# Patient Record
Sex: FEMALE | Race: WHITE | NOT HISPANIC OR LATINO
[De-identification: names, ages, dates, MRNs, and addresses within clinical notes are randomized per-mention and may not be internally consistent; named-entity substitution may affect disease eponyms.]

---

## 2017-03-06 ENCOUNTER — RECORD ABSTRACTING (OUTPATIENT)
Age: 82
End: 2017-03-06

## 2017-03-17 ENCOUNTER — RECORD ABSTRACTING (OUTPATIENT)
Age: 82
End: 2017-03-17

## 2017-03-17 DIAGNOSIS — K21.9 GASTRO-ESOPHAGEAL REFLUX DISEASE W/OUT ESOPHAGITIS: ICD-10-CM

## 2017-03-17 DIAGNOSIS — E55.9 VITAMIN D DEFICIENCY, UNSPECIFIED: ICD-10-CM

## 2017-03-17 DIAGNOSIS — Z87.891 PERSONAL HISTORY OF NICOTINE DEPENDENCE: ICD-10-CM

## 2017-03-17 DIAGNOSIS — Z91.19 PATIENT'S NONCOMPLIANCE WITH OTHER MEDICAL TREATMENT AND REGIMEN: ICD-10-CM

## 2017-03-17 DIAGNOSIS — Z80.1 FAMILY HISTORY OF MALIGNANT NEOPLASM OF TRACHEA, BRONCHUS AND LUNG: ICD-10-CM

## 2017-03-17 DIAGNOSIS — Z92.89 PERSONAL HISTORY OF OTHER MEDICAL TREATMENT: ICD-10-CM

## 2017-03-17 DIAGNOSIS — Z87.39 PERSONAL HISTORY OF OTHER DISEASES OF THE MUSCULOSKELETAL SYSTEM AND CONNECTIVE TISSUE: ICD-10-CM

## 2017-03-17 DIAGNOSIS — E03.9 HYPOTHYROIDISM, UNSPECIFIED: ICD-10-CM

## 2017-03-17 DIAGNOSIS — Z82.0 FAMILY HISTORY OF EPILEPSY AND OTHER DISEASES OF THE NERVOUS SYSTEM: ICD-10-CM

## 2017-03-20 ENCOUNTER — APPOINTMENT (OUTPATIENT)
Dept: INTERNAL MEDICINE | Facility: CLINIC | Age: 82
End: 2017-03-20

## 2017-03-20 VITALS
SYSTOLIC BLOOD PRESSURE: 130 MMHG | HEART RATE: 72 BPM | BODY MASS INDEX: 40.97 KG/M2 | OXYGEN SATURATION: 95 % | TEMPERATURE: 98.6 F | WEIGHT: 240 LBS | RESPIRATION RATE: 20 BRPM | DIASTOLIC BLOOD PRESSURE: 76 MMHG | HEIGHT: 64 IN

## 2017-03-20 DIAGNOSIS — Z00.00 ENCOUNTER FOR GENERAL ADULT MEDICAL EXAMINATION W/OUT ABNORMAL FINDINGS: ICD-10-CM

## 2017-03-27 PROBLEM — Z00.00 ENCOUNTER FOR PREVENTIVE HEALTH EXAMINATION: Status: ACTIVE | Noted: 2017-02-25

## 2017-07-17 ENCOUNTER — OTHER (OUTPATIENT)
Age: 82
End: 2017-07-17

## 2017-07-20 ENCOUNTER — OTHER (OUTPATIENT)
Age: 82
End: 2017-07-20

## 2017-07-20 ENCOUNTER — MEDICATION RENEWAL (OUTPATIENT)
Age: 82
End: 2017-07-20

## 2017-07-24 LAB
CHOLEST SERPL-MCNC: 254
GLUCOSE SERPL-MCNC: 115
HBA1C MFR BLD HPLC: 6
HDLC SERPL-MCNC: 47
LDLC SERPL CALC-MCNC: 165

## 2017-08-15 ENCOUNTER — MEDICATION RENEWAL (OUTPATIENT)
Age: 82
End: 2017-08-15

## 2017-08-15 RX ORDER — ATORVASTATIN CALCIUM 40 MG/1
40 TABLET, FILM COATED ORAL
Refills: 0 | Status: COMPLETED | COMMUNITY
End: 2017-08-15

## 2017-09-13 ENCOUNTER — APPOINTMENT (OUTPATIENT)
Dept: INTERNAL MEDICINE | Facility: CLINIC | Age: 82
End: 2017-09-13
Payer: MEDICARE

## 2017-09-13 VITALS
SYSTOLIC BLOOD PRESSURE: 120 MMHG | OXYGEN SATURATION: 95 % | WEIGHT: 248 LBS | RESPIRATION RATE: 20 BRPM | DIASTOLIC BLOOD PRESSURE: 82 MMHG | HEIGHT: 64 IN | HEART RATE: 78 BPM | TEMPERATURE: 97.5 F | BODY MASS INDEX: 42.34 KG/M2

## 2017-09-13 PROCEDURE — 99213 OFFICE O/P EST LOW 20 MIN: CPT

## 2017-09-13 RX ORDER — PREDNISONE 20 MG/1
20 TABLET ORAL
Qty: 10 | Refills: 0 | Status: DISCONTINUED | COMMUNITY
Start: 2017-08-15 | End: 2017-09-13

## 2017-09-22 ENCOUNTER — APPOINTMENT (OUTPATIENT)
Dept: INTERNAL MEDICINE | Facility: CLINIC | Age: 82
End: 2017-09-22

## 2017-10-07 ENCOUNTER — RECORD ABSTRACTING (OUTPATIENT)
Age: 82
End: 2017-10-07

## 2017-10-10 ENCOUNTER — APPOINTMENT (OUTPATIENT)
Dept: INTERNAL MEDICINE | Facility: CLINIC | Age: 82
End: 2017-10-10
Payer: MEDICARE

## 2017-10-10 VITALS
TEMPERATURE: 97.6 F | BODY MASS INDEX: 42 KG/M2 | SYSTOLIC BLOOD PRESSURE: 124 MMHG | WEIGHT: 245.99 LBS | OXYGEN SATURATION: 97 % | HEART RATE: 70 BPM | DIASTOLIC BLOOD PRESSURE: 80 MMHG | RESPIRATION RATE: 16 BRPM | HEIGHT: 64 IN

## 2017-10-10 DIAGNOSIS — N30.90 CYSTITIS, UNSPECIFIED W/OUT HEMATURIA: ICD-10-CM

## 2017-10-10 DIAGNOSIS — I65.23 OCCLUSION AND STENOSIS OF BILATERAL CAROTID ARTERIES: ICD-10-CM

## 2017-10-10 DIAGNOSIS — K76.0 FATTY (CHANGE OF) LIVER, NOT ELSEWHERE CLASSIFIED: ICD-10-CM

## 2017-10-10 DIAGNOSIS — E78.00 PURE HYPERCHOLESTEROLEMIA, UNSPECIFIED: ICD-10-CM

## 2017-10-10 DIAGNOSIS — R79.89 OTHER SPECIFIED ABNORMAL FINDINGS OF BLOOD CHEMISTRY: ICD-10-CM

## 2017-10-10 DIAGNOSIS — M10.9 GOUT, UNSPECIFIED: ICD-10-CM

## 2017-10-10 DIAGNOSIS — E53.8 DEFICIENCY OF OTHER SPECIFIED B GROUP VITAMINS: ICD-10-CM

## 2017-10-10 PROCEDURE — 99213 OFFICE O/P EST LOW 20 MIN: CPT | Mod: 25

## 2017-10-10 RX ORDER — PREDNISONE 20 MG/1
20 TABLET ORAL AS DIRECTED
Qty: 9 | Refills: 0 | Status: COMPLETED | COMMUNITY
Start: 2017-09-13 | End: 2017-10-10

## 2017-10-10 RX ORDER — OLMESARTAN MEDOXOMIL 20 MG/1
20 TABLET, FILM COATED ORAL DAILY
Refills: 0 | Status: COMPLETED | COMMUNITY
End: 2017-10-10

## 2017-10-10 RX ORDER — HYDROCHLOROTHIAZIDE 25 MG/1
25 TABLET ORAL
Refills: 0 | Status: COMPLETED | COMMUNITY
End: 2017-10-10

## 2017-10-10 RX ORDER — LEVOTHYROXINE SODIUM 75 UG/1
75 TABLET ORAL
Refills: 0 | Status: COMPLETED | COMMUNITY
End: 2017-10-10

## 2018-01-09 ENCOUNTER — RECORD ABSTRACTING (OUTPATIENT)
Age: 83
End: 2018-01-09

## 2018-01-09 RX ORDER — UBIDECARENONE/VIT E ACET 100MG-5
CAPSULE ORAL
Refills: 0 | Status: ACTIVE | COMMUNITY

## 2018-01-10 ENCOUNTER — OTHER (OUTPATIENT)
Age: 83
End: 2018-01-10

## 2018-01-10 ENCOUNTER — MED ADMIN CHARGE (OUTPATIENT)
Age: 83
End: 2018-01-10

## 2018-01-10 ENCOUNTER — APPOINTMENT (OUTPATIENT)
Dept: INTERNAL MEDICINE | Facility: CLINIC | Age: 83
End: 2018-01-10
Payer: MEDICARE

## 2018-01-10 VITALS
BODY MASS INDEX: 40.97 KG/M2 | DIASTOLIC BLOOD PRESSURE: 78 MMHG | OXYGEN SATURATION: 96 % | HEIGHT: 64 IN | HEART RATE: 70 BPM | TEMPERATURE: 97.6 F | WEIGHT: 240 LBS | SYSTOLIC BLOOD PRESSURE: 134 MMHG | RESPIRATION RATE: 20 BRPM

## 2018-01-10 DIAGNOSIS — Z23 ENCOUNTER FOR IMMUNIZATION: ICD-10-CM

## 2018-01-10 DIAGNOSIS — E66.01 MORBID (SEVERE) OBESITY DUE TO EXCESS CALORIES: ICD-10-CM

## 2018-01-10 DIAGNOSIS — E66.9 OBESITY, UNSPECIFIED: ICD-10-CM

## 2018-01-10 LAB
CHOLEST SERPL-MCNC: 187
HDLC SERPL-MCNC: 37.5
LDLC SERPL CALC-MCNC: 112.3

## 2018-01-10 PROCEDURE — 99213 OFFICE O/P EST LOW 20 MIN: CPT | Mod: 25

## 2018-01-10 PROCEDURE — 90662 IIV NO PRSV INCREASED AG IM: CPT

## 2018-01-10 PROCEDURE — G0008: CPT

## 2018-02-20 ENCOUNTER — MEDICATION RENEWAL (OUTPATIENT)
Age: 83
End: 2018-02-20

## 2018-04-04 ENCOUNTER — APPOINTMENT (OUTPATIENT)
Dept: OTOLARYNGOLOGY | Facility: CLINIC | Age: 83
End: 2018-04-04
Payer: MEDICARE

## 2018-04-04 VITALS
BODY MASS INDEX: 40.8 KG/M2 | HEIGHT: 64 IN | SYSTOLIC BLOOD PRESSURE: 178 MMHG | WEIGHT: 239 LBS | HEART RATE: 73 BPM | DIASTOLIC BLOOD PRESSURE: 93 MMHG

## 2018-04-04 DIAGNOSIS — H61.21 IMPACTED CERUMEN, RIGHT EAR: ICD-10-CM

## 2018-04-04 DIAGNOSIS — R42 DIZZINESS AND GIDDINESS: ICD-10-CM

## 2018-04-04 PROCEDURE — 92588 EVOKED AUDITORY TST COMPLETE: CPT

## 2018-04-04 PROCEDURE — 99203 OFFICE O/P NEW LOW 30 MIN: CPT

## 2018-04-04 PROCEDURE — 92557 COMPREHENSIVE HEARING TEST: CPT

## 2018-04-04 PROCEDURE — 92567 TYMPANOMETRY: CPT

## 2018-05-15 ENCOUNTER — NON-APPOINTMENT (OUTPATIENT)
Age: 83
End: 2018-05-15

## 2018-05-15 ENCOUNTER — APPOINTMENT (OUTPATIENT)
Dept: INTERNAL MEDICINE | Facility: CLINIC | Age: 83
End: 2018-05-15
Payer: MEDICARE

## 2018-05-15 VITALS
DIASTOLIC BLOOD PRESSURE: 74 MMHG | TEMPERATURE: 98.6 F | BODY MASS INDEX: 40.97 KG/M2 | HEIGHT: 64 IN | WEIGHT: 240 LBS | SYSTOLIC BLOOD PRESSURE: 132 MMHG | RESPIRATION RATE: 20 BRPM | HEART RATE: 88 BPM | OXYGEN SATURATION: 97 %

## 2018-05-15 PROCEDURE — 94010 BREATHING CAPACITY TEST: CPT

## 2018-05-15 PROCEDURE — 99214 OFFICE O/P EST MOD 30 MIN: CPT | Mod: 25

## 2018-05-23 ENCOUNTER — RX RENEWAL (OUTPATIENT)
Age: 83
End: 2018-05-23

## 2018-06-28 DIAGNOSIS — E55.9 VITAMIN D DEFICIENCY, UNSPECIFIED: ICD-10-CM

## 2018-07-10 ENCOUNTER — RX RENEWAL (OUTPATIENT)
Age: 83
End: 2018-07-10

## 2018-07-10 RX ORDER — LEVOTHYROXINE SODIUM 0.09 MG/1
88 TABLET ORAL DAILY
Qty: 90 | Refills: 3 | Status: ACTIVE | COMMUNITY
Start: 1900-01-01 | End: 1900-01-01

## 2018-08-16 ENCOUNTER — MEDICATION RENEWAL (OUTPATIENT)
Age: 83
End: 2018-08-16

## 2018-11-13 ENCOUNTER — APPOINTMENT (OUTPATIENT)
Dept: INTERNAL MEDICINE | Facility: CLINIC | Age: 83
End: 2018-11-13

## 2018-12-17 ENCOUNTER — APPOINTMENT (OUTPATIENT)
Dept: INTERNAL MEDICINE | Facility: CLINIC | Age: 83
End: 2018-12-17
Payer: MEDICARE

## 2018-12-17 ENCOUNTER — OTHER (OUTPATIENT)
Age: 83
End: 2018-12-17

## 2018-12-17 VITALS
DIASTOLIC BLOOD PRESSURE: 80 MMHG | OXYGEN SATURATION: 97 % | HEART RATE: 76 BPM | WEIGHT: 236 LBS | TEMPERATURE: 97.9 F | BODY MASS INDEX: 40.29 KG/M2 | RESPIRATION RATE: 16 BRPM | HEIGHT: 64 IN | SYSTOLIC BLOOD PRESSURE: 124 MMHG

## 2018-12-17 DIAGNOSIS — M54.41 LUMBAGO WITH SCIATICA, LEFT SIDE: ICD-10-CM

## 2018-12-17 DIAGNOSIS — M47.816 SPONDYLOSIS W/OUT MYELOPATHY OR RADICULOPATHY, LUMBAR REGION: ICD-10-CM

## 2018-12-17 DIAGNOSIS — M54.42 LUMBAGO WITH SCIATICA, LEFT SIDE: ICD-10-CM

## 2018-12-17 DIAGNOSIS — G89.29 LUMBAGO WITH SCIATICA, LEFT SIDE: ICD-10-CM

## 2018-12-17 DIAGNOSIS — R53.82 CHRONIC FATIGUE, UNSPECIFIED: ICD-10-CM

## 2018-12-17 PROCEDURE — G0008: CPT

## 2018-12-17 PROCEDURE — 99214 OFFICE O/P EST MOD 30 MIN: CPT | Mod: 25

## 2018-12-17 PROCEDURE — 90662 IIV NO PRSV INCREASED AG IM: CPT | Mod: GA

## 2018-12-17 RX ORDER — LOSARTAN POTASSIUM 50 MG/1
50 TABLET, FILM COATED ORAL
Refills: 0 | Status: DISCONTINUED | COMMUNITY
End: 2018-12-17

## 2018-12-17 NOTE — PHYSICAL EXAM
[No Acute Distress] : no acute distress [Well Nourished] : well nourished [Normal Oropharynx] : the oropharynx was normal [Supple] : supple [No Respiratory Distress] : no respiratory distress  [Clear to Auscultation] : lungs were clear to auscultation bilaterally [Normal Rate] : normal rate  [Regular Rhythm] : with a regular rhythm [Soft] : abdomen soft [Non Tender] : non-tender [Normal Bowel Sounds] : normal bowel sounds [Normal Supraclavicular Nodes] : no supraclavicular lymphadenopathy [Normal Posterior Cervical Nodes] : no posterior cervical lymphadenopathy [Normal Anterior Cervical Nodes] : no anterior cervical lymphadenopathy [No CVA Tenderness] : no CVA  tenderness [No Spinal Tenderness] : no spinal tenderness [Coordination Grossly Intact] : coordination grossly intact [No Focal Deficits] : no focal deficits [Normal Affect] : the affect was normal [Normal Insight/Judgement] : insight and judgment were intact [de-identified] : 1 + edema bilateral lower extremities. [de-identified] : Decreased strength and tone bilateral lower extremities.

## 2018-12-17 NOTE — PLAN
[FreeTextEntry1] : Will arrange comprehensive fasting blood work to be scheduled through [facility] for week of January 1, 2019.\par Continue current medications as prescribed.\par Advised follow up with Dr. Pack and Dr. Us for gait instability.\par She has a script through Dr. Us for further PT for gait, balance and strength to be restarted 2019.\par She will follow with Dr. Collins in Cardiology.\par 6 month primary care visit with Dr. Núñez in our office.\par Will speak with patient when lab results reviewed, she will call us sooner if questions or problems.

## 2018-12-17 NOTE — HISTORY OF PRESENT ILLNESS
[FreeTextEntry1] : patient presents for interval exam.  She is generally feeling well but still complains of gait imbalance and weakness in lower extremities.  Has had extensive work up with Neurologist and Physiologist.  She has had MRI of brain which was noncontributory and MRI spine and  EMG/nerve conduction studies.  Epidurals by Dr. Pack in past which has helped the pains from spinal disease.See previous notes for results.\par To date she is managing at home without falling, she uses a walker and cane.  \par Felt that PT was helpful, completed 1 month ago for strength and she has been doing some exercise at home.. Has PT script from Dr. Us for more sessions in 2019. [de-identified] : To Dr. Rodarte 5 months ago for stage 3 chronic renal disease.  Reports HCTZ and Losartan dosing decreased by 1/2.  Blood pressure has been stable.  Next appointment i n 2 months.

## 2019-01-08 ENCOUNTER — RX RENEWAL (OUTPATIENT)
Age: 84
End: 2019-01-08

## 2019-01-09 ENCOUNTER — RX RENEWAL (OUTPATIENT)
Age: 84
End: 2019-01-09

## 2019-01-09 RX ORDER — ATORVASTATIN CALCIUM 20 MG/1
20 TABLET, FILM COATED ORAL
Qty: 90 | Refills: 1 | Status: ACTIVE | COMMUNITY
Start: 2018-05-23 | End: 1900-01-01

## 2019-01-17 LAB — HBA1C MFR BLD HPLC: 6.2

## 2019-03-01 ENCOUNTER — APPOINTMENT (OUTPATIENT)
Dept: INTERNAL MEDICINE | Facility: CLINIC | Age: 84
End: 2019-03-01

## 2019-07-09 ENCOUNTER — RX RENEWAL (OUTPATIENT)
Age: 84
End: 2019-07-09

## 2019-07-10 ENCOUNTER — APPOINTMENT (OUTPATIENT)
Dept: INTERNAL MEDICINE | Facility: CLINIC | Age: 84
End: 2019-07-10

## 2021-01-18 ENCOUNTER — APPOINTMENT (OUTPATIENT)
Dept: OTOLARYNGOLOGY | Facility: CLINIC | Age: 86
End: 2021-01-18
Payer: MEDICARE

## 2021-01-18 VITALS
TEMPERATURE: 97.5 F | SYSTOLIC BLOOD PRESSURE: 134 MMHG | DIASTOLIC BLOOD PRESSURE: 67 MMHG | BODY MASS INDEX: 40.29 KG/M2 | HEART RATE: 72 BPM | WEIGHT: 236 LBS | HEIGHT: 64 IN

## 2021-01-18 DIAGNOSIS — H68.023 CHRONIC EUSTACHIAN SALPINGITIS, BILATERAL: ICD-10-CM

## 2021-01-18 PROCEDURE — 92557 COMPREHENSIVE HEARING TEST: CPT

## 2021-01-18 PROCEDURE — 99214 OFFICE O/P EST MOD 30 MIN: CPT

## 2021-01-18 PROCEDURE — 92567 TYMPANOMETRY: CPT

## 2021-01-18 NOTE — ASSESSMENT
[FreeTextEntry1] : cerumen cleared worse on rt\par audio  moderate to severe au lss poor disc rt\par rec hearing aid reeval\par tinnitus  rec masking

## 2021-01-18 NOTE — PHYSICAL EXAM
[Midline] : trachea located in midline position [Normal] : no rashes [de-identified] : cerumen impacted rt ear cleared

## 2021-04-27 ENCOUNTER — OUTPATIENT (OUTPATIENT)
Dept: OUTPATIENT SERVICES | Facility: HOSPITAL | Age: 86
LOS: 1 days | End: 2021-04-27
Payer: MEDICARE

## 2021-04-27 DIAGNOSIS — Z20.828 CONTACT WITH AND (SUSPECTED) EXPOSURE TO OTHER VIRAL COMMUNICABLE DISEASES: ICD-10-CM

## 2021-04-27 LAB — SARS-COV-2 RNA SPEC QL NAA+PROBE: SIGNIFICANT CHANGE UP

## 2021-04-27 PROCEDURE — U0003: CPT

## 2021-04-27 PROCEDURE — U0005: CPT

## 2021-04-29 ENCOUNTER — OUTPATIENT (OUTPATIENT)
Dept: OUTPATIENT SERVICES | Facility: HOSPITAL | Age: 86
LOS: 1 days | End: 2021-04-29
Payer: MEDICARE

## 2021-04-29 DIAGNOSIS — M54.16 RADICULOPATHY, LUMBAR REGION: ICD-10-CM

## 2021-04-29 PROCEDURE — 62323 NJX INTERLAMINAR LMBR/SAC: CPT

## 2021-07-29 ENCOUNTER — OUTPATIENT (OUTPATIENT)
Dept: OUTPATIENT SERVICES | Facility: HOSPITAL | Age: 86
LOS: 1 days | End: 2021-07-29
Payer: MEDICARE

## 2021-07-29 DIAGNOSIS — M54.16 RADICULOPATHY, LUMBAR REGION: ICD-10-CM

## 2021-07-29 PROCEDURE — 62323 NJX INTERLAMINAR LMBR/SAC: CPT

## 2022-03-15 ENCOUNTER — APPOINTMENT (OUTPATIENT)
Dept: PHARMACY | Facility: CLINIC | Age: 87
End: 2022-03-15

## 2022-03-30 ENCOUNTER — APPOINTMENT (OUTPATIENT)
Dept: PHARMACY | Facility: CLINIC | Age: 87
End: 2022-03-30
Payer: SELF-PAY

## 2022-03-30 ENCOUNTER — APPOINTMENT (OUTPATIENT)
Dept: OTOLARYNGOLOGY | Facility: CLINIC | Age: 87
End: 2022-03-30
Payer: MEDICARE

## 2022-03-30 PROCEDURE — 92567 TYMPANOMETRY: CPT

## 2022-03-30 PROCEDURE — 92557 COMPREHENSIVE HEARING TEST: CPT

## 2022-03-30 PROCEDURE — V5010 ASSESSMENT FOR HEARING AID: CPT | Mod: NC

## 2022-05-02 ENCOUNTER — APPOINTMENT (OUTPATIENT)
Dept: OTOLARYNGOLOGY | Facility: CLINIC | Age: 87
End: 2022-05-02

## 2022-05-02 ENCOUNTER — APPOINTMENT (OUTPATIENT)
Dept: PHARMACY | Facility: CLINIC | Age: 87
End: 2022-05-02

## 2022-06-06 ENCOUNTER — APPOINTMENT (OUTPATIENT)
Dept: PHARMACY | Facility: CLINIC | Age: 87
End: 2022-06-06

## 2022-06-06 ENCOUNTER — APPOINTMENT (OUTPATIENT)
Dept: OTOLARYNGOLOGY | Facility: CLINIC | Age: 87
End: 2022-06-06

## 2022-06-27 ENCOUNTER — APPOINTMENT (OUTPATIENT)
Dept: OTOLARYNGOLOGY | Facility: CLINIC | Age: 87
End: 2022-06-27

## 2022-06-30 RX ORDER — AZITHROMYCIN 500 MG/1
0 TABLET, FILM COATED ORAL
Qty: 0 | Refills: 0 | DISCHARGE
Start: 2022-06-30 | End: 2022-07-04

## 2022-07-02 ENCOUNTER — INPATIENT (INPATIENT)
Facility: HOSPITAL | Age: 87
LOS: 5 days | Discharge: NURSING, REHAB OR LONG-TERM CARE | DRG: 280 | End: 2022-07-08
Attending: HOSPITALIST | Admitting: FAMILY MEDICINE
Payer: MEDICARE

## 2022-07-02 VITALS
SYSTOLIC BLOOD PRESSURE: 107 MMHG | DIASTOLIC BLOOD PRESSURE: 63 MMHG | OXYGEN SATURATION: 96 % | RESPIRATION RATE: 20 BRPM | TEMPERATURE: 98 F | HEART RATE: 70 BPM | WEIGHT: 216.93 LBS

## 2022-07-02 DIAGNOSIS — I21.4 NON-ST ELEVATION (NSTEMI) MYOCARDIAL INFARCTION: ICD-10-CM

## 2022-07-02 LAB
ALBUMIN SERPL ELPH-MCNC: 2.9 G/DL — LOW (ref 3.3–5)
ALP SERPL-CCNC: 91 U/L — SIGNIFICANT CHANGE UP (ref 40–120)
ALT FLD-CCNC: 177 U/L — HIGH (ref 12–78)
ANION GAP SERPL CALC-SCNC: 9 MMOL/L — SIGNIFICANT CHANGE UP (ref 5–17)
ANION GAP SERPL CALC-SCNC: 9 MMOL/L — SIGNIFICANT CHANGE UP (ref 5–17)
APPEARANCE UR: ABNORMAL
APTT BLD: 20.7 SEC — LOW (ref 27.5–35.5)
AST SERPL-CCNC: 717 U/L — HIGH (ref 15–37)
BASOPHILS # BLD AUTO: 0.02 K/UL — SIGNIFICANT CHANGE UP (ref 0–0.2)
BASOPHILS NFR BLD AUTO: 0.1 % — SIGNIFICANT CHANGE UP (ref 0–2)
BILIRUB SERPL-MCNC: 0.8 MG/DL — SIGNIFICANT CHANGE UP (ref 0.2–1.2)
BILIRUB UR-MCNC: NEGATIVE — SIGNIFICANT CHANGE UP
BUN SERPL-MCNC: 69 MG/DL — HIGH (ref 7–23)
BUN SERPL-MCNC: 73 MG/DL — HIGH (ref 7–23)
CALCIUM SERPL-MCNC: 8.5 MG/DL — SIGNIFICANT CHANGE UP (ref 8.5–10.1)
CALCIUM SERPL-MCNC: 8.9 MG/DL — SIGNIFICANT CHANGE UP (ref 8.5–10.1)
CHLORIDE SERPL-SCNC: 105 MMOL/L — SIGNIFICANT CHANGE UP (ref 96–108)
CHLORIDE SERPL-SCNC: 108 MMOL/L — SIGNIFICANT CHANGE UP (ref 96–108)
CO2 SERPL-SCNC: 19 MMOL/L — LOW (ref 22–31)
CO2 SERPL-SCNC: 22 MMOL/L — SIGNIFICANT CHANGE UP (ref 22–31)
COLOR SPEC: YELLOW — SIGNIFICANT CHANGE UP
CREAT SERPL-MCNC: 4.09 MG/DL — HIGH (ref 0.5–1.3)
CREAT SERPL-MCNC: 4.21 MG/DL — HIGH (ref 0.5–1.3)
DIFF PNL FLD: ABNORMAL
EGFR: 10 ML/MIN/1.73M2 — LOW
EGFR: 10 ML/MIN/1.73M2 — LOW
EOSINOPHIL # BLD AUTO: 0.01 K/UL — SIGNIFICANT CHANGE UP (ref 0–0.5)
EOSINOPHIL NFR BLD AUTO: 0.1 % — SIGNIFICANT CHANGE UP (ref 0–6)
GLUCOSE SERPL-MCNC: 125 MG/DL — HIGH (ref 70–99)
GLUCOSE SERPL-MCNC: 126 MG/DL — HIGH (ref 70–99)
GLUCOSE UR QL: NEGATIVE — SIGNIFICANT CHANGE UP
HCT VFR BLD CALC: 34.2 % — LOW (ref 34.5–45)
HGB BLD-MCNC: 11.8 G/DL — SIGNIFICANT CHANGE UP (ref 11.5–15.5)
IMM GRANULOCYTES NFR BLD AUTO: 0.4 % — SIGNIFICANT CHANGE UP (ref 0–1.5)
INR BLD: 1.03 RATIO — SIGNIFICANT CHANGE UP (ref 0.88–1.16)
KETONES UR-MCNC: NEGATIVE — SIGNIFICANT CHANGE UP
LACTATE SERPL-SCNC: 1.7 MMOL/L — SIGNIFICANT CHANGE UP (ref 0.7–2)
LEUKOCYTE ESTERASE UR-ACNC: ABNORMAL
LYMPHOCYTES # BLD AUTO: 1.31 K/UL — SIGNIFICANT CHANGE UP (ref 1–3.3)
LYMPHOCYTES # BLD AUTO: 9.2 % — LOW (ref 13–44)
MAGNESIUM SERPL-MCNC: 2 MG/DL — SIGNIFICANT CHANGE UP (ref 1.6–2.6)
MCHC RBC-ENTMCNC: 30.9 PG — SIGNIFICANT CHANGE UP (ref 27–34)
MCHC RBC-ENTMCNC: 34.5 GM/DL — SIGNIFICANT CHANGE UP (ref 32–36)
MCV RBC AUTO: 89.5 FL — SIGNIFICANT CHANGE UP (ref 80–100)
MONOCYTES # BLD AUTO: 0.71 K/UL — SIGNIFICANT CHANGE UP (ref 0–0.9)
MONOCYTES NFR BLD AUTO: 5 % — SIGNIFICANT CHANGE UP (ref 2–14)
NEUTROPHILS # BLD AUTO: 12.07 K/UL — HIGH (ref 1.8–7.4)
NEUTROPHILS NFR BLD AUTO: 85.2 % — HIGH (ref 43–77)
NITRITE UR-MCNC: NEGATIVE — SIGNIFICANT CHANGE UP
PH UR: 5 — SIGNIFICANT CHANGE UP (ref 5–8)
PLATELET # BLD AUTO: 161 K/UL — SIGNIFICANT CHANGE UP (ref 150–400)
POTASSIUM SERPL-MCNC: 3.7 MMOL/L — SIGNIFICANT CHANGE UP (ref 3.5–5.3)
POTASSIUM SERPL-MCNC: 4.2 MMOL/L — SIGNIFICANT CHANGE UP (ref 3.5–5.3)
POTASSIUM SERPL-SCNC: 3.7 MMOL/L — SIGNIFICANT CHANGE UP (ref 3.5–5.3)
POTASSIUM SERPL-SCNC: 4.2 MMOL/L — SIGNIFICANT CHANGE UP (ref 3.5–5.3)
PROT SERPL-MCNC: 6.5 GM/DL — SIGNIFICANT CHANGE UP (ref 6–8.3)
PROT UR-MCNC: 30 MG/DL
PROTHROM AB SERPL-ACNC: 11.9 SEC — SIGNIFICANT CHANGE UP (ref 10.5–13.4)
RBC # BLD: 3.82 M/UL — SIGNIFICANT CHANGE UP (ref 3.8–5.2)
RBC # FLD: 13.4 % — SIGNIFICANT CHANGE UP (ref 10.3–14.5)
SODIUM SERPL-SCNC: 136 MMOL/L — SIGNIFICANT CHANGE UP (ref 135–145)
SODIUM SERPL-SCNC: 136 MMOL/L — SIGNIFICANT CHANGE UP (ref 135–145)
SP GR SPEC: 1.01 — SIGNIFICANT CHANGE UP (ref 1.01–1.02)
TROPONIN I, HIGH SENSITIVITY RESULT: 3957.44 NG/L — HIGH
UROBILINOGEN FLD QL: NEGATIVE — SIGNIFICANT CHANGE UP
WBC # BLD: 14.18 K/UL — HIGH (ref 3.8–10.5)
WBC # FLD AUTO: 14.18 K/UL — HIGH (ref 3.8–10.5)

## 2022-07-02 PROCEDURE — 83735 ASSAY OF MAGNESIUM: CPT

## 2022-07-02 PROCEDURE — 85045 AUTOMATED RETICULOCYTE COUNT: CPT

## 2022-07-02 PROCEDURE — 36415 COLL VENOUS BLD VENIPUNCTURE: CPT

## 2022-07-02 PROCEDURE — 97530 THERAPEUTIC ACTIVITIES: CPT | Mod: GP

## 2022-07-02 PROCEDURE — 93010 ELECTROCARDIOGRAM REPORT: CPT

## 2022-07-02 PROCEDURE — 83540 ASSAY OF IRON: CPT

## 2022-07-02 PROCEDURE — 80048 BASIC METABOLIC PNL TOTAL CA: CPT

## 2022-07-02 PROCEDURE — 86850 RBC ANTIBODY SCREEN: CPT

## 2022-07-02 PROCEDURE — 82570 ASSAY OF URINE CREATININE: CPT

## 2022-07-02 PROCEDURE — 76376 3D RENDER W/INTRP POSTPROCES: CPT

## 2022-07-02 PROCEDURE — 70450 CT HEAD/BRAIN W/O DYE: CPT | Mod: MA

## 2022-07-02 PROCEDURE — 83010 ASSAY OF HAPTOGLOBIN QUANT: CPT

## 2022-07-02 PROCEDURE — 85018 HEMOGLOBIN: CPT

## 2022-07-02 PROCEDURE — 86901 BLOOD TYPING SEROLOGIC RH(D): CPT

## 2022-07-02 PROCEDURE — 83550 IRON BINDING TEST: CPT

## 2022-07-02 PROCEDURE — 87086 URINE CULTURE/COLONY COUNT: CPT

## 2022-07-02 PROCEDURE — 72125 CT NECK SPINE W/O DYE: CPT | Mod: 26

## 2022-07-02 PROCEDURE — 84443 ASSAY THYROID STIM HORMONE: CPT

## 2022-07-02 PROCEDURE — 99291 CRITICAL CARE FIRST HOUR: CPT

## 2022-07-02 PROCEDURE — P9016: CPT

## 2022-07-02 PROCEDURE — 84300 ASSAY OF URINE SODIUM: CPT

## 2022-07-02 PROCEDURE — 93005 ELECTROCARDIOGRAM TRACING: CPT

## 2022-07-02 PROCEDURE — 72125 CT NECK SPINE W/O DYE: CPT | Mod: MA

## 2022-07-02 PROCEDURE — 85014 HEMATOCRIT: CPT

## 2022-07-02 PROCEDURE — 72192 CT PELVIS W/O DYE: CPT

## 2022-07-02 PROCEDURE — 74176 CT ABD & PELVIS W/O CONTRAST: CPT

## 2022-07-02 PROCEDURE — 86900 BLOOD TYPING SEROLOGIC ABO: CPT

## 2022-07-02 PROCEDURE — 84484 ASSAY OF TROPONIN QUANT: CPT

## 2022-07-02 PROCEDURE — 76770 US EXAM ABDO BACK WALL COMP: CPT

## 2022-07-02 PROCEDURE — 81001 URINALYSIS AUTO W/SCOPE: CPT

## 2022-07-02 PROCEDURE — 80076 HEPATIC FUNCTION PANEL: CPT

## 2022-07-02 PROCEDURE — 86920 COMPATIBILITY TEST SPIN: CPT

## 2022-07-02 PROCEDURE — 85027 COMPLETE CBC AUTOMATED: CPT

## 2022-07-02 PROCEDURE — 85730 THROMBOPLASTIN TIME PARTIAL: CPT

## 2022-07-02 PROCEDURE — 70450 CT HEAD/BRAIN W/O DYE: CPT | Mod: 26

## 2022-07-02 PROCEDURE — 93306 TTE W/DOPPLER COMPLETE: CPT

## 2022-07-02 PROCEDURE — U0003: CPT

## 2022-07-02 PROCEDURE — 73502 X-RAY EXAM HIP UNI 2-3 VIEWS: CPT | Mod: 26,LT

## 2022-07-02 PROCEDURE — 84156 ASSAY OF PROTEIN URINE: CPT

## 2022-07-02 PROCEDURE — 97116 GAIT TRAINING THERAPY: CPT | Mod: GP

## 2022-07-02 PROCEDURE — 99222 1ST HOSP IP/OBS MODERATE 55: CPT

## 2022-07-02 PROCEDURE — 73700 CT LOWER EXTREMITY W/O DYE: CPT | Mod: LT

## 2022-07-02 PROCEDURE — 82728 ASSAY OF FERRITIN: CPT

## 2022-07-02 PROCEDURE — 85610 PROTHROMBIN TIME: CPT

## 2022-07-02 PROCEDURE — 36430 TRANSFUSION BLD/BLD COMPNT: CPT

## 2022-07-02 PROCEDURE — 97163 PT EVAL HIGH COMPLEX 45 MIN: CPT | Mod: GP

## 2022-07-02 PROCEDURE — 71045 X-RAY EXAM CHEST 1 VIEW: CPT | Mod: 26

## 2022-07-02 PROCEDURE — 84100 ASSAY OF PHOSPHORUS: CPT

## 2022-07-02 PROCEDURE — 82607 VITAMIN B-12: CPT

## 2022-07-02 PROCEDURE — U0005: CPT

## 2022-07-02 PROCEDURE — 83615 LACTATE (LD) (LDH) ENZYME: CPT

## 2022-07-02 RX ORDER — ACETAMINOPHEN 500 MG
650 TABLET ORAL ONCE
Refills: 0 | Status: COMPLETED | OUTPATIENT
Start: 2022-07-02 | End: 2022-07-02

## 2022-07-02 RX ORDER — HEPARIN SODIUM 5000 [USP'U]/ML
INJECTION INTRAVENOUS; SUBCUTANEOUS
Qty: 25000 | Refills: 0 | Status: DISCONTINUED | OUTPATIENT
Start: 2022-07-02 | End: 2022-07-03

## 2022-07-02 RX ORDER — SODIUM CHLORIDE 9 MG/ML
1000 INJECTION INTRAMUSCULAR; INTRAVENOUS; SUBCUTANEOUS
Refills: 0 | Status: DISCONTINUED | OUTPATIENT
Start: 2022-07-02 | End: 2022-07-03

## 2022-07-02 RX ORDER — HEPARIN SODIUM 5000 [USP'U]/ML
5000 INJECTION INTRAVENOUS; SUBCUTANEOUS ONCE
Refills: 0 | Status: COMPLETED | OUTPATIENT
Start: 2022-07-02 | End: 2022-07-02

## 2022-07-02 RX ORDER — CEFTRIAXONE 500 MG/1
1000 INJECTION, POWDER, FOR SOLUTION INTRAMUSCULAR; INTRAVENOUS EVERY 24 HOURS
Refills: 0 | Status: DISCONTINUED | OUTPATIENT
Start: 2022-07-03 | End: 2022-07-05

## 2022-07-02 RX ORDER — CEFTRIAXONE 500 MG/1
1000 INJECTION, POWDER, FOR SOLUTION INTRAMUSCULAR; INTRAVENOUS ONCE
Refills: 0 | Status: COMPLETED | OUTPATIENT
Start: 2022-07-02 | End: 2022-07-02

## 2022-07-02 RX ORDER — SODIUM CHLORIDE 9 MG/ML
1000 INJECTION INTRAMUSCULAR; INTRAVENOUS; SUBCUTANEOUS ONCE
Refills: 0 | Status: COMPLETED | OUTPATIENT
Start: 2022-07-02 | End: 2022-07-02

## 2022-07-02 RX ORDER — LANOLIN ALCOHOL/MO/W.PET/CERES
3 CREAM (GRAM) TOPICAL AT BEDTIME
Refills: 0 | Status: DISCONTINUED | OUTPATIENT
Start: 2022-07-02 | End: 2022-07-08

## 2022-07-02 RX ORDER — ASPIRIN/CALCIUM CARB/MAGNESIUM 324 MG
324 TABLET ORAL ONCE
Refills: 0 | Status: COMPLETED | OUTPATIENT
Start: 2022-07-02 | End: 2022-07-02

## 2022-07-02 RX ORDER — CEFTRIAXONE 500 MG/1
1000 INJECTION, POWDER, FOR SOLUTION INTRAMUSCULAR; INTRAVENOUS EVERY 24 HOURS
Refills: 0 | Status: DISCONTINUED | OUTPATIENT
Start: 2022-07-02 | End: 2022-07-02

## 2022-07-02 RX ORDER — ATORVASTATIN CALCIUM 80 MG/1
20 TABLET, FILM COATED ORAL AT BEDTIME
Refills: 0 | Status: DISCONTINUED | OUTPATIENT
Start: 2022-07-02 | End: 2022-07-08

## 2022-07-02 RX ORDER — ACETAMINOPHEN 500 MG
650 TABLET ORAL EVERY 6 HOURS
Refills: 0 | Status: DISCONTINUED | OUTPATIENT
Start: 2022-07-02 | End: 2022-07-03

## 2022-07-02 RX ORDER — HEPARIN SODIUM 5000 [USP'U]/ML
6000 INJECTION INTRAVENOUS; SUBCUTANEOUS EVERY 6 HOURS
Refills: 0 | Status: DISCONTINUED | OUTPATIENT
Start: 2022-07-02 | End: 2022-07-05

## 2022-07-02 RX ORDER — LEVOTHYROXINE SODIUM 125 MCG
88 TABLET ORAL DAILY
Refills: 0 | Status: DISCONTINUED | OUTPATIENT
Start: 2022-07-02 | End: 2022-07-08

## 2022-07-02 RX ORDER — ONDANSETRON 8 MG/1
4 TABLET, FILM COATED ORAL EVERY 8 HOURS
Refills: 0 | Status: DISCONTINUED | OUTPATIENT
Start: 2022-07-02 | End: 2022-07-08

## 2022-07-02 RX ADMIN — HEPARIN SODIUM 5000 UNIT(S): 5000 INJECTION INTRAVENOUS; SUBCUTANEOUS at 16:38

## 2022-07-02 RX ADMIN — HEPARIN SODIUM 1000 UNIT(S)/HR: 5000 INJECTION INTRAVENOUS; SUBCUTANEOUS at 19:14

## 2022-07-02 RX ADMIN — Medication 650 MILLIGRAM(S): at 13:47

## 2022-07-02 RX ADMIN — CEFTRIAXONE 100 MILLIGRAM(S): 500 INJECTION, POWDER, FOR SOLUTION INTRAMUSCULAR; INTRAVENOUS at 13:42

## 2022-07-02 RX ADMIN — ATORVASTATIN CALCIUM 20 MILLIGRAM(S): 80 TABLET, FILM COATED ORAL at 21:22

## 2022-07-02 RX ADMIN — SODIUM CHLORIDE 1000 MILLILITER(S): 9 INJECTION INTRAMUSCULAR; INTRAVENOUS; SUBCUTANEOUS at 13:47

## 2022-07-02 RX ADMIN — Medication 324 MILLIGRAM(S): at 13:41

## 2022-07-02 RX ADMIN — CEFTRIAXONE 1000 MILLIGRAM(S): 500 INJECTION, POWDER, FOR SOLUTION INTRAMUSCULAR; INTRAVENOUS at 13:47

## 2022-07-02 RX ADMIN — HEPARIN SODIUM 1000 UNIT(S)/HR: 5000 INJECTION INTRAVENOUS; SUBCUTANEOUS at 16:38

## 2022-07-02 RX ADMIN — Medication 650 MILLIGRAM(S): at 13:41

## 2022-07-02 RX ADMIN — SODIUM CHLORIDE 500 MILLILITER(S): 9 INJECTION INTRAMUSCULAR; INTRAVENOUS; SUBCUTANEOUS at 22:28

## 2022-07-02 RX ADMIN — SODIUM CHLORIDE 2000 MILLILITER(S): 9 INJECTION INTRAMUSCULAR; INTRAVENOUS; SUBCUTANEOUS at 13:40

## 2022-07-02 NOTE — ED PROVIDER NOTE - CLINICAL SUMMARY MEDICAL DECISION MAKING FREE TEXT BOX
Pt's EKG is NST rate of 63 with T wave abnormalities, st segment flattening, labs remarkable for a trop of 3,000 Pt's EKG is NST rate of 63 with T wave abnormalities, st segment flattening, labs remarkable for a trop of over 3000, I d/w Dr. Nelson, no stemi called at this time, she denied any CP/SOB, ASA/heparin ordered, d/w medicine and will be admitted.

## 2022-07-02 NOTE — ED PROVIDER NOTE - OBJECTIVE STATEMENT
86 y/o female with a PMHx of HTN, HLD, Gout presents to the ED s/p fall getting into bed Wednesday night. Pt has severe pain in the L hip region, states pain has been getting worse. Pt denies head trauma, LOC. Daughter suspects pt is experiencing potential UTI, as she is becoming disoriented.

## 2022-07-02 NOTE — ED PROVIDER NOTE - CRITICAL CARE ATTENDING CONTRIBUTION TO CARE
Elements for critical care include direct patient care (not related to procedure), additional history taking, interpretation of diagnostic studies, documentation, consultation with other physicians, consult w/ pt's family directly relating to pts condition

## 2022-07-02 NOTE — H&P ADULT - NSHPPHYSICALEXAM_GEN_ALL_CORE
PHYSICAL EXAM:    General: NAD, Alert & Oriented X3.  HEENT: NC/AT, Normal Hearing  Eyes: EOMI, PERRLA, Conjunctiva and sclera clear  Neck: Soft and supple. No JVD  Respiratory: Clear breath sounds bilaterally.  No wheezing, rales or rhonchi  Cardiovascular: S1 and S2, regular rate and rhythm.  No murmurs, gallops or rubs  Gastrointestinal: Soft, non-tender, non-distended. No guarding, rebound tenderness, +BS  Genitourinary: Voiding freely. No palpable bladder  Extremities: +2 peripheral pulses bilaterally.  No clubbing, cyanosis, or edema.    Neurological:.  No focal deficits  Musculoskeletal: 5/5 strength bilateral upper and lower extremities  Skin: No rashes

## 2022-07-02 NOTE — ED ADULT TRIAGE NOTE - CHIEF COMPLAINT QUOTE
pt presents to ED from home, awake and alert. s/p mechanical fall getting out of bed on Wednesday. c/o L hip pain, lower back pain, difficulty ambulating. denies headstrike, anticoagulants. pt notes she is being treated currently for UTI with PO abx.

## 2022-07-02 NOTE — ED ADULT NURSE NOTE - NSIMPLEMENTINTERV_GEN_ALL_ED
Implemented All Fall Risk Interventions:  [place] to call system. Call bell, personal items and telephone within reach. Instruct patient to call for assistance. Room bathroom lighting operational. Non-slip footwear when patient is off stretcher. Physically safe environment: no spills, clutter or unnecessary equipment. Stretcher in lowest position, wheels locked, appropriate side rails in place. Provide visual cue, wrist band, yellow gown, etc. Monitor gait and stability. Monitor for mental status changes and reorient to person, place, and time. Review medications for side effects contributing to fall risk. Reinforce activity limits and safety measures with patient and family.

## 2022-07-02 NOTE — ED PROVIDER NOTE - PHYSICAL EXAMINATION
Vital signs as available reviewed.  General:  Comfortable, no acute distress.  Head:  Normocephalic, atraumatic.  Eyes:  Conjunctiva pink, no icterus.  Cardiovascular:  Regular rate, no obvious murmur.  Respiratory:  Clear to auscultation, good air entry bilaterally.  Abdomen:  Soft, non-tender.  Musculoskeletal:  No deformity or calf tenderness.  Neurologic: Alert and oriented, moving all extremities.  Skin:  Warm and dry. Constitutional: NAD AAOx3  Eyes: PERRL, EOMI  Head: Normocephalic atraumatic  Mouth: MMM  Cardiac: regular rate   Resp: Lungs CTAB  GI: Abd s/nt/nd  Neuro: CN2-12 intact  Extremities: Intact distal pulses b/l, no calf tenderness, + ttp of the left hip, normal ROM   Skin: No rashes

## 2022-07-02 NOTE — PATIENT PROFILE ADULT - TRANSPORTATION
BMI: BMI (kg/m2): 20.9 (04-16-22 @ 18:50)  HbA1c: A1C with Estimated Average Glucose Result: 5.3 % (09-22-21 @ 11:56)    Glucose:   BP: --  Lipid Panel: Date/Time: 09-23-21 @ 08:40  Cholesterol, Serum: 177  Direct LDL: --  HDL Cholesterol, Serum: 101  Total Cholesterol/HDL Ration Measurement: --  Triglycerides, Serum: 52   no

## 2022-07-02 NOTE — H&P ADULT - ASSESSMENT
metabolic encephalopathy secondary to uti, improving   mariza due to above, cr- 4.  unknown baseline  leukocytosis with left shift due to uti  nstemi, no cp or sob  2.1 cm left thyroid lobe nodule      -admit to tele   -cont rocephin   -cardio consult, dr. cooper  -trend troponin   -cont asa  -cont heparin gtt  -cont statin   -BP on lower end, may add metoprolol 12.5mg bid if increases (on Bystolic at home)  -hold home hctz, losartan, allopurinol due to mraiza  -nephro consult   -cont ivf   -repeat bmp in 2 hours    -insert external cath, strict i/o   -f/u am labs, ecg  -f/u thyroid nodule outpt     dvt prophyalxis   -heparin gtt     advance directives   -full code  metabolic encephalopathy secondary to uti, improving   mariza due to above, cr- 4.  h/o ckd stage III  leukocytosis with left shift due to uti  nstemi, no cp or sob  2.1 cm left thyroid lobe nodule      -admit to tele   -cont rocephin   -cardio consult, dr. cooper  -trend troponin   -cont asa  -cont heparin gtt  -cont statin   -BP on lower end, may add metoprolol 12.5mg bid if increases (on Bystolic at home)  -hold home hctz, losartan, allopurinol due to mariza  -nephro consult   -cont ivf   -repeat bmp in 2 hours    -insert external cath, strict i/o   -f/u am labs, ecg  -f/u thyroid nodule outpt     dvt prophyalxis   -heparin gtt     advance directives   -full code  metabolic encephalopathy secondary to uti, improving   mariza due to above, cr- 4.  h/o ckd stage III  leukocytosis with left shift due to uti  nstemi, no cp or sob  2.1 cm left thyroid lobe nodule      -admit to tele   -cont rocephin   -cardio consult, dr. cooper  -trend troponin   -ecg/echo in am   -cont asa  -cont heparin gtt  -cont statin   -BP on lower end, may add metoprolol 12.5mg bid if increases (on Bystolic at home)  -hold home hctz, losartan, allopurinol due to mariza  -nephro consult   -cont ivf   -repeat bmp in 2 hours    -insert external cath, strict i/o   -f/u am labs, ecg  -f/u thyroid nodule outpt     dvt prophyalxis   -heparin gtt     advance directives   -full code

## 2022-07-02 NOTE — ED PROVIDER NOTE - NS ED ROS FT
Constitutional: No fever.  Neurological: +disoriented  Eyes: No vision changes.   Ears, Nose, Mouth, Throat: No congestion.  Cardiovascular: No chest pain.  Respiratory: No difficulty breathing.  Gastrointestinal: No nausea or vomiting.  Genitourinary: No dysuria.  Musculoskeletal: +L hip pain.  Integumentary (skin and/or breast): No rash.

## 2022-07-02 NOTE — PATIENT PROFILE ADULT - FALL HARM RISK - HARM RISK INTERVENTIONS

## 2022-07-02 NOTE — H&P ADULT - HISTORY OF PRESENT ILLNESS
86 y/o female with a PMHx of HTN, HLD, Gout, hypothyroidism,  presents to the ED s/p fall 2 days ago.  as per daughter and patient, was getting out of bed and fell to the floor landing on left hip.   Pt denies head trauma, LOC.    episode was preceding buy ~1week of weakness, fatigue decreased po intake and disoriented.  patient lives alone, performs ADLs with some assistance.      in ed, ecg- s/p asa, heparin gtt. trop- >3000, cr-4.  cth-neg, left hip xray- no acute fx on wet read.   no cp.  c/o left hip and chronic back pain.  usually just takes tylenol at home  86 y/o female with a PMHx of CAD, HTN, HLD, Gout, hypothyroidism, ckd stage III, hypothyroidism presents to the ED s/p fall 2 days ago.  as per daughter and patient, was getting out of bed and fell to the floor landing on left hip.   Pt denies head trauma, LOC.    episode was preceding buy ~1week of weakness, fatigue decreased po intake and disoriented.  patient lives alone, performs ADLs with some assistance.      in ed, ecg- s/p asa, heparin gtt. trop- >3000, cr-4.  cth-neg, left hip xray- no acute fx on wet read.   no cp.  c/o left hip and chronic back pain.  usually just takes tylenol at home     pmh- as above   psh-denies   fmh- unable to recall   soc- non-smoker

## 2022-07-03 DIAGNOSIS — I10 ESSENTIAL (PRIMARY) HYPERTENSION: ICD-10-CM

## 2022-07-03 DIAGNOSIS — I25.10 ATHEROSCLEROTIC HEART DISEASE OF NATIVE CORONARY ARTERY WITHOUT ANGINA PECTORIS: ICD-10-CM

## 2022-07-03 DIAGNOSIS — I21.4 NON-ST ELEVATION (NSTEMI) MYOCARDIAL INFARCTION: ICD-10-CM

## 2022-07-03 DIAGNOSIS — N18.4 CHRONIC KIDNEY DISEASE, STAGE 4 (SEVERE): ICD-10-CM

## 2022-07-03 DIAGNOSIS — M25.552 PAIN IN LEFT HIP: ICD-10-CM

## 2022-07-03 LAB
ADD ON TEST-SPECIMEN IN LAB: SIGNIFICANT CHANGE UP
ANION GAP SERPL CALC-SCNC: 10 MMOL/L — SIGNIFICANT CHANGE UP (ref 5–17)
APPEARANCE UR: ABNORMAL
APTT BLD: 62.2 SEC — HIGH (ref 27.5–35.5)
APTT BLD: 76.7 SEC — HIGH (ref 27.5–35.5)
APTT BLD: 80.3 SEC — HIGH (ref 27.5–35.5)
BILIRUB UR-MCNC: NEGATIVE — SIGNIFICANT CHANGE UP
BUN SERPL-MCNC: 75 MG/DL — HIGH (ref 7–23)
CALCIUM SERPL-MCNC: 8.4 MG/DL — LOW (ref 8.5–10.1)
CHLORIDE SERPL-SCNC: 107 MMOL/L — SIGNIFICANT CHANGE UP (ref 96–108)
CO2 SERPL-SCNC: 19 MMOL/L — LOW (ref 22–31)
COLOR SPEC: YELLOW — SIGNIFICANT CHANGE UP
CREAT ?TM UR-MCNC: 160 MG/DL — SIGNIFICANT CHANGE UP
CREAT SERPL-MCNC: 4 MG/DL — HIGH (ref 0.5–1.3)
DIFF PNL FLD: ABNORMAL
EGFR: 10 ML/MIN/1.73M2 — LOW
GLUCOSE SERPL-MCNC: 97 MG/DL — SIGNIFICANT CHANGE UP (ref 70–99)
GLUCOSE UR QL: NEGATIVE — SIGNIFICANT CHANGE UP
HCT VFR BLD CALC: 32.8 % — LOW (ref 34.5–45)
HCT VFR BLD CALC: 34.9 % — SIGNIFICANT CHANGE UP (ref 34.5–45)
HGB BLD-MCNC: 11 G/DL — LOW (ref 11.5–15.5)
HGB BLD-MCNC: 11.1 G/DL — LOW (ref 11.5–15.5)
KETONES UR-MCNC: ABNORMAL
LEUKOCYTE ESTERASE UR-ACNC: ABNORMAL
MCHC RBC-ENTMCNC: 30.8 PG — SIGNIFICANT CHANGE UP (ref 27–34)
MCHC RBC-ENTMCNC: 30.9 PG — SIGNIFICANT CHANGE UP (ref 27–34)
MCHC RBC-ENTMCNC: 31.5 GM/DL — LOW (ref 32–36)
MCHC RBC-ENTMCNC: 33.8 GM/DL — SIGNIFICANT CHANGE UP (ref 32–36)
MCV RBC AUTO: 91.1 FL — SIGNIFICANT CHANGE UP (ref 80–100)
MCV RBC AUTO: 98 FL — SIGNIFICANT CHANGE UP (ref 80–100)
NITRITE UR-MCNC: POSITIVE
PH UR: 5 — SIGNIFICANT CHANGE UP (ref 5–8)
PLATELET # BLD AUTO: 147 K/UL — LOW (ref 150–400)
PLATELET # BLD AUTO: 155 K/UL — SIGNIFICANT CHANGE UP (ref 150–400)
POTASSIUM SERPL-MCNC: 4.6 MMOL/L — SIGNIFICANT CHANGE UP (ref 3.5–5.3)
POTASSIUM SERPL-SCNC: 4.6 MMOL/L — SIGNIFICANT CHANGE UP (ref 3.5–5.3)
PROT ?TM UR-MCNC: 101 MG/DL — HIGH (ref 0–12)
PROT UR-MCNC: SIGNIFICANT CHANGE UP MG/DL
PROT/CREAT UR-RTO: 0.6 RATIO — HIGH (ref 0–0.2)
RBC # BLD: 3.56 M/UL — LOW (ref 3.8–5.2)
RBC # BLD: 3.6 M/UL — LOW (ref 3.8–5.2)
RBC # FLD: 13.5 % — SIGNIFICANT CHANGE UP (ref 10.3–14.5)
RBC # FLD: 13.8 % — SIGNIFICANT CHANGE UP (ref 10.3–14.5)
SODIUM SERPL-SCNC: 136 MMOL/L — SIGNIFICANT CHANGE UP (ref 135–145)
SODIUM UR-SCNC: 51 MMOL/L — SIGNIFICANT CHANGE UP
SP GR SPEC: 1.01 — SIGNIFICANT CHANGE UP (ref 1.01–1.02)
UROBILINOGEN FLD QL: NEGATIVE — SIGNIFICANT CHANGE UP
WBC # BLD: 10.1 K/UL — SIGNIFICANT CHANGE UP (ref 3.8–10.5)
WBC # BLD: 10.9 K/UL — HIGH (ref 3.8–10.5)
WBC # FLD AUTO: 10.1 K/UL — SIGNIFICANT CHANGE UP (ref 3.8–10.5)
WBC # FLD AUTO: 10.9 K/UL — HIGH (ref 3.8–10.5)

## 2022-07-03 PROCEDURE — 99232 SBSQ HOSP IP/OBS MODERATE 35: CPT

## 2022-07-03 PROCEDURE — 72192 CT PELVIS W/O DYE: CPT | Mod: 26

## 2022-07-03 PROCEDURE — 93010 ELECTROCARDIOGRAM REPORT: CPT

## 2022-07-03 PROCEDURE — 76770 US EXAM ABDO BACK WALL COMP: CPT | Mod: 26

## 2022-07-03 PROCEDURE — 76376 3D RENDER W/INTRP POSTPROCES: CPT | Mod: 26

## 2022-07-03 RX ORDER — SODIUM CHLORIDE 9 MG/ML
1000 INJECTION INTRAMUSCULAR; INTRAVENOUS; SUBCUTANEOUS
Refills: 0 | Status: DISCONTINUED | OUTPATIENT
Start: 2022-07-03 | End: 2022-07-04

## 2022-07-03 RX ORDER — METOPROLOL TARTRATE 50 MG
25 TABLET ORAL DAILY
Refills: 0 | Status: DISCONTINUED | OUTPATIENT
Start: 2022-07-03 | End: 2022-07-05

## 2022-07-03 RX ORDER — ACETAMINOPHEN 500 MG
975 TABLET ORAL EVERY 8 HOURS
Refills: 0 | Status: DISCONTINUED | OUTPATIENT
Start: 2022-07-03 | End: 2022-07-08

## 2022-07-03 RX ORDER — HEPARIN SODIUM 5000 [USP'U]/ML
INJECTION INTRAVENOUS; SUBCUTANEOUS
Qty: 25000 | Refills: 0 | Status: DISCONTINUED | OUTPATIENT
Start: 2022-07-03 | End: 2022-07-05

## 2022-07-03 RX ADMIN — Medication 975 MILLIGRAM(S): at 22:10

## 2022-07-03 RX ADMIN — Medication 88 MICROGRAM(S): at 05:08

## 2022-07-03 RX ADMIN — Medication 975 MILLIGRAM(S): at 18:02

## 2022-07-03 RX ADMIN — HEPARIN SODIUM 800 UNIT(S)/HR: 5000 INJECTION INTRAVENOUS; SUBCUTANEOUS at 17:30

## 2022-07-03 RX ADMIN — HEPARIN SODIUM 800 UNIT(S)/HR: 5000 INJECTION INTRAVENOUS; SUBCUTANEOUS at 17:33

## 2022-07-03 RX ADMIN — Medication 25 MILLIGRAM(S): at 10:51

## 2022-07-03 RX ADMIN — Medication 975 MILLIGRAM(S): at 21:38

## 2022-07-03 RX ADMIN — HEPARIN SODIUM 1000 UNIT(S)/HR: 5000 INJECTION INTRAVENOUS; SUBCUTANEOUS at 07:12

## 2022-07-03 RX ADMIN — HEPARIN SODIUM 1000 UNIT(S)/HR: 5000 INJECTION INTRAVENOUS; SUBCUTANEOUS at 00:46

## 2022-07-03 RX ADMIN — Medication 975 MILLIGRAM(S): at 14:34

## 2022-07-03 RX ADMIN — HEPARIN SODIUM 800 UNIT(S)/HR: 5000 INJECTION INTRAVENOUS; SUBCUTANEOUS at 19:24

## 2022-07-03 RX ADMIN — HEPARIN SODIUM 900 UNIT(S)/HR: 5000 INJECTION INTRAVENOUS; SUBCUTANEOUS at 08:53

## 2022-07-03 RX ADMIN — ATORVASTATIN CALCIUM 20 MILLIGRAM(S): 80 TABLET, FILM COATED ORAL at 21:37

## 2022-07-03 RX ADMIN — CEFTRIAXONE 100 MILLIGRAM(S): 500 INJECTION, POWDER, FOR SOLUTION INTRAMUSCULAR; INTRAVENOUS at 13:28

## 2022-07-03 NOTE — PROGRESS NOTE ADULT - SUBJECTIVE AND OBJECTIVE BOX
Chief Complaint: Confusion, disorientation, decreased PO intake, fatigue    Interval History: Patient seen and examined. Patient states that she feels only minimally better since admission yesterday. Still with poor appetite, generalized weakness and malaise. Per daughter, patient is a bit less confused.     ROS: Multi system review is comprehensively negative x 10 systems except as above    Vitals:  T(F): 97.9 (03 Jul 2022 07:48), Max: 97.9 (03 Jul 2022 07:48)  HR: 70 (03 Jul 2022 07:48) (66 - 70)  BP: 132/59 (03 Jul 2022 07:48) (85/52 - 132/59)  RR: 19 (03 Jul 2022 07:48) (19 - 19)  SpO2: 98% (03 Jul 2022 07:48) (98% - 98%)    Exam:  Gen: Comfortable appearing  HEENT: NCAT PERRL EOMI MMM clear oropharynx  Neck: Supple, no JVD, no LAD  Chest: Normal resp effort at rest, lungs CTA B/L  CVS: s1 s2  normal, RRR  Abd: +BS, soft NT ND   Ext: No edema or calf tenderness  Skin: Warm, dry  Neuro: Awake and alert, follows commands, answers questions appropriately,   Mood: Calm, pleasant    Labs:               11.1   10.90 )-----------( 155               32.8       136  |  107  |  75  --------------------<  97  4.6   |  19  |  4.00    Ca 8.4  Mg 2.0    TPro  6.5  /  Alb  2.9  /  TBili  0.8  /  DBili  x   /  AST  717  /  ALT  177  /  AlkPhos  91      Troponin 3957 --> 2209   Lactate 1.7    UA 7/2: N-, mod LE, large bld, >50 WBC, 6-10 RBC, many bact    UA 7/3: N+, mod LE, large bld, 11-25 RBC 0-2 WBC (was > 50 on prior UA but patient has since received a dose of antibiotics), few bact    Micro:  Urine culture 7/3: In process (but collected after abx started)  COVID19 PCR 7/2: Negative    Imaging:  CT pelvis 7/3: No fractures are seen. Right iliopsoas muscle edema suggests strain.    US kidney bladder 7/3: The left kidney was not visualized. No right renal mass, hydronephrosis or calculi is visualized sonographically. The bladder is underdistended, limiting evaluation.    CT C spine 7/2: No acute cervical spine fracture or evidence of traumatic malalignment. 2.1 cm left thyroid lobe nodule.    CT head 7/2: No acute intracranial hemorrhage, mass effect, midline shift, extra-axial collection, hydrocephalus, or evidence of acute vascular territorial infarction. Mild patchy hypodensities within the periventricular and subcortical white matter, although nonspecific, likely reflect chronic microvascular disease. Cerebral volume loss   contributes to prominence of the ventricles and sulci. The visualized paranasal sinuses and mastoid air cells are clear. Intraorbital contents are unremarkable. No calvarial fracture.    Meds:  MEDICATIONS  (STANDING):  acetaminophen     Tablet .. 975 milliGRAM(s) Oral every 8 hours  atorvastatin 20 milliGRAM(s) Oral at bedtime  cefTRIAXone   IVPB 1000 milliGRAM(s) IV Intermittent every 24 hours  heparin  Infusion.  Unit(s)/Hr (10 mL/Hr) IV Continuous <Continuous>  levothyroxine 88 MICROGram(s) Oral daily  metoprolol succinate ER 25 milliGRAM(s) Oral daily  sodium chloride 0.9%. 1000 milliLiter(s) (75 mL/Hr) IV Continuous <Continuous>    MEDICATIONS  (PRN):  aluminum hydroxide/magnesium hydroxide/simethicone Suspension 30 milliLiter(s) Oral every 4 hours PRN Dyspepsia  heparin   Injectable 6000 Unit(s) IV Push every 6 hours PRN For aPTT less than 40  melatonin 3 milliGRAM(s) Oral at bedtime PRN Insomnia  ondansetron Injectable 4 milliGRAM(s) IV Push every 8 hours PRN Nausea and/or Vomiting

## 2022-07-03 NOTE — PROGRESS NOTE ADULT - ASSESSMENT
87 year old woman with HTN, HLD, CAD, CKD III (baseline Cr ~1.8), gout, and hypothyroidism, suffered a fall back on 6/30, landed on L hip, occurred while getting out of bed, has been generally weak, fatigued and with loss of appetite for several days prior to the fall and remains as such, per daughter patient also was having some disorientation and urinary frequency. In the ED patient was noted to have leukocytosis, RADHA with Cr 4.2, elevated AST/ALT, and markedly elevated troponin. EKG, rate WNL, sinus rhythm, L axis, lateral TWI. CT head and C spine negative for acute pathology. Patient started on empiric ceftriaxone for UTI, given IV fluids for RADHA and started on heparin drip for possible NSTEMI. Admitted to [facility].    Acute metabolic encephalopathy  Likely multi factorial due to UTI, acute renal injury, NSTEMI, see below.   - Continue to manage underlying issues  - Reorient frequently  - Control pain where applicable  - Maintain regular bowel movement     UTI  Patient with UA suggestive of infection, obained via straight cath. Started on empiric ceftriaxone.   - Continue ceftriaxone  - F/u in process Ucx    RADHA on CKD III  Cr markedly elevated. Could be pre-renal in setting of infection and low PO intake, superimposed progression to ATN. Nephrology consulted. UPCR 0.6. FENa 0.95%. Renal bladder US done. L kidney not visualized. R kidney with no hydronephrosis. No stones seen. Underdistended bladder.   - IV fluid hydration, serial Cr  - Treat UTI  - Bladder scan for PVR, may need Bose if she has significant retention    NSTEMI  Trop nearly 4000. While demand ischemia combined with reduced renal clearance is possible, must be treated as NSTEMI for now as such. Cardiology input appreciated.   - Awaiting TTE  - Continue heparin drip, statin, beta blocker    Hypothyroidism  - Check TFTs  - Continue levothyroxine   87 year old woman with HTN, HLD, CAD, CKD III (baseline Cr ~1.8), gout, and hypothyroidism, suffered a fall back on 6/30, landed on L hip, occurred while getting out of bed, has been generally weak, fatigued and with loss of appetite for several days prior to the fall and remains as such, per daughter patient also was having some disorientation and urinary frequency. In the ED patient was noted to have leukocytosis, RADHA with Cr 4.2, elevated AST/ALT, and markedly elevated troponin. EKG, rate WNL, sinus rhythm, L axis, lateral TWI. CT head and C spine negative for acute pathology. Patient started on empiric ceftriaxone for UTI, given IV fluids for RADHA and started on heparin drip for possible NSTEMI. Admitted to [facility].    Acute metabolic encephalopathy  Likely multi factorial due to UTI, acute renal injury, NSTEMI, see below.   - Continue to manage underlying issues  - Reorient frequently  - Control pain where applicable  - Maintain regular bowel movement     UTI  Patient with UA suggestive of infection, obained via straight cath. Started on empiric ceftriaxone.   - Continue ceftriaxone  - F/u in process Ucx    RADHA on CKD III  Cr markedly elevated. Could be pre-renal in setting of infection and low PO intake, superimposed progression to ATN. Nephrology consulted. UPCR 0.6. FENa 0.95%. Renal bladder US done. L kidney not visualized. R kidney with no hydronephrosis. No stones seen. Underdistended bladder.   - IV fluid hydration, serial Cr  - Treat UTI  - Bladder scan for PVR, may need Bose if she has significant retention    NSTEMI  Trop nearly 4000. While demand ischemia combined with reduced renal clearance is possible, must be treated as NSTEMI for now as such. Cardiology input appreciated.   - Awaiting TTE  - Continue heparin drip, statin, beta blocker    Hypothyroidism  - Check TFTs  - Continue levothyroxine    Physical deconditioning and debility  PT evaluation requested  - F/u PT assessment

## 2022-07-04 LAB
ANION GAP SERPL CALC-SCNC: 13 MMOL/L — SIGNIFICANT CHANGE UP (ref 5–17)
APTT BLD: 42 SEC — HIGH (ref 27.5–35.5)
APTT BLD: 71.9 SEC — HIGH (ref 27.5–35.5)
APTT BLD: 88 SEC — HIGH (ref 27.5–35.5)
BUN SERPL-MCNC: 81 MG/DL — HIGH (ref 7–23)
CALCIUM SERPL-MCNC: 7.9 MG/DL — LOW (ref 8.5–10.1)
CHLORIDE SERPL-SCNC: 109 MMOL/L — HIGH (ref 96–108)
CO2 SERPL-SCNC: 16 MMOL/L — LOW (ref 22–31)
CREAT SERPL-MCNC: 3.76 MG/DL — HIGH (ref 0.5–1.3)
CULTURE RESULTS: SIGNIFICANT CHANGE UP
EGFR: 11 ML/MIN/1.73M2 — LOW
GLUCOSE SERPL-MCNC: 94 MG/DL — SIGNIFICANT CHANGE UP (ref 70–99)
HCT VFR BLD CALC: 28 % — LOW (ref 34.5–45)
HGB BLD-MCNC: 9.4 G/DL — LOW (ref 11.5–15.5)
MCHC RBC-ENTMCNC: 31.2 PG — SIGNIFICANT CHANGE UP (ref 27–34)
MCHC RBC-ENTMCNC: 33.6 GM/DL — SIGNIFICANT CHANGE UP (ref 32–36)
MCV RBC AUTO: 93 FL — SIGNIFICANT CHANGE UP (ref 80–100)
PLATELET # BLD AUTO: 170 K/UL — SIGNIFICANT CHANGE UP (ref 150–400)
POTASSIUM SERPL-MCNC: 3.7 MMOL/L — SIGNIFICANT CHANGE UP (ref 3.5–5.3)
POTASSIUM SERPL-SCNC: 3.7 MMOL/L — SIGNIFICANT CHANGE UP (ref 3.5–5.3)
RBC # BLD: 3.01 M/UL — LOW (ref 3.8–5.2)
RBC # FLD: 13.8 % — SIGNIFICANT CHANGE UP (ref 10.3–14.5)
SODIUM SERPL-SCNC: 138 MMOL/L — SIGNIFICANT CHANGE UP (ref 135–145)
SPECIMEN SOURCE: SIGNIFICANT CHANGE UP
T4 FREE+ TSH PNL SERPL: 2.8 UU/ML — SIGNIFICANT CHANGE UP (ref 0.34–4.82)
WBC # BLD: 11.36 K/UL — HIGH (ref 3.8–10.5)
WBC # FLD AUTO: 11.36 K/UL — HIGH (ref 3.8–10.5)

## 2022-07-04 PROCEDURE — 99232 SBSQ HOSP IP/OBS MODERATE 35: CPT

## 2022-07-04 PROCEDURE — 93306 TTE W/DOPPLER COMPLETE: CPT | Mod: 26

## 2022-07-04 RX ORDER — SODIUM CHLORIDE 9 MG/ML
1000 INJECTION INTRAMUSCULAR; INTRAVENOUS; SUBCUTANEOUS
Refills: 0 | Status: DISCONTINUED | OUTPATIENT
Start: 2022-07-04 | End: 2022-07-05

## 2022-07-04 RX ADMIN — Medication 975 MILLIGRAM(S): at 14:31

## 2022-07-04 RX ADMIN — HEPARIN SODIUM 0 UNIT(S)/HR: 5000 INJECTION INTRAVENOUS; SUBCUTANEOUS at 18:39

## 2022-07-04 RX ADMIN — HEPARIN SODIUM 800 UNIT(S)/HR: 5000 INJECTION INTRAVENOUS; SUBCUTANEOUS at 01:58

## 2022-07-04 RX ADMIN — Medication 975 MILLIGRAM(S): at 21:51

## 2022-07-04 RX ADMIN — CEFTRIAXONE 100 MILLIGRAM(S): 500 INJECTION, POWDER, FOR SOLUTION INTRAMUSCULAR; INTRAVENOUS at 14:30

## 2022-07-04 RX ADMIN — Medication 88 MICROGRAM(S): at 05:19

## 2022-07-04 RX ADMIN — HEPARIN SODIUM 800 UNIT(S)/HR: 5000 INJECTION INTRAVENOUS; SUBCUTANEOUS at 07:44

## 2022-07-04 RX ADMIN — HEPARIN SODIUM 800 UNIT(S)/HR: 5000 INJECTION INTRAVENOUS; SUBCUTANEOUS at 19:15

## 2022-07-04 RX ADMIN — HEPARIN SODIUM 800 UNIT(S)/HR: 5000 INJECTION INTRAVENOUS; SUBCUTANEOUS at 19:17

## 2022-07-04 RX ADMIN — Medication 975 MILLIGRAM(S): at 05:19

## 2022-07-04 RX ADMIN — HEPARIN SODIUM 1000 UNIT(S)/HR: 5000 INJECTION INTRAVENOUS; SUBCUTANEOUS at 10:07

## 2022-07-04 RX ADMIN — ATORVASTATIN CALCIUM 20 MILLIGRAM(S): 80 TABLET, FILM COATED ORAL at 21:51

## 2022-07-04 NOTE — PROGRESS NOTE ADULT - SUBJECTIVE AND OBJECTIVE BOX
88 y/o female with a PMHx of CAD, HTN, HLD, Gout, hypothyroidism, ckd stage III Cr ~ known to me from office - last vist in Aug 2021 Cr 1.8,     presents to the ED s/p fall 2 days ago.  as per daughter and patient, was getting out of bed and fell to the floor landing on left hip.   Pt denies head trauma, LOC.    episode was preceding buy ~1week of weakness, fatigue decreased po intake and disoriented.  patient lives alone, performs ADLs with some assistance.  Per daughter pt feeling weak and some frequency   denies nsaid use    Today    feeling ok    no sob     straight cath x 2 overnight    300 ml this AM as well         PAST MEDICAL & SURGICAL HISTORY:    Home Medications:  allopurinol 100 mg oral tablet: 1 tab(s) orally once a day (2022 14:39)  atorvastatin 20 mg oral tablet: 1 tab(s) orally once a day (2022 14:39)  Azithromycin 5 Day Dose Pack 250 mg oral tablet: as directed  *** course not complete*** (2022 14:39)  hydroCHLOROthiazide 12.5 mg oral tablet: 1 tab(s) orally once a day (2022 14:39)  Imodium 2 mg oral capsule: 1 cap(s) orally every 4 hours, As Needed (2022 14:39)  levothyroxine 88 mcg (0.088 mg) oral tablet: 1 tab(s) orally once a day (2022 14:39)  losartan 25 mg oral tablet: 1 tab(s) orally once a day (2022 14:39)  Medrol Dosepak 4 mg oral tablet: as directed  ***course not complete*** (2022 14:39)  Nebivolol 20 mg oral tablet: 1 tab(s) orally once a day (2022 14:39)  Tylenol 500 mg oral tablet: 2 tab(s) orally 2 times a day, As Needed (2022 14:39)      MEDICATIONS  (STANDING):  acetaminophen     Tablet .. 975 milliGRAM(s) Oral every 8 hours  atorvastatin 20 milliGRAM(s) Oral at bedtime  cefTRIAXone   IVPB 1000 milliGRAM(s) IV Intermittent every 24 hours  heparin  Infusion.  Unit(s)/Hr (10 mL/Hr) IV Continuous <Continuous>  levothyroxine 88 MICROGram(s) Oral daily  metoprolol succinate ER 25 milliGRAM(s) Oral daily  sodium chloride 0.9%. 1000 milliLiter(s) (75 mL/Hr) IV Continuous <Continuous>        Allergies    penicillin (Unknown)    Intolerances        SOCIAL HISTORY:  Denies ETOh,Smoking,     FAMILY HISTORY:      REVIEW OF SYSTEMS:    CONSTITUTIONAL: No weakness, fevers or chills  EYES/ENT: No visual changes;  No vertigo or throat pain   NECK: No pain or stiffness  RESPIRATORY: No cough, wheezing, hemoptysis; No shortness of breath  CARDIOVASCULAR: No chest pain or palpitations  GASTROINTESTINAL: No abdominal or epigastric pain. No nausea, vomiting, or hematemesis; No diarrhea or constipation. No melena or hematochezia.  GENITOURINARY: No dysuria, frequency or hematuria  NEUROLOGICAL: No numbness or weakness  SKIN: No itching, burning, rashes, or lesions   All other review of systems is negative unless indicated above.    Vital Signs Last 24 Hrs  T(C): 36.2 (2022 07:33), Max: 36.7 (2022 19:40)  T(F): 97.2 (2022 07:33), Max: 98 (2022 19:40)  HR: 67 (2022 07:33) (67 - 71)  BP: 98/48 (2022 07:33) (98/48 - 105/47)  BP(mean): --  RR: 18 (2022 07:33) (18 - 18)  SpO2: 96% (2022 07:33) (96% - 97%)    I&O's Detail    2022 07:01  -  2022 07:00  --------------------------------------------------------  IN:  Total IN: 0 mL    OUT:    Intermittent Catheterization - Urethral (mL): 800 mL    Voided (mL): 50 mL  Total OUT: 850 mL    Total NET: -850 mL          PHYSICAL EXAM:    Constitutional: NAD, obese  HEENT: , EOMI,  MMM  Neck: No LAD, No JVD  Respiratory: CTAB  Cardiovascular: S1 and S2  Gastrointestinal: BS+, soft, NT/ND  Extremities: No peripheral edema  Neurological: A/O x 3, no focal deficits  : No Bose  Skin: No rashes  Access: Not applicable    LABS:                                   9.4    11.36 )-----------( 170      ( 2022 08:51 )             28.0                         11.1   10.90 )-----------( 155      ( 2022 06:49 )             32.8       138    |  109    |  81     ----------------------------<  94        2022 08:51  3.7     |  16     |  3.76     136    |  107    |  75     ----------------------------<  97        2022 06:49  4.6     |  19     |  4.00     136    |  108    |  73     ----------------------------<  125       2022 20:35  3.7     |  19     |  4.09     Ca    7.9        2022 08:51  Ca    8.4        2022 06:49      Mg     2.0       2022 12:11    TPro  6.5    /  Alb  2.9    /  TBili  0.8    /        2022 12:11  DBili  x      /  AST  717    /  ALT  177    /  AlkPhos  91               Urine Studies:  Urinalysis Basic - ( 2022 11:40 )    Color: Yellow / Appearance: Slightly Turbid / S.015 / pH: x  Gluc: x / Ketone: Trace  / Bili: Negative / Urobili: Negative   Blood: x / Protein: trace mg/dL / Nitrite: Positive   Leuk Esterase: Moderate / RBC: 11-25 /HPF / WBC 0-2   Sq Epi: x / Non Sq Epi: Occasional / Bacteria: Few      Sodium, Random Urine: 51 mmol/L ( @ 11:40)  Creatinine, Random Urine: 160 mg/dL ( @ 11:40)  Protein/Creatinine Ratio Calculation: 0.6 Ratio ( @ 11:40)        RADIOLOGY & ADDITIONAL STUDIES:    ACC: [id number] EXAM:  US KIDNEYS AND BLADDER                          PROCEDURE DATE:  2022          INTERPRETATION:  CLINICAL INFORMATION: Acute kidney injury.    COMPARISON: None available.    TECHNIQUE: Sonography of the kidneys and bladder.    FINDINGS:`  Right kidney: 9.8 cm. No renal mass, hydronephrosis or calculi.    Left kidney: Not visualized.    Urinary bladder: Underdistended limiting evaluation    IMPRESSION:  The left kidney was not visualized.  No right renal mass, hydronephrosis or calculi is visualized   sonographically.  The bladder is underdistended, limiting evaluation.

## 2022-07-04 NOTE — DIETITIAN INITIAL EVALUATION ADULT - OTHER INFO
86yo female with PMH significant for CAD, HTN, HLD, gout, hypothyroidism, CKD3 s/p fall 2 days PTA landing on Lt hip with preceding week of weakness, fatigue and decreased PO intake with disorientation.  admitted with acute metabolic encephalopathy 2/2 UTI, acute renal injury, NSTEMI.  full code.    *pt appears weak and frail.  NFPE significant for severe muscle/mod fat wasting.  no prior wt hx in EMR.  pt reports wt loss, however, unable to state prior wt.  current wt PTA was 217#.  unable to obtain bedscale wt as it was not working.  admit wt of 214.5#; wt loss of ~2.5# wt loss in ~1 wk (1.2%), clinically significant.  pt meets criteria for severe malnutrition.

## 2022-07-04 NOTE — PROGRESS NOTE ADULT - SUBJECTIVE AND OBJECTIVE BOX
CHIEF COMPLAINT:  Patient is a 87y old  Female who presents with a chief complaint of fall, weakness (2022 17:13)      HPI: 7/3/22:  86 y/o female, well known to our service and Dr Collins, with a PMHx of CAD, HTN, HLD, Gout, hypothyroidism, ckd stage III,  presents to the ED s/p fall 2 days ago.  As per daughter and patient, was getting out of bed and fell to the floor landing on left hip.   Pt denies head trauma, LOC.    Episode was preceding buy ~1week of weakness, fatigue decreased po intake and disoriented.  Patient lives alone, performs ADLs with some assistance.    In ed, ecg- s/p asa, heparin gtt. trop- >3000, cr-4.  cth-neg, left hip xray- no acute fx on wet read.   no cp.  c/o left hip and chronic back pain.  Usually just takes Tylenol at home.  Other than the pain in her left hip, she denies anginal chest pains or increased SOB.  With her CKD, likely demand ischemia with significantly reduce renal clearance of the Troponin which is coming down.  With her renal failure, invasive cardiac cath is very risky and will likely result in need for dialysis. Will get ECHO.    22:  Resting comfortably this AM without anginal chest pains or increased SOB.  Left hip pain better.  CT of Pelvis showed no left hip fx.  Echo pending.  Troponin levels coming down.  Renal function unchanged.  No cardiac at this time.        PMHx:  PAST MEDICAL & SURGICAL HISTORY:  CAD  HTN  Hyperlipidemia  Gout  Hypothyroidism  Chronic Kidney disease stage 4      SOCIAL Hx- non-smoker       FAMILY HISTORY:   FAMILY HISTORY:  non-contributory      ALLERGIES:  Allergies  penicillin (Unknown)      REVIEW OF SYSTEMS:  10 point ROS was obtained  Pertinent positives and negatives are as above  All other review of systems is negative unless indicated above      Vital Signs Last 24 Hrs  T(C): 36.7 (2022 19:40), Max: 36.7 (2022 19:40)  T(F): 98 (2022 19:40), Max: 98 (2022 19:40)  HR: 71 (2022 19:40) (70 - 71)  BP: 105/47 (2022 19:40) (105/47 - 132/59)  BP(mean): --  RR: 18 (2022 19:40) (18 - 19)  SpO2: 97% (2022 19:40) (97% - 98%)        PHYSICAL EXAM:   Constitutional: NAD, awake and alert, well-developed  HEENT: PERR, EOMI, Normal Hearing, MMM  Neck: Soft and supple, No LAD, No JVD  Respiratory: Breath sounds are clear bilaterally, No wheezing, rales or rhonchi  Cardiovascular: S1 and S2, regular rate and rhythm, soft DANI at LLSB and base as before, no gallops or rubs  Gastrointestinal: Bowel Sounds present, soft, nontender, nondistended, no guarding, no rebound  Extremities: No peripheral edema  Vascular: 2+ peripheral pulses  Neurological: A/O x 3, no focal deficits  Skin: No rashes      MEDICATIONS  (STANDING):  acetaminophen     Tablet .. 975 milliGRAM(s) Oral every 8 hours  atorvastatin 20 milliGRAM(s) Oral at bedtime  cefTRIAXone   IVPB 1000 milliGRAM(s) IV Intermittent every 24 hours  heparin  Infusion.  Unit(s)/Hr (10 mL/Hr) IV Continuous <Continuous>  levothyroxine 88 MICROGram(s) Oral daily  metoprolol succinate ER 25 milliGRAM(s) Oral daily  sodium chloride 0.9%. 1000 milliLiter(s) (75 mL/Hr) IV Continuous <Continuous>    MEDICATIONS  (PRN):  aluminum hydroxide/magnesium hydroxide/simethicone Suspension 30 milliLiter(s) Oral every 4 hours PRN Dyspepsia  heparin   Injectable 6000 Unit(s) IV Push every 6 hours PRN For aPTT less than 40  melatonin 3 milliGRAM(s) Oral at bedtime PRN Insomnia  ondansetron Injectable 4 milliGRAM(s) IV Push every 8 hours PRN Nausea and/or Vomiting      LABS: All Labs Reviewed:                        11.1   10.90 )-----------( 155      ( 2022 06:49 )             32.8     07-03    136  |  107  |  75<H>  ----------------------------<  97  4.6   |  19<L>  |  4.00<H>    Ca    8.4<L>      2022 06:49  Mg     2.0     07-02    TPro  6.5  /  Alb  2.9<L>  /  TBili  0.8  /  DBili  x   /  AST  717<H>  /  ALT  177<H>  /  AlkPhos  91  07-02    PT/INR - ( 2022 15:26 )   PT: 11.9 sec;   INR: 1.03 ratio       PTT - ( 2022 07:19 )  PTT:76.7 sec    Troponin I, High Sensitivity (22 @ 06:49): 2209.18  Troponin I, High Sensitivity (22 @ 12:11): 3957.44    BLOOD CULTURES:   LIPID PROFILE     RADIOLOGY:    CT of Pelvis: 7/3/22:  FINDINGS:  OSSEOUS STRUCTURES    Acute/Chronic Fractures:  None.  PELVIC JOINTS    Symphysis Pubis:  Mild arthrosis is noted.    Sacroiliac Joints:  Mild arthrosis is noted bilaterally.  RIGHT HIP JOINT    Avascular Necrosis: None.    Joint Space:  Maintained.    Effusion/Synovitis:  None.  LEFT HIP JOINT    Avascular Necrosis:  None.    Joint Space:  Maintained although tiny acetabular osteophytes are noted.    Effusion/Synovitis:  None.  VISUALIZED SPINE    Transitional lumbosacral level is noted with osseous bridging of the transitional disc. Moderate to severe lower lumbar facet arthrosis is present.  SOFT TISSUES  Neurovascular:  Moderate to severe atherosclerotic calcifications are present.  Pelvis/Abdomen:  Colonic diverticula are present without appreciated inflammatory change. The patient is status post hysterectomy.  Musculature:  There is mild edema of the right iliopsoas muscle. Mild generalized muscle atrophy is present. Moderate atrophy of the bilateral gluteus minimus muscles is noted.  Subcutaneous Tissues:  Unremarkable.  IMPRESSION:  1. No fractures are seen.  2. Right iliopsoas muscle edema suggests strain.    CT of C-Spine: 22:  FINDINGS:  No acute cervical spine fracture or evidence of traumatic malalignment.   Nonspecific straightening of the normal cervical lordosis. Craniocervical junction is unremarkable. No facet joint dislocation. No prevertebral soft tissue swelling. Mild anterolisthesis of C2 on C3, C3 on C4 and C4 on C5. There are multilevel degenerative changes of the spine characterized by degenerative disc disease as well as uncovertebral and facet joint arthrosis, contributing to mild to moderate stenosis at the C2-C3 level on the right. Limited evaluation of the spinal canal.  Visualized lung apices are clear. 2.1 cm hypodense left thyroid lobe nodule.  IMPRESSION:  No acute cervical spine fracture or evidence of traumatic malalignment.  2.1 cm left thyroid lobe nodule. Further characterization with nonemergent targeted sonogram may be obtained, if not previously characterized.    CT of Brain: 22:  FINDINGS:  There is no acute intracranial hemorrhage, mass effect, midline shift, extra-axial collection, hydrocephalus, or evidence of acute vascular territorial infarction. Mild patchy hypodensities within the periventricular and subcortical white matter, although nonspecific, likely reflect chronic microvascular disease. Cerebral volume loss contributes to prominence of the ventricles and sulci.  The visualized paranasal sinuses and mastoid air cells are clear.   Intraorbital contents are unremarkable. No calvarial fracture.  IMPRESSION:  No acute intracranial hemorrhage or calvarial fracture.      EK/3/22:  NSR, LAD/LAHB/IVCD, poor R-waves V1-6. No acute changes    TELEMETRY:  NSR    ECHO:  Pending

## 2022-07-04 NOTE — DIETITIAN NUTRITION RISK NOTIFICATION - ADDITIONAL COMMENTS/DIETITIAN RECOMMENDATIONS
1) add gelatein TID 2) consider checking vitamin D 25OH level and supplement prn 3) monitor BM: if > 3 days without BM, add bowel regimen 4) in v/o malnutrition, add 100mg thiamine 5) daily wt checks to track/trend changes 6) add MVI with minerals daily to ensure 100% RDI met

## 2022-07-04 NOTE — DIETITIAN INITIAL EVALUATION ADULT - PERTINENT MEDS FT
MEDICATIONS  (STANDING):  acetaminophen     Tablet .. 975 milliGRAM(s) Oral every 8 hours  atorvastatin 20 milliGRAM(s) Oral at bedtime  cefTRIAXone   IVPB 1000 milliGRAM(s) IV Intermittent every 24 hours  heparin  Infusion.  Unit(s)/Hr (10 mL/Hr) IV Continuous <Continuous>  levothyroxine 88 MICROGram(s) Oral daily  metoprolol succinate ER 25 milliGRAM(s) Oral daily  sodium chloride 0.9%. 1000 milliLiter(s) (75 mL/Hr) IV Continuous <Continuous>    MEDICATIONS  (PRN):  aluminum hydroxide/magnesium hydroxide/simethicone Suspension 30 milliLiter(s) Oral every 4 hours PRN Dyspepsia  heparin   Injectable 6000 Unit(s) IV Push every 6 hours PRN For aPTT less than 40  melatonin 3 milliGRAM(s) Oral at bedtime PRN Insomnia  ondansetron Injectable 4 milliGRAM(s) IV Push every 8 hours PRN Nausea and/or Vomiting

## 2022-07-04 NOTE — DIETITIAN INITIAL EVALUATION ADULT - ORAL INTAKE PTA/DIET HISTORY
3 small meals/day with decreased PO intake x 1 wk prior.  per pt, has had diarrhea with meals for a very long time.  was informed by MD to take imodium with meals.

## 2022-07-04 NOTE — PROGRESS NOTE ADULT - ASSESSMENT
7/3/22:  Pt with above history and slipped getting into her bed and lying on the floor for ? time.  Other than the pain in her left hip, she denies anginal chest pains or increased SOB.  With her CKD, likely demand ischemia with significantly reduce renal clearance of the Troponin which is coming down.  With her renal failure, invasive cardiac cath is very risky and will likely result in need for dialysis. Will get ECHO and avoid a cardiac cath if possible.  Otherwise a CT of her left hip may be needed to better evaluate possible hip fx or other source of her hip pain.  Otherwise continue as outlined by medicine etal.  Will follow.    7/4/22:  Resting comfortably this AM without anginal chest pains or increased SOB.  Left hip pain better.  CT of Pelvis showed no left hip fx.  Echo pending.  Troponin levels coming down.  Renal function unchanged.  No cardiac at this time.  Otherwise continue as outlined by medicine etal.  Will follow.

## 2022-07-04 NOTE — PROGRESS NOTE ADULT - ASSESSMENT
87 year-old woman with HTN, HLD, CAD, CKD III (baseline Cr ~1.8), gout and hypothyroidism, here for further evaluation of approximately 1 week of progressively worsening generalized weakness, malaise, decreased PO intake, suffered a fall on 6/30, landed on L hip, occurred while getting out of bed, and per daughter, patient has since developed increasing confusion as well. In the ED, patient was noted to have leukocytosis, RADHA with Cr 4.2, elevated AST/ALT, markedly elevated troponin. EKG w/ NSR, LAFB, septal infarct and T wave abnormality inferolaterally. Ua N-, mod LE, large bld, >50 WBC, 6-10 RBC, many bacteria. CT head and C spine negative for acute pathology. Patient started on empiric ceftriaxone for possible UTI, given IV fluids for RADHA, and started on heparin drip for possible NSTEMI. Admitted to [facility].    Acute metabolic encephalopathy  Likely multi factorial due to UTI, acute renal injury, NSTEMI, see below.   - Continue to manage underlying issues  - Reorient frequently  - Control pain where applicable  - Maintain regular bowel movement     UTI  Patient with UA suggestive of infection, obtained via straight cath. Started on empiric ceftriaxone. Urine culture in process but note that this was collected after patient had already received antibiotic.   - Continue ceftriaxone, day 2 today  - F/u in-process urine culture    RADHA on CKD III  Cr markedly elevated. Could be pre-renal in setting of infection and low PO intake, progression to ATN as she also had hypotension while on HCTZ and losartan, and possible urinary retention as well. Straight cath'd x 2 in the past 24 hrs. UPCR 0.6. FENa 0.95%. Renal bladder US done. L kidney not visualized. R kidney with no hydronephrosis. No stones seen. Appreciate input from Nephrology.    - Continue IV fluid hydration for now  - Trend Cr  - Treat UTI  - Bladder scan for PVR, may need Bose today if she has significant retention    Metabolic acidosis  In setting of RADHA but also may be partially due to saline infusion.   - Continue to trend labs, if bicarb <14 will switch over to bicarb drip    NSTEMI  Troponin 3957 on admission. EKG at that time NSR, L axis, LAFB, septal q, TWI inferolaterally. While demand ischemia combined with reduced renal clearance is possible, must be treated as NSTEMI for now as such. Cardiology input appreciated.   - Awaiting TTE results  - Continue heparin drip, statin, beta blocker    Hypothyroidism  TSH WNL  - Continue levothyroxine    Physical deconditioning and debility  PT evaluation requested  - F/u PT assessment    Severe protein-calorie malnutrition  Appreciate input from Nutrition  - Liberalized diet to regular but will need to take caution given RADHA. Ensure Enlive BID. Trend weight.    87 year-old woman with HTN, HLD, CAD, CKD III (baseline Cr ~1.8), gout and hypothyroidism, here for further evaluation of approximately 1 week of progressively worsening generalized weakness, malaise, decreased PO intake, suffered a fall on 6/30, landed on L hip, occurred while getting out of bed, and per daughter, patient has since developed increasing confusion as well. In the ED, patient was noted to have leukocytosis, RADHA with Cr 4.2, elevated AST/ALT, markedly elevated troponin. EKG w/ NSR, LAFB, septal infarct and T wave abnormality inferolaterally. Ua N-, mod LE, large bld, >50 WBC, 6-10 RBC, many bacteria. CT head and C spine negative for acute pathology. Patient started on empiric ceftriaxone for possible UTI, given IV fluids for RADHA, and started on heparin drip for possible NSTEMI. Admitted to [facility].    Acute metabolic encephalopathy  Likely multi factorial due to UTI, acute renal injury, NSTEMI, see below.   - Continue to manage underlying issues  - Reorient frequently  - Control pain where applicable  - Maintain regular bowel movement     UTI  Patient with UA suggestive of infection, obtained via straight cath. Started on empiric ceftriaxone. Urine culture in process but note that this was collected after patient had already received antibiotic.   - Continue ceftriaxone, day 2 today  - F/u in-process urine culture    RADHA on CKD III  Cr markedly elevated. Could be pre-renal in setting of infection and low PO intake, progression to ATN as she also had hypotension while on HCTZ and losartan, and possible urinary retention as well. Straight cath'd x 2 in the past 24 hrs. UPCR 0.6. FENa 0.95%. Renal bladder US done. L kidney not visualized. R kidney with no hydronephrosis. No stones seen. Appreciate input from Nephrology.    - Continue IV fluid hydration for now  - Trend Cr  - Treat UTI  - Bladder scan for PVR, may need Bose today if she has significant retention    Metabolic acidosis  In setting of RADHA but also may be partially due to saline infusion.   - Continue to trend labs, if bicarb <14 will switch over to bicarb drip    NSTEMI  Troponin 3957 on admission. EKG at that time NSR, L axis, LAFB, septal q, TWI inferolaterally. While demand ischemia combined with reduced renal clearance is possible, must be treated as NSTEMI for now as such. Cardiology input appreciated.   - Awaiting TTE results  - Continue heparin drip, statin, beta blocker    Anemia  Appears to be acute with Hgb today 9.4 from 11.8 on 7/2. MCV WNL. RDW WNL. No overt bleeding noted. Ordered for repeat CBC. Retic. LDH. Haptoglobin. Iron studies, B12.   - Trend Hgb  - F/u requested workup   - May be more liberal with transfusion requirements given her possible NSTEMI    Hypothyroidism  TSH WNL  - Continue levothyroxine    Physical deconditioning and debility  PT evaluation requested  - F/u PT assessment    Severe protein-calorie malnutrition  Appreciate input from Nutrition  - Liberalized diet to regular but will need to take caution given RADHA. Ensure Enlive BID. Trend weight.    87 year-old woman with HTN, HLD, CAD, CKD III (baseline Cr ~1.8), gout and hypothyroidism, here for further evaluation of approximately 1 week of progressively worsening generalized weakness, malaise, decreased PO intake, suffered a fall on 6/30, landed on L hip, occurred while getting out of bed, and per daughter, patient has since developed increasing confusion as well. In the ED, patient was noted to have leukocytosis, RADHA with Cr 4.2, elevated AST/ALT, markedly elevated troponin. EKG w/ NSR, LAFB, septal infarct and T wave abnormality inferolaterally. Ua N-, mod LE, large bld, >50 WBC, 6-10 RBC, many bacteria. CT head and C spine negative for acute pathology. Patient started on empiric ceftriaxone for possible UTI, given IV fluids for RADHA, and started on heparin drip for possible NSTEMI. Admitted to [facility].    Acute metabolic encephalopathy  Likely multi factorial due to UTI, acute renal injury, NSTEMI, see below.   - Continue to manage underlying issues  - Reorient frequently  - Control pain where applicable  - Maintain regular bowel movement     UTI  Patient with UA suggestive of infection, obtained via straight cath. Started on empiric ceftriaxone. Urine culture in process but note that this was collected after patient had already received antibiotic.   - Continue ceftriaxone, day 2 today  - F/u in-process urine culture    RADHA on CKD III  Cr markedly elevated. Could be pre-renal in setting of infection and low PO intake, progression to ATN as she also had hypotension while on HCTZ and losartan, and possible urinary retention as well. Straight cath'd x 2 in the past 24 hrs. UPCR 0.6. FENa 0.95%. Renal bladder US done. L kidney not visualized. R kidney with no hydronephrosis. No stones seen. Appreciate input from Nephrology.    - Continue IV fluid hydration for now  - Trend Cr  - Treat UTI  - Bladder scan for PVR, may need Bose today if she has significant retention    Metabolic acidosis  In setting of RADHA but also may be partially due to saline infusion.   - Continue to trend labs, if bicarb <14 will switch over to bicarb drip    NSTEMI  Troponin 3957 on admission. EKG at that time NSR, L axis, LAFB, septal q, TWI inferolaterally. While demand ischemia combined with reduced renal clearance is possible, must be treated as NSTEMI for now as such. Cardiology input appreciated.   - Awaiting TTE results  - Continue heparin drip, statin, beta blocker. Anticipate discontinuing heparin drip tomorrow and starting Aspirin/Plavix instead, pending TTE results and further Cardiology input    Anemia  Appears to be acute with Hgb today 9.4 from 11.8 on 7/2. MCV WNL. RDW WNL. No overt bleeding noted. Ordered for repeat CBC. Retic. LDH. Haptoglobin. Iron studies, B12.   - Trend Hgb  - F/u requested workup   - May be more liberal with transfusion requirements given her possible NSTEMI    Hypothyroidism  TSH WNL  - Continue levothyroxine    Physical deconditioning and debility  PT evaluation requested  - F/u PT assessment    Severe protein-calorie malnutrition  Appreciate input from Nutrition  - Liberalized diet to regular but will need to take caution given RADHA. Ensure Enlive BID. Trend weight.

## 2022-07-04 NOTE — DIETITIAN INITIAL EVALUATION ADULT - PERTINENT LABORATORY DATA
07-03    136  |  107  |  75<H>  ----------------------------<  97  4.6   |  19<L>  |  4.00<H>    Ca    8.4<L>      03 Jul 2022 06:49  Mg     2.0     07-02    TPro  6.5  /  Alb  2.9<L>  /  TBili  0.8  /  DBili  x   /  AST  717<H>  /  ALT  177<H>  /  AlkPhos  91  07-02

## 2022-07-04 NOTE — PROGRESS NOTE ADULT - SUBJECTIVE AND OBJECTIVE BOX
Chief Complaint: Confusion, disorientation, decreased PO intake, fatigue    Interval History: Patient seen and examined. Patient states that she better today. Bit less malaise and generalized weakness. Less confused. Still with poor appetite.   On ceftriaxone, day 2, for possible UTI. Urine culture in-process (note, collected after antibiotics started). On heparin infusion as per Cardiology for possible NSTEMI. TTE performed, report pending. On IV normal saline as per Nephrology for RADHA. Cr improved today, 3.74, compared to 4.21 on admission.     ROS: Multi system review is comprehensively negative x 10 systems except as above    Vitals:  T(F): 97.5 (04 Jul 2022 19:46), Max: 97.5 (04 Jul 2022 19:46)  HR: 74 (04 Jul 2022 19:46) (67 - 74)  BP: 95/42 (04 Jul 2022 19:46) (95/42 - 98/48)  RR: 18 (04 Jul 2022 19:46) (18 - 18)  SpO2: 100% (04 Jul 2022 19:46) (96% - 100%)    Exam:  Gen: Comfortable appearing  HEENT: NCAT PERRL EOMI MMM clear oropharynx  Neck: Supple, no JVD, no LAD  Chest: Normal resp effort at rest, lungs CTA B/L  CVS: s1 s2  normal, RRR  Abd: +BS, soft NT ND   Ext: No edema or calf tenderness  Skin: Warm, dry  Neuro: Awake and alert, follows commands, answers questions appropriately,   Mood: Calm, pleasant    Labs:                       9.4    11.36 )------( 170              28.0       138  |  109  |  81  ---------------------<  94  3.7   |   16   |  3.76    Ca 7.9    TPro  6.5  /  Alb  2.9  /  TBili  0.8  /  DBili  x   /  AST  717  /  ALT  177  /  AlkPhos  91      Troponin 3957 --> 2209   Lactate 1.7    UA 7/2: N-, mod LE, large bld, >50 WBC, 6-10 RBC, many bact    UA 7/3: N+, mod LE, large bld, 11-25 RBC 0-2 WBC (was > 50 on prior UA but patient has since received a dose of antibiotics), few bact    Micro:  Urine culture 7/3: In-process (but collected after abx started)  COVID19 PCR 7/2: Negative    Imaging:  CT pelvis 7/3: No fractures are seen. Right iliopsoas muscle edema suggests strain.    US kidney bladder 7/3: The left kidney was not visualized. No right renal mass, hydronephrosis or calculi is visualized sonographically. The bladder is underdistended, limiting evaluation.    CT C spine 7/2: No acute cervical spine fracture or evidence of traumatic malalignment. 2.1 cm left thyroid lobe nodule.    CT head 7/2: No acute intracranial hemorrhage, mass effect, midline shift, extra-axial collection, hydrocephalus, or evidence of acute vascular territorial infarction. Mild patchy hypodensities within the periventricular and subcortical white matter, although nonspecific, likely reflect chronic microvascular disease. Cerebral volume loss   contributes to prominence of the ventricles and sulci. The visualized paranasal sinuses and mastoid air cells are clear. Intraorbital contents are unremarkable. No calvarial fracture.    Cardiac Testing:  TTE 7/4: Performed, report pending    EKG 7/3: Normal sinus rhythm. Left axis deviation. Non-specific intra-ventricular conduction block. T wave abnormality, consider lateral ischemia. When compared with yesterday's EKG, criteria for septal infarct are no longer present. Nonspecific T wave abnormality has replaced inverted T waves in inferior leads.    EKG 7/2: Normal sinus rhythm. Left anterior fascicular block. Minimal voltage criteria for LVH, may be normal variant ( Ash product ). Septal infarct. T wave abnormality, inferolaterally.     Meds:  MEDICATIONS  (STANDING):  acetaminophen     Tablet .. 975 milliGRAM(s) Oral every 8 hours  atorvastatin 20 milliGRAM(s) Oral at bedtime  cefTRIAXone   IVPB 1000 milliGRAM(s) IV Intermittent every 24 hours  heparin  Infusion.  Unit(s)/Hr (10 mL/Hr) IV Continuous <Continuous>  levothyroxine 88 MICROGram(s) Oral daily  metoprolol succinate ER 25 milliGRAM(s) Oral daily  sodium chloride 0.9%. 1000 milliLiter(s) (75 mL/Hr) IV Continuous <Continuous>    MEDICATIONS  (PRN):  aluminum hydroxide/magnesium hydroxide/simethicone Suspension 30 milliLiter(s) Oral every 4 hours PRN Dyspepsia  heparin   Injectable 6000 Unit(s) IV Push every 6 hours PRN For aPTT less than 40  melatonin 3 milliGRAM(s) Oral at bedtime PRN Insomnia  ondansetron Injectable 4 milliGRAM(s) IV Push every 8 hours PRN Nausea and/or Vomiting   Chief Complaint: Confusion, disorientation, decreased PO intake, fatigue    Interval History: Patient seen and examined. Patient states that she better today. Bit less malaise and generalized weakness. Less confused. Still with poor appetite. On ceftriaxone, day 2, for possible UTI. Urine culture in-process (note, collected after antibiotics started). On heparin infusion as per Cardiology for possible NSTEMI. TTE performed, report pending. On IV normal saline as per Nephrology for RADHA. Cr improved today, 3.74, compared to 4.21 on admission.     ROS: Multi system review is comprehensively negative x 10 systems except as above    Vitals:  T(F): 97.5 (04 Jul 2022 19:46), Max: 97.5 (04 Jul 2022 19:46)  HR: 74 (04 Jul 2022 19:46) (67 - 74)  BP: 95/42 (04 Jul 2022 19:46) (95/42 - 98/48)  RR: 18 (04 Jul 2022 19:46) (18 - 18)  SpO2: 100% (04 Jul 2022 19:46) (96% - 100%)    Exam:  Gen: Comfortable appearing  HEENT: NCAT PERRL EOMI MMM clear oropharynx  Neck: Supple, no JVD, no LAD  Chest: Normal resp effort at rest, lungs CTA B/L  CVS: s1 s2  normal, RRR  Abd: +BS, soft NT ND   Ext: No edema or calf tenderness  Skin: Warm, dry  Neuro: Awake and alert, follows commands, answers questions appropriately,   Mood: Calm, pleasant    Labs:                       9.4    11.36 )------( 170              28.0       138  |  109  |  81  ---------------------<  94  3.7   |   16   |  3.76    Ca 7.9    TPro  6.5  /  Alb  2.9  /  TBili  0.8  /  DBili  x   /  AST  717  /  ALT  177  /  AlkPhos  91      Troponin 3957 --> 2209   Lactate 1.7    UA 7/2: N-, mod LE, large bld, >50 WBC, 6-10 RBC, many bact    UA 7/3: N+, mod LE, large bld, 11-25 RBC 0-2 WBC (was > 50 on prior UA but patient has since received a dose of antibiotics), few bact    Micro:  Urine culture 7/3: In-process (but collected after abx started)  COVID19 PCR 7/2: Negative    Imaging:  CT pelvis 7/3: No fractures are seen. Right iliopsoas muscle edema suggests strain.    US kidney bladder 7/3: The left kidney was not visualized. No right renal mass, hydronephrosis or calculi is visualized sonographically. The bladder is underdistended, limiting evaluation.    CT C spine 7/2: No acute cervical spine fracture or evidence of traumatic malalignment. 2.1 cm left thyroid lobe nodule.    CT head 7/2: No acute intracranial hemorrhage, mass effect, midline shift, extra-axial collection, hydrocephalus, or evidence of acute vascular territorial infarction. Mild patchy hypodensities within the periventricular and subcortical white matter, although nonspecific, likely reflect chronic microvascular disease. Cerebral volume loss   contributes to prominence of the ventricles and sulci. The visualized paranasal sinuses and mastoid air cells are clear. Intraorbital contents are unremarkable. No calvarial fracture.    Cardiac Testing:  TTE 7/4: Performed, report pending    EKG 7/3: Normal sinus rhythm. Left axis deviation. Non-specific intra-ventricular conduction block. T wave abnormality, consider lateral ischemia. When compared with yesterday's EKG, criteria for septal infarct are no longer present. Nonspecific T wave abnormality has replaced inverted T waves in inferior leads.    EKG 7/2: Normal sinus rhythm. Left anterior fascicular block. Minimal voltage criteria for LVH, may be normal variant ( Ash product ). Septal infarct. T wave abnormality, inferolaterally.     Meds:  MEDICATIONS  (STANDING):  acetaminophen     Tablet .. 975 milliGRAM(s) Oral every 8 hours  atorvastatin 20 milliGRAM(s) Oral at bedtime  cefTRIAXone   IVPB 1000 milliGRAM(s) IV Intermittent every 24 hours  heparin  Infusion.  Unit(s)/Hr (10 mL/Hr) IV Continuous <Continuous>  levothyroxine 88 MICROGram(s) Oral daily  metoprolol succinate ER 25 milliGRAM(s) Oral daily  sodium chloride 0.9%. 1000 milliLiter(s) (75 mL/Hr) IV Continuous <Continuous>    MEDICATIONS  (PRN):  aluminum hydroxide/magnesium hydroxide/simethicone Suspension 30 milliLiter(s) Oral every 4 hours PRN Dyspepsia  heparin   Injectable 6000 Unit(s) IV Push every 6 hours PRN For aPTT less than 40  melatonin 3 milliGRAM(s) Oral at bedtime PRN Insomnia  ondansetron Injectable 4 milliGRAM(s) IV Push every 8 hours PRN Nausea and/or Vomiting

## 2022-07-05 LAB
ADD ON TEST-SPECIMEN IN LAB: SIGNIFICANT CHANGE UP
ANION GAP SERPL CALC-SCNC: 9 MMOL/L — SIGNIFICANT CHANGE UP (ref 5–17)
APTT BLD: 61.8 SEC — HIGH (ref 27.5–35.5)
APTT BLD: 73.6 SEC — HIGH (ref 27.5–35.5)
BUN SERPL-MCNC: 85 MG/DL — HIGH (ref 7–23)
CALCIUM SERPL-MCNC: 7.5 MG/DL — LOW (ref 8.5–10.1)
CHLORIDE SERPL-SCNC: 109 MMOL/L — HIGH (ref 96–108)
CO2 SERPL-SCNC: 19 MMOL/L — LOW (ref 22–31)
CREAT SERPL-MCNC: 3.41 MG/DL — HIGH (ref 0.5–1.3)
EGFR: 13 ML/MIN/1.73M2 — LOW
FERRITIN SERPL-MCNC: 848 NG/ML — HIGH (ref 15–150)
GLUCOSE SERPL-MCNC: 111 MG/DL — HIGH (ref 70–99)
HAPTOGLOB SERPL-MCNC: 268 MG/DL — HIGH (ref 34–200)
HCT VFR BLD CALC: 20 % — CRITICAL LOW (ref 34.5–45)
HCT VFR BLD CALC: 20.4 % — CRITICAL LOW (ref 34.5–45)
HCT VFR BLD CALC: 28.8 % — LOW (ref 34.5–45)
HGB BLD-MCNC: 6.8 G/DL — CRITICAL LOW (ref 11.5–15.5)
HGB BLD-MCNC: 6.9 G/DL — CRITICAL LOW (ref 11.5–15.5)
HGB BLD-MCNC: 9.7 G/DL — LOW (ref 11.5–15.5)
IRON SATN MFR SERPL: 30 % — SIGNIFICANT CHANGE UP (ref 14–50)
IRON SATN MFR SERPL: 56 UG/DL — SIGNIFICANT CHANGE UP (ref 30–160)
LDH SERPL L TO P-CCNC: 536 U/L — HIGH (ref 84–241)
MAGNESIUM SERPL-MCNC: 1.9 MG/DL — SIGNIFICANT CHANGE UP (ref 1.6–2.6)
MCHC RBC-ENTMCNC: 30 PG — SIGNIFICANT CHANGE UP (ref 27–34)
MCHC RBC-ENTMCNC: 30.9 PG — SIGNIFICANT CHANGE UP (ref 27–34)
MCHC RBC-ENTMCNC: 33.7 GM/DL — SIGNIFICANT CHANGE UP (ref 32–36)
MCHC RBC-ENTMCNC: 33.8 GM/DL — SIGNIFICANT CHANGE UP (ref 32–36)
MCV RBC AUTO: 89.2 FL — SIGNIFICANT CHANGE UP (ref 80–100)
MCV RBC AUTO: 91.5 FL — SIGNIFICANT CHANGE UP (ref 80–100)
PHOSPHATE SERPL-MCNC: 5.2 MG/DL — HIGH (ref 2.5–4.5)
PLATELET # BLD AUTO: 150 K/UL — SIGNIFICANT CHANGE UP (ref 150–400)
PLATELET # BLD AUTO: 162 K/UL — SIGNIFICANT CHANGE UP (ref 150–400)
POTASSIUM SERPL-MCNC: 3.7 MMOL/L — SIGNIFICANT CHANGE UP (ref 3.5–5.3)
POTASSIUM SERPL-SCNC: 3.7 MMOL/L — SIGNIFICANT CHANGE UP (ref 3.5–5.3)
RBC # BLD: 2.23 M/UL — LOW (ref 3.8–5.2)
RBC # BLD: 2.23 M/UL — LOW (ref 3.8–5.2)
RBC # BLD: 3.23 M/UL — LOW (ref 3.8–5.2)
RBC # FLD: 14 % — SIGNIFICANT CHANGE UP (ref 10.3–14.5)
RBC # FLD: 14.6 % — HIGH (ref 10.3–14.5)
RETICS #: 39.8 K/UL — SIGNIFICANT CHANGE UP (ref 25–125)
RETICS/RBC NFR: 1.8 % — SIGNIFICANT CHANGE UP (ref 0.5–2.5)
SODIUM SERPL-SCNC: 137 MMOL/L — SIGNIFICANT CHANGE UP (ref 135–145)
TIBC SERPL-MCNC: 188 UG/DL — LOW (ref 220–430)
UIBC SERPL-MCNC: 132 UG/DL — SIGNIFICANT CHANGE UP (ref 110–370)
VIT B12 SERPL-MCNC: 359 PG/ML — SIGNIFICANT CHANGE UP (ref 232–1245)
WBC # BLD: 11.14 K/UL — HIGH (ref 3.8–10.5)
WBC # BLD: 9.54 K/UL — SIGNIFICANT CHANGE UP (ref 3.8–10.5)
WBC # FLD AUTO: 11.14 K/UL — HIGH (ref 3.8–10.5)
WBC # FLD AUTO: 9.54 K/UL — SIGNIFICANT CHANGE UP (ref 3.8–10.5)

## 2022-07-05 PROCEDURE — 73700 CT LOWER EXTREMITY W/O DYE: CPT | Mod: 26,LT

## 2022-07-05 PROCEDURE — 74176 CT ABD & PELVIS W/O CONTRAST: CPT | Mod: 26

## 2022-07-05 PROCEDURE — 99233 SBSQ HOSP IP/OBS HIGH 50: CPT

## 2022-07-05 PROCEDURE — 99221 1ST HOSP IP/OBS SF/LOW 40: CPT

## 2022-07-05 RX ORDER — PREGABALIN 225 MG/1
1000 CAPSULE ORAL DAILY
Refills: 0 | Status: DISCONTINUED | OUTPATIENT
Start: 2022-07-05 | End: 2022-07-08

## 2022-07-05 RX ORDER — CEFTRIAXONE 500 MG/1
INJECTION, POWDER, FOR SOLUTION INTRAMUSCULAR; INTRAVENOUS
Refills: 0 | Status: DISCONTINUED | OUTPATIENT
Start: 2022-07-05 | End: 2022-07-05

## 2022-07-05 RX ORDER — CEFTRIAXONE 500 MG/1
1000 INJECTION, POWDER, FOR SOLUTION INTRAMUSCULAR; INTRAVENOUS ONCE
Refills: 0 | Status: COMPLETED | OUTPATIENT
Start: 2022-07-05 | End: 2022-07-05

## 2022-07-05 RX ORDER — METOPROLOL TARTRATE 50 MG
25 TABLET ORAL DAILY
Refills: 0 | Status: DISCONTINUED | OUTPATIENT
Start: 2022-07-05 | End: 2022-07-08

## 2022-07-05 RX ORDER — CHLORHEXIDINE GLUCONATE 213 G/1000ML
1 SOLUTION TOPICAL
Refills: 0 | Status: DISCONTINUED | OUTPATIENT
Start: 2022-07-05 | End: 2022-07-08

## 2022-07-05 RX ORDER — SODIUM CHLORIDE 9 MG/ML
1000 INJECTION INTRAMUSCULAR; INTRAVENOUS; SUBCUTANEOUS
Refills: 0 | Status: DISCONTINUED | OUTPATIENT
Start: 2022-07-05 | End: 2022-07-06

## 2022-07-05 RX ORDER — CEFTRIAXONE 500 MG/1
INJECTION, POWDER, FOR SOLUTION INTRAMUSCULAR; INTRAVENOUS
Refills: 0 | Status: COMPLETED | OUTPATIENT
Start: 2022-07-05 | End: 2022-07-09

## 2022-07-05 RX ORDER — CEFTRIAXONE 500 MG/1
1000 INJECTION, POWDER, FOR SOLUTION INTRAMUSCULAR; INTRAVENOUS EVERY 24 HOURS
Refills: 0 | Status: COMPLETED | OUTPATIENT
Start: 2022-07-06 | End: 2022-07-08

## 2022-07-05 RX ORDER — CLOPIDOGREL BISULFATE 75 MG/1
75 TABLET, FILM COATED ORAL DAILY
Refills: 0 | Status: DISCONTINUED | OUTPATIENT
Start: 2022-07-05 | End: 2022-07-05

## 2022-07-05 RX ORDER — ALBUTEROL 90 UG/1
2 AEROSOL, METERED ORAL EVERY 6 HOURS
Refills: 0 | Status: DISCONTINUED | OUTPATIENT
Start: 2022-07-05 | End: 2022-07-08

## 2022-07-05 RX ADMIN — CLOPIDOGREL BISULFATE 75 MILLIGRAM(S): 75 TABLET, FILM COATED ORAL at 09:24

## 2022-07-05 RX ADMIN — Medication 88 MICROGRAM(S): at 06:06

## 2022-07-05 RX ADMIN — SODIUM CHLORIDE 60 MILLILITER(S): 9 INJECTION INTRAMUSCULAR; INTRAVENOUS; SUBCUTANEOUS at 14:23

## 2022-07-05 RX ADMIN — Medication 975 MILLIGRAM(S): at 21:43

## 2022-07-05 RX ADMIN — Medication 975 MILLIGRAM(S): at 06:06

## 2022-07-05 RX ADMIN — CHLORHEXIDINE GLUCONATE 1 APPLICATION(S): 213 SOLUTION TOPICAL at 09:26

## 2022-07-05 RX ADMIN — ATORVASTATIN CALCIUM 20 MILLIGRAM(S): 80 TABLET, FILM COATED ORAL at 21:43

## 2022-07-05 RX ADMIN — CEFTRIAXONE 1000 MILLIGRAM(S): 500 INJECTION, POWDER, FOR SOLUTION INTRAMUSCULAR; INTRAVENOUS at 14:23

## 2022-07-05 RX ADMIN — HEPARIN SODIUM 800 UNIT(S)/HR: 5000 INJECTION INTRAVENOUS; SUBCUTANEOUS at 02:50

## 2022-07-05 RX ADMIN — Medication 975 MILLIGRAM(S): at 14:24

## 2022-07-05 RX ADMIN — HEPARIN SODIUM 800 UNIT(S)/HR: 5000 INJECTION INTRAVENOUS; SUBCUTANEOUS at 01:49

## 2022-07-05 NOTE — PROGRESS NOTE ADULT - ASSESSMENT
7/3/22:  Pt with above history and slipped getting into her bed and lying on the floor for ? time.  Other than the pain in her left hip, she denies anginal chest pains or increased SOB.  With her CKD, likely demand ischemia with significantly reduce renal clearance of the Troponin which is coming down.  With her renal failure, invasive cardiac cath is very risky and will likely result in need for dialysis. Will get ECHO and avoid a cardiac cath if possible.  Otherwise a CT of her left hip may be needed to better evaluate possible hip fx or other source of her hip pain.  Otherwise continue as outlined by medicine etal.  Will follow.    7/4/22:  Resting comfortably this AM without anginal chest pains or increased SOB.  Left hip pain better.  CT of Pelvis showed no left hip fx.  Echo pending.  Troponin levels coming down.  Renal function unchanged.  No cardiac at this time.  Otherwise continue as outlined by medicine etal.  Will follow.    7/5/22:  Slowly improving without increased SOB or anginal chest pains.  Echo showing high normal LV systolic function with mild diastolic dysfunction, moderate LVH with mild outflow gradient, mild PI and TR and mild pulmonary HTN.  No wall motion abnormalities noted.  Will need PT for ambulation and left hip treatments.  Home or rehab when ok with medicine.  Would stop the Heparin and use antiplatelet therapy with Plavix for now.  No cardiac cath indicated at this time.  Otherwise continue as outlined by medicine etal.  Dr Collins will be back tomorrow.  Will follow as an outpt as needed.

## 2022-07-05 NOTE — PHYSICAL THERAPY INITIAL EVALUATION ADULT - LEVEL OF INDEPENDENCE: SUPINE/SIT, REHAB EVAL
held transfer / gait assessment due to low H&H: 6.8 / 20.0; blood transfusion pending; pt with Bilat thigh hematomas on CT; pt to be on bedrest @ this time as per YOAV Cordova (present)

## 2022-07-05 NOTE — PROGRESS NOTE ADULT - SUBJECTIVE AND OBJECTIVE BOX
Chief Complaint: Confusion, disorientation, decreased PO intake, fatigue    Interval History: Patient seen and examined. Patient states that she has generalized weakness.   On ceftriaxone, day 3, for possible UTI. Urine culture in-process (note, collected after antibiotics started). On heparin infusion as per Cardiology for possible NSTEMI. TTE performed, report pending. On IV normal saline as per Nephrology for RADHA. Cr improved today, 3.74, compared to 4.21 on admission.     ROS: Multi system review is comprehensively negative x 10 systems except as above    Vitals:  T(F): 97.5 (04 Jul 2022 19:46), Max: 97.5 (04 Jul 2022 19:46)  HR: 74 (04 Jul 2022 19:46) (67 - 74)  BP: 95/42 (04 Jul 2022 19:46) (95/42 - 98/48)  RR: 18 (04 Jul 2022 19:46) (18 - 18)  SpO2: 100% (04 Jul 2022 19:46) (96% - 100%)    Exam:  Gen: Comfortable appearing  HEENT: NCAT PERRL EOMI MMM clear oropharynx  Neck: Supple, no JVD, no LAD  Chest: Normal resp effort at rest, lungs CTA B/L  CVS: s1 s2  normal, RRR  Abd: +BS, soft NT ND   : Bose  Ext: No edema or calf tenderness  Skin: Warm, dry  Neuro: Awake and alert, follows commands, answers questions appropriately,   Mood: Calm, pleasant    Labs:               6.8    x     )-----------( x        ( 05 Jul 2022 09:28 )             20.0     07-05    137  |  109<H>  |  85<H>  ----------------------------<  111<H>  3.7   |  19<L>  |  3.41<H>    Ca    7.5<L>      05 Jul 2022 07:24  Phos  5.2     07-05  Mg     1.9     07-05    PTT - ( 05 Jul 2022 07:24 )  PTT:73.6 sec    Troponin 3957 --> 2209   Lactate 1.7    UA 7/2: N-, mod LE, large bld, >50 WBC, 6-10 RBC, many bact    UA 7/3: N+, mod LE, large bld, 11-25 RBC 0-2 WBC (was > 50 on prior UA but patient has since received a dose of antibiotics), few bact    Micro:  Urine culture 7/3: (-0 (but collected after abx started)    COVID19 PCR 7/2: Negative    Imaging:  CT pelvis 7/3: No fractures are seen. Right iliopsoas muscle edema suggests strain.    US kidney bladder 7/3: The left kidney was not visualized. No right renal mass, hydronephrosis or calculi is visualized sonographically. The bladder is underdistended, limiting evaluation.    CT C spine 7/2: No acute cervical spine fracture or evidence of traumatic malalignment. 2.1 cm left thyroid lobe nodule.    CT head 7/2: No acute intracranial hemorrhage, mass effect, midline shift, extra-axial collection, hydrocephalus, or evidence of acute vascular territorial infarction. Mild patchy hypodensities within the periventricular and subcortical white matter, although nonspecific, likely reflect chronic microvascular disease. Cerebral volume loss   contributes to prominence of the ventricles and sulci. The visualized paranasal sinuses and mastoid air cells are clear. Intraorbital contents are unremarkable. No calvarial fracture.    Cardiac Testing:  TTE 7/4: The left ventricle is normal in size, wall motion and contractility as seen in limited views. Estimated left ventricular ejection fraction is >70 %. Moderate concentric left ventricular hypertrophy is present. The aortic valve is well visualized, appears mildly calcified. Valve opening seems to be normal. Normal appearing mitral valve structure and function. EA reversal of the mitral inflow consistent with reduced compliance of the left ventricle. The tricuspid valve leaflets are thin and pliable; valve motion is normal. Mild (1+) tricuspid valve regurgitation is present. Mild pulmonary hypertension. Normal appearing pulmonic valve structure. Mild pulmonic valvular regurgitation (1+) is present. There is a hypoechoic space anterior to the right ventricular free wall. Cannot rule out loculated pericardial effusion Vs. pericardial fat pad. The IVC is dilated.    EKG 7/3: Normal sinus rhythm. Left axis deviation. Non-specific intra-ventricular conduction block. T wave abnormality, consider lateral ischemia. When compared with yesterday's EKG, criteria for septal infarct are no longer present. Nonspecific T wave abnormality has replaced inverted T waves in inferior leads.    EKG 7/2: Normal sinus rhythm. Left anterior fascicular block. Minimal voltage criteria for LVH, may be normal variant ( Murphy product ). Septal infarct. T wave abnormality, inferolaterally.     Meds:  MEDICATIONS  (STANDING):  acetaminophen     Tablet .. 975 milliGRAM(s) Oral every 8 hours  atorvastatin 20 milliGRAM(s) Oral at bedtime  cefTRIAXone Injectable.      chlorhexidine 4% Liquid 1 Application(s) Topical <User Schedule>  clopidogrel Tablet 75 milliGRAM(s) Oral daily  levothyroxine 88 MICROGram(s) Oral daily  metoprolol succinate ER 25 milliGRAM(s) Oral daily  sodium chloride 0.9%. 1000 milliLiter(s) (75 mL/Hr) IV Continuous <Continuous>    MEDICATIONS  (PRN):  ALBUTerol    90 MICROgram(s) HFA Inhaler 2 Puff(s) Inhalation every 6 hours PRN Bronchospasm  aluminum hydroxide/magnesium hydroxide/simethicone Suspension 30 milliLiter(s) Oral every 4 hours PRN Dyspepsia  melatonin 3 milliGRAM(s) Oral at bedtime PRN Insomnia  ondansetron Injectable 4 milliGRAM(s) IV Push every 8 hours PRN Nausea and/or Vomiting     Chief Complaint: Confusion, disorientation, decreased PO intake, fatigue    Interval History: Patient seen and examined. Patient states that she has generalized weakness. Acute anemia with Hgb to 6/8 today. follow up CT scans. IU PRBCs today. + Left hip pain. No visible ecchymosis. No blood in stool or urine per patient. On ceftriaxone, day 3, for probable UTI.     ROS: Multi system review is comprehensively negative x 10 systems except as above    Vitals:  T(C): 36.3 (05 Jul 2022 07:30), Max: 36.4 (04 Jul 2022 19:46)  T(F): 97.3 (05 Jul 2022 07:30), Max: 97.5 (04 Jul 2022 19:46)  HR: 61 (05 Jul 2022 07:30) (61 - 74)  BP: 102/42 (05 Jul 2022 07:30) (95/42 - 102/42)  RR: 18 (05 Jul 2022 07:30) (18 - 18)  SpO2: 96% (05 Jul 2022 07:30) (96% - 100%)    Exam:  Gen: Comfortable appearing  HEENT: NCAT PERRL EOMI MMM clear oropharynx  Neck: Supple, no JVD, no LAD  Chest: Normal resp effort at rest, lungs CTA B/L  CVS: s1 s2  normal, RRR  Abd: +BS, soft NT ND   : Bose  Ext: No edema or calf tenderness  Skin: Warm, dry  Neuro: Awake and alert, follows commands, answers questions appropriately,   Mood: Calm, pleasant    Labs:               6.8    x     )-----------( x        ( 05 Jul 2022 09:28 )             20.0     07-05    137  |  109<H>  |  85<H>  ----------------------------<  111<H>  3.7   |  19<L>  |  3.41<H>    Ca    7.5<L>      05 Jul 2022 07:24  Phos  5.2     07-05  Mg     1.9     07-05    PTT - ( 05 Jul 2022 07:24 )  PTT:73.6 sec    Troponin 3957 --> 2209   Lactate 1.7    UA 7/2: N-, mod LE, large bld, >50 WBC, 6-10 RBC, many bact    UA 7/3: N+, mod LE, large bld, 11-25 RBC 0-2 WBC (was > 50 on prior UA but patient has since received a dose of antibiotics), few bact    Micro:  Urine culture 7/3: (-0 (but collected after abx started)    COVID19 PCR 7/2: Negative    Imaging:  CT pelvis 7/3: No fractures are seen. Right iliopsoas muscle edema suggests strain.    US kidney bladder 7/3: The left kidney was not visualized. No right renal mass, hydronephrosis or calculi is visualized sonographically. The bladder is underdistended, limiting evaluation.    CT C spine 7/2: No acute cervical spine fracture or evidence of traumatic malalignment. 2.1 cm left thyroid lobe nodule.    CT head 7/2: No acute intracranial hemorrhage, mass effect, midline shift, extra-axial collection, hydrocephalus, or evidence of acute vascular territorial infarction. Mild patchy hypodensities within the periventricular and subcortical white matter, although nonspecific, likely reflect chronic microvascular disease. Cerebral volume loss   contributes to prominence of the ventricles and sulci. The visualized paranasal sinuses and mastoid air cells are clear. Intraorbital contents are unremarkable. No calvarial fracture.    Cardiac Testing:  TTE 7/4: The left ventricle is normal in size, wall motion and contractility as seen in limited views. Estimated left ventricular ejection fraction is >70 %. Moderate concentric left ventricular hypertrophy is present. The aortic valve is well visualized, appears mildly calcified. Valve opening seems to be normal. Normal appearing mitral valve structure and function. EA reversal of the mitral inflow consistent with reduced compliance of the left ventricle. The tricuspid valve leaflets are thin and pliable; valve motion is normal. Mild (1+) tricuspid valve regurgitation is present. Mild pulmonary hypertension. Normal appearing pulmonic valve structure. Mild pulmonic valvular regurgitation (1+) is present. There is a hypoechoic space anterior to the right ventricular free wall. Cannot rule out loculated pericardial effusion Vs. pericardial fat pad. The IVC is dilated.    EKG 7/3: Normal sinus rhythm. Left axis deviation. Non-specific intra-ventricular conduction block. T wave abnormality, consider lateral ischemia. When compared with yesterday's EKG, criteria for septal infarct are no longer present. Nonspecific T wave abnormality has replaced inverted T waves in inferior leads.    EKG 7/2: Normal sinus rhythm. Left anterior fascicular block. Minimal voltage criteria for LVH, may be normal variant ( Ash product ). Septal infarct. T wave abnormality, inferolaterally.     Meds:  MEDICATIONS  (STANDING):  acetaminophen     Tablet .. 975 milliGRAM(s) Oral every 8 hours  atorvastatin 20 milliGRAM(s) Oral at bedtime  cefTRIAXone Injectable.      chlorhexidine 4% Liquid 1 Application(s) Topical <User Schedule>  levothyroxine 88 MICROGram(s) Oral daily  metoprolol succinate ER 25 milliGRAM(s) Oral daily  sodium chloride 0.9%. 1000 milliLiter(s) (75 mL/Hr) IV Continuous <Continuous>    MEDICATIONS  (PRN):  ALBUTerol    90 MICROgram(s) HFA Inhaler 2 Puff(s) Inhalation every 6 hours PRN Bronchospasm  aluminum hydroxide/magnesium hydroxide/simethicone Suspension 30 milliLiter(s) Oral every 4 hours PRN Dyspepsia  melatonin 3 milliGRAM(s) Oral at bedtime PRN Insomnia  ondansetron Injectable 4 milliGRAM(s) IV Push every 8 hours PRN Nausea and/or Vomiting

## 2022-07-05 NOTE — PROGRESS NOTE ADULT - SUBJECTIVE AND OBJECTIVE BOX
CHIEF COMPLAINT:  Patient is a 87y old  Female who presents with a chief complaint of fall, weakness (2022 17:13)      HPI: 7/3/22:  86 y/o female, well known to our service and Dr Collins, with a PMHx of CAD, HTN, HLD, Gout, hypothyroidism, ckd stage III,  presents to the ED s/p fall 2 days ago.  As per daughter and patient, was getting out of bed and fell to the floor landing on left hip.   Pt denies head trauma, LOC.    Episode was preceding buy ~1week of weakness, fatigue decreased po intake and disoriented.  Patient lives alone, performs ADLs with some assistance.    In ed, ecg- s/p asa, heparin gtt. trop- >3000, cr-4.  cth-neg, left hip xray- no acute fx on wet read.   no cp.  c/o left hip and chronic back pain.  Usually just takes Tylenol at home.  Other than the pain in her left hip, she denies anginal chest pains or increased SOB.  With her CKD, likely demand ischemia with significantly reduce renal clearance of the Troponin which is coming down.  With her renal failure, invasive cardiac cath is very risky and will likely result in need for dialysis. Will get ECHO.    22:  Resting comfortably this AM without anginal chest pains or increased SOB.  Left hip pain better.  CT of Pelvis showed no left hip fx.  Echo pending.  Troponin levels coming down.  Renal function unchanged.  No cardiac at this time.    22:  Slowly improving without increased SOB or anginal chest pains.  Echo showing high normal LV systolic function with mild diastolic dysfunction, moderate LVH with mild outflow gradient, mild PI and TR and mild pulmonary HTN.  No wall motion abnormalities noted.  Will need PT for ambulation and left hip treatments.  Home or rehab when ok with medicine.  Would stop the Heparin and use antiplatelet therapy with Plavix for now.  No cardiac cath indicated at this time.        PMHx:  PAST MEDICAL & SURGICAL HISTORY:  CAD  HTN  Hyperlipidemia  Gout  Hypothyroidism  Chronic Kidney disease stage 4      SOCIAL Hx- non-smoker       FAMILY HISTORY:   FAMILY HISTORY:  non-contributory      ALLERGIES:  Allergies  penicillin (Unknown)      REVIEW OF SYSTEMS:  10 point ROS was obtained  Pertinent positives and negatives are as above  All other review of systems is negative unless indicated above      Vital Signs Last 24 Hrs  T(C): 36.4 (2022 19:46), Max: 36.4 (2022 19:46)  T(F): 97.5 (2022 19:46), Max: 97.5 (2022 19:46)  HR: 74 (2022 19:46) (67 - 74)  BP: 95/42 (2022 19:46) (95/42 - 98/48)  RR: 18 (2022 19:46) (18 - 18)  SpO2: 100% (2022 19:46) (96% - 100%)      PHYSICAL EXAM:   Constitutional: NAD, awake and alert, well-developed  HEENT: PERR, EOMI, Normal Hearing, MMM  Neck: Soft and supple, No LAD, No JVD  Respiratory: Breath sounds are clear bilaterally, No wheezing, rales or rhonchi  Cardiovascular: S1 and S2, regular rate and rhythm, soft DANI at LLSB and base as before, no gallops or rubs  Gastrointestinal: Bowel Sounds present, soft, nontender, nondistended, no guarding, no rebound  Extremities: No peripheral edema  Vascular: 2+ peripheral pulses  Neurological: A/O x 3, no focal deficits  Skin: No rashes      MEDICATIONS  (STANDING):  acetaminophen     Tablet .. 975 milliGRAM(s) Oral every 8 hours  atorvastatin 20 milliGRAM(s) Oral at bedtime  cefTRIAXone   IVPB 1000 milliGRAM(s) IV Intermittent every 24 hours  heparin  Infusion.  Unit(s)/Hr (10 mL/Hr) IV Continuous <Continuous>  levothyroxine 88 MICROGram(s) Oral daily  metoprolol succinate ER 25 milliGRAM(s) Oral daily  sodium chloride 0.9%. 1000 milliLiter(s) (75 mL/Hr) IV Continuous <Continuous>    MEDICATIONS  (PRN):  ALBUTerol    90 MICROgram(s) HFA Inhaler 2 Puff(s) Inhalation every 6 hours PRN Bronchospasm  aluminum hydroxide/magnesium hydroxide/simethicone Suspension 30 milliLiter(s) Oral every 4 hours PRN Dyspepsia  heparin   Injectable 6000 Unit(s) IV Push every 6 hours PRN For aPTT less than 40  melatonin 3 milliGRAM(s) Oral at bedtime PRN Insomnia  ondansetron Injectable 4 milliGRAM(s) IV Push every 8 hours PRN Nausea and/or Vomiting      LABS: All Labs Reviewed:                        9.4    11.36 )-----------( 170      ( 2022 08:51 )             28.0                           11.1   10.90 )-----------( 155      ( 2022 06:49 )             32.8       07-04    138  |  109<H>  |  81<H>  ----------------------------<  94  3.7   |  16<L>  |  3.76<H>    Ca    7.9<L>      2022 08:51      07-03    136  |  107  |  75<H>  ----------------------------<  97  4.6   |  19<L>  |  4.00<H>    Ca    8.4<L>      2022 06:49  Mg     2.0     07-02    TPro  6.5  /  Alb  2.9<L>  /  TBili  0.8  /  DBili  x   /  AST  717<H>  /  ALT  177<H>  /  AlkPhos  91  07-02    PT/INR - ( 2022 15:26 )   PT: 11.9 sec;   INR: 1.03 ratio       PTT - ( 2022 07:19 )  PTT:76.7 sec    Troponin I, High Sensitivity (22 @ 06:49): 2209.18  Troponin I, High Sensitivity (22 @ 12:11): 3957.44    BLOOD CULTURES:   LIPID PROFILE     RADIOLOGY:    CT of Pelvis: 7/3/22:  FINDINGS:  OSSEOUS STRUCTURES    Acute/Chronic Fractures:  None.  PELVIC JOINTS    Symphysis Pubis:  Mild arthrosis is noted.    Sacroiliac Joints:  Mild arthrosis is noted bilaterally.  RIGHT HIP JOINT    Avascular Necrosis: None.    Joint Space:  Maintained.    Effusion/Synovitis:  None.  LEFT HIP JOINT    Avascular Necrosis:  None.    Joint Space:  Maintained although tiny acetabular osteophytes are noted.    Effusion/Synovitis:  None.  VISUALIZED SPINE    Transitional lumbosacral level is noted with osseous bridging of the transitional disc. Moderate to severe lower lumbar facet arthrosis is present.  SOFT TISSUES  Neurovascular:  Moderate to severe atherosclerotic calcifications are present.  Pelvis/Abdomen:  Colonic diverticula are present without appreciated inflammatory change. The patient is status post hysterectomy.  Musculature:  There is mild edema of the right iliopsoas muscle. Mild generalized muscle atrophy is present. Moderate atrophy of the bilateral gluteus minimus muscles is noted.  Subcutaneous Tissues:  Unremarkable.  IMPRESSION:  1. No fractures are seen.  2. Right iliopsoas muscle edema suggests strain.    CT of C-Spine: 22:  FINDINGS:  No acute cervical spine fracture or evidence of traumatic malalignment.   Nonspecific straightening of the normal cervical lordosis. Craniocervical junction is unremarkable. No facet joint dislocation. No prevertebral soft tissue swelling. Mild anterolisthesis of C2 on C3, C3 on C4 and C4 on C5. There are multilevel degenerative changes of the spine characterized by degenerative disc disease as well as uncovertebral and facet joint arthrosis, contributing to mild to moderate stenosis at the C2-C3 level on the right. Limited evaluation of the spinal canal.  Visualized lung apices are clear. 2.1 cm hypodense left thyroid lobe nodule.  IMPRESSION:  No acute cervical spine fracture or evidence of traumatic malalignment.  2.1 cm left thyroid lobe nodule. Further characterization with nonemergent targeted sonogram may be obtained, if not previously characterized.    CT of Brain: 22:  FINDINGS:  There is no acute intracranial hemorrhage, mass effect, midline shift, extra-axial collection, hydrocephalus, or evidence of acute vascular territorial infarction. Mild patchy hypodensities within the periventricular and subcortical white matter, although nonspecific, likely reflect chronic microvascular disease. Cerebral volume loss contributes to prominence of the ventricles and sulci.  The visualized paranasal sinuses and mastoid air cells are clear.   Intraorbital contents are unremarkable. No calvarial fracture.  IMPRESSION:  No acute intracranial hemorrhage or calvarial fracture.      EK/3/22:  NSR, LAD/LAHB/IVCD, poor R-waves V1-6. No acute changes    TELEMETRY:  NSR    ECHO:  22:  M-Mode Measurements (cm)   LVEDd: 3.48 cm            LVESd: 2.3 cm   IVSEd: 1.45 cm   LVPWd: 1.4 cm             AO Root Dimension: 2.8 cm                    LA: 3 cm                             LVOT: 2 cm  Doppler Measurements:   AV Velocity:153 cm/s                 MV Peak E-Wave: 45.9 cm/s   AV Peak Gradient: 9.36 mmHg          MV Peak A-Wave: 75.5 cm/s   AV Mean Gradient: 5 mmHg           MV E/A Ratio: 0.61 %   AV Area (Continuity):2.35 cm^2       MV Peak Gradient: 0.84 mmHg   TR Velocity:276 cm/s   TR Gradient:30.4704 mmHg   Estimated RAP:10 mmHg   RVSP:45 mmHg    Findings  Mitral Valve   Normal appearing mitral valve structure and function.   EA reversal of the mitral inflow consistent with reduced compliance of the left ventricle.    Aortic Valve   The aortic valve is well visualized, appears mildly calcified. Valve opening seems to be normal.    Tricuspid Valve   The tricuspid valve leaflets are thin and pliable; valve motion is normal.   Mild (1+) tricuspid valve regurgitation is present.   Mild pulmonary hypertension.    Pulmonic Valve   Normal appearing pulmonic valve structure.   Mild pulmonic valvular regurgitation (1+) is present.    Left Atrium   Normal appearing left atrium.    Left Ventricle   The left ventricle is normal in size, wall motion and contractility as seen in limited views.   Estimated left ventricular ejection fraction is >70 %.   Moderate concentric left ventricular hypertrophy is present.    Right Atrium   Normal appearing right atrium.    Right Ventricle   Normal appearing right ventricle structure and function.    Pericardial Effusion   There is a hypoechoic space anterior to the right ventricular free wall.   Cannot rule out loculated pericardial effusion Vs. pericardial fat pad.    Pleural Effusion   No evidence of pleural effusion.    Miscellaneous   The IVC is dilated.    Summary   The left ventricle is normal in size, wall motion and contractility as seen in limited views.   Estimated left ventricular ejection fraction is >70 %.   Moderate concentric left ventricular hypertrophy is present.   The aortic valve is well visualized, appears mildly calcified. Valve   opening seems to be normal.   Normal appearing mitral valve structure and function.   EA reversal of the mitral inflow consistent with reduced compliance of the left ventricle.   The tricuspid valve leaflets are thin and pliable; valve motion is normal.   Mild (1+) tricuspid valve regurgitation is present.   Mild pulmonary hypertension.   Normal appearing pulmonic valve structure.   Mild pulmonic valvular regurgitation (1+) is present.   There is a hypoechoic space anterior to the right ventricular free wall.   Cannot rule out loculated pericardial effusion Vs. pericardial fat pad.   The IVC is dilated.    Signature   ---------------------------------------------------------------   Electronically signed by [facility]   physician) on 2022 06:53 PM   ----------------------------------------------------------------

## 2022-07-05 NOTE — CHART NOTE - NSCHARTNOTEFT_GEN_A_CORE
CT Abdomen and Pelvis No Cont: 7/5/22: Large bilateral proximal thigh hematomas with hematocrit levels indicating acute bleed. The left hematoma is larger. Right iliacus hematoma as well. Left lateral abdominal wall ecchymosis. Small bilateral pleural effusions.    CT Femur No Cont, Left: 7/5/22: 1.  Evolving moderate left adductor muscle hematoma 2.  No fracture. 3.  Degenerative changes of the hip and knee. 4.  Mild soft tissue swelling.    Case reported by radiologist.   Contacted Surgery Resident who advised contacting IR. Case d/w IR PA.   Continue with medical management. 2U PRBCs, IVF. Close monitoring of hemodynamics.  If patient deteriorates, obtain CTA A/P and re-call IR department.  CBCs q6  Keep NPO  Bedrest    Updated family at bedside. CT Abdomen and Pelvis No Cont: 7/5/22: Large bilateral proximal thigh hematomas with hematocrit levels indicating acute bleed. The left hematoma is larger. Right iliacus hematoma as well. Left lateral abdominal wall ecchymosis. Small bilateral pleural effusions.    CT Femur No Cont, Left: 7/5/22: 1.  Evolving moderate left adductor muscle hematoma 2.  No fracture. 3.  Degenerative changes of the hip and knee. 4.  Mild soft tissue swelling.    Case reported by radiologist.   Contacted Surgery Resident who advised contacting IR. Case d/w IR PA.   Continue with medical management. 2U PRBCs, IVF. Close monitoring of hemodynamics.  If patient deteriorates, obtain CTA A/P and re-call IR department.  CBCs q6  Keep NPO  Bedrest  Vitals q4    Updated family at bedside. CT Abdomen and Pelvis No Cont: 7/5/22: Large bilateral proximal thigh hematomas with hematocrit levels indicating acute bleed. The left hematoma is larger. Right iliacus hematoma as well. Left lateral abdominal wall ecchymosis. Small bilateral pleural effusions.    CT Femur No Cont, Left: 7/5/22: 1.  Evolving moderate left adductor muscle hematoma 2.  No fracture. 3.  Degenerative changes of the hip and knee. 4.  Mild soft tissue swelling.    Case reported by radiologist.   Contacted Surgery Resident who advised contacting IR. Case d/w IR PA.   Continue with medical management. 2U PRBCs, IVF. Close monitoring of hemodynamics.  Heparin drip held around 7AM, no indication at this time for reversal agent d/w pharmacy  If patient deteriorates, obtain CTA A/P and re-call IR department.  CBCs q6  Keep NPO  Bedrest  Vitals q4    Updated family at bedside.

## 2022-07-05 NOTE — PHYSICAL THERAPY INITIAL EVALUATION ADULT - CRITERIA FOR SKILLED THERAPEUTIC INTERVENTIONS
will hold further PT intervention @ this time; CT + for Bilat thigh hematomas; pt with low H&H: 6.8 / 20.0; blood transfusion pending; will await future PT orders when appropriate further course of PT intervention TBD

## 2022-07-05 NOTE — PROGRESS NOTE ADULT - ASSESSMENT
86 yo female with hx of HTN, CAD, CKD 3b w left atrophic kidney, baseline Cr ~ 1.8 in 2021 presenting with fall, weakness and UTI w decreased po intake and RADHA     RADHA on CKD Cr 1.8 - 2 w left atrophic kidney hx     sec to ATN in setting of hypotension w soft BP while on HCTZ and losartan + Urine retention     Cr improving and likely anemia related   Urology eval    continue to hold further ARB and diuretics   maintain SBP > 110     Met acidosis -  sec to saline + RADHA    trend and change to Bicarb gtt if  HC < 14    check labs in AM     Anemia  -Workup per medicine  -Consideration of imaging for blood loss    d/c with YOAV Tolbert and Dr Rodarte  Seen earlier, note now    Thank you for the courtesy of this consult. We will follow this patient with you.   Management is subject to change if new information becomes available or patient condition changes.

## 2022-07-05 NOTE — PROVIDER CONTACT NOTE (CRITICAL VALUE NOTIFICATION) - NS PROVIDER READ BACK TO LAB
Prescription approved per [facility], [facility] or [facility] refill protocol.  Arline Farias RN - Triage  [facility]       yes

## 2022-07-05 NOTE — PHYSICAL THERAPY INITIAL EVALUATION ADULT - GENERAL OBSERVATIONS, REHAB EVAL
kevin; HM; pt rec'd in bed supine; HR 71; pt denied pain; bilat knee abrasions noted;  son / NP RAINE Cordova present

## 2022-07-05 NOTE — PHYSICAL THERAPY INITIAL EVALUATION ADULT - MODALITIES TREATMENT COMMENTS
pt left in bed supine post Eval; bed alarm on; cassie  in place; son present; callbell in reach; madhu well; denied pain @ rest

## 2022-07-05 NOTE — PROGRESS NOTE ADULT - ASSESSMENT
87 year-old woman with HTN, HLD, CAD, CKD III (baseline Cr ~1.8), gout and hypothyroidism, here for further evaluation of approximately 1 week of progressively worsening generalized weakness, malaise, decreased PO intake, suffered a fall on 6/30, landed on L hip, occurred while getting out of bed, and per daughter, patient has since developed increasing confusion as well. In the ED, patient was noted to have leukocytosis, RADHA with Cr 4.2, elevated AST/ALT, markedly elevated troponin. EKG w/ NSR, LAFB, septal infarct and T wave abnormality inferolaterally. Ua N-, mod LE, large bld, >50 WBC, 6-10 RBC, many bacteria. CT head and C spine negative for acute pathology. Patient started on empiric ceftriaxone for possible UTI, given IV fluids for RADHA, and started on heparin drip for possible NSTEMI. Admitted to [facility].    Acute metabolic encephalopathy  Likely multi factorial due to UTI, acute renal injury, NSTEMI, see below.   - Continue to manage underlying issues  - Reorient frequently  - Control pain where applicable  - Maintain regular bowel movement     UTI  Patient with UA suggestive of infection, obtained via straight cath. Started on empiric ceftriaxone. Urine culture (-), but note that this was collected after patient had already received antibiotic. Given presence of Bose will given 5 day course.  - Continue ceftriaxone, day 3/5 today    Acute Anemia  Appears to be acute with Hgb today of 6.8. Possible dilutional component and with acute renal failure contributing to anemia. No overt bleeding noted. CT abdomen/Pelvis and CT LT femur to r/o bleeding. Patient with fall prior to hospital arrival. 1U PRBCs.  - Trend Hgb q6 hours. Follow up iron studies  - Antiplatelet agents held    RADHA on CKD III  Cr markedly elevated. Could be pre-renal in setting of infection and low PO intake, progression to ATN as she also had hypotension while on HCTZ and losartan, and possible urinary retention as well. Straight cath'd x 2 in the past 24 hrs. UPCR 0.6. FENa 0.95%. Renal bladder US done. L kidney not visualized. R kidney with no hydronephrosis. No stones seen. Appreciate input from Nephrology.    - Continue IV fluid hydration for now  - Trend Cr  - Treat UTI  - Bladder scan for PVR, may need Bose today if she has significant retention    Metabolic acidosis  In setting of RADHA but also may be partially due to saline infusion.   - Continue to trend labs, if bicarb <14 will switch over to bicarb drip    NSTEMI  Troponin 3957 on admission. EKG at that time NSR, L axis, LAFB, septal q, TWI inferolaterally. While demand ischemia combined with reduced renal clearance is possible, must be treated as NSTEMI for now as such. Cardiology input appreciated.   - TTE w/ EF >70 %, mild TR, mild pulmHTN  - S/p heparin drip. Discontinued today.  - Continue statin, beta blocker  - Antiplatelet agents held in lieu of anemia, r/o bleed d/w cardiology.    Hypothyroidism  TSH WNL  - Continue levothyroxine    Physical deconditioning and debility  PT evaluation requested  - F/u PT assessment    Severe protein-calorie malnutrition  Appreciate input from Nutrition  - Liberalized diet to regular but will need to take caution given RADHA. Ensure Enlive BID. Trend weight.     DVT ppx  - SCDs, hold chemical DVT ppx in lieu of anemia. 87 year-old woman with HTN, HLD, CAD, CKD III (baseline Cr ~1.8), gout and hypothyroidism, here for further evaluation of approximately 1 week of progressively worsening generalized weakness, malaise, decreased PO intake, suffered a fall on 6/30, landed on L hip, occurred while getting out of bed, and per daughter, patient has since developed increasing confusion as well. In the ED, patient was noted to have leukocytosis, RADHA with Cr 4.2, elevated AST/ALT, markedly elevated troponin. EKG w/ NSR, LAFB, septal infarct and T wave abnormality inferolaterally. Ua N-, mod LE, large bld, >50 WBC, 6-10 RBC, many bacteria. CT head and C spine negative for acute pathology. Patient started on empiric ceftriaxone for possible UTI, given IV fluids for RADHA, and started on heparin drip for possible NSTEMI. Admitted to [facility].    Acute metabolic encephalopathy  Likely multi factorial due to UTI, acute renal injury, NSTEMI, see below.   - Continue to manage underlying issues  - Reorient frequently  - Control pain where applicable  - Maintain regular bowel movement     UTI  Patient with UA suggestive of infection, obtained via straight cath. Started on empiric ceftriaxone. Urine culture (-), but note that this was collected after patient had already received antibiotic. Given presence of Bose will given 5 day course.  - Continue ceftriaxone, day 3/5 today    Acute Anemia  Appears to be acute with Hgb today of 6.8. Possible dilutional component and with acute renal failure contributing to anemia. No overt bleeding noted. CT abdomen/Pelvis and CT LT femur to r/o bleeding. Patient with fall prior to hospital arrival. 1U PRBCs.  - Trend Hgb q6 hours. Follow up iron studies  - Antiplatelet agents held    RADHA on CKD III  Cr markedly elevated. Could be pre-renal in setting of infection and low PO intake, progression to ATN as she also had hypotension while on HCTZ and losartan, and possible urinary retention as well. UPCR 0.6. FENa 0.95%. Renal bladder US done. L kidney not visualized. R kidney with no hydronephrosis. No stones seen. Appreciate input from Nephrology.    - Continue IV fluid hydration for now  - Trend Cr  - Treat UTI  - Bose in place for retention. Urology evaluation.    Metabolic acidosis  In setting of RADHA but also may be partially due to saline infusion.   - Continue to trend labs, if bicarb <14 will switch over to bicarb drip    NSTEMI  Troponin 3957 on admission. EKG at that time NSR, L axis, LAFB, septal q, TWI inferolaterally. While demand ischemia combined with reduced renal clearance is possible, must be treated as NSTEMI for now as such. Cardiology input appreciated.   - TTE w/ EF >70 %, mild TR, mild pulmHTN  - S/p heparin drip. Discontinued today.  - Continue statin, beta blocker  - Antiplatelet agents held in lieu of anemia, r/o bleed d/w cardiology.    Hypothyroidism  TSH WNL  - Continue levothyroxine    Physical deconditioning and debility  PT evaluation requested  - F/u PT assessment    Severe protein-calorie malnutrition  Appreciate input from Nutrition  - Liberalized diet to regular but will need to take caution given RADHA. Ensure Enlive BID. Trend weight.     DVT ppx  - SCDs, hold chemical DVT ppx in lieu of anemia.

## 2022-07-05 NOTE — PROGRESS NOTE ADULT - SUBJECTIVE AND OBJECTIVE BOX
Patient is a 87y Female who reports no complaints overnight.    REVIEW OF SYSTEMS:    CONSTITUTIONAL: No weakness, fevers or chills  RESPIRATORY: No cough, wheezing, hemoptysis; No shortness of breath  CARDIOVASCULAR: No chest pain or palpitations  GENITOURINARY: No dysuria, frequency or hematuria  All other review of systems is negative unless indicated above.    MEDICATIONS  (STANDING):  acetaminophen     Tablet .. 975 milliGRAM(s) Oral every 8 hours  atorvastatin 20 milliGRAM(s) Oral at bedtime  cefTRIAXone   IVPB      chlorhexidine 4% Liquid 1 Application(s) Topical <User Schedule>  levothyroxine 88 MICROGram(s) Oral daily  metoprolol succinate ER 25 milliGRAM(s) Oral daily  sodium chloride 0.9%. 1000 milliLiter(s) (75 mL/Hr) IV Continuous <Continuous>  sodium chloride 0.9%. 1000 milliLiter(s) (60 mL/Hr) IV Continuous <Continuous>    MEDICATIONS  (PRN):  ALBUTerol    90 MICROgram(s) HFA Inhaler 2 Puff(s) Inhalation every 6 hours PRN Bronchospasm  aluminum hydroxide/magnesium hydroxide/simethicone Suspension 30 milliLiter(s) Oral every 4 hours PRN Dyspepsia  melatonin 3 milliGRAM(s) Oral at bedtime PRN Insomnia  ondansetron Injectable 4 milliGRAM(s) IV Push every 8 hours PRN Nausea and/or Vomiting        T(C): , Max: 36.4 (07-04-22 @ 19:46)  T(F): , Max: 97.5 (07-04-22 @ 19:46)  HR: 65 (07-05-22 @ 13:00)  BP: 90/51 (07-05-22 @ 13:00)  BP(mean): --  RR: 16 (07-05-22 @ 13:00)  SpO2: 100% (07-05-22 @ 13:00)  Wt(kg): --    07-04 @ 07:01  -  07-05 @ 07:00  --------------------------------------------------------  IN: 0 mL / OUT: 450 mL / NET: -450 mL          PHYSICAL EXAM:    Constitutional: NAD, well-groomed, well-developed  HEENT: PERRLA, EOMI,  MMM  Neck: No LAD, No JVD  Respiratory: CTAB  Cardiovascular: S1 and S2, RRR  Gastrointestinal: BS+, soft, NT/ND  Extremities: No peripheral edema  Neurological: A/O x 3, no focal deficits  Psychiatric: Normal mood, normal affect  : No Bose  Skin: No rashes  Access: Not applicable        LABS:                        6.8    x     )-----------( x        ( 05 Jul 2022 09:28 )             20.0     05 Jul 2022 07:24    137    |  109    |  85     ----------------------------<  111    3.7     |  19     |  3.41   04 Jul 2022 08:51    138    |  109    |  81     ----------------------------<  94     3.7     |  16     |  3.76   03 Jul 2022 06:49    136    |  107    |  75     ----------------------------<  97     4.6     |  19     |  4.00   02 Jul 2022 20:35    136    |  108    |  73     ----------------------------<  125    3.7     |  19     |  4.09   02 Jul 2022 12:11    136    |  105    |  69     ----------------------------<  126    4.2     |  22     |  4.21     Ca    7.5        05 Jul 2022 07:24  Ca    7.9        04 Jul 2022 08:51  Ca    8.4        03 Jul 2022 06:49  Ca    8.5        02 Jul 2022 20:35  Ca    8.9        02 Jul 2022 12:11  Phos  5.2       05 Jul 2022 07:24  Mg     1.9       05 Jul 2022 07:24  Mg     2.0       02 Jul 2022 12:11    TPro  6.5    /  Alb  2.9    /  TBili  0.8    /  DBili  x      /  AST  717    /  ALT  177    /  AlkPhos  91     02 Jul 2022 12:11          Urine Studies:          RADIOLOGY & ADDITIONAL STUDIES:

## 2022-07-06 LAB
ALBUMIN SERPL ELPH-MCNC: 2.2 G/DL — LOW (ref 3.3–5)
ALP SERPL-CCNC: 64 U/L — SIGNIFICANT CHANGE UP (ref 40–120)
ALT FLD-CCNC: 81 U/L — HIGH (ref 12–78)
ANION GAP SERPL CALC-SCNC: 10 MMOL/L — SIGNIFICANT CHANGE UP (ref 5–17)
AST SERPL-CCNC: 164 U/L — HIGH (ref 15–37)
BILIRUB DIRECT SERPL-MCNC: 0.1 MG/DL — SIGNIFICANT CHANGE UP (ref 0–0.3)
BILIRUB INDIRECT FLD-MCNC: 0.8 MG/DL — SIGNIFICANT CHANGE UP (ref 0.2–1)
BILIRUB SERPL-MCNC: 0.9 MG/DL — SIGNIFICANT CHANGE UP (ref 0.2–1.2)
BUN SERPL-MCNC: 84 MG/DL — HIGH (ref 7–23)
CALCIUM SERPL-MCNC: 7.9 MG/DL — LOW (ref 8.5–10.1)
CHLORIDE SERPL-SCNC: 113 MMOL/L — HIGH (ref 96–108)
CO2 SERPL-SCNC: 20 MMOL/L — LOW (ref 22–31)
CREAT SERPL-MCNC: 2.97 MG/DL — HIGH (ref 0.5–1.3)
EGFR: 15 ML/MIN/1.73M2 — LOW
GLUCOSE SERPL-MCNC: 95 MG/DL — SIGNIFICANT CHANGE UP (ref 70–99)
HCT VFR BLD CALC: 29.3 % — LOW (ref 34.5–45)
HCT VFR BLD CALC: 30.8 % — LOW (ref 34.5–45)
HGB BLD-MCNC: 9.9 G/DL — LOW (ref 11.5–15.5)
HGB BLD-MCNC: 9.9 G/DL — LOW (ref 11.5–15.5)
MAGNESIUM SERPL-MCNC: 2.1 MG/DL — SIGNIFICANT CHANGE UP (ref 1.6–2.6)
MCHC RBC-ENTMCNC: 29.6 PG — SIGNIFICANT CHANGE UP (ref 27–34)
MCHC RBC-ENTMCNC: 30.3 PG — SIGNIFICANT CHANGE UP (ref 27–34)
MCHC RBC-ENTMCNC: 32.1 GM/DL — SIGNIFICANT CHANGE UP (ref 32–36)
MCHC RBC-ENTMCNC: 33.8 GM/DL — SIGNIFICANT CHANGE UP (ref 32–36)
MCV RBC AUTO: 89.6 FL — SIGNIFICANT CHANGE UP (ref 80–100)
MCV RBC AUTO: 92.2 FL — SIGNIFICANT CHANGE UP (ref 80–100)
PLATELET # BLD AUTO: 155 K/UL — SIGNIFICANT CHANGE UP (ref 150–400)
PLATELET # BLD AUTO: 165 K/UL — SIGNIFICANT CHANGE UP (ref 150–400)
POTASSIUM SERPL-MCNC: 4 MMOL/L — SIGNIFICANT CHANGE UP (ref 3.5–5.3)
POTASSIUM SERPL-SCNC: 4 MMOL/L — SIGNIFICANT CHANGE UP (ref 3.5–5.3)
PROT SERPL-MCNC: 5 GM/DL — LOW (ref 6–8.3)
RBC # BLD: 3.27 M/UL — LOW (ref 3.8–5.2)
RBC # BLD: 3.34 M/UL — LOW (ref 3.8–5.2)
RBC # FLD: 15 % — HIGH (ref 10.3–14.5)
RBC # FLD: 15 % — HIGH (ref 10.3–14.5)
SODIUM SERPL-SCNC: 143 MMOL/L — SIGNIFICANT CHANGE UP (ref 135–145)
WBC # BLD: 10.76 K/UL — HIGH (ref 3.8–10.5)
WBC # BLD: 11.77 K/UL — HIGH (ref 3.8–10.5)
WBC # FLD AUTO: 10.76 K/UL — HIGH (ref 3.8–10.5)
WBC # FLD AUTO: 11.77 K/UL — HIGH (ref 3.8–10.5)

## 2022-07-06 PROCEDURE — 99232 SBSQ HOSP IP/OBS MODERATE 35: CPT

## 2022-07-06 RX ORDER — TAMSULOSIN HYDROCHLORIDE 0.4 MG/1
0.4 CAPSULE ORAL AT BEDTIME
Refills: 0 | Status: DISCONTINUED | OUTPATIENT
Start: 2022-07-06 | End: 2022-07-08

## 2022-07-06 RX ADMIN — Medication 975 MILLIGRAM(S): at 14:45

## 2022-07-06 RX ADMIN — PREGABALIN 1000 MICROGRAM(S): 225 CAPSULE ORAL at 12:42

## 2022-07-06 RX ADMIN — CEFTRIAXONE 100 MILLIGRAM(S): 500 INJECTION, POWDER, FOR SOLUTION INTRAMUSCULAR; INTRAVENOUS at 12:42

## 2022-07-06 RX ADMIN — CHLORHEXIDINE GLUCONATE 1 APPLICATION(S): 213 SOLUTION TOPICAL at 12:41

## 2022-07-06 RX ADMIN — ATORVASTATIN CALCIUM 20 MILLIGRAM(S): 80 TABLET, FILM COATED ORAL at 21:22

## 2022-07-06 RX ADMIN — Medication 975 MILLIGRAM(S): at 21:23

## 2022-07-06 RX ADMIN — Medication 88 MICROGRAM(S): at 04:54

## 2022-07-06 RX ADMIN — TAMSULOSIN HYDROCHLORIDE 0.4 MILLIGRAM(S): 0.4 CAPSULE ORAL at 21:23

## 2022-07-06 NOTE — PROGRESS NOTE ADULT - NUTRITIONAL ASSESSMENT
This patient has been assessed with a concern for Malnutrition and has been determined to have a diagnosis/diagnoses of Severe protein-calorie malnutrition.    This patient is being managed with:   Diet Regular-  Supplement Feeding Modality:  Oral  Ensure Enlive Cans or Servings Per Day:  1       Frequency:  Two Times a day  Entered: Jul 6 2022  8:45AM    
This patient has been assessed with a concern for Malnutrition and has been determined to have a diagnosis/diagnoses of Severe protein-calorie malnutrition.    This patient is being managed with:   Diet Regular-  Supplement Feeding Modality:  Oral  Ensure Enlive Cans or Servings Per Day:  1       Frequency:  Two Times a day  Entered: Jul 4 2022 12:52PM

## 2022-07-06 NOTE — CONSULT NOTE ADULT - ASSESSMENT
86 yo female with PMH as above, admitted for metabolic encephalopathy. Urology consulted for pt with urinary retention with PVRs of 415 mL, 55 mL then 315 mL on bladder scan, straight catheterized initially and now with indwelling brown in place. Pt with concern for UTI initially started on ABX, urine culture neg but was collected after antibiotics were started. Pt is on ABX empirically.  Recommend  - Start Flomax   - Trial of void in 1 week inpatient vs. outpatient   - Pt can follow up with Urologists in [place] (Dr. Sampson/Denise/Geoff) or Follow up with Dr. Etienne Mckeon (located in [facility] [phone]) for further management    Case discussed with Dr. Mckeon
Trauma surgery consulted for drop in H/H, on Hep drip for NSTEMI since this am, (currently held), repeat CT     trend H/H Q6 hr  transfuse as needed  monitor LE compartments, soft  conservative management for hematomas  hold any AC at this time  appreciate further recs for NSTEMI by cardiology team  rest of care per primary team  thank you for the consult    Plan discussed with trauma attending, Dr Glez  
86yo F with L adductor muscle hematoma/bleed referred to IR for evaluation  - Pt was on heparin drip for elevated troponins, now stopped  - H/H morning readings stable at two hour interval  - As per NP, non-con CT obtained due to impaired renal function  - VSS  - Pt had breakfast  - Case reviewed with Dr. Davila. Recommend holding anticoagulation and performing serial H/H for now as pt is currently stable. Proceed with transfusion of 2U PRBC as ordered, assess H/H for appropriate response. If pt becomes unstable, order CTA to assess for location of bleed. Keep pt NPO in the interim. NP Art little
88 yo female with hx of HTN, CAD, CKD 3b w left atrophic kidney, baseline Cr ~ 1.8 in 2021    not seen since then  now presenting with fall, weakness and UTI w decreased po intake and RADHA     RADHA on CKD Cr 1.8 - 2 w left atrophic kidney hx    sec to ATN in setting of hypotension w soft BP while on HCTZ and losartan + Urine retention    hold further ARB and diuretics   maintain SBP > 110    bladder scan for retention and SC - may need Bose if persists    trend labs    abx asper Medicine    gentle IVF    check labs in AM     ** pt seen earlier today  d/w Dr Ayers     Thank you for the courtesy of this consult. We will follow this patient with you.   Management is subject to change if new information becomes available or patient condition changes.     
7/3/22:  Pt with above history and slipped getting into her bed and lying on the floor for ? time.  Other than the pain in her left hip, she denies anginal chest pains or increased SOB.  With her CKD, likely demand ischemia with significantly reduce renal clearance of the Troponin which is coming down.  With her renal failure, invasive cardiac cath is very risky and will likely result in need for dialysis. Will get ECHO and avoid a cardiac cath if possible.  Otherwise a CT of her left hip may be needed to better evaluate possible hip fx or other source of her hip pain.  Otherwise continue as outlined by medicine etal.  Will follow.

## 2022-07-06 NOTE — PROGRESS NOTE ADULT - ASSESSMENT
87 year-old woman with HTN, HLD, CAD, CKD III (baseline Cr ~1.8), gout and hypothyroidism, here for further evaluation of approximately 1 week of progressively worsening generalized weakness, malaise, decreased PO intake, suffered a fall on 6/30, landed on L hip, occurred while getting out of bed, and per daughter, patient has since developed increasing confusion as well. In the ED, patient was noted to have leukocytosis, RADHA with Cr 4.2, elevated AST/ALT, markedly elevated troponin. EKG w/ NSR, LAFB, septal infarct and T wave abnormality inferolaterally. Ua N-, mod LE, large bld, >50 WBC, 6-10 RBC, many bacteria. CT head and C spine negative for acute pathology. Patient started on empiric ceftriaxone for possible UTI, given IV fluids for RADHA, and started on heparin drip for possible NSTEMI. Admitted to [facility].    Acute metabolic encephalopathy  Likely multi factorial due to UTI, acute renal injury, NSTEMI, see below.   - Continue to manage underlying issues  - Reorient frequently  - Control pain where applicable  - Maintain regular bowel movement     UTI  Patient with UA suggestive of infection, obtained via straight cath. Started on empiric ceftriaxone. Urine culture (-), but note that this was collected after patient had already received antibiotic. Given presence of Bose will given 5 day course.  - Continue ceftriaxone, day 3/5 today    Acute Anemia  Appears to be acute with Hgb today of 6.8. Possible dilutional component and with acute renal failure contributing to anemia. No overt bleeding noted. CT abdomen/Pelvis and CT LT femur to r/o bleeding. Patient with fall prior to hospital arrival. 1U PRBCs.  - Trend Hgb q6 hours. Follow up iron studies  - Antiplatelet agents held    RADHA on CKD III  Cr markedly elevated. Could be pre-renal in setting of infection and low PO intake, progression to ATN as she also had hypotension while on HCTZ and losartan, and possible urinary retention as well. UPCR 0.6. FENa 0.95%. Renal bladder US done. L kidney not visualized. R kidney with no hydronephrosis. No stones seen. Appreciate input from Nephrology.    - Continue IV fluid hydration for now  - Trend Cr  - Treat UTI  - Bose in place for retention. Urology evaluation.    Metabolic acidosis  In setting of RADHA but also may be partially due to saline infusion.   - Continue to trend labs, if bicarb <14 will switch over to bicarb drip    NSTEMI  Troponin 3957 on admission. EKG at that time NSR, L axis, LAFB, septal q, TWI inferolaterally. While demand ischemia combined with reduced renal clearance is possible, must be treated as NSTEMI for now as such. Cardiology input appreciated.   - TTE w/ EF >70 %, mild TR, mild pulmHTN  - S/p heparin drip. Discontinued today.  - Continue statin, beta blocker  - Antiplatelet agents held in lieu of anemia, r/o bleed d/w cardiology.    Hypothyroidism  TSH WNL  - Continue levothyroxine    Physical deconditioning and debility  PT evaluation requested  - F/u PT assessment    Severe protein-calorie malnutrition  Appreciate input from Nutrition  - Liberalized diet to regular but will need to take caution given RADHA. Ensure Enlive BID. Trend weight.     DVT ppx  - SCDs, hold chemical DVT ppx in lieu of anemia. 87 year-old woman with HTN, HLD, CAD, CKD III (baseline Cr ~1.8), gout and hypothyroidism, here for further evaluation of approximately 1 week of progressively worsening generalized weakness, malaise, decreased PO intake, suffered a fall on 6/30, landed on L hip, occurred while getting out of bed, and per daughter, patient has since developed increasing confusion as well. In the ED, patient was noted to have leukocytosis, RADHA with Cr 4.2, elevated AST/ALT, markedly elevated troponin. EKG w/ NSR, LAFB, septal infarct and T wave abnormality inferolaterally. Ua N-, mod LE, large bld, >50 WBC, 6-10 RBC, many bacteria. CT head and C spine negative for acute pathology. Patient started on empiric ceftriaxone for possible UTI, given IV fluids for RADHA, and started on heparin drip for possible NSTEMI. Admitted to [facility].    Acute metabolic encephalopathy  Mentation improved. Likely multi factorial due to UTI, acute renal injury, NSTEMI, see below.   - Continue to manage underlying issues  - Reorient frequently  - Control pain where applicable  - Maintain regular bowel movement     UTI  Patient with UA suggestive of infection, obtained via straight cath. Started on empiric ceftriaxone. Urine culture (-), but note that this was collected after patient had already received antibiotic. Given presence of Bose will given 5 day course.  - Continue ceftriaxone, day 4/5 today    Acute Anemia  Due to large bilateral proximal thigh hematomas. The left hematoma is larger. Right iliacus hematoma as well. Left lateral abdominal wall ecchymosis and moderate left adductor muscle hematoma. s/p 2U of PRBCs, hemoglobin response appropriate and VSS. Patient with fall prior to hospital arrival.   - Continue to monitor CBC daily, continue to hold antiplatelet agents   - Surgery and IR team f/u appreciated      RADHA on CKD III  Cr markedly elevated. Could be pre-renal in setting of infection and low PO intake, progression to ATN as she also had hypotension while on HCTZ and losartan, and possible urinary retention as well. UPCR 0.6. FENa 0.95%. Renal bladder US done. L kidney not visualized. R kidney with no hydronephrosis. No stones seen. Appreciate input from Nephrology.    - s/p IV fluid hydration   - Bose in place for retention. Urology evaluation pending  - ABX for UTI, trend sCr    Elevated AST/ALT  Likely related to dehydration  - s/p IVF. Improving, repeat in AM.    Metabolic acidosis  In setting of RADHA but also may be partially due to saline infusion.   - Improving.     NSTEMI  Troponin 3957 on admission. EKG at that time NSR, L axis, LAFB, septal q, TWI inferolaterally. While demand ischemia combined with reduced renal clearance is possible,  treated as NSTEMI on arrival. Cardiology input appreciated. TTE w/ EF >70 %, mild TR, mild pulmHTN.   - S/p heparin drip. Discontinued in lieu of hematomas  - Continue statin, beta blocker  - Antiplatelet agents held, ok to hold d/w cardiology.    Hypothyroidism  TSH WNL  - Continue levothyroxine    Physical deconditioning and debility  PT evaluation requested  -  PT assessment pending from today, likely CE placement     Severe protein-calorie malnutrition  Appreciate input from Nutrition  - Liberalized diet to regular but will need to take caution given RADHA. Ensure Enlive BID. Trend weight.     DVT ppx  - SCDs, hold chemical DVT ppx in lieu of anemia/hematomas.     Updated family at bedside.

## 2022-07-06 NOTE — PROGRESS NOTE ADULT - SUBJECTIVE AND OBJECTIVE BOX
Patient is a 87y Female who reports no complaints overnight.  no pains  in good spirits     REVIEW OF SYSTEMS:    CONSTITUTIONAL: No weakness, fevers or chills  RESPIRATORY: No cough, wheezing, hemoptysis; No shortness of breath  CARDIOVASCULAR: No chest pain or palpitations  GENITOURINARY: No dysuria, frequency or hematuria  All other review of systems is negative unless indicated above.    MEDICATIONS  (STANDING):  acetaminophen     Tablet .. 975 milliGRAM(s) Oral every 8 hours  atorvastatin 20 milliGRAM(s) Oral at bedtime  cefTRIAXone   IVPB      cefTRIAXone   IVPB 1000 milliGRAM(s) IV Intermittent every 24 hours  chlorhexidine 4% Liquid 1 Application(s) Topical <User Schedule>  cyanocobalamin 1000 MICROGram(s) Oral daily  levothyroxine 88 MICROGram(s) Oral daily  metoprolol succinate ER 25 milliGRAM(s) Oral daily  tamsulosin 0.4 milliGRAM(s) Oral at bedtime        Vital Signs Last 24 Hrs  T(C): 36.8 (06 Jul 2022 19:22), Max: 36.8 (06 Jul 2022 19:22)  T(F): 98.2 (06 Jul 2022 19:22), Max: 98.2 (06 Jul 2022 19:22)  HR: 70 (06 Jul 2022 19:22) (69 - 70)  BP: 121/48 (06 Jul 2022 19:22) (104/50 - 121/48)  BP(mean): --  RR: 16 (06 Jul 2022 19:22) (16 - 18)  SpO2: 96% (06 Jul 2022 19:22) (96% - 99%)      I&O's Detail    05 Jul 2022 07:01  -  06 Jul 2022 07:00  --------------------------------------------------------  IN:    PRBCs (Packed Red Blood Cells): 678 mL  Total IN: 678 mL    OUT:    Indwelling Catheter - Urethral (mL): 800 mL  Total OUT: 800 mL    Total NET: -122 mL        PHYSICAL EXAM:    Constitutional: NAD, well-groomed, well-developed  HEENT: EOMI,  MMM  Neck: No LAD, No JVD  Respiratory: CTAB  Cardiovascular: S1 and S2, RRR  Gastrointestinal: BS+, soft, NT/ND  Extremities: No peripheral edema  Neurological: A/O x 3, no focal deficits  : No Bose  Skin: No rashes  Access: Not applicable        LABS:                                   9.9    10.76 )-----------( 165      ( 06 Jul 2022 07:27 )             29.3                         9.9    11.77 )-----------( 155      ( 06 Jul 2022 03:06 )             30.8         143    |  113    |  84     ----------------------------<  95        06 Jul 2022 07:27  4.0     |  20     |  2.97     137    |  109    |  85     ----------------------------<  111       05 Jul 2022 07:24  3.7     |  19     |  3.41     138    |  109    |  81     ----------------------------<  94        04 Jul 2022 08:51  3.7     |  16     |  3.76     Ca    7.9        06 Jul 2022 07:27  Ca    7.5        05 Jul 2022 07:24    Phos  5.2       05 Jul 2022 07:24    Mg     2.1       06 Jul 2022 07:27  Mg     1.9       05 Jul 2022 07:24    TPro  5.0    /  Alb  2.2    /  TBili  0.9    /        06 Jul 2022 07:27  DBili  0.1    /  AST  164    /  ALT  81     /  AlkPhos  64             Urine Studies:          RADIOLOGY & ADDITIONAL STUDIES:

## 2022-07-06 NOTE — CONSULT NOTE ADULT - CONSULT REASON
RADHA on CKD
B/l Thigh Hematomas
urinary retention
88yo F with L adductor muscle hematoma/bleed referred to IR for evaluation
FALL, NSTEMI, renal failure, left hip pain

## 2022-07-06 NOTE — PROGRESS NOTE ADULT - ASSESSMENT
86 yo female with hx of HTN, CAD, CKD 3b w left atrophic kidney, baseline Cr ~ 1.8 in 2021 presenting with fall, weakness and UTI w decreased po intake and RADHA     RADHA on CKD Cr 1.8 - 2 w left atrophic kidney hx     sec to ATN in setting of hypotension w soft BP while on HCTZ and losartan + Urine retention     Cr improving - trend labs    Urology eval    continue to hold further ARB and diuretics   maintain SBP > 110     Met acidosis -  sec to saline + RADHA    trend and change to Bicarb gtt if  HC < 14    check labs in AM     Anemia - blood loss sec to RP hematoma while on A/c    s/p PRBC - Hb improved     * pt seen earlier

## 2022-07-06 NOTE — PROGRESS NOTE ADULT - SUBJECTIVE AND OBJECTIVE BOX
Chief Complaint: Confusion, disorientation, decreased PO intake, fatigue    Interval History:   Patient seen and examined. Patient states that she has generalized weakness. Acute anemia with Hgb to 6/8 today. follow up CT scans. IU PRBCs today. + Left hip pain. No visible ecchymosis. No blood in stool or urine per patient. On ceftriaxone, day 3, for probable UTI.     ROS: Multi system review is comprehensively negative x 10 systems except as above    Vitals:  T(C): 36.3 (05 Jul 2022 07:30), Max: 36.4 (04 Jul 2022 19:46)  T(F): 97.3 (05 Jul 2022 07:30), Max: 97.5 (04 Jul 2022 19:46)  HR: 61 (05 Jul 2022 07:30) (61 - 74)  BP: 102/42 (05 Jul 2022 07:30) (95/42 - 102/42)  RR: 18 (05 Jul 2022 07:30) (18 - 18)  SpO2: 96% (05 Jul 2022 07:30) (96% - 100%)    Exam:  Gen: Comfortable appearing  HEENT: NCAT PERRL EOMI MMM clear oropharynx  Neck: Supple, no JVD, no LAD  Chest: Normal resp effort at rest, lungs CTA B/L  CVS: s1 s2  normal, RRR  Abd: +BS, soft NT ND   : Bose  Ext: No edema or calf tenderness  Skin: Warm, dry  Neuro: Awake and alert, follows commands, answers questions appropriately,   Mood: Calm, pleasant    Labs:               6.8    x     )-----------( x        ( 05 Jul 2022 09:28 )             20.0     07-05    137  |  109<H>  |  85<H>  ----------------------------<  111<H>  3.7   |  19<L>  |  3.41<H>    Ca    7.5<L>      05 Jul 2022 07:24  Phos  5.2     07-05  Mg     1.9     07-05    PTT - ( 05 Jul 2022 07:24 )  PTT:73.6 sec    Troponin 3957 --> 2209   Lactate 1.7    UA 7/2: N-, mod LE, large bld, >50 WBC, 6-10 RBC, many bact    UA 7/3: N+, mod LE, large bld, 11-25 RBC 0-2 WBC (was > 50 on prior UA but patient has since received a dose of antibiotics), few bact    Micro:  Urine culture 7/3: (-0 (but collected after abx started)    COVID19 PCR 7/2: Negative    Imaging:  CT pelvis 7/3: No fractures are seen. Right iliopsoas muscle edema suggests strain.    US kidney bladder 7/3: The left kidney was not visualized. No right renal mass, hydronephrosis or calculi is visualized sonographically. The bladder is underdistended, limiting evaluation.    CT C spine 7/2: No acute cervical spine fracture or evidence of traumatic malalignment. 2.1 cm left thyroid lobe nodule.    CT head 7/2: No acute intracranial hemorrhage, mass effect, midline shift, extra-axial collection, hydrocephalus, or evidence of acute vascular territorial infarction. Mild patchy hypodensities within the periventricular and subcortical white matter, although nonspecific, likely reflect chronic microvascular disease. Cerebral volume loss   contributes to prominence of the ventricles and sulci. The visualized paranasal sinuses and mastoid air cells are clear. Intraorbital contents are unremarkable. No calvarial fracture.    Cardiac Testing:  TTE 7/4: The left ventricle is normal in size, wall motion and contractility as seen in limited views. Estimated left ventricular ejection fraction is >70 %. Moderate concentric left ventricular hypertrophy is present. The aortic valve is well visualized, appears mildly calcified. Valve opening seems to be normal. Normal appearing mitral valve structure and function. EA reversal of the mitral inflow consistent with reduced compliance of the left ventricle. The tricuspid valve leaflets are thin and pliable; valve motion is normal. Mild (1+) tricuspid valve regurgitation is present. Mild pulmonary hypertension. Normal appearing pulmonic valve structure. Mild pulmonic valvular regurgitation (1+) is present. There is a hypoechoic space anterior to the right ventricular free wall. Cannot rule out loculated pericardial effusion Vs. pericardial fat pad. The IVC is dilated.    EKG 7/3: Normal sinus rhythm. Left axis deviation. Non-specific intra-ventricular conduction block. T wave abnormality, consider lateral ischemia. When compared with yesterday's EKG, criteria for septal infarct are no longer present. Nonspecific T wave abnormality has replaced inverted T waves in inferior leads.    EKG 7/2: Normal sinus rhythm. Left anterior fascicular block. Minimal voltage criteria for LVH, may be normal variant ( [place] product ). Septal infarct. T wave abnormality, inferolaterally.     Meds:  MEDICATIONS  (STANDING):  acetaminophen     Tablet .. 975 milliGRAM(s) Oral every 8 hours  atorvastatin 20 milliGRAM(s) Oral at bedtime  cefTRIAXone Injectable.      chlorhexidine 4% Liquid 1 Application(s) Topical <User Schedule>  levothyroxine 88 MICROGram(s) Oral daily  metoprolol succinate ER 25 milliGRAM(s) Oral daily  sodium chloride 0.9%. 1000 milliLiter(s) (75 mL/Hr) IV Continuous <Continuous>    MEDICATIONS  (PRN):  ALBUTerol    90 MICROgram(s) HFA Inhaler 2 Puff(s) Inhalation every 6 hours PRN Bronchospasm  aluminum hydroxide/magnesium hydroxide/simethicone Suspension 30 milliLiter(s) Oral every 4 hours PRN Dyspepsia  melatonin 3 milliGRAM(s) Oral at bedtime PRN Insomnia  ondansetron Injectable 4 milliGRAM(s) IV Push every 8 hours PRN Nausea and/or Vomiting       Chief Complaint: Confusion, disorientation, decreased PO intake, fatigue    Interval History:   7/5/22: Patient seen and examined. Patient states that she has generalized weakness. Acute anemia with Hgb to 6/8 today. follow up CT scans. IU PRBCs today. + Left hip pain. No visible ecchymosis. No blood in stool or urine per patient. On ceftriaxone, day 3, for probable UTI.     7/6/22:    ROS: Multi system review is comprehensively negative x 10 systems except as above    Vitals:  T(C): 36.5 (06 Jul 2022 07:55), Max: 36.7 (05 Jul 2022 18:50)  T(F): 97.7 (06 Jul 2022 07:55), Max: 98 (05 Jul 2022 18:50)  HR: 69 (06 Jul 2022 07:55) (65 - 72)  BP: 104/50 (06 Jul 2022 07:55) (90/51 - 117/62)  RR: 18 (06 Jul 2022 07:55) (16 - 18)  SpO2: 99% (06 Jul 2022 07:55) (95% - 100%)    Exam:  Gen: Comfortable appearing  HEENT: NCAT PERRL EOMI MMM clear oropharynx  Neck: Supple, no JVD, no LAD  Chest: Normal resp effort at rest, lungs CTA B/L  CVS: s1 s2  normal, RRR  Abd: +BS, soft NT ND   : Bose  Ext: No edema or calf tenderness  Skin: Warm, dry  Neuro: Awake and alert, follows commands, answers questions appropriately,   Mood: Calm, pleasant    Labs:             9.9    10.76 )-----------( 165      ( 06 Jul 2022 07:27 )             29.3     07-06    143  |  113<H>  |  84<H>  ----------------------------<  95  4.0   |  20<L>  |  2.97<H>    Ca    7.9<L>      06 Jul 2022 07:27  Phos  5.2     07-05  Mg     2.1     07-06    TPro  5.0<L>  /  Alb  2.2<L>  /  TBili  0.9  /  DBili  0.1  /  AST  164<H>  /  ALT  81<H>  /  AlkPhos  64  07-06    PTT - ( 05 Jul 2022 07:24 )  PTT:73.6 sec    Troponin 3957 --> 2209   Lactate 1.7    UA 7/2: N-, mod LE, large bld, >50 WBC, 6-10 RBC, many bact    UA 7/3: N+, mod LE, large bld, 11-25 RBC 0-2 WBC (was > 50 on prior UA but patient has since received a dose of antibiotics), few bact    Micro:  Urine culture 7/3: Negative (but collected after abx started)    COVID19 PCR 7/2: Negative    Imaging:  CT pelvis 7/3: No fractures are seen. Right iliopsoas muscle edema suggests strain.    US kidney bladder 7/3: The left kidney was not visualized. No right renal mass, hydronephrosis or calculi is visualized sonographically. The bladder is underdistended, limiting evaluation.    CT C spine 7/2: No acute cervical spine fracture or evidence of traumatic malalignment. 2.1 cm left thyroid lobe nodule.    CT head 7/2: No acute intracranial hemorrhage, mass effect, midline shift, extra-axial collection, hydrocephalus, or evidence of acute vascular territorial infarction. Mild patchy hypodensities within the periventricular and subcortical white matter, although nonspecific, likely reflect chronic microvascular disease. Cerebral volume loss   contributes to prominence of the ventricles and sulci. The visualized paranasal sinuses and mastoid air cells are clear. Intraorbital contents are unremarkable. No calvarial fracture.    Cardiac Testing:  TTE 7/4: The left ventricle is normal in size, wall motion and contractility as seen in limited views. Estimated left ventricular ejection fraction is >70 %. Moderate concentric left ventricular hypertrophy is present. The aortic valve is well visualized, appears mildly calcified. Valve opening seems to be normal. Normal appearing mitral valve structure and function. EA reversal of the mitral inflow consistent with reduced compliance of the left ventricle. The tricuspid valve leaflets are thin and pliable; valve motion is normal. Mild (1+) tricuspid valve regurgitation is present. Mild pulmonary hypertension. Normal appearing pulmonic valve structure. Mild pulmonic valvular regurgitation (1+) is present. There is a hypoechoic space anterior to the right ventricular free wall. Cannot rule out loculated pericardial effusion Vs. pericardial fat pad. The IVC is dilated.    EKG 7/3: Normal sinus rhythm. Left axis deviation. Non-specific intra-ventricular conduction block. T wave abnormality, consider lateral ischemia. When compared with yesterday's EKG, criteria for septal infarct are no longer present. Nonspecific T wave abnormality has replaced inverted T waves in inferior leads.    EKG 7/2: Normal sinus rhythm. Left anterior fascicular block. Minimal voltage criteria for LVH, may be normal variant ( [place] product ). Septal infarct. T wave abnormality, inferolaterally.     Meds:  MEDICATIONS  (STANDING):  acetaminophen     Tablet .. 975 milliGRAM(s) Oral every 8 hours  atorvastatin 20 milliGRAM(s) Oral at bedtime  cefTRIAXone   IVPB      cefTRIAXone   IVPB 1000 milliGRAM(s) IV Intermittent every 24 hours  chlorhexidine 4% Liquid 1 Application(s) Topical <User Schedule>  cyanocobalamin 1000 MICROGram(s) Oral daily  levothyroxine 88 MICROGram(s) Oral daily  metoprolol succinate ER 25 milliGRAM(s) Oral daily    MEDICATIONS  (PRN):  ALBUTerol    90 MICROgram(s) HFA Inhaler 2 Puff(s) Inhalation every 6 hours PRN Bronchospasm  aluminum hydroxide/magnesium hydroxide/simethicone Suspension 30 milliLiter(s) Oral every 4 hours PRN Dyspepsia  melatonin 3 milliGRAM(s) Oral at bedtime PRN Insomnia  ondansetron Injectable 4 milliGRAM(s) IV Push every 8 hours PRN Nausea and/or Vomiting         Chief Complaint: Confusion, disorientation, decreased PO intake, fatigue    Interval History:   7/5/22: Patient seen and examined. Patient states that she has generalized weakness. Acute anemia with Hgb to 6/8 today. follow up CT scans. IU PRBCs today. + Left hip pain. No visible ecchymosis. No blood in stool or urine per patient. On ceftriaxone, day 3, for probable UTI.     7/6/22: Patient seen and examined. Some left hip tenderness, persistent, not worse since yesterday. C/w IV Abx. Hemoglobin stable after PRBC transfusions. (+) hematomas of repeat imaging yesterday. Urology eval pending. off anticoagulation. DC NPO and bedrest. PT evaluation.    ROS: Multi system review is comprehensively negative x 10 systems except as above    Vitals:  T(C): 36.5 (06 Jul 2022 07:55), Max: 36.7 (05 Jul 2022 18:50)  T(F): 97.7 (06 Jul 2022 07:55), Max: 98 (05 Jul 2022 18:50)  HR: 69 (06 Jul 2022 07:55) (65 - 72)  BP: 104/50 (06 Jul 2022 07:55) (90/51 - 117/62)  RR: 18 (06 Jul 2022 07:55) (16 - 18)  SpO2: 99% (06 Jul 2022 07:55) (95% - 100%) room air    Exam:  Gen: Comfortable appearing  HEENT: NCAT PERRL EOMI MMM clear oropharynx  Neck: Supple, no JVD, no LAD  Chest: Normal resp effort at rest, lungs CTA B/L  CVS: s1 s2  normal, RRR  Abd: +BS, soft NT ND   : Bose  Ext: No edema or calf tenderness  Skin: Warm, dry  Neuro: Awake and alert, follows commands, answers questions appropriately,   Mood: Calm, pleasant    Labs:             9.9    10.76 )-----------( 165      ( 06 Jul 2022 07:27 )             29.3     07-06    143  |  113<H>  |  84<H>  ----------------------------<  95  4.0   |  20<L>  |  2.97<H>    Ca    7.9<L>      06 Jul 2022 07:27  Phos  5.2     07-05  Mg     2.1     07-06    TPro  5.0<L>  /  Alb  2.2<L>  /  TBili  0.9  /  DBili  0.1  /  AST  164<H>  /  ALT  81<H>  /  AlkPhos  64  07-06    PTT - ( 05 Jul 2022 07:24 )  PTT:73.6 sec    Troponin 3957 --> 2209   Lactate 1.7    UA 7/2: N-, mod LE, large bld, >50 WBC, 6-10 RBC, many bact    UA 7/3: N+, mod LE, large bld, 11-25 RBC 0-2 WBC (was > 50 on prior UA but patient has since received a dose of antibiotics), few bact    Micro:  Urine culture 7/3: Negative (but collected after abx started)    COVID19 PCR 7/2: Negative    Imaging:    CT Femur No Cont, Left: 7/5/22: 1.  Evolving moderate left adductor muscle hematoma 2.  No fracture. 3.  Degenerative changes of the hip and knee. 4.  Mild soft tissue swelling.     CT Abdomen and Pelvis No Cont: 7/5/22: Large bilateral proximal thigh hematomas with hematocrit levels indicating acute bleed. The left hematoma is larger. Right iliacus hematoma as well. Left lateral abdominal wall ecchymosis. Small bilateral pleural effusions.    CT pelvis 7/3: No fractures are seen. Right iliopsoas muscle edema suggests strain.    US kidney bladder 7/3: The left kidney was not visualized. No right renal mass, hydronephrosis or calculi is visualized sonographically. The bladder is underdistended, limiting evaluation.    CT C spine 7/2: No acute cervical spine fracture or evidence of traumatic malalignment. 2.1 cm left thyroid lobe nodule.    CT head 7/2: No acute intracranial hemorrhage, mass effect, midline shift, extra-axial collection, hydrocephalus, or evidence of acute vascular territorial infarction. Mild patchy hypodensities within the periventricular and subcortical white matter, although nonspecific, likely reflect chronic microvascular disease. Cerebral volume loss   contributes to prominence of the ventricles and sulci. The visualized paranasal sinuses and mastoid air cells are clear. Intraorbital contents are unremarkable. No calvarial fracture.    Cardiac Testing:  TTE 7/4: The left ventricle is normal in size, wall motion and contractility as seen in limited views. Estimated left ventricular ejection fraction is >70 %. Moderate concentric left ventricular hypertrophy is present. The aortic valve is well visualized, appears mildly calcified. Valve opening seems to be normal. Normal appearing mitral valve structure and function. EA reversal of the mitral inflow consistent with reduced compliance of the left ventricle. The tricuspid valve leaflets are thin and pliable; valve motion is normal. Mild (1+) tricuspid valve regurgitation is present. Mild pulmonary hypertension. Normal appearing pulmonic valve structure. Mild pulmonic valvular regurgitation (1+) is present. There is a hypoechoic space anterior to the right ventricular free wall. Cannot rule out loculated pericardial effusion Vs. pericardial fat pad. The IVC is dilated.    EKG 7/3: Normal sinus rhythm. Left axis deviation. Non-specific intra-ventricular conduction block. T wave abnormality, consider lateral ischemia. When compared with yesterday's EKG, criteria for septal infarct are no longer present. Nonspecific T wave abnormality has replaced inverted T waves in inferior leads.    EKG 7/2: Normal sinus rhythm. Left anterior fascicular block. Minimal voltage criteria for LVH, may be normal variant ( [place] product ). Septal infarct. T wave abnormality, inferolaterally.     Meds:  MEDICATIONS  (STANDING):  acetaminophen     Tablet .. 975 milliGRAM(s) Oral every 8 hours  atorvastatin 20 milliGRAM(s) Oral at bedtime  cefTRIAXone   IVPB      cefTRIAXone   IVPB 1000 milliGRAM(s) IV Intermittent every 24 hours  chlorhexidine 4% Liquid 1 Application(s) Topical <User Schedule>  cyanocobalamin 1000 MICROGram(s) Oral daily  levothyroxine 88 MICROGram(s) Oral daily  metoprolol succinate ER 25 milliGRAM(s) Oral daily    MEDICATIONS  (PRN):  ALBUTerol    90 MICROgram(s) HFA Inhaler 2 Puff(s) Inhalation every 6 hours PRN Bronchospasm  aluminum hydroxide/magnesium hydroxide/simethicone Suspension 30 milliLiter(s) Oral every 4 hours PRN Dyspepsia  melatonin 3 milliGRAM(s) Oral at bedtime PRN Insomnia  ondansetron Injectable 4 milliGRAM(s) IV Push every 8 hours PRN Nausea and/or Vomiting

## 2022-07-06 NOTE — CONSULT NOTE ADULT - SUBJECTIVE AND OBJECTIVE BOX
CHIEF COMPLAINT:  Patient is a 87y old  Female who presents with a chief complaint of fall, weakness (02 Jul 2022 17:13)      HPI: 7/3/22:  88 y/o female, well known to our service and Dr Collins, with a PMHx of CAD, HTN, HLD, Gout, hypothyroidism, ckd stage III,  presents to the ED s/p fall 2 days ago.  As per daughter and patient, was getting out of bed and fell to the floor landing on left hip.   Pt denies head trauma, LOC.    Episode was preceding buy ~1week of weakness, fatigue decreased po intake and disoriented.  Patient lives alone, performs ADLs with some assistance.    In ed, ecg- s/p asa, heparin gtt. trop- >3000, cr-4.  cth-neg, left hip xray- no acute fx on wet read.   no cp.  c/o left hip and chronic back pain.  Usually just takes Tylenol at home.  Other than the pain in her left hip, she denies anginal chest pains or increased SOB.  With her CKD, likely demand ischemia with significantly reduce renal clearance of the Troponin which is coming down.  With her renal failure, invasive cardiac cath is very risky and will likely result in need for dialysis. Will get ECHO.        PMHx:  PAST MEDICAL & SURGICAL HISTORY:  CAD  HTN  Hyperlipidemia  Gout  Hypothyroidism  Chronic Kidney disease stage 4      SOCIAL Hx- non-smoker       FAMILY HISTORY:   FAMILY HISTORY:  non-contributory      ALLERGIES:  Allergies  penicillin (Unknown)      REVIEW OF SYSTEMS:  10 point ROS was obtained  Pertinent positives and negatives are as above  All other review of systems is negative unless indicated above      Vital Signs Last 24 Hrs  T(C): 36.6 (03 Jul 2022 07:48), Max: 36.6 (02 Jul 2022 17:27)  T(F): 97.9 (03 Jul 2022 07:48), Max: 97.9 (02 Jul 2022 17:27)  HR: 70 (03 Jul 2022 07:48) (62 - 80)  BP: 132/59 (03 Jul 2022 07:48) (85/52 - 132/59)  RR: 19 (03 Jul 2022 07:48) (17 - 20)  SpO2: 98% (03 Jul 2022 07:48) (96% - 100%)        PHYSICAL EXAM:   Constitutional: NAD, awake and alert, well-developed  HEENT: PERR, EOMI, Normal Hearing, MMM  Neck: Soft and supple, No LAD, No JVD  Respiratory: Breath sounds are clear bilaterally, No wheezing, rales or rhonchi  Cardiovascular: S1 and S2, regular rate and rhythm, soft DANI at LLSB and base as before, no gallops or rubs  Gastrointestinal: Bowel Sounds present, soft, nontender, nondistended, no guarding, no rebound  Extremities: No peripheral edema  Vascular: 2+ peripheral pulses  Neurological: A/O x 3, no focal deficits  Skin: No rashes      MEDICATIONS  (STANDING):  atorvastatin 20 milliGRAM(s) Oral at bedtime  cefTRIAXone   IVPB 1000 milliGRAM(s) IV Intermittent every 24 hours  heparin  Infusion.  Unit(s)/Hr (10 mL/Hr) IV Continuous <Continuous>  levothyroxine 88 MICROGram(s) Oral daily  metoprolol succinate ER 25 milliGRAM(s) Oral daily  sodium chloride 0.9%. 1000 milliLiter(s) (500 mL/Hr) IV Continuous <Continuous>  sodium chloride 0.9%. 1000 milliLiter(s) (75 mL/Hr) IV Continuous <Continuous>    MEDICATIONS  (PRN):  acetaminophen     Tablet .. 650 milliGRAM(s) Oral every 6 hours PRN Temp greater or equal to 38C (100.4F), Mild Pain (1 - 3)  aluminum hydroxide/magnesium hydroxide/simethicone Suspension 30 milliLiter(s) Oral every 4 hours PRN Dyspepsia  heparin   Injectable 6000 Unit(s) IV Push every 6 hours PRN For aPTT less than 40  melatonin 3 milliGRAM(s) Oral at bedtime PRN Insomnia  ondansetron Injectable 4 milliGRAM(s) IV Push every 8 hours PRN Nausea and/or Vomiting      LABS: All Labs Reviewed:                        11.1   10.90 )-----------( 155      ( 03 Jul 2022 06:49 )             32.8     07-03    136  |  107  |  75<H>  ----------------------------<  97  4.6   |  19<L>  |  4.00<H>    Ca    8.4<L>      03 Jul 2022 06:49  Mg     2.0     07-02    TPro  6.5  /  Alb  2.9<L>  /  TBili  0.8  /  DBili  x   /  AST  717<H>  /  ALT  177<H>  /  AlkPhos  91  07-02    PT/INR - ( 02 Jul 2022 15:26 )   PT: 11.9 sec;   INR: 1.03 ratio       PTT - ( 03 Jul 2022 07:19 )  PTT:76.7 sec    Troponin I, High Sensitivity (07.03.22 @ 06:49): 2209.18  Troponin I, High Sensitivity (07.02.22 @ 12:11): 3957.44    BLOOD CULTURES:   LIPID PROFILE     RADIOLOGY:    CT of C-Spine: 7/2/22:  FINDINGS:  No acute cervical spine fracture or evidence of traumatic malalignment.   Nonspecific straightening of the normal cervical lordosis. Craniocervical junction is unremarkable. No facet joint dislocation. No prevertebral soft tissue swelling. Mild anterolisthesis of C2 on C3, C3 on C4 and C4 on C5. There are multilevel degenerative changes of the spine characterized by degenerative disc disease as well as uncovertebral and facet joint arthrosis, contributing to mild to moderate stenosis at the C2-C3 level on the right. Limited evaluation of the spinal canal.  Visualized lung apices are clear. 2.1 cm hypodense left thyroid lobe nodule.  IMPRESSION:  No acute cervical spine fracture or evidence of traumatic malalignment.  2.1 cm left thyroid lobe nodule. Further characterization with nonemergent targeted sonogram may be obtained, if not previously characterized.    CT of Brain: 7/2/22:  FINDINGS:  There is no acute intracranial hemorrhage, mass effect, midline shift, extra-axial collection, hydrocephalus, or evidence of acute vascular territorial infarction. Mild patchy hypodensities within the periventricular and subcortical white matter, although nonspecific, likely reflect chronic microvascular disease. Cerebral volume loss contributes to prominence of the ventricles and sulci.  The visualized paranasal sinuses and mastoid air cells are clear.   Intraorbital contents are unremarkable. No calvarial fracture.  IMPRESSION:  No acute intracranial hemorrhage or calvarial fracture.      EKG:  No acute changes    TELEMETRY:  NSR    ECHO:  Pending    
CHIEF COMPLAINT:  Patient is a 87y old  Female who presents with a chief complaint of fall, weakness (02 Jul 2022 17:13)    HPI: 7/3/22:  86 y/o female, with a PMHx of CAD, HTN, HLD, Gout, hypothyroidism, ckd stage III,  presents to the ED s/p fall 3 days ago.  As per daughter and patient, was getting out of bed and fell to the floor landing on left hip.   Pt denies head trauma, LOC.    Episode was preceding buy ~1week of weakness, fatigue decreased po intake and disoriented.  Patient lives alone, performs ADLs with some assistance.    In ed, ecg- s/p asa, heparin gtt. trop- >3000, cr-4.  cth-neg, left hip xray- no acute fx on wet read.   no cp.  c/o left hip and chronic back pain.  Usually just takes Tylenol at home.  Other than the pain in her left hip, she denies anginal chest pains or increased SOB.  patient lives alone, performs ADLs with some assistance.      in ed, ecg- s/p asa, heparin gtt. trop- >3000, cr-4.  cth-neg, left hip xray- no acute fx on wet read.   no cp.  c/o left hip and chronic back pain.  usually just takes tylenol at home     pmh- as above   psh-denies   fmh- unable to recall   soc- non-smoker     PAST MEDICAL & SURGICAL HISTORY:    as above    Allergies    penicillin (Unknown)    Intolerances    Vital Signs Last 24 Hrs  T(C): 36.3 (05 Jul 2022 12:37), Max: 36.4 (04 Jul 2022 19:46)  T(F): 97.4 (05 Jul 2022 12:37), Max: 97.5 (04 Jul 2022 19:46)  HR: 72 (05 Jul 2022 12:37) (61 - 74)  BP: 103/53 (05 Jul 2022 12:37) (95/42 - 103/53)  RR: 16 (05 Jul 2022 12:37) (16 - 18)  SpO2: 99% (05 Jul 2022 12:37) (96% - 100%)    PHYSICAL EXAM:  GENERAL: NAD, lying in bed comfortably  HEAD:  Atraumatic, Normocephalic  EYES: EOMI, PERRLA, conjunctiva and sclera clear  ENT: Moist mucous membranes  NECK: Supple, No JVD  CHEST/LUNG: Unlabored respirations  HEART: Regular rate and rhythm  ABDOMEN: Soft, Nontender, Nondistended. No hepatomegaly. Moderate size soft, left flank hematoma.   EXTREMITIES:  2+ Peripheral Pulses, brisk capillary refill. No clubbing, cyanosis, or edema or hematoma visible or palpable. LE compartments soft.   NERVOUS SYSTEM:  Alert & Oriented X3, speech clear. No deficits   MSK: FROM all 4 extremities, full and equal strength  SKIN: No rashes or lesions    LABS:                        6.8    x     )-----------( x        ( 05 Jul 2022 09:28 )             20.0     05 Jul 2022 07:24    137    |  109    |  85     ----------------------------<  111    3.7     |  19     |  3.41     Ca    7.5        05 Jul 2022 07:24  Phos  5.2       05 Jul 2022 07:24  Mg     1.9       05 Jul 2022 07:24      PTT - ( 05 Jul 2022 07:24 )  PTT:73.6 sec    ct< from: CT Femur No Cont, Left (07.05.22 @ 11:31) >    IMPRESSION:    1.  Evolving moderate left adductor muscle hematoma  2.  No fracture.  3.  Degenerative changes of the hip and knee.  4.  Mild soft tissue swelling.    --- End of Report ---            MIRNA ELAM MD; Attending Radiologist  This document has been electronically signed. Jul 5 2022 12:08PM    < end of copied text >  < from: CT Abdomen and Pelvis No Cont (07.05.22 @ 11:31) >  IMPRESSION:    Large bilateral proximal thigh hematomas with hematocrit levels   indicating acute bleed. The left hematoma is larger. Right iliacus   hematoma as well. Left lateral abdominal wall ecchymosis.    Small bilateral pleural effusions.    Findings were discussed with Dr. Aracelis Birch 7/5/2022 11:58 AM by Dr. Kinjal Martinez with read back confirmation.    --- End of Report ---            KINJAL MARTINEZ MD; Attending Radiologist  This document has been electronically signed. Jul 5 2022 12:04PM    < end of copied text >  < from: CT Pelvis Bony Only No Cont (07.03.22 @ 09:59) >    IMPRESSION:  1. No fractures are seen.  2. Right iliopsoas muscle edema suggests strain.    --- End of Report ---            VICTOR HUGO ATKINS MD; Attending Radiologist  This document has been electronically signed. Jul  3 2022 10:56AM    < end of copied text >  
Chief complaint:  Patient is a 87y old  Female who presents with a chief complaint of left adductor muscle hematoma      HPI:  88 y/o female with a PMHx of CAD, HTN, HLD, Gout, hypothyroidism, ckd stage III, hypothyroidism presents to the ED s/p fall 2 days ago.  as per daughter and patient, was getting out of bed and fell to the floor landing on left hip. Pt admitted, found to have high troponins and heparin drip was started. Today pt was found to have hemoglobin 6.8, down from 9.4. Non contrast CT showed left adductor muscle hematoma. IR was consulted for evaluation.    Allergies  penicillin (Unknown)      MEDICATIONS  (STANDING):  acetaminophen     Tablet .. 975 milliGRAM(s) Oral every 8 hours  atorvastatin 20 milliGRAM(s) Oral at bedtime  cefTRIAXone   IVPB      cefTRIAXone   IVPB 1000 milliGRAM(s) IV Intermittent once  chlorhexidine 4% Liquid 1 Application(s) Topical <User Schedule>  levothyroxine 88 MICROGram(s) Oral daily  metoprolol succinate ER 25 milliGRAM(s) Oral daily  sodium chloride 0.9%. 1000 milliLiter(s) (75 mL/Hr) IV Continuous <Continuous>  sodium chloride 0.9%. 1000 milliLiter(s) (60 mL/Hr) IV Continuous <Continuous>    MEDICATIONS  (PRN):  ALBUTerol    90 MICROgram(s) HFA Inhaler 2 Puff(s) Inhalation every 6 hours PRN Bronchospasm  aluminum hydroxide/magnesium hydroxide/simethicone Suspension 30 milliLiter(s) Oral every 4 hours PRN Dyspepsia  melatonin 3 milliGRAM(s) Oral at bedtime PRN Insomnia  ondansetron Injectable 4 milliGRAM(s) IV Push every 8 hours PRN Nausea and/or Vomiting      Vital Signs Last 24 Hrs  T(C): 36.2 (05 Jul 2022 13:00), Max: 36.4 (04 Jul 2022 19:46)  T(F): 97.2 (05 Jul 2022 13:00), Max: 97.5 (04 Jul 2022 19:46)  HR: 65 (05 Jul 2022 13:00) (61 - 74)  BP: 90/51 (05 Jul 2022 13:00) (90/51 - 103/53)  BP(mean): --  RR: 16 (05 Jul 2022 13:00) (16 - 18)  SpO2: 100% (05 Jul 2022 13:00) (96% - 100%)    CBC                        6.8    x     )-----------( x        ( 05 Jul 2022 09:28 )             20.0       Chemistry  07-05    137  |  109<H>  |  85<H>  ----------------------------<  111<H>  3.7   |  19<L>  |  3.41<H>    Ca    7.5<L>      05 Jul 2022 07:24  Phos  5.2     07-05  Mg     1.9     07-05        PTT - ( 05 Jul 2022 07:24 )  PTT:73.6 sec    < from: CT Femur No Cont, Left (07.05.22 @ 11:31) >  IMPRESSION:    1.  Evolving moderate left adductor muscle hematoma  2.  No fracture.  3.  Degenerative changes of the hip and knee.  4.  Mild soft tissue swelling.    < end of copied text >  
HPI:  86 y/o female with a PMHx of CAD, HTN, HLD, Gout, hypothyroidism, ckd stage III, hypothyroidism presents to the ED s/p fall 2 days ago.  as per daughter and patient, was getting out of bed and fell to the floor landing on left hip.   Pt denies head trauma, LOC.    episode was preceding buy ~1week of weakness, fatigue decreased po intake and disoriented.  patient lives alone, performs ADLs with some assistance.      in ed, ecg- s/p asa, heparin gtt. trop- >3000, cr-4.  cth-neg, left hip xray- no acute fx on wet read.   no cp.  c/o left hip and chronic back pain.  usually just takes tylenol at home     pmh- as above   psh-denies   fmh- unable to recall   soc- non-smoker  (02 Jul 2022 17:13)    88 yo female with PMH as above, admitted for metabolic encephalopathy. Urology consulted for pt with urinary retention with PVRs of 415 mL, 55 mL then 315 mL on bladder scan, straight catheterized initially and now with indwelling brown in place. Patient seen at bedside reports she had some urinary frequency and urgency at home. She denies any abd/flank pain, dysuria, hematuria, retention, fevers or chills. Pt denies following up with a urologist for this.      PAST MEDICAL & SURGICAL HISTORY:      REVIEW OF SYSTEMS  All other review of systems neg, except as noted in HPI    MEDICATIONS  (STANDING):  acetaminophen     Tablet .. 975 milliGRAM(s) Oral every 8 hours  atorvastatin 20 milliGRAM(s) Oral at bedtime  cefTRIAXone   IVPB      cefTRIAXone   IVPB 1000 milliGRAM(s) IV Intermittent every 24 hours  chlorhexidine 4% Liquid 1 Application(s) Topical <User Schedule>  cyanocobalamin 1000 MICROGram(s) Oral daily  levothyroxine 88 MICROGram(s) Oral daily  metoprolol succinate ER 25 milliGRAM(s) Oral daily  tamsulosin 0.4 milliGRAM(s) Oral at bedtime    MEDICATIONS  (PRN):  ALBUTerol    90 MICROgram(s) HFA Inhaler 2 Puff(s) Inhalation every 6 hours PRN Bronchospasm  aluminum hydroxide/magnesium hydroxide/simethicone Suspension 30 milliLiter(s) Oral every 4 hours PRN Dyspepsia  melatonin 3 milliGRAM(s) Oral at bedtime PRN Insomnia  ondansetron Injectable 4 milliGRAM(s) IV Push every 8 hours PRN Nausea and/or Vomiting      Allergies    penicillin (Unknown)    Intolerances        SOCIAL HISTORY:    FAMILY HISTORY:      Vital Signs Last 24 Hrs  T(C): 36.5 (06 Jul 2022 07:55), Max: 36.7 (05 Jul 2022 18:50)  T(F): 97.7 (06 Jul 2022 07:55), Max: 98 (05 Jul 2022 18:50)  HR: 69 (06 Jul 2022 07:55) (67 - 72)  BP: 104/50 (06 Jul 2022 07:55) (100/44 - 117/62)  BP(mean): --  RR: 18 (06 Jul 2022 07:55) (16 - 18)  SpO2: 99% (06 Jul 2022 07:55) (95% - 99%)    PHYSICAL EXAM:    General: No distress, No anxiety  VITALS  T(C): 36.5 (07-06-22 @ 07:55), Max: 36.7 (07-05-22 @ 18:50)  HR: 69 (07-06-22 @ 07:55) (67 - 72)  BP: 104/50 (07-06-22 @ 07:55) (100/44 - 117/62)  RR: 18 (07-06-22 @ 07:55) (16 - 18)  SpO2: 99% (07-06-22 @ 07:55) (95% - 99%)            Skin     : No jaundice   HEENT: Normocephalic, no icterus , EOM full , No epistaxis  Lung    : No resp distress  Abdo:   : Soft, Non tender, No guarding, No distension   Back    : No CVAT b/l  Genitalia Female: brown with yellow urine  Neuro   : A&Ox3      LABS:                        9.9    10.76 )-----------( 165      ( 06 Jul 2022 07:27 )             29.3     07-06    143  |  113<H>  |  84<H>  ----------------------------<  95  4.0   |  20<L>  |  2.97<H>    Ca    7.9<L>      06 Jul 2022 07:27  Phos  5.2     07-05  Mg     2.1     07-06    TPro  5.0<L>  /  Alb  2.2<L>  /  TBili  0.9  /  DBili  0.1  /  AST  164<H>  /  ALT  81<H>  /  AlkPhos  64  07-06    PTT - ( 05 Jul 2022 07:24 )  PTT:73.6 sec      RADIOLOGY & ADDITIONAL STUDIES:  < from: CT Abdomen and Pelvis No Cont (07.05.22 @ 11:31) >  ACC: [id number] EXAM:  CT ABDOMEN AND PELVIS                          PROCEDURE DATE:  07/05/2022          INTERPRETATION:  CLINICAL INFORMATION: 87 years  Female with fall.   anemia. r/o rp bleed..    COMPARISON: CT pelvis 7/3/2022    CONTRAST/COMPLICATIONS:  IV Contrast: NONE  Oral Contrast: NONE  Complications: None reported at time of study completion    PROCEDURE:  CT of the Abdomen and Pelvis was performed.  Sagittal and coronal reformats were performed.    FINDINGS:  LOWER CHEST: Small bilateral pleural effusions. Coronary atherosclerosis.    LIVER: Within normal limits.  BILE DUCTS: Normal caliber.  GALLBLADDER: Within normal limits.  SPLEEN: Within normal limits.  PANCREAS: Within normal limits.  ADRENALS: Within normal limits.  KIDNEYS/URETERS: Completely atretic left kidney. No right   hydroureteronephrosis. 2.3 cm right upper pole cyst.    BLADDER: Collapsed around Brown catheter.  REPRODUCTIVE ORGANS: Hysterectomy.    BOWEL: No bowel obstruction. Appendix  . Extensive sigmoiddiverticulosis   without diverticulitis.  PERITONEUM: No ascites.  VESSELS: Atherosclerotic changes.  RETROPERITONEUM/LYMPH NODES: Thickening of the right iliac is muscle   extending into the right groin hematoma.  ABDOMINAL WALL: There is a large hematoma in the left proximal thigh   partially imaged measuring 7.9 x 7.4 cm in axial dimension with a   hematocrit level. There is also a right thigh hematoma measuring 5.9 x   5.9 cm in axial dimensions also with a hematocrit level. Infiltration of   the left lateral abdominal wall fat likely related to ecchymosis.  BONES: Moderate degenerative changes.    IMPRESSION:    Large bilateral proximal thigh hematomas with hematocrit levels   indicating acute bleed. The left hematoma is larger. Right iliacus   hematoma as well. Left lateral abdominal wall ecchymosis.    Small bilateral pleural effusions.    Findings were discussed with Dr. Aracelis Birch 7/5/2022 11:58 AM by Dr. Luisa Martinez with read back confirmation.    --- End of Report ---            LUISA MARTINEZ MD; Attending Radiologist  This document has been electronically signed. Jul 5 2022 12:04PM    < end of copied text >  < from: US Kidney and Bladder (07.03.22 @ 15:17) >  ACC: [id number] EXAM:  US KIDNEYS AND BLADDER                          PROCEDURE DATE:  07/03/2022          INTERPRETATION:  CLINICAL INFORMATION: Acute kidney injury.    COMPARISON: None available.    TECHNIQUE: Sonography of the kidneys and bladder.    FINDINGS:  Right kidney: 9.8 cm. No renal mass, hydronephrosis or calculi.    Left kidney: Not visualized.    Urinary bladder: Underdistended limiting evaluation    IMPRESSION:  The left kidney was not visualized.  No right renal mass, hydronephrosis or calculi is visualized   sonographically.  The bladder is underdistended, limiting evaluation.        --- End of Report ---            LEDA THEODORE MD; Attending Radiologist  This document has been electronically signed. Jul  3 2022  6:23PM    < end of copied text >  
88 y/o female with a PMHx of CAD, HTN, HLD, Gout, hypothyroidism, ckd stage III Cr ~ known to me from office - last vist in Aug 2021 Cr 1.8,     presents to the ED s/p fall 2 days ago.  as per daughter and patient, was getting out of bed and fell to the floor landing on left hip.   Pt denies head trauma, LOC.    episode was preceding buy ~1week of weakness, fatigue decreased po intake and disoriented.  patient lives alone, performs ADLs with some assistance.  Per daughter pt feeling weak and some frequency   denies nsaid use    PAST MEDICAL & SURGICAL HISTORY:    Home Medications:  allopurinol 100 mg oral tablet: 1 tab(s) orally once a day (2022 14:39)  atorvastatin 20 mg oral tablet: 1 tab(s) orally once a day (2022 14:39)  Azithromycin 5 Day Dose Pack 250 mg oral tablet: as directed  *** course not complete*** (2022 14:39)  hydroCHLOROthiazide 12.5 mg oral tablet: 1 tab(s) orally once a day (2022 14:39)  Imodium 2 mg oral capsule: 1 cap(s) orally every 4 hours, As Needed (2022 14:39)  levothyroxine 88 mcg (0.088 mg) oral tablet: 1 tab(s) orally once a day (2022 14:39)  losartan 25 mg oral tablet: 1 tab(s) orally once a day (2022 14:39)  Medrol Dosepak 4 mg oral tablet: as directed  ***course not complete*** (2022 14:39)  Nebivolol 20 mg oral tablet: 1 tab(s) orally once a day (2022 14:39)  Tylenol 500 mg oral tablet: 2 tab(s) orally 2 times a day, As Needed (2022 14:39)      MEDICATIONS  (STANDING):  acetaminophen     Tablet .. 975 milliGRAM(s) Oral every 8 hours  atorvastatin 20 milliGRAM(s) Oral at bedtime  cefTRIAXone   IVPB 1000 milliGRAM(s) IV Intermittent every 24 hours  heparin  Infusion.  Unit(s)/Hr (10 mL/Hr) IV Continuous <Continuous>  levothyroxine 88 MICROGram(s) Oral daily  metoprolol succinate ER 25 milliGRAM(s) Oral daily  sodium chloride 0.9%. 1000 milliLiter(s) (75 mL/Hr) IV Continuous <Continuous>      Allergies    penicillin (Unknown)    Intolerances        SOCIAL HISTORY:  Denies ETOh,Smoking,     FAMILY HISTORY:      REVIEW OF SYSTEMS:    CONSTITUTIONAL: No weakness, fevers or chills  EYES/ENT: No visual changes;  No vertigo or throat pain   NECK: No pain or stiffness  RESPIRATORY: No cough, wheezing, hemoptysis; No shortness of breath  CARDIOVASCULAR: No chest pain or palpitations  GASTROINTESTINAL: No abdominal or epigastric pain. No nausea, vomiting, or hematemesis; No diarrhea or constipation. No melena or hematochezia.  GENITOURINARY: No dysuria, frequency or hematuria  NEUROLOGICAL: No numbness or weakness  SKIN: No itching, burning, rashes, or lesions   All other review of systems is negative unless indicated above.    VITAL:  T(C): , Max: 36.7 (22 @ 19:40)  T(F): , Max: 98 (22 @ 19:40)  HR: 67 (22 @ 07:33)  BP: 98/48 (22 @ 07:33)  BP(mean): --  RR: 18 (22 @ 07:33)  SpO2: 96% (22 @ 07:33)  Wt(kg): --    I and O's:     @ 07:01  -   @ 07:00  --------------------------------------------------------  IN: 0 mL / OUT: 850 mL / NET: -850 mL          PHYSICAL EXAM:    Constitutional: NAD  HEENT: PERRLA, EOMI,  MMM  Neck: No LAD, No JVD  Respiratory: CTAB  Cardiovascular: S1 and S2  Gastrointestinal: BS+, soft, NT/ND  Extremities: No peripheral edema  Neurological: A/O x 3, no focal deficits  : No Bose  Skin: No rashes  Access: Not applicable    LABS:               136    |  107    |  75     ----------------------------<  97        2022 06:49  4.6     |  19     |  4.00     136    |  108    |  73     ----------------------------<  125       2022 20:35  3.7     |  19     |  4.09     Ca    7.9        2022 08:51  Ca    8.4        2022 06:49      Mg     2.0       2022 12:11    TPro  6.5    /  Alb  2.9    /  TBili  0.8    /        2022 12:11  DBili  x      /  AST  717    /  ALT  177    /  AlkPhos  91             Urine Studies:  Urinalysis Basic - ( 2022 11:40 )    Color: Yellow / Appearance: Slightly Turbid / S.015 / pH: x  Gluc: x / Ketone: Trace  / Bili: Negative / Urobili: Negative   Blood: x / Protein: trace mg/dL / Nitrite: Positive   Leuk Esterase: Moderate / RBC: 11-25 /HPF / WBC 0-2   Sq Epi: x / Non Sq Epi: Occasional / Bacteria: Few      Sodium, Random Urine: 51 mmol/L ( @ 11:40)  Creatinine, Random Urine: 160 mg/dL ( @ 11:40)  Protein/Creatinine Ratio Calculation: 0.6 Ratio ( @ 11:40)        RADIOLOGY & ADDITIONAL STUDIES:    ACC: [id number] EXAM:  US KIDNEYS AND BLADDER                          PROCEDURE DATE:  2022          INTERPRETATION:  CLINICAL INFORMATION: Acute kidney injury.    COMPARISON: None available.    TECHNIQUE: Sonography of the kidneys and bladder.    FINDINGS:`  Right kidney: 9.8 cm. No renal mass, hydronephrosis or calculi.    Left kidney: Not visualized.    Urinary bladder: Underdistended limiting evaluation    IMPRESSION:  The left kidney was not visualized.  No right renal mass, hydronephrosis or calculi is visualized   sonographically.  The bladder is underdistended, limiting evaluation.

## 2022-07-07 LAB
ADD ON TEST-SPECIMEN IN LAB: SIGNIFICANT CHANGE UP
ANION GAP SERPL CALC-SCNC: 10 MMOL/L — SIGNIFICANT CHANGE UP (ref 5–17)
BUN SERPL-MCNC: 75 MG/DL — HIGH (ref 7–23)
CALCIUM SERPL-MCNC: 8.4 MG/DL — LOW (ref 8.5–10.1)
CHLORIDE SERPL-SCNC: 111 MMOL/L — HIGH (ref 96–108)
CO2 SERPL-SCNC: 17 MMOL/L — LOW (ref 22–31)
CREAT SERPL-MCNC: 2.48 MG/DL — HIGH (ref 0.5–1.3)
EGFR: 19 ML/MIN/1.73M2 — LOW
GLUCOSE SERPL-MCNC: 99 MG/DL — SIGNIFICANT CHANGE UP (ref 70–99)
HCT VFR BLD CALC: 26.9 % — LOW (ref 34.5–45)
HGB BLD-MCNC: 9.1 G/DL — LOW (ref 11.5–15.5)
MAGNESIUM SERPL-MCNC: 2.1 MG/DL — SIGNIFICANT CHANGE UP (ref 1.6–2.6)
MCHC RBC-ENTMCNC: 30.7 PG — SIGNIFICANT CHANGE UP (ref 27–34)
MCHC RBC-ENTMCNC: 33.8 GM/DL — SIGNIFICANT CHANGE UP (ref 32–36)
MCV RBC AUTO: 90.9 FL — SIGNIFICANT CHANGE UP (ref 80–100)
PLATELET # BLD AUTO: 164 K/UL — SIGNIFICANT CHANGE UP (ref 150–400)
POTASSIUM SERPL-MCNC: 4.2 MMOL/L — SIGNIFICANT CHANGE UP (ref 3.5–5.3)
POTASSIUM SERPL-SCNC: 4.2 MMOL/L — SIGNIFICANT CHANGE UP (ref 3.5–5.3)
RBC # BLD: 2.96 M/UL — LOW (ref 3.8–5.2)
RBC # FLD: 15.1 % — HIGH (ref 10.3–14.5)
SODIUM SERPL-SCNC: 138 MMOL/L — SIGNIFICANT CHANGE UP (ref 135–145)
WBC # BLD: 10.23 K/UL — SIGNIFICANT CHANGE UP (ref 3.8–10.5)
WBC # FLD AUTO: 10.23 K/UL — SIGNIFICANT CHANGE UP (ref 3.8–10.5)

## 2022-07-07 PROCEDURE — 99232 SBSQ HOSP IP/OBS MODERATE 35: CPT

## 2022-07-07 RX ORDER — SODIUM BICARBONATE 1 MEQ/ML
325 SYRINGE (ML) INTRAVENOUS
Refills: 0 | Status: DISCONTINUED | OUTPATIENT
Start: 2022-07-07 | End: 2022-07-08

## 2022-07-07 RX ADMIN — Medication 975 MILLIGRAM(S): at 05:17

## 2022-07-07 RX ADMIN — TAMSULOSIN HYDROCHLORIDE 0.4 MILLIGRAM(S): 0.4 CAPSULE ORAL at 21:35

## 2022-07-07 RX ADMIN — Medication 88 MICROGRAM(S): at 05:17

## 2022-07-07 RX ADMIN — Medication 975 MILLIGRAM(S): at 14:07

## 2022-07-07 RX ADMIN — CEFTRIAXONE 100 MILLIGRAM(S): 500 INJECTION, POWDER, FOR SOLUTION INTRAMUSCULAR; INTRAVENOUS at 12:44

## 2022-07-07 RX ADMIN — Medication 325 MILLIGRAM(S): at 21:35

## 2022-07-07 RX ADMIN — Medication 975 MILLIGRAM(S): at 21:35

## 2022-07-07 RX ADMIN — ATORVASTATIN CALCIUM 20 MILLIGRAM(S): 80 TABLET, FILM COATED ORAL at 21:35

## 2022-07-07 RX ADMIN — CHLORHEXIDINE GLUCONATE 1 APPLICATION(S): 213 SOLUTION TOPICAL at 05:17

## 2022-07-07 RX ADMIN — PREGABALIN 1000 MICROGRAM(S): 225 CAPSULE ORAL at 11:28

## 2022-07-07 RX ADMIN — Medication 25 MILLIGRAM(S): at 11:27

## 2022-07-07 NOTE — PROGRESS NOTE ADULT - SUBJECTIVE AND OBJECTIVE BOX
Patient is a 87y Female who reports no complaints overnight.  no pains  in good spirits     REVIEW OF SYSTEMS:    CONSTITUTIONAL: No weakness, fevers or chills  RESPIRATORY: No cough, wheezing, hemoptysis; No shortness of breath  CARDIOVASCULAR: No chest pain or palpitations  GENITOURINARY: No dysuria, frequency or hematuria  All other review of systems is negative unless indicated above.    MEDICATIONS  (STANDING):  acetaminophen     Tablet .. 975 milliGRAM(s) Oral every 8 hours  atorvastatin 20 milliGRAM(s) Oral at bedtime  cefTRIAXone   IVPB      cefTRIAXone   IVPB 1000 milliGRAM(s) IV Intermittent every 24 hours  chlorhexidine 4% Liquid 1 Application(s) Topical <User Schedule>  cyanocobalamin 1000 MICROGram(s) Oral daily  levothyroxine 88 MICROGram(s) Oral daily  metoprolol succinate ER 25 milliGRAM(s) Oral daily  sodium bicarbonate 325 milliGRAM(s) Oral two times a day  tamsulosin 0.4 milliGRAM(s) Oral at bedtime      Vital Signs Last 24 Hrs  T(C): 36.3 (07 Jul 2022 08:03), Max: 36.8 (06 Jul 2022 19:22)  T(F): 97.4 (07 Jul 2022 08:03), Max: 98.2 (06 Jul 2022 19:22)  HR: 74 (07 Jul 2022 08:03) (70 - 74)  BP: 109/48 (07 Jul 2022 08:03) (109/48 - 121/48)  BP(mean): --  RR: 18 (07 Jul 2022 08:03) (16 - 18)  SpO2: 94% (07 Jul 2022 08:03) (94% - 96%)    Parameters below as of 07 Jul 2022 08:03  Patient On (Oxygen Delivery Method): room air      I&O's Detail    06 Jul 2022 07:01  -  07 Jul 2022 07:00  --------------------------------------------------------  IN:  Total IN: 0 mL    OUT:    Indwelling Catheter - Urethral (mL): 1000 mL  Total OUT: 1000 mL    Total NET: -1000 mL      PHYSICAL EXAM:    Constitutional: NAD, well-groomed, well-developed  HEENT: EOMI,  MMM  Neck: No LAD, No JVD  Respiratory: CTAB  Cardiovascular: S1 and S2, RRR  Gastrointestinal: BS+, soft, NT/ND  Extremities: No peripheral edema  Neurological: A/O x 3, no focal deficits  :  Bose +  Skin: No rashes  Access: Not applicable        LABS:               138    |  111    |  75     ----------------------------<  99        07 Jul 2022 06:47  4.2     |  17     |  2.48     143    |  113    |  84     ----------------------------<  95        06 Jul 2022 07:27  4.0     |  20     |  2.97     137    |  109    |  85     ----------------------------<  111       05 Jul 2022 07:24  3.7     |  19     |  3.41     Ca    8.4        07 Jul 2022 06:47  Ca    7.9        06 Jul 2022 07:27    Phos  5.2       05 Jul 2022 07:24    Mg     2.1       07 Jul 2022 06:47  Mg     2.1       06 Jul 2022 07:27    TPro  4.9    /  Alb  2.2    /  TBili  0.9    /        07 Jul 2022 06:47  DBili  <0.1   /  AST  137    /  ALT  71     /  AlkPhos  63       TPro  5.0    /  Alb  2.2    /  TBili  0.9    /        06 Jul 2022 07:27  DBili  0.1    /  AST  164    /  ALT  81     /  AlkPhos  64                                         9.1    10.23 )-----------( 164      ( 07 Jul 2022 06:47 )             26.9                         9.9    10.76 )-----------( 165      ( 06 Jul 2022 07:27 )             29.3           Urine Studies:          RADIOLOGY & ADDITIONAL STUDIES:

## 2022-07-07 NOTE — PROGRESS NOTE ADULT - SUBJECTIVE AND OBJECTIVE BOX
Chief Complaint: Confusion, disorientation, decreased PO intake, fatigue    Interval History:   7/5/22: Patient seen and examined. Patient states that she has generalized weakness. Acute anemia with Hgb to 6/8 today. follow up CT scans. IU PRBCs today. + Left hip pain. No visible ecchymosis. No blood in stool or urine per patient. On ceftriaxone, day 3, for probable UTI.     7/6/22: Patient seen and examined. Some left hip tenderness, persistent, not worse since yesterday. C/w IV Abx. Hemoglobin stable after PRBC transfusions. (+) hematomas of repeat imaging yesterday. Urology eval pending. off anticoagulation. DC NPO and bedrest. PT evaluation.    7/7/22: Patient seen and examined. no new complaints. generalized weakness and left hip pain persists since fall.     ROS: Multi system review is comprehensively negative x 10 systems except as above    Vitals:  T(C): 36.3 (07 Jul 2022 08:03), Max: 36.8 (06 Jul 2022 19:22)  T(F): 97.4 (07 Jul 2022 08:03), Max: 98.2 (06 Jul 2022 19:22)  HR: 74 (07 Jul 2022 08:03) (70 - 74)  BP: 109/48 (07 Jul 2022 08:03) (109/48 - 121/48)  RR: 18 (07 Jul 2022 08:03) (16 - 18)  SpO2: 94% (07 Jul 2022 08:03) (94% - 96%)room air    Exam:  Gen: Comfortable appearing  HEENT: NCAT PERRL EOMI MMM clear oropharynx  Neck: Supple, no JVD, no LAD  Chest: Normal resp effort at rest, lungs CTA B/L  CVS: s1 s2  normal, RRR  Abd: +BS, soft NT ND   : Bose  Ext: No edema or calf tenderness  Skin: Warm, dry  Neuro: Awake and alert, follows commands, answers questions appropriately,   Mood: Calm, pleasant    Labs:             9.1    10.23 )-----------( 164      ( 07 Jul 2022 06:47 )             26.9     07-07    138  |  111<H>  |  75<H>  ----------------------------<  99  4.2   |  17<L>  |  2.48<H>    Ca    8.4<L>      07 Jul 2022 06:47  Mg     2.1     07-07    TPro  4.9<L>  /  Alb  2.2<L>  /  TBili  0.9  /  DBili  <0.1  /  AST  137<H>  /  ALT  71  /  AlkPhos  63  07-07    Troponin 3957 --> 2209   Lactate 1.7    UA 7/2: N-, mod LE, large bld, >50 WBC, 6-10 RBC, many bact    UA 7/3: N+, mod LE, large bld, 11-25 RBC 0-2 WBC (was > 50 on prior UA but patient has since received a dose of antibiotics), few bact    Micro:  Urine culture 7/3: Negative (but collected after abx started)    COVID19 PCR 7/2: Negative    Imaging:    CT Femur No Cont, Left: 7/5/22: 1.  Evolving moderate left adductor muscle hematoma 2.  No fracture. 3.  Degenerative changes of the hip and knee. 4.  Mild soft tissue swelling.     CT Abdomen and Pelvis No Cont: 7/5/22: Large bilateral proximal thigh hematomas with hematocrit levels indicating acute bleed. The left hematoma is larger. Right iliacus hematoma as well. Left lateral abdominal wall ecchymosis. Small bilateral pleural effusions.    CT pelvis 7/3: No fractures are seen. Right iliopsoas muscle edema suggests strain.    US kidney bladder 7/3: The left kidney was not visualized. No right renal mass, hydronephrosis or calculi is visualized sonographically. The bladder is underdistended, limiting evaluation.    CT C spine 7/2: No acute cervical spine fracture or evidence of traumatic malalignment. 2.1 cm left thyroid lobe nodule.    CT head 7/2: No acute intracranial hemorrhage, mass effect, midline shift, extra-axial collection, hydrocephalus, or evidence of acute vascular territorial infarction. Mild patchy hypodensities within the periventricular and subcortical white matter, although nonspecific, likely reflect chronic microvascular disease. Cerebral volume loss   contributes to prominence of the ventricles and sulci. The visualized paranasal sinuses and mastoid air cells are clear. Intraorbital contents are unremarkable. No calvarial fracture.    Cardiac Testing:  TTE 7/4: The left ventricle is normal in size, wall motion and contractility as seen in limited views. Estimated left ventricular ejection fraction is >70 %. Moderate concentric left ventricular hypertrophy is present. The aortic valve is well visualized, appears mildly calcified. Valve opening seems to be normal. Normal appearing mitral valve structure and function. EA reversal of the mitral inflow consistent with reduced compliance of the left ventricle. The tricuspid valve leaflets are thin and pliable; valve motion is normal. Mild (1+) tricuspid valve regurgitation is present. Mild pulmonary hypertension. Normal appearing pulmonic valve structure. Mild pulmonic valvular regurgitation (1+) is present. There is a hypoechoic space anterior to the right ventricular free wall. Cannot rule out loculated pericardial effusion Vs. pericardial fat pad. The IVC is dilated.    EKG 7/3: Normal sinus rhythm. Left axis deviation. Non-specific intra-ventricular conduction block. T wave abnormality, consider lateral ischemia. When compared with yesterday's EKG, criteria for septal infarct are no longer present. Nonspecific T wave abnormality has replaced inverted T waves in inferior leads.    EKG 7/2: Normal sinus rhythm. Left anterior fascicular block. Minimal voltage criteria for LVH, may be normal variant ( [place] product ). Septal infarct. T wave abnormality, inferolaterally.     Meds:  MEDICATIONS  (STANDING):  acetaminophen     Tablet .. 975 milliGRAM(s) Oral every 8 hours  atorvastatin 20 milliGRAM(s) Oral at bedtime  cefTRIAXone   IVPB      cefTRIAXone   IVPB 1000 milliGRAM(s) IV Intermittent every 24 hours  chlorhexidine 4% Liquid 1 Application(s) Topical <User Schedule>  cyanocobalamin 1000 MICROGram(s) Oral daily  levothyroxine 88 MICROGram(s) Oral daily  metoprolol succinate ER 25 milliGRAM(s) Oral daily  tamsulosin 0.4 milliGRAM(s) Oral at bedtime    MEDICATIONS  (PRN):  ALBUTerol    90 MICROgram(s) HFA Inhaler 2 Puff(s) Inhalation every 6 hours PRN Bronchospasm  aluminum hydroxide/magnesium hydroxide/simethicone Suspension 30 milliLiter(s) Oral every 4 hours PRN Dyspepsia  melatonin 3 milliGRAM(s) Oral at bedtime PRN Insomnia  ondansetron Injectable 4 milliGRAM(s) IV Push every 8 hours PRN Nausea and/or Vomiting

## 2022-07-07 NOTE — PROGRESS NOTE ADULT - ASSESSMENT
86 yo female with hx of HTN, CAD, CKD 3b w left atrophic kidney, baseline Cr ~ 1.8 in 2021 presenting with fall, weakness and UTI w decreased po intake and RADHA     RADHA on CKD Cr 1.8 - 2 w left atrophic kidney hx     sec to ATN in setting of hypotension w soft BP while on HCTZ and losartan + Urine retention     Cr improving - trend labs    Urology eval    continue to hold further ARB and diuretics   maintain SBP > 110    encourage po     Met acidosis -  sec to saline + RADHA    oral bicarb tabs     check labs in AM     Anemia - blood loss sec to RP hematoma while on A/c    s/p PRBC - Hb improved   trend Hb      * pt seen earlier     d/w Np cassy

## 2022-07-07 NOTE — PROGRESS NOTE ADULT - ASSESSMENT
87 year-old woman with HTN, HLD, CAD, CKD III (baseline Cr ~1.8), gout and hypothyroidism, here for further evaluation of approximately 1 week of progressively worsening generalized weakness, malaise, decreased PO intake, suffered a fall on 6/30, landed on L hip, occurred while getting out of bed, and per daughter, patient has since developed increasing confusion as well. In the ED, patient was noted to have leukocytosis, RADHA with Cr 4.2, elevated AST/ALT, markedly elevated troponin. EKG w/ NSR, LAFB, septal infarct and T wave abnormality inferolaterally. Ua N-, mod LE, large bld, >50 WBC, 6-10 RBC, many bacteria. CT head and C spine negative for acute pathology. Patient started on empiric ceftriaxone for possible UTI, given IV fluids for RADHA, and started on heparin drip for possible NSTEMI. Admitted to [facility].    Acute metabolic encephalopathy  Mentation improved. Likely multi factorial due to UTI, acute renal injury, NSTEMI, see below.   - Continue to manage underlying issues  - Reorient frequently  - Control pain where applicable  - Maintain regular bowel movement     UTI  Patient with UA suggestive of infection, obtained via straight cath. Started on empiric ceftriaxone. Urine culture (-), but note that this was collected after patient had already received antibiotic. Given presence of Bose will continue IV Ceftriaxone.   - Continue with IV ceftriaxone     Acute Anemia  Due to large bilateral proximal thigh hematomas. The left hematoma is larger. Right iliacus hematoma as well. Left lateral abdominal wall ecchymosis and moderate left adductor muscle hematoma. s/p 2U of PRBCs, hemoglobin response appropriate and VSS. Patient with fall prior to hospital arrival.   - Continue to monitor CBC daily, continue to hold antiplatelet agents   - Surgery and IR team f/u appreciated      RADHA on CKD III  Cr markedly elevated. Could be pre-renal in setting of infection and low PO intake, progression to ATN as she also had hypotension while on HCTZ and losartan, and possible urinary retention as well. UPCR 0.6. FENa 0.95%. Renal bladder US done. L kidney not visualized. R kidney with no hydronephrosis. No stones seen. Appreciate input from Nephrology.    - s/p IV fluid hydration   - Bose in place for retention. Urology evaluation pending  - ABX for UTI, trend sCr    Elevated AST/ALT  Likely related to dehydration  - s/p IVF. Improving, monitor    Metabolic acidosis  In setting of RADHA but also may be partially due to saline infusion.   - Improving. Defer to nephrology in starting bicarb tabs if necessary.    NSTEMI  Troponin 3957 on admission. EKG at that time NSR, L axis, LAFB, septal q, TWI inferolaterally. While demand ischemia combined with reduced renal clearance is possible,  treated as NSTEMI on arrival. Cardiology input appreciated. TTE w/ EF >70 %, mild TR, mild pulmHTN.   - S/p heparin drip. Discontinued in lieu of hematomas  - Continue statin, beta blocker  - Antiplatelet agents held, ok to hold d/w cardiology.    Hypothyroidism  TSH WNL  - Continue levothyroxine    Physical deconditioning and debility  PT evaluation requested  -  PT assessment pending from today, CE placement     Severe protein-calorie malnutrition  Appreciate input from Nutrition  - Liberalized diet to regular but will need to take caution given RADHA. Ensure Enlive BID. Trend weight.     DVT ppx  - SCDs, hold chemical DVT ppx in lieu of anemia/hematomas.     Updated family at bedside daily.  Eventual CE. DC planning in 24 to 48 hours.

## 2022-07-08 ENCOUNTER — TRANSCRIPTION ENCOUNTER (OUTPATIENT)
Age: 87
End: 2022-07-08

## 2022-07-08 VITALS
HEART RATE: 78 BPM | OXYGEN SATURATION: 98 % | DIASTOLIC BLOOD PRESSURE: 80 MMHG | SYSTOLIC BLOOD PRESSURE: 112 MMHG | RESPIRATION RATE: 16 BRPM | TEMPERATURE: 98 F

## 2022-07-08 LAB
ANION GAP SERPL CALC-SCNC: 8 MMOL/L — SIGNIFICANT CHANGE UP (ref 5–17)
BUN SERPL-MCNC: 78 MG/DL — HIGH (ref 7–23)
CALCIUM SERPL-MCNC: 8 MG/DL — LOW (ref 8.5–10.1)
CHLORIDE SERPL-SCNC: 114 MMOL/L — HIGH (ref 96–108)
CO2 SERPL-SCNC: 20 MMOL/L — LOW (ref 22–31)
CREAT SERPL-MCNC: 2.41 MG/DL — HIGH (ref 0.5–1.3)
EGFR: 19 ML/MIN/1.73M2 — LOW
GLUCOSE SERPL-MCNC: 120 MG/DL — HIGH (ref 70–99)
HCT VFR BLD CALC: 27 % — LOW (ref 34.5–45)
HCT VFR BLD CALC: 29.7 % — LOW (ref 34.5–45)
HGB BLD-MCNC: 8.8 G/DL — LOW (ref 11.5–15.5)
HGB BLD-MCNC: 8.9 G/DL — LOW (ref 11.5–15.5)
MCHC RBC-ENTMCNC: 29.6 GM/DL — LOW (ref 32–36)
MCHC RBC-ENTMCNC: 30 PG — SIGNIFICANT CHANGE UP (ref 27–34)
MCHC RBC-ENTMCNC: 30.1 PG — SIGNIFICANT CHANGE UP (ref 27–34)
MCHC RBC-ENTMCNC: 33 GM/DL — SIGNIFICANT CHANGE UP (ref 32–36)
MCV RBC AUTO: 101.4 FL — HIGH (ref 80–100)
MCV RBC AUTO: 91.2 FL — SIGNIFICANT CHANGE UP (ref 80–100)
PLATELET # BLD AUTO: 162 K/UL — SIGNIFICANT CHANGE UP (ref 150–400)
PLATELET # BLD AUTO: 164 K/UL — SIGNIFICANT CHANGE UP (ref 150–400)
POTASSIUM SERPL-MCNC: 4.2 MMOL/L — SIGNIFICANT CHANGE UP (ref 3.5–5.3)
POTASSIUM SERPL-SCNC: 4.2 MMOL/L — SIGNIFICANT CHANGE UP (ref 3.5–5.3)
RBC # BLD: 2.93 M/UL — LOW (ref 3.8–5.2)
RBC # BLD: 2.96 M/UL — LOW (ref 3.8–5.2)
RBC # FLD: 15 % — HIGH (ref 10.3–14.5)
RBC # FLD: 15.6 % — HIGH (ref 10.3–14.5)
SARS-COV-2 RNA SPEC QL NAA+PROBE: SIGNIFICANT CHANGE UP
SODIUM SERPL-SCNC: 142 MMOL/L — SIGNIFICANT CHANGE UP (ref 135–145)
WBC # BLD: 9.19 K/UL — SIGNIFICANT CHANGE UP (ref 3.8–10.5)
WBC # BLD: 9.43 K/UL — SIGNIFICANT CHANGE UP (ref 3.8–10.5)
WBC # FLD AUTO: 9.19 K/UL — SIGNIFICANT CHANGE UP (ref 3.8–10.5)
WBC # FLD AUTO: 9.43 K/UL — SIGNIFICANT CHANGE UP (ref 3.8–10.5)

## 2022-07-08 PROCEDURE — 99239 HOSP IP/OBS DSCHRG MGMT >30: CPT

## 2022-07-08 RX ORDER — LOPERAMIDE HCL 2 MG
1 TABLET ORAL
Qty: 0 | Refills: 0 | DISCHARGE

## 2022-07-08 RX ORDER — PREGABALIN 225 MG/1
1 CAPSULE ORAL
Qty: 0 | Refills: 0 | DISCHARGE
Start: 2022-07-08

## 2022-07-08 RX ORDER — PREGABALIN 225 MG/1
2 CAPSULE ORAL
Qty: 0 | Refills: 0 | DISCHARGE
Start: 2022-07-08

## 2022-07-08 RX ORDER — LOSARTAN POTASSIUM 100 MG/1
1 TABLET, FILM COATED ORAL
Qty: 0 | Refills: 0 | DISCHARGE

## 2022-07-08 RX ORDER — NEBIVOLOL HYDROCHLORIDE 5 MG/1
1 TABLET ORAL
Qty: 0 | Refills: 0 | DISCHARGE

## 2022-07-08 RX ORDER — TAMSULOSIN HYDROCHLORIDE 0.4 MG/1
1 CAPSULE ORAL
Qty: 0 | Refills: 0 | DISCHARGE
Start: 2022-07-08

## 2022-07-08 RX ORDER — SODIUM BICARBONATE 1 MEQ/ML
1 SYRINGE (ML) INTRAVENOUS
Qty: 0 | Refills: 0 | DISCHARGE
Start: 2022-07-08

## 2022-07-08 RX ORDER — METOPROLOL TARTRATE 50 MG
1 TABLET ORAL
Qty: 0 | Refills: 0 | DISCHARGE
Start: 2022-07-08

## 2022-07-08 RX ORDER — ALLOPURINOL 300 MG
1 TABLET ORAL
Qty: 0 | Refills: 0 | DISCHARGE

## 2022-07-08 RX ADMIN — CEFTRIAXONE 100 MILLIGRAM(S): 500 INJECTION, POWDER, FOR SOLUTION INTRAMUSCULAR; INTRAVENOUS at 10:55

## 2022-07-08 RX ADMIN — Medication 88 MICROGRAM(S): at 05:51

## 2022-07-08 RX ADMIN — Medication 975 MILLIGRAM(S): at 05:50

## 2022-07-08 RX ADMIN — Medication 975 MILLIGRAM(S): at 08:03

## 2022-07-08 RX ADMIN — Medication 325 MILLIGRAM(S): at 10:51

## 2022-07-08 RX ADMIN — Medication 25 MILLIGRAM(S): at 10:51

## 2022-07-08 RX ADMIN — PREGABALIN 1000 MICROGRAM(S): 225 CAPSULE ORAL at 10:51

## 2022-07-08 RX ADMIN — CHLORHEXIDINE GLUCONATE 1 APPLICATION(S): 213 SOLUTION TOPICAL at 10:57

## 2022-07-08 NOTE — DISCHARGE NOTE PROVIDER - NSDCCPCAREPLAN_GEN_ALL_CORE_FT
PRINCIPAL DISCHARGE DIAGNOSIS  Diagnosis: NSTEMI (non-ST elevation myocardial infarction)  Assessment and Plan of Treatment: WHAT IS A HEART ATTACK (NSTEMI)? A heart attack, also known as a myocardial infarction (MI) is a condition that occurs when your heart does not get enough oxygen causing damage to your heart muscle. This can cause permanent damage if not treated right away and lead to heart failure.  THINGS TO DO: (1) Eat heart healthy foods like fruits, vegetables, and whole grains (2) Limit dietary sodium to less than 2,300mg per day (3) Avoid nicotine products – smoking can cause further damage to your heart (4) Stay active with exercise (5) Maintain a healthy weight (6) Manage stress – stress can increase your risk of another heart attack (7) Monitor your blood pressure – high blood pressure can increase your risk for another heart attack  MONITOR THESE SIGNS AND SYMPTOMS: (1) Chest pain or pressure (2) Shortness of breath (3) Nausea or Vomiting (4) Lightheadedness. If you experience any of these, DO alert your primary care provider, or return to the Emergency Department if you feel very sick.      SECONDARY DISCHARGE DIAGNOSES  Diagnosis: Stage 4 chronic kidney disease  Assessment and Plan of Treatment: During hospitalization had KAREN on CKD  Baseline sCr 1.8 - 2 with left atrophic kidney hx   Karen was from ATN in setting of hypotension in combination with HCTZ and Losartan, and urinary retention   Continue to hold Losartan and diuretics  Bose in place for urinary retention    Urology eval    continue to hold further ARB and diuretics   maintain SBP > 110    encourage po        PRINCIPAL DISCHARGE DIAGNOSIS  Diagnosis: NSTEMI (non-ST elevation myocardial infarction)  Assessment and Plan of Treatment: WHAT IS A HEART ATTACK (NSTEMI)? A heart attack, also known as a myocardial infarction (MI) is a condition that occurs when your heart does not get enough oxygen causing damage to your heart muscle. This can cause permanent damage if not treated right away and lead to heart failure.  THINGS TO DO: (1) Eat heart healthy foods like fruits, vegetables, and whole grains (2) Limit dietary sodium to less than 2,300mg per day (3) Avoid nicotine products – smoking can cause further damage to your heart (4) Stay active with exercise (5) Maintain a healthy weight (6) Manage stress – stress can increase your risk of another heart attack (7) Monitor your blood pressure – high blood pressure can increase your risk for another heart attack  MONITOR THESE SIGNS AND SYMPTOMS: (1) Chest pain or pressure (2) Shortness of breath (3) Nausea or Vomiting (4) Lightheadedness. If you experience any of these, DO alert your primary care provider, or return to the Emergency Department if you feel very sick.      SECONDARY DISCHARGE DIAGNOSES  Diagnosis: Stage 4 chronic kidney disease  Assessment and Plan of Treatment: During hospitalization had RADHA on CKD with metabolic acidosis  Baseline sCr 1.8 - 2 with left atrophic kidney hx   Radha was from ATN in setting of hypotension in combination with HCTZ and Losartan, and urinary retention   Continue to hold Losartan and diuretics  Continue sodium bicarb tablets  Bose in place for urinary retention   Maintain SBP > 110    Diagnosis: Anemia  Assessment and Plan of Treatment: Acute Anemia due to large bilateral proximal thigh hematomas. The left hematoma is larger. Right iliacus hematoma as well. Left lateral abdominal wall ecchymosis and moderate left adductor muscle hematoma.   You recieved 2 units of blood  Continue with close monitoring of CBC after discharge in 3 days.   Continue to hold antiplatelet agents (Plavix and Aspirin), okay to continue to hold as per cardiology team in setting of possible NSTEMI.    Diagnosis: UTI (urinary tract infection)  Assessment and Plan of Treatment: UTI, you completed your course of IV antibiotics.   Continue Bose for urinary retention and eventual voiding trial.    Diagnosis: Acute urinary retention  Assessment and Plan of Treatment: Patient was started on Flomax at bedtime. Attempt Trial of void in 1 week (7/13/22). Follow up with your urologist, you can see Urologists in [place] (Dr. Sampson/Denise/Geoff) or Follow up with Dr. Etienne Mckeon (located in [facility] [phone]) for further management.     PRINCIPAL DISCHARGE DIAGNOSIS  Diagnosis: NSTEMI (non-ST elevation myocardial infarction)  Assessment and Plan of Treatment: WHAT IS A HEART ATTACK (NSTEMI)? A heart attack, also known as a myocardial infarction (MI) is a condition that occurs when your heart does not get enough oxygen causing damage to your heart muscle. This can cause permanent damage if not treated right away and lead to heart failure.  THINGS TO DO: (1) Eat heart healthy foods like fruits, vegetables, and whole grains (2) Limit dietary sodium to less than 2,300mg per day (3) Avoid nicotine products – smoking can cause further damage to your heart (4) Stay active with exercise (5) Maintain a healthy weight (6) Manage stress – stress can increase your risk of another heart attack (7) Monitor your blood pressure – high blood pressure can increase your risk for another heart attack  MONITOR THESE SIGNS AND SYMPTOMS: (1) Chest pain or pressure (2) Shortness of breath (3) Nausea or Vomiting (4) Lightheadedness. If you experience any of these, DO alert your primary care provider, or return to the Emergency Department if you feel very sick.  Antiplatelet agents held due to anemia/bleeding and hemtomas as described below.      SECONDARY DISCHARGE DIAGNOSES  Diagnosis: Stage 4 chronic kidney disease  Assessment and Plan of Treatment: During hospitalization had RADHA on CKD with metabolic acidosis  Baseline sCr 1.8 - 2 with left atrophic kidney hx   Radha was from ATN in setting of hypotension in combination with HCTZ and Losartan, and urinary retention   Continue to hold Losartan and diuretics  Continue sodium bicarb tablets  Bose in place for urinary retention   Maintain SBP > 110  sCr 2.4 on discharge    Diagnosis: Anemia  Assessment and Plan of Treatment: Acute Anemia due to large bilateral proximal thigh hematomas. The left hematoma is larger. Right iliacus hematoma as well. Left lateral abdominal wall ecchymosis and moderate left adductor muscle hematoma.   You recieved 2 units of blood  Continue with close monitoring of CBC after discharge in 3 days.   Continue to hold antiplatelet agents (Plavix and Aspirin), okay to continue to hold as per cardiology team in setting of possible NSTEMI.  Hbg 8.9 on discharge.    Diagnosis: UTI (urinary tract infection)  Assessment and Plan of Treatment: UTI, you completed your course of IV antibiotics.   Continue Bose for urinary retention and eventual voiding trial.    Diagnosis: Acute urinary retention  Assessment and Plan of Treatment: Patient was started on Flomax at bedtime. Attempt Trial of void in 1 week (7/13/22). Follow up with your urologist, you can see Urologists in [place] (Dr. Sampson/Denise/Geoff) or Follow up with Dr. Etienne Mckeon (located in [facility] [phone]) for further management.    Diagnosis: Severe protein-calorie malnutrition  Assessment and Plan of Treatment: Protein supplemental drinks

## 2022-07-08 NOTE — PROGRESS NOTE ADULT - SUBJECTIVE AND OBJECTIVE BOX
Patient is a 87y Female who reports no complaints overnight.  no pains  in good spirits     REVIEW OF SYSTEMS:    CONSTITUTIONAL: No weakness, fevers or chills  RESPIRATORY: No cough, wheezing, hemoptysis; No shortness of breath  CARDIOVASCULAR: No chest pain or palpitations  GENITOURINARY: No dysuria, frequency or hematuria  All other review of systems is negative unless indicated above.    MEDICATIONS  (STANDING):  acetaminophen     Tablet .. 975 milliGRAM(s) Oral every 8 hours  atorvastatin 20 milliGRAM(s) Oral at bedtime  chlorhexidine 4% Liquid 1 Application(s) Topical <User Schedule>  cyanocobalamin 1000 MICROGram(s) Oral daily  levothyroxine 88 MICROGram(s) Oral daily  metoprolol succinate ER 25 milliGRAM(s) Oral daily  sodium bicarbonate 325 milliGRAM(s) Oral two times a day  tamsulosin 0.4 milliGRAM(s) Oral at bedtime        Vital Signs Last 24 Hrs  T(C): 36.4 (08 Jul 2022 13:56), Max: 36.4 (08 Jul 2022 08:02)  T(F): 97.5 (08 Jul 2022 13:56), Max: 97.6 (08 Jul 2022 08:02)  HR: 78 (08 Jul 2022 13:56) (74 - 78)  BP: 112/80 (08 Jul 2022 13:56) (106/48 - 112/80)  BP(mean): --  RR: 16 (08 Jul 2022 13:56) (16 - 18)  SpO2: 98% (08 Jul 2022 13:56) (93% - 98%)    Parameters below as of 08 Jul 2022 13:56  Patient On (Oxygen Delivery Method): room air      I&O's Detail    07 Jul 2022 07:01  -  08 Jul 2022 07:00  --------------------------------------------------------  IN:  Total IN: 0 mL    OUT:    Indwelling Catheter - Urethral (mL): 1000 mL  Total OUT: 1000 mL    Total NET: -1000 mL      08 Jul 2022 07:01  -  08 Jul 2022 19:21  --------------------------------------------------------  IN:  Total IN: 0 mL    OUT:    Indwelling Catheter - Urethral (mL): 250 mL  Total OUT: 250 mL    Total NET: -250 mL            PHYSICAL EXAM:    Constitutional: NAD, well-groomed, well-developed  HEENT: EOMI,  MMM  Neck: No LAD, No JVD  Respiratory: CTAB  Cardiovascular: S1 and S2, RRR  Gastrointestinal: BS+, soft, NT/ND  Extremities: No peripheral edema  Neurological: A/O x 3, no focal deficits  :  Bose +  Skin: No rashes  Access: Not applicable        LABS:                                   8.9    9.43  )-----------( 162      ( 08 Jul 2022 11:43 )             27.0                         8.8    9.19  )-----------( 164      ( 08 Jul 2022 06:47 )             29.7         142    |  114    |  78     ----------------------------<  120       08 Jul 2022 06:47  4.2     |  20     |  2.41     138    |  111    |  75     ----------------------------<  99        07 Jul 2022 06:47  4.2     |  17     |  2.48     143    |  113    |  84     ----------------------------<  95        06 Jul 2022 07:27  4.0     |  20     |  2.97     Ca    8.0        08 Jul 2022 06:47  Ca    8.4        07 Jul 2022 06:47    Phos  5.2       05 Jul 2022 07:24    Mg     2.1       07 Jul 2022 06:47  Mg     2.1       06 Jul 2022 07:27    TPro  4.9    /  Alb  2.2    /  TBili  0.9 / 07 Jul 2022 06:47  DBili  <0.1   /  AST  137    /  ALT  71     /  AlkPhos  63       TPro  5.0    /  Alb  2.2    /  TBili  0.9    /        06 Jul 2022 07:27  DBili  0.1    /  AST  164    /  ALT  81     /  AlkPhos  64               Urine Studies:          RADIOLOGY & ADDITIONAL STUDIES:

## 2022-07-08 NOTE — PROGRESS NOTE ADULT - ASSESSMENT
87 year-old woman with HTN, HLD, CAD, CKD III (baseline Cr ~1.8), gout and hypothyroidism, here for further evaluation of approximately 1 week of progressively worsening generalized weakness, malaise, decreased PO intake, suffered a fall on 6/30, landed on L hip, occurred while getting out of bed, and per daughter, patient has since developed increasing confusion as well. In the ED, patient was noted to have leukocytosis, RADHA with Cr 4.2, elevated AST/ALT, markedly elevated troponin. EKG w/ NSR, LAFB, septal infarct and T wave abnormality inferolaterally. Ua N-, mod LE, large bld, >50 WBC, 6-10 RBC, many bacteria. CT head and C spine negative for acute pathology. Patient started on empiric ceftriaxone for possible UTI, given IV fluids for RADHA, and started on heparin drip for possible NSTEMI. Admitted to [facility].    Acute metabolic encephalopathy  Mentation improved. Likely multi factorial due to UTI, acute renal injury, NSTEMI, see below.   - Continue to manage underlying issues  - Reorient frequently  - Control pain where applicable  - Maintain regular bowel movement     UTI  Patient with UA suggestive of infection, obtained via straight cath. Started on empiric ceftriaxone. Urine culture (-), but note that this was collected after patient had already received antibiotic. Given presence of Bose will continue IV Ceftriaxone.   - Continue with IV ceftriaxone     Acute Anemia  Due to large bilateral proximal thigh hematomas. The left hematoma is larger. Right iliacus hematoma as well. Left lateral abdominal wall ecchymosis and moderate left adductor muscle hematoma. s/p 2U of PRBCs, hemoglobin response appropriate and VSS. Patient with fall prior to hospital arrival.   - Continue to monitor CBC daily, continue to hold antiplatelet agents   - Surgery and IR team f/u appreciated      RADHA on CKD III  Cr markedly elevated. Could be pre-renal in setting of infection and low PO intake, progression to ATN as she also had hypotension while on HCTZ and losartan, and possible urinary retention as well. UPCR 0.6. FENa 0.95%. Renal bladder US done. L kidney not visualized. R kidney with no hydronephrosis. No stones seen. Appreciate input from Nephrology.    - s/p IV fluid hydration   - Bose in place for retention. Urology evaluation pending  - ABX for UTI, trend sCr    Elevated AST/ALT  Likely related to dehydration  - s/p IVF. Improving, monitor    Metabolic acidosis  In setting of RADHA but also may be partially due to saline infusion.   - Improving. Defer to nephrology in starting bicarb tabs if necessary.    NSTEMI  Troponin 3957 on admission. EKG at that time NSR, L axis, LAFB, septal q, TWI inferolaterally. While demand ischemia combined with reduced renal clearance is possible,  treated as NSTEMI on arrival. Cardiology input appreciated. TTE w/ EF >70 %, mild TR, mild pulmHTN.   - S/p heparin drip. Discontinued in lieu of hematomas  - Continue statin, beta blocker  - Antiplatelet agents held, ok to hold d/w cardiology.    Hypothyroidism  TSH WNL  - Continue levothyroxine    Physical deconditioning and debility  PT evaluation requested  -  PT assessment pending from today, [facility] placement     Severe protein-calorie malnutrition  Appreciate input from Nutrition  - Liberalized diet to regular but will need to take caution given RADHA. Ensure Enlive BID. Trend weight.     DVT ppx  - SCDs, hold chemical DVT ppx in lieu of anemia/hematomas.     Updated family at bedside daily.  Eventual CE. DC planning in 24 to 48 hours. 87 year-old woman with HTN, HLD, CAD, CKD III (baseline Cr ~1.8), gout and hypothyroidism, here for further evaluation of approximately 1 week of progressively worsening generalized weakness, malaise, decreased PO intake, suffered a fall on 6/30, landed on L hip, occurred while getting out of bed, and per daughter, patient has since developed increasing confusion as well. In the ED, patient was noted to have leukocytosis, RADHA with Cr 4.2, elevated AST/ALT, markedly elevated troponin. EKG w/ NSR, LAFB, septal infarct and T wave abnormality inferolaterally. Ua N-, mod LE, large bld, >50 WBC, 6-10 RBC, many bacteria. CT head and C spine negative for acute pathology. Patient started on empiric ceftriaxone for possible UTI, given IV fluids for RADHA, and started on heparin drip for possible NSTEMI. Admitted to [facility].    Acute metabolic encephalopathy  Mentation improved. Likely multi factorial due to UTI, acute renal injury, NSTEMI, see below.   - Continue to manage underlying issues  - Reorient frequently  - Control pain where applicable  - Maintain regular bowel movement     UTI  Patient with UA suggestive of infection, obtained via straight cath. Started on empiric ceftriaxone. Urine culture (-), but note that this was collected after patient had already received antibiotic.   - s/p Course of IV Ceftriaxone.     Acute Anemia  Due to large bilateral proximal thigh hematomas. The left hematoma is larger. Right iliacus hematoma as well. Left lateral abdominal wall ecchymosis and moderate left adductor muscle hematoma. s/p 2U of PRBCs, hemoglobin response appropriate and VSS. Patient with fall prior to hospital arrival.   - Continue to monitor CBC daily, continue to hold antiplatelet agents   - Surgery and IR team f/u appreciated    - Repeat CBC in 3 days after discharge for close monitoring.      RADHA on CKD III  Cr markedly elevated. Could be pre-renal in setting of infection and low PO intake, progression to ATN as she also had hypotension while on HCTZ and losartan, and urinary retention as well. UPCR 0.6. FENa 0.95%. Renal bladder US done. L kidney not visualized. R kidney with no hydronephrosis. No stones seen. Appreciate input from Nephrology.    - s/p IV fluid hydration   - Obse in place for retention. Urology evaluation pending  - ABX for UTI, trend sCr    Acute Urinary retention  Bose in place. Seen by Urology.  - Started on Flomax   - Trial of void in 1 week at facility  - Follow up with Urologists in [place] (Dr. Sampson/Denise/Geoff) or Follow up with Dr. Etienne Mckeon (located in [facility] [phone]) for further management    Elevated AST/ALT  Likely related to dehydration  - s/p IVF. Improving.    Metabolic acidosis  In setting of RADHA but also may be partially due to saline infusion.   - Improving. Started on Na bicarb tabs per nephrology.    NSTEMI  Troponin 3957 on admission. EKG at that time NSR, L axis, LAFB, septal q, TWI inferolaterally. While demand ischemia combined with reduced renal clearance is possible,  treated as NSTEMI on arrival. Cardiology input appreciated. TTE w/ EF >70 %, mild TR, mild pulmHTN.   - S/p heparin drip. Discontinued in lieu of hematomas  - Continue statin, beta blocker  - Antiplatelet agents held, ok to hold d/w cardiology.    Hypothyroidism  TSH WNL  - Continue levothyroxine    Physical deconditioning and debility  PT evaluation requested  -  PT assessment pending from today, [facility] placement     Severe protein-calorie malnutrition  Appreciate input from Nutrition  - Liberalized diet to regular but will need to take caution given RADHA. Ensure Enlive BID. Trend weight.     DVT ppx  - SCDs, hold chemical DVT ppx in lieu of anemia/hematomas.     Updated family at bedside daily.  [facility]. [place].

## 2022-07-08 NOTE — DISCHARGE NOTE NURSING/CASE MANAGEMENT/SOCIAL WORK - NSDCPEFALRISK_GEN_ALL_CORE
For information on Fall & Injury Prevention, visit: [email or web address] OR  [email or web address] OR  https://www.cdc.gov/steadi/patient.html

## 2022-07-08 NOTE — PROGRESS NOTE ADULT - ASSESSMENT
88 yo female with hx of HTN, CAD, CKD 3b w left atrophic kidney, baseline Cr ~ 1.8 in 2021 presenting with fall, weakness and UTI w decreased po intake and RADHA     RADHA on CKD baseline  Cr 1.8 - 2 w left atrophic kidney hx     sec to ATN in setting of hypotension w soft BP while on HCTZ and losartan + Urine retention     Cr improving - trend labs    if DC needs labs drawn at rehab center   Urology eval for voiding trial in rehab    continue to hold further ARB and diuretics at this time    maintain SBP > 110    encourage po     Met acidosis -  sec to saline + RADHA    oral bicarb tabs -  improving   follow labs     Anemia - blood loss sec to RP hematoma while on A/c    s/p PRBC - Hb improved -  monitor Hb in rehab       * pt seen earlier     d/w Np cassy

## 2022-07-08 NOTE — PROGRESS NOTE ADULT - REASON FOR ADMISSION
Acute encephalopathy, RADHA, UTI, possible NSTEMI
Acute encephalopathy, RADHA, UTI, possible NSTEMI
fall, weakness

## 2022-07-08 NOTE — DISCHARGE NOTE PROVIDER - CARE PROVIDERS DIRECT ADDRESSES
[email or web address]

## 2022-07-08 NOTE — DISCHARGE NOTE PROVIDER - HOSPITAL COURSE
See Progress Note From 7/8/22:         See Progress Note From 7/8/22:    87 year-old woman with HTN, HLD, CAD, CKD III (baseline Cr ~1.8), gout and hypothyroidism, here for further evaluation of approximately 1 week of progressively worsening generalized weakness, malaise, decreased PO intake, suffered a fall on 6/30, landed on L hip, occurred while getting out of bed, and per daughter, patient has since developed increasing confusion as well. In the ED, patient was noted to have leukocytosis, RADHA with Cr 4.2, elevated AST/ALT, markedly elevated troponin. EKG w/ NSR, LAFB, septal infarct and T wave abnormality inferolaterally. Ua N-, mod LE, large bld, >50 WBC, 6-10 RBC, many bacteria. CT head and C spine negative for acute pathology. Patient started on empiric ceftriaxone for possible UTI, given IV fluids for RADHA, and started on heparin drip for possible NSTEMI. Admitted to [facility].    Acute metabolic encephalopathy  Mentation improved. Likely multi factorial due to UTI, acute renal injury, NSTEMI, see below.   - Continue to manage underlying issues  - Reorient frequently  - Control pain where applicable  - Maintain regular bowel movement     UTI  Patient with UA suggestive of infection, obtained via straight cath. Started on empiric ceftriaxone. Urine culture (-), but note that this was collected after patient had already received antibiotic.   - s/p Course of IV Ceftriaxone.     Acute Anemia due to bleeding/hematomas.  Due to large bilateral proximal thigh hematomas. The left hematoma is larger. Right iliacus hematoma as well. Left lateral abdominal wall ecchymosis and moderate left adductor muscle hematoma. s/p 2U of PRBCs, hemoglobin response appropriate and VSS. Patient with fall prior to hospital arrival.   - Continue to monitor CBC daily, continue to hold antiplatelet agents   - Surgery and IR team f/u appreciated    - Repeat CBC in 3 days after discharge for close monitoring.      RADHA on CKD III  Cr markedly elevated. Could be pre-renal in setting of infection and low PO intake, progression to ATN as she also had hypotension while on HCTZ and losartan, and urinary retention as well. UPCR 0.6. FENa 0.95%. Renal bladder US done. L kidney not visualized. R kidney with no hydronephrosis. No stones seen. Appreciate input from Nephrology.    - s/p IV fluid hydration   - Bose in place for retention. Urology evaluation pending  - ABX for UTI, trend sCr    Acute Urinary retention  Bose in place. Seen by Urology.  - Started on Flomax   - Trial of void in 1 week at facility  - Follow up with Urologists in [place] (Dr. Sampson/Denise/Geoff) or Follow up with Dr. Etienne Mckeon (located in [place] and [place] [phone]) for further management    Elevated AST/ALT  Likely related to dehydration  - s/p IVF. Improving.    Metabolic acidosis  In setting of RADHA but also may be partially due to saline infusion.   - Improving. Started on Na bicarb tabs per nephrology.    NSTEMI  Troponin 3957 on admission. EKG at that time NSR, L axis, LAFB, septal q, TWI inferolaterally. While demand ischemia combined with reduced renal clearance is possible,  treated as NSTEMI on arrival. Cardiology input appreciated. TTE w/ EF >70 %, mild TR, mild pulmHTN.   - S/p heparin drip. Discontinued in lieu of hematomas  - Continue statin, beta blocker  - Antiplatelet agents held, ok to hold d/w cardiology.    Hypothyroidism  TSH WNL  - Continue levothyroxine    Physical deconditioning and debility  PT evaluation requested  -  PT assessment pending from today, [facility] placement     Severe protein-calorie malnutrition  Appreciate input from Nutrition  - Liberalized diet to regular but will need to take caution given RADHA. Ensure Enlive BID. Trend weight.     DVT ppx  - SCDs, hold chemical DVT ppx in lieu of anemia/hematomas.     Updated family at bedside daily.  [facility]. [place].    Dispo: discharge to *[facility] in stable condition    Final diagnosis, treatment plan, and follow-up recommendations were discussed and explained to the patient. The patient was given an opportunity to ask questions concerning the diagnosis and treatment plan. The patient acknowledged understanding of the diagnosis, treatment, and follow-up recommendations. The patient was advised to seek urgent care upon discharge if worsening symptoms develop prior to scheduled follow-up. Time spent on discharge included time with the patient, and also coordinating discharge care as outlined below.    Total time spent: 50 min See Progress Note From 7/8/22:    87 year-old woman with HTN, HLD, CAD, CKD III (baseline Cr ~1.8), gout and hypothyroidism, here for further evaluation of approximately 1 week of progressively worsening generalized weakness, malaise, decreased PO intake, suffered a fall on 6/30, landed on L hip, occurred while getting out of bed, and per daughter, patient has since developed increasing confusion as well. In the ED, patient was noted to have leukocytosis, RADHA with Cr 4.2, elevated AST/ALT, markedly elevated troponin. EKG w/ NSR, LAFB, septal infarct and T wave abnormality inferolaterally. Ua N-, mod LE, large bld, >50 WBC, 6-10 RBC, many bacteria. CT head and C spine negative for acute pathology. Patient started on empiric ceftriaxone for possible UTI, given IV fluids for RADHA, and started on heparin drip for possible NSTEMI. Admitted to [facility].    Acute metabolic encephalopathy  Mentation improved. Likely multi factorial due to UTI, acute renal injury, NSTEMI, see below.   - Continue to manage underlying issues  - Reorient frequently  - Control pain where applicable  - Maintain regular bowel movement     UTI  Patient with UA suggestive of infection, obtained via straight cath. Started on empiric ceftriaxone. Urine culture (-), but note that this was collected after patient had already received antibiotic.   - s/p Course of IV Ceftriaxone.     Acute Anemia due to bleeding/hematomas.  Due to large bilateral proximal thigh hematomas. The left hematoma is larger. Right iliacus hematoma as well. Left lateral abdominal wall ecchymosis and moderate left adductor muscle hematoma. s/p 2U of PRBCs, hemoglobin response appropriate and VSS. Patient with fall prior to hospital arrival.   - Continue to monitor CBC daily, continue to hold antiplatelet agents   - Surgery and IR team f/u appreciated    - Repeat CBC in 3 days after discharge for close monitoring.      RADHA on CKD III  Cr markedly elevated. Could be pre-renal in setting of infection and low PO intake, progression to ATN as she also had hypotension while on HCTZ and losartan, and urinary retention as well. UPCR 0.6. FENa 0.95%. Renal bladder US done. L kidney not visualized. R kidney with no hydronephrosis. No stones seen. Appreciate input from Nephrology.    - s/p IV fluid hydration   - Bose in place for retention. Urology evaluation pending  - ABX for UTI, trend sCr    Acute Urinary retention  Bose in place. Seen by Urology.  - Started on Flomax   - Trial of void in 1 week at facility  - Follow up with Urologists in [place] (Dr. Sampson/Denise/Geoff) or Follow up with Dr. Etienne Mckeon (located in [place] and [place] [phone]) for further management    Elevated AST/ALT  Likely related to dehydration  - s/p IVF. Improving.    Metabolic acidosis  In setting of RADHA but also may be partially due to saline infusion.   - Improving. Started on Na bicarb tabs per nephrology.    NSTEMI  Troponin 3957 on admission. EKG at that time NSR, L axis, LAFB, septal q, TWI inferolaterally. While demand ischemia combined with reduced renal clearance is possible,  treated as NSTEMI on arrival. Cardiology input appreciated. TTE w/ EF >70 %, mild TR, mild pulmHTN.   - S/p heparin drip. Discontinued in lieu of hematomas  - Continue statin, beta blocker  - Antiplatelet agents held, ok to hold d/w cardiology.    Hypothyroidism  TSH WNL  - Continue levothyroxine    Physical deconditioning and debility  PT evaluation requested  -  PT assessment pending from today, [facility] placement     Severe protein-calorie malnutrition  Appreciate input from Nutrition  - Liberalized diet to regular but will need to take caution given RADHA. Ensure Enlive BID. Trend weight.     DVT ppx  - SCDs, hold chemical DVT ppx in lieu of anemia/hematomas.     Updated family at bedside daily.  [facility]. [place].    Dispo: discharge to *[facility]* in stable condition    Final diagnosis, treatment plan, and follow-up recommendations were discussed and explained to the patient. The patient was given an opportunity to ask questions concerning the diagnosis and treatment plan. The patient acknowledged understanding of the diagnosis, treatment, and follow-up recommendations. The patient was advised to seek urgent care upon discharge if worsening symptoms develop prior to scheduled follow-up. Time spent on discharge included time with the patient, and also coordinating discharge care as outlined below.    Total time spent: 50 min    I have seen and examined pt on day of d/c and agree with A&P as above.  -Aracelis Birch MD

## 2022-07-08 NOTE — DISCHARGE NOTE PROVIDER - NSDCFUSCHEDAPPT_GEN_ALL_CORE_FT
[facility] Physician [facility]  DISPENSARY [address]  Scheduled Appointment: 07/21/2022

## 2022-07-08 NOTE — DISCHARGE NOTE PROVIDER - DETAILS OF MALNUTRITION DIAGNOSIS/DIAGNOSES
This patient has been assessed with a concern for Malnutrition and was treated during this hospitalization for the following Nutrition diagnosis/diagnoses:     -  07/04/2022: Severe protein-calorie malnutrition

## 2022-07-08 NOTE — PROGRESS NOTE ADULT - SUBJECTIVE AND OBJECTIVE BOX
Chief Complaint: Confusion, disorientation, decreased PO intake, fatigue    Interval History:   7/5/22: Patient seen and examined. Patient states that she has generalized weakness. Acute anemia with Hgb to 6/8 today. follow up CT scans. IU PRBCs today. + Left hip pain. No visible ecchymosis. No blood in stool or urine per patient. On ceftriaxone, day 3, for probable UTI.     7/6/22: Patient seen and examined. Some left hip tenderness, persistent, not worse since yesterday. C/w IV Abx. Hemoglobin stable after PRBC transfusions. (+) hematomas of repeat imaging yesterday. Urology eval pending. off anticoagulation. DC NPO and bedrest. PT evaluation.    7/7/22: Patient seen and examined. no new complaints. generalized weakness and left hip pain persists since fall.     7/8/22:       ROS: Multi system review is comprehensively negative x 10 systems except as above    Vitals:  T(C): 36.4 (08 Jul 2022 08:02), Max: 36.4 (08 Jul 2022 08:02)  T(F): 97.6 (08 Jul 2022 08:02), Max: 97.6 (08 Jul 2022 08:02)  HR: 74 (08 Jul 2022 08:02) (74 - 74)  BP: 111/49 (08 Jul 2022 08:02) (106/48 - 111/49)  RR: 18 (08 Jul 2022 08:02) (18 - 18)  SpO2: 96% (08 Jul 2022 08:02) (93% - 96%)    Exam:  Gen: Comfortable appearing  HEENT: NCAT PERRL EOMI MMM clear oropharynx  Neck: Supple, no JVD, no LAD  Chest: Normal resp effort at rest, lungs CTA B/L  CVS: s1 s2  normal, RRR  Abd: +BS, soft NT ND   : Bose  Ext: No edema or calf tenderness  Skin: Warm, dry  Neuro: Awake and alert, follows commands, answers questions appropriately,   Mood: Calm, pleasant    Labs:             8.8    9.19  )-----------( 164      ( 08 Jul 2022 06:47 )             29.7     07-08    142  |  114<H>  |  78<H>  ----------------------------<  120<H>  4.2   |  20<L>  |  2.41<H>    Ca    8.0<L>      08 Jul 2022 06:47  Mg     2.1     07-07    TPro  4.9<L>  /  Alb  2.2<L>  /  TBili  0.9  /  DBili  <0.1  /  AST  137<H>  /  ALT  71  /  AlkPhos  63  07-07    Troponin 3957 --> 2209   Lactate 1.7    UA 7/2: N-, mod LE, large bld, >50 WBC, 6-10 RBC, many bact    UA 7/3: N+, mod LE, large bld, 11-25 RBC 0-2 WBC (was > 50 on prior UA but patient has since received a dose of antibiotics), few bact    Micro:  Urine culture 7/3: Negative (but collected after abx started)    COVID19 PCR 7/2: Negative    Imaging:    CT Femur No Cont, Left: 7/5/22: 1.  Evolving moderate left adductor muscle hematoma 2.  No fracture. 3.  Degenerative changes of the hip and knee. 4.  Mild soft tissue swelling.     CT Abdomen and Pelvis No Cont: 7/5/22: Large bilateral proximal thigh hematomas with hematocrit levels indicating acute bleed. The left hematoma is larger. Right iliacus hematoma as well. Left lateral abdominal wall ecchymosis. Small bilateral pleural effusions.    CT pelvis 7/3: No fractures are seen. Right iliopsoas muscle edema suggests strain.    US kidney bladder 7/3: The left kidney was not visualized. No right renal mass, hydronephrosis or calculi is visualized sonographically. The bladder is underdistended, limiting evaluation.    CT C spine 7/2: No acute cervical spine fracture or evidence of traumatic malalignment. 2.1 cm left thyroid lobe nodule.    CT head 7/2: No acute intracranial hemorrhage, mass effect, midline shift, extra-axial collection, hydrocephalus, or evidence of acute vascular territorial infarction. Mild patchy hypodensities within the periventricular and subcortical white matter, although nonspecific, likely reflect chronic microvascular disease. Cerebral volume loss   contributes to prominence of the ventricles and sulci. The visualized paranasal sinuses and mastoid air cells are clear. Intraorbital contents are unremarkable. No calvarial fracture.    Cardiac Testing:  TTE 7/4: The left ventricle is normal in size, wall motion and contractility as seen in limited views. Estimated left ventricular ejection fraction is >70 %. Moderate concentric left ventricular hypertrophy is present. The aortic valve is well visualized, appears mildly calcified. Valve opening seems to be normal. Normal appearing mitral valve structure and function. EA reversal of the mitral inflow consistent with reduced compliance of the left ventricle. The tricuspid valve leaflets are thin and pliable; valve motion is normal. Mild (1+) tricuspid valve regurgitation is present. Mild pulmonary hypertension. Normal appearing pulmonic valve structure. Mild pulmonic valvular regurgitation (1+) is present. There is a hypoechoic space anterior to the right ventricular free wall. Cannot rule out loculated pericardial effusion Vs. pericardial fat pad. The IVC is dilated.    EKG 7/3: Normal sinus rhythm. Left axis deviation. Non-specific intra-ventricular conduction block. T wave abnormality, consider lateral ischemia. When compared with yesterday's EKG, criteria for septal infarct are no longer present. Nonspecific T wave abnormality has replaced inverted T waves in inferior leads.    EKG 7/2: Normal sinus rhythm. Left anterior fascicular block. Minimal voltage criteria for LVH, may be normal variant ( [place] product ). Septal infarct. T wave abnormality, inferolaterally.     Meds:  MEDICATIONS  (STANDING):  acetaminophen     Tablet .. 975 milliGRAM(s) Oral every 8 hours  atorvastatin 20 milliGRAM(s) Oral at bedtime  cefTRIAXone   IVPB      cefTRIAXone   IVPB 1000 milliGRAM(s) IV Intermittent every 24 hours  chlorhexidine 4% Liquid 1 Application(s) Topical <User Schedule>  cyanocobalamin 1000 MICROGram(s) Oral daily  levothyroxine 88 MICROGram(s) Oral daily  metoprolol succinate ER 25 milliGRAM(s) Oral daily  sodium bicarbonate 325 milliGRAM(s) Oral two times a day  tamsulosin 0.4 milliGRAM(s) Oral at bedtime    MEDICATIONS  (PRN):  ALBUTerol    90 MICROgram(s) HFA Inhaler 2 Puff(s) Inhalation every 6 hours PRN Bronchospasm  aluminum hydroxide/magnesium hydroxide/simethicone Suspension 30 milliLiter(s) Oral every 4 hours PRN Dyspepsia  melatonin 3 milliGRAM(s) Oral at bedtime PRN Insomnia  ondansetron Injectable 4 milliGRAM(s) IV Push every 8 hours PRN Nausea and/or Vomiting     Chief Complaint: Confusion, disorientation, decreased PO intake, fatigue    Interval History:   7/8/22: Patient seen and examined. no new complaints. generalized weakness and left hip pain persists since fall (not worsening, stable). H&H stable on repeat labs. DC planning to [facility].    ROS: Multi system review is comprehensively negative x 10 systems except as above    Vitals:  T(C): 36.4 (08 Jul 2022 08:02), Max: 36.4 (08 Jul 2022 08:02)  T(F): 97.6 (08 Jul 2022 08:02), Max: 97.6 (08 Jul 2022 08:02)  HR: 74 (08 Jul 2022 08:02) (74 - 74)  BP: 111/49 (08 Jul 2022 08:02) (106/48 - 111/49)  RR: 18 (08 Jul 2022 08:02) (18 - 18)  SpO2: 96% (08 Jul 2022 08:02) (93% - 96%)    Exam:  Gen: Comfortable appearing  HEENT: NCAT PERRL EOMI MMM clear oropharynx  Neck: Supple, no JVD, no LAD  Chest: Normal resp effort at rest, lungs CTA B/L  CVS: s1 s2  normal, RRR  Abd: +BS, soft NT ND   : Bose  Ext: No edema or calf tenderness  Skin: Warm, dry  Neuro: Awake and alert, follows commands, answers questions appropriately,   Mood: Calm, pleasant    Labs:             8.8    9.19  )-----------( 164      ( 08 Jul 2022 06:47 )             29.7     07-08    142  |  114<H>  |  78<H>  ----------------------------<  120<H>  4.2   |  20<L>  |  2.41<H>    Ca    8.0<L>      08 Jul 2022 06:47  Mg     2.1     07-07    TPro  4.9<L>  /  Alb  2.2<L>  /  TBili  0.9  /  DBili  <0.1  /  AST  137<H>  /  ALT  71  /  AlkPhos  63  07-07    Troponin 3957 --> 2209   Lactate 1.7    UA 7/2: N-, mod LE, large bld, >50 WBC, 6-10 RBC, many bact    UA 7/3: N+, mod LE, large bld, 11-25 RBC 0-2 WBC (was > 50 on prior UA but patient has since received a dose of antibiotics), few bact    Micro:  Urine culture 7/3: Negative (but collected after abx started)    COVID19 PCR 7/2: Negative    Imaging:    CT Femur No Cont, Left: 7/5/22: 1.  Evolving moderate left adductor muscle hematoma 2.  No fracture. 3.  Degenerative changes of the hip and knee. 4.  Mild soft tissue swelling.     CT Abdomen and Pelvis No Cont: 7/5/22: Large bilateral proximal thigh hematomas with hematocrit levels indicating acute bleed. The left hematoma is larger. Right iliacus hematoma as well. Left lateral abdominal wall ecchymosis. Small bilateral pleural effusions.    CT pelvis 7/3: No fractures are seen. Right iliopsoas muscle edema suggests strain.    US kidney bladder 7/3: The left kidney was not visualized. No right renal mass, hydronephrosis or calculi is visualized sonographically. The bladder is underdistended, limiting evaluation.    CT C spine 7/2: No acute cervical spine fracture or evidence of traumatic malalignment. 2.1 cm left thyroid lobe nodule.    CT head 7/2: No acute intracranial hemorrhage, mass effect, midline shift, extra-axial collection, hydrocephalus, or evidence of acute vascular territorial infarction. Mild patchy hypodensities within the periventricular and subcortical white matter, although nonspecific, likely reflect chronic microvascular disease. Cerebral volume loss   contributes to prominence of the ventricles and sulci. The visualized paranasal sinuses and mastoid air cells are clear. Intraorbital contents are unremarkable. No calvarial fracture.    Cardiac Testing:  TTE 7/4: The left ventricle is normal in size, wall motion and contractility as seen in limited views. Estimated left ventricular ejection fraction is >70 %. Moderate concentric left ventricular hypertrophy is present. The aortic valve is well visualized, appears mildly calcified. Valve opening seems to be normal. Normal appearing mitral valve structure and function. EA reversal of the mitral inflow consistent with reduced compliance of the left ventricle. The tricuspid valve leaflets are thin and pliable; valve motion is normal. Mild (1+) tricuspid valve regurgitation is present. Mild pulmonary hypertension. Normal appearing pulmonic valve structure. Mild pulmonic valvular regurgitation (1+) is present. There is a hypoechoic space anterior to the right ventricular free wall. Cannot rule out loculated pericardial effusion Vs. pericardial fat pad. The IVC is dilated.    EKG 7/3: Normal sinus rhythm. Left axis deviation. Non-specific intra-ventricular conduction block. T wave abnormality, consider lateral ischemia. When compared with yesterday's EKG, criteria for septal infarct are no longer present. Nonspecific T wave abnormality has replaced inverted T waves in inferior leads.    EKG 7/2: Normal sinus rhythm. Left anterior fascicular block. Minimal voltage criteria for LVH, may be normal variant ( [place] product ). Septal infarct. T wave abnormality, inferolaterally.     Meds:  MEDICATIONS  (STANDING):  acetaminophen     Tablet .. 975 milliGRAM(s) Oral every 8 hours  atorvastatin 20 milliGRAM(s) Oral at bedtime  chlorhexidine 4% Liquid 1 Application(s) Topical <User Schedule>  cyanocobalamin 1000 MICROGram(s) Oral daily  levothyroxine 88 MICROGram(s) Oral daily  metoprolol succinate ER 25 milliGRAM(s) Oral daily  sodium bicarbonate 325 milliGRAM(s) Oral two times a day  tamsulosin 0.4 milliGRAM(s) Oral at bedtime    MEDICATIONS  (PRN):  ALBUTerol    90 MICROgram(s) HFA Inhaler 2 Puff(s) Inhalation every 6 hours PRN Bronchospasm  aluminum hydroxide/magnesium hydroxide/simethicone Suspension 30 milliLiter(s) Oral every 4 hours PRN Dyspepsia  melatonin 3 milliGRAM(s) Oral at bedtime PRN Insomnia  ondansetron Injectable 4 milliGRAM(s) IV Push every 8 hours PRN Nausea and/or Vomiting

## 2022-07-08 NOTE — DISCHARGE NOTE PROVIDER - NSDCACTIVITY_GEN_ALL_CORE
From: Keith Lara  To: Shanon Watson MD  Sent: 3/13/2022 4:02 PM CDT  Subject: Refill Of Insulin    Dr Georgia Watson   Urgent! Please refill my Insulin as I will run out Sunday evening the 13th March and need it by the 14th.     Thank you      Triston Cabrales
See refill request 3/14/22. Rx was sent. MyChart sent.
No restrictions

## 2022-07-08 NOTE — DISCHARGE NOTE PROVIDER - NSDCMRMEDTOKEN_GEN_ALL_CORE_FT
allopurinol 100 mg oral tablet: 1 tab(s) orally once a day  atorvastatin 20 mg oral tablet: 1 tab(s) orally once a day  Azithromycin 5 Day Dose Pack 250 mg oral tablet: as directed  *** course not complete***  hydroCHLOROthiazide 12.5 mg oral tablet: 1 tab(s) orally once a day  Imodium 2 mg oral capsule: 1 cap(s) orally every 4 hours, As Needed  levothyroxine 88 mcg (0.088 mg) oral tablet: 1 tab(s) orally once a day  losartan 25 mg oral tablet: 1 tab(s) orally once a day  Medrol Dosepak 4 mg oral tablet: as directed  ***course not complete***  Nebivolol 20 mg oral tablet: 1 tab(s) orally once a day  Tylenol 500 mg oral tablet: 2 tab(s) orally 2 times a day, As Needed   atorvastatin 20 mg oral tablet: 1 tab(s) orally once a day  cyanocobalamin 1000 mcg oral tablet: 1 tab(s) orally once a day  levothyroxine 88 mcg (0.088 mg) oral tablet: 1 tab(s) orally once a day  metoprolol succinate 25 mg oral tablet, extended release: 1 tab(s) orally once a day  sodium bicarbonate 325 mg oral tablet: 1 tab(s) orally 2 times a day  tamsulosin 0.4 mg oral capsule: 1 cap(s) orally once a day (at bedtime)  Tylenol 500 mg oral tablet: 2 tab(s) orally 2 times a day, As Needed

## 2022-07-08 NOTE — DISCHARGE NOTE PROVIDER - NSDCCAREPROVSEEN_GEN_ALL_CORE_FT
Paris Cordova ([facility])  Aracelis Birch ([facility])  Ashish Moe (Nephrology)  Theo Norwood (Cardiology)   Rasheed Oro (Nephrology)  Jorge Coyne (Urology)  Ander Ayers ([facility])  Briana Collins (Cardiology)   Manas Rodarte (Nephrology)

## 2022-07-08 NOTE — PROGRESS NOTE ADULT - PROVIDER SPECIALTY LIST ADULT
Nephrology
Hospitalist
Nephrology
Cardiology
Hospitalist
Nephrology
Hospitalist
Cardiology

## 2022-07-08 NOTE — DISCHARGE NOTE NURSING/CASE MANAGEMENT/SOCIAL WORK - PATIENT PORTAL LINK FT
You can access the FollowMyHealth Patient Portal offered by [facility] by registering at the following website: [email or web address]. By joining [facility]’s FollowMyHealth portal, you will also be able to view your health information using other applications (apps) compatible with our system.

## 2022-07-08 NOTE — DISCHARGE NOTE PROVIDER - NSDCFUADDINST_GEN_ALL_CORE_FT
Ensure supplemental drinks with meals. Ensure supplemental drinks with meals.  Renal Diet restrictions. Protein Supplemental drinks with meals.  Renal Diet restrictions.

## 2022-07-08 NOTE — DISCHARGE NOTE PROVIDER - CARE PROVIDER_API CALL
Manas Rodarte)  Internal Medicine; Nephrology  [address] [place] [address]  Phone: [phone]  Fax: [phone]  Follow Up Time: Routine    Judah Collins)  Cardiovascular Disease; Internal Medicine  [address], [address]  [place] [address]  Phone: [phone]  Fax: [phone]  Follow Up Time: Routine   Manas Rodarte)  Internal Medicine; Nephrology  [address] [place] [address]  Phone: [phone]  Fax: [phone]  Follow Up Time: Routine    Judah Collins)  Cardiovascular Disease; Internal Medicine  [address] [facility] [address], [address]  [place] [address]  Phone: [phone]  Fax: [phone]  Follow Up Time: Routine    López Walker)  Urology  [facility]  [address], [address] [place] [address]  Phone: [phone]  Fax: [phone]  Follow Up Time: Routine

## 2022-07-08 NOTE — DISCHARGE NOTE NURSING/CASE MANAGEMENT/SOCIAL WORK - NSTRANSFERBELONGINGSDISPO_GEN_A_NUR
If you are a smoker, it is important for your health to stop smoking. Please be aware that second hand smoke is also harmful. with patient

## 2022-07-14 DIAGNOSIS — I25.10 ATHEROSCLEROTIC HEART DISEASE OF NATIVE CORONARY ARTERY WITHOUT ANGINA PECTORIS: ICD-10-CM
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
## 2022-07-14 DIAGNOSIS — I27.20 PULMONARY HYPERTENSION, UNSPECIFIED: ICD-10-CM

## 2022-07-14 DIAGNOSIS — E43 UNSPECIFIED SEVERE PROTEIN-CALORIE MALNUTRITION: ICD-10-CM
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
## 2022-07-14 DIAGNOSIS — N17.0 ACUTE KIDNEY FAILURE WITH TUBULAR NECROSIS: ICD-10-CM

## 2022-07-14 DIAGNOSIS — M54.9 DORSALGIA, UNSPECIFIED: ICD-10-CM

## 2022-07-14 DIAGNOSIS — M10.9 GOUT, UNSPECIFIED: ICD-10-CM

## 2022-07-14 DIAGNOSIS — I10 ESSENTIAL (PRIMARY) HYPERTENSION: ICD-10-CM

## 2022-07-14 DIAGNOSIS — G89.29 OTHER CHRONIC PAIN: ICD-10-CM

## 2022-07-14 DIAGNOSIS — M25.552 PAIN IN LEFT HIP: ICD-10-CM

## 2022-07-14 DIAGNOSIS — M79.81 NONTRAUMATIC HEMATOMA OF SOFT TISSUE: ICD-10-CM

## 2022-07-14 DIAGNOSIS — I36.1 NONRHEUMATIC TRICUSPID (VALVE) INSUFFICIENCY: ICD-10-CM

## 2022-07-14 DIAGNOSIS — I21.4 NON-ST ELEVATION (NSTEMI) MYOCARDIAL INFARCTION: ICD-10-CM

## 2022-07-14 DIAGNOSIS — E03.9 HYPOTHYROIDISM, UNSPECIFIED: ICD-10-CM

## 2022-07-14 DIAGNOSIS — G93.41 METABOLIC ENCEPHALOPATHY: ICD-10-CM

## 2022-07-14 DIAGNOSIS — D63.8 ANEMIA IN OTHER CHRONIC DISEASES CLASSIFIED ELSEWHERE: ICD-10-CM

## 2022-07-14 DIAGNOSIS — N18.4 CHRONIC KIDNEY DISEASE, STAGE 4 (SEVERE): ICD-10-CM

## 2022-07-14 DIAGNOSIS — E86.0 DEHYDRATION: ICD-10-CM

## 2022-07-14 DIAGNOSIS — D62 ACUTE POSTHEMORRHAGIC ANEMIA: ICD-10-CM

## 2022-07-14 DIAGNOSIS — E04.1 NONTOXIC SINGLE THYROID NODULE: ICD-10-CM

## 2022-07-14 DIAGNOSIS — R53.81 OTHER MALAISE: ICD-10-CM

## 2022-07-14 DIAGNOSIS — R33.9 RETENTION OF URINE, UNSPECIFIED: ICD-10-CM

## 2022-07-14 DIAGNOSIS — J90 PLEURAL EFFUSION, NOT ELSEWHERE CLASSIFIED: ICD-10-CM

## 2022-07-14 DIAGNOSIS — E87.2 ACIDOSIS: ICD-10-CM

## 2022-07-14 DIAGNOSIS — N39.0 URINARY TRACT INFECTION, SITE NOT SPECIFIED: ICD-10-CM
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
## 2022-07-14 DIAGNOSIS — E78.5 HYPERLIPIDEMIA, UNSPECIFIED: ICD-10-CM

## 2022-07-18 ENCOUNTER — APPOINTMENT (OUTPATIENT)
Dept: PHARMACY | Facility: CLINIC | Age: 87
End: 2022-07-18

## 2022-07-21 ENCOUNTER — APPOINTMENT (OUTPATIENT)
Dept: PHARMACY | Facility: CLINIC | Age: 87
End: 2022-07-21

## 2022-09-30 NOTE — H&P ADULT - NSICDXPASTMEDICALHX_GEN_ALL_CORE_FT
PAST MEDICAL HISTORY:  CAD (coronary artery disease)     History of valvular heart disease     HLD (hyperlipidemia)     HTN (hypertension)      PAST MEDICAL HISTORY:  CAD (coronary artery disease)     History of valvular heart disease     HLD (hyperlipidemia)     HTN (hypertension)     Stage 3 chronic kidney disease

## 2022-09-30 NOTE — H&P ADULT - HISTORY OF PRESENT ILLNESS
87 y.o female with PMHx of HTN, HLD, valvular disease, CAD presented to cardiology office with c/o PHELAN. Pt reports having dyspnea while walking on ground level, as well as walking up and hills. Pt underwent PET stress, revealed LCx/ RCA ischemia. Pt referred to cardiac cath for further ischemic evaluation.   COVID-19 PCR (

## 2022-09-30 NOTE — H&P ADULT - ASSESSMENT
87 y.o female with PMHx of HTN, HLD, valvular disease, CAD presented to cardiology office with c/o PHELAN. Pt reports having dyspnea while walking on ground level, as well as walking up and hills. Pt underwent PET stress, revealed LCx/ RCA ischemia. Pt referred to cardiac cath for further ischemic evaluation.     ASA class:  Cr:  GFR;  Bleeding risk:  Marco score:  87 y.o female with PMHx of HTN, HLD, valvular disease, CAD presented to cardiology office with c/o PHELAN. Pt reports having dyspnea while walking on ground level, as well as walking up and hills. Pt underwent PET stress, revealed LCx/ RCA ischemia. Pt referred to cardiac cath for further ischemic evaluation.     ASA class: II  Cr: 1.88  GFR; 26  Bleeding risk: 5.6%  Marco score:  12 points

## 2022-09-30 NOTE — H&P ADULT - NSHPSOCIALHISTORY_GEN_ALL_CORE
Pt was recently discharged from subacute rehab s/p fall, at that time she moved in with her daughter Zaida Sandhu [phone]  Pt is a    denies tobacco, alcohol and illicit drug use.

## 2022-09-30 NOTE — H&P ADULT - PROBLEM SELECTOR PLAN 1
- EARL protocol pre hydration: NS 250cc IV bolus x1   - Procedure, its risks, alternatives, benefits and potential complications were discussed in detail. Risks include but not limited to bleeding, infection, allergy, renal failure requiring dialysis, stroke, vascular injury, pericardial tamponade, arrhythmias, MI and even death. Pt is agreeable and has consented for the procedure. [facility] with possible PCI  - EARL protocol pre hydration: NS 250cc x 1  complications were discussed in detail. Risks include but not limited to bleeding, infection, allergy, renal failure requiring dialysis, stroke, vascular injury, pericardial tamponade, arrhythmias, MI and even death. Pt is agreeable and has consented for the procedure.

## 2022-09-30 NOTE — H&P ADULT - NSHPPHYSICALEXAM_GEN_ALL_CORE
PHYSICAL EXAM  GENERAL: NAD, AAOx3, obese   HEAD:  Atraumatic, Normocephalic  EYES: EOMI, PERRLA, conjunctiva and sclera clear  NECK: Supple, No JVD, No LAD  CHEST/LUNG: Clear to auscultation bilaterally; No wheeze  HEART: s1 s2 Regular rate and rhythm; No murmurs, rubs, or gallops  ABDOMEN: Soft, Nontender, Nondistended; Bowel sounds present X 4 quadrants   EXTREMITIES:  2+ Peripheral Pulses, No clubbing, cyanosis, or edema  SKIN: No rashes or lesions,  b/l LE not red, cool to touch,  no open skin no drainage  NEURO: nonfocal CN/motor/sensory/reflexes  Psych: normal affect and behavior, calm and cooperative

## 2022-09-30 NOTE — H&P ADULT - NSHPLABSRESULTS_GEN_ALL_CORE
EKG (9/26/22): NSR at 84 bpm    PET stress test (9/26/22): EF 53% at rest, 64% at stress, large defect of moderate to severe intensity, fixed perfusion abnormality in basal antreolateral segment, partially reversible pefsuion defect in mid inferolateral, mid inferior and apex, reversible defect in basal inferolateral and apical lateral segments, reversible defect in apical inferior segment. These findings are c/w LCx/ RCA ischemia.

## 2022-10-03 ENCOUNTER — OUTPATIENT (OUTPATIENT)
Dept: OUTPATIENT SERVICES | Facility: HOSPITAL | Age: 87
LOS: 1 days | Discharge: HOME | End: 2022-10-03
Payer: MEDICARE

## 2022-10-03 ENCOUNTER — TRANSCRIPTION ENCOUNTER (OUTPATIENT)
Age: 87
End: 2022-10-03

## 2022-10-03 VITALS
RESPIRATION RATE: 16 BRPM | HEIGHT: 64 IN | TEMPERATURE: 97 F | HEART RATE: 88 BPM | OXYGEN SATURATION: 96 % | SYSTOLIC BLOOD PRESSURE: 163 MMHG | DIASTOLIC BLOOD PRESSURE: 70 MMHG | WEIGHT: 214.07 LBS

## 2022-10-03 DIAGNOSIS — R94.39 ABNORMAL RESULT OF OTHER CARDIOVASCULAR FUNCTION STUDY: ICD-10-CM

## 2022-10-03 PROCEDURE — C1760: CPT

## 2022-10-03 PROCEDURE — 93454 CORONARY ARTERY ANGIO S&I: CPT

## 2022-10-03 PROCEDURE — C1894: CPT

## 2022-10-03 PROCEDURE — C1769: CPT

## 2022-10-03 PROCEDURE — C1887: CPT

## 2022-10-03 RX ORDER — LEVOTHYROXINE SODIUM 125 MCG
88 TABLET ORAL DAILY
Refills: 0 | Status: DISCONTINUED | OUTPATIENT
Start: 2022-10-03 | End: 2022-10-04

## 2022-10-03 RX ORDER — SODIUM CHLORIDE 9 MG/ML
1000 INJECTION INTRAMUSCULAR; INTRAVENOUS; SUBCUTANEOUS
Refills: 0 | Status: DISCONTINUED | OUTPATIENT
Start: 2022-10-03 | End: 2022-10-04

## 2022-10-03 RX ORDER — FUROSEMIDE 40 MG
40 TABLET ORAL DAILY
Refills: 0 | Status: DISCONTINUED | OUTPATIENT
Start: 2022-10-03 | End: 2022-10-04

## 2022-10-03 RX ORDER — METOPROLOL TARTRATE 50 MG
25 TABLET ORAL DAILY
Refills: 0 | Status: DISCONTINUED | OUTPATIENT
Start: 2022-10-03 | End: 2022-10-04

## 2022-10-03 RX ORDER — SODIUM BICARBONATE 1 MEQ/ML
325 SYRINGE (ML) INTRAVENOUS DAILY
Refills: 0 | Status: DISCONTINUED | OUTPATIENT
Start: 2022-10-03 | End: 2022-10-04

## 2022-10-03 RX ORDER — TAMSULOSIN HYDROCHLORIDE 0.4 MG/1
0.4 CAPSULE ORAL AT BEDTIME
Refills: 0 | Status: DISCONTINUED | OUTPATIENT
Start: 2022-10-03 | End: 2022-10-04

## 2022-10-03 RX ORDER — SODIUM CHLORIDE 9 MG/ML
250 INJECTION INTRAMUSCULAR; INTRAVENOUS; SUBCUTANEOUS
Refills: 0 | Status: DISCONTINUED | OUTPATIENT
Start: 2022-10-03 | End: 2022-10-03

## 2022-10-03 RX ORDER — ACETAMINOPHEN 500 MG
1000 TABLET ORAL EVERY 8 HOURS
Refills: 0 | Status: DISCONTINUED | OUTPATIENT
Start: 2022-10-03 | End: 2022-10-04

## 2022-10-03 RX ORDER — ATORVASTATIN CALCIUM 80 MG/1
20 TABLET, FILM COATED ORAL AT BEDTIME
Refills: 0 | Status: DISCONTINUED | OUTPATIENT
Start: 2022-10-03 | End: 2022-10-04

## 2022-10-03 RX ADMIN — TAMSULOSIN HYDROCHLORIDE 0.4 MILLIGRAM(S): 0.4 CAPSULE ORAL at 21:32

## 2022-10-03 RX ADMIN — SODIUM CHLORIDE 50 MILLILITER(S): 9 INJECTION INTRAMUSCULAR; INTRAVENOUS; SUBCUTANEOUS at 11:30

## 2022-10-03 RX ADMIN — Medication 1000 MILLIGRAM(S): at 21:38

## 2022-10-03 RX ADMIN — ATORVASTATIN CALCIUM 20 MILLIGRAM(S): 80 TABLET, FILM COATED ORAL at 21:32

## 2022-10-03 NOTE — PROGRESS NOTE ADULT - SUBJECTIVE AND OBJECTIVE BOX
Nurse Practitioner Progress note:     HPI:  87 y.o female with PMHx of HTN, HLD, valvular disease, CAD presented to cardiology office with c/o PHELAN. Pt reports having dyspnea while walking on ground level, as well as walking up and hills. Pt underwent PET stress, revealed LCx/ RCA ischemia. Pt referred to cardiac cath for further ischemic evaluation.   COVID-19 PCR ( (30 Sep 2022 13:05)      S/P LHC: Triple vessel disease, pt needs evaluation for CABG. Possible tx to [place]       T(C): 36.2 (10-03-22 @ 09:20), Max: 36.2 (10-03-22 @ 09:20)  HR: 79 (10-03-22 @ 12:30) (79 - 90)  BP: 132/67 (10-03-22 @ 12:30) (118/80 - 163/70)  RR: 16 (10-03-22 @ 12:30) (16 - 16)  SpO2: 98% (10-03-22 @ 12:30) (96% - 100%)  Wt(kg): --        PHYSICAL EXAM:  NEURO: Non-focal, AxOx3.  No neuro deficits NECK: Supple, No JVD, No LAD  CHEST/LUNG: Clear to auscultation bilaterally; No wheeze  HEART: s1 s2 Regular rate and rhythm; No murmurs, rubs, or gallops  ABDOMEN: Soft, Nontender, Nondistended; Bowel sounds present X 4 quadrants   EXTREMITIES:  2+ Peripheral Pulses, No clubbing, cyanosis, or edema   VASCULAR: Peripheral pulses palpable 2+ bilaterally  PROCEDURE SITE: RFA with mynx  ,Site is without hematoma or bleeding. Sensation and ANKIT intact. Distal pulses palpable 2+, capillary refill < 2 seconds. Patient denies pain, numbness, tingling, CP or SOB. Clean dry dressing applied     PROCEDURE RESULTS: Full report to follow     ASSESSMENT: HPI:  87 y.o female with PMHx of HTN, HLD, valvular disease, CAD presented to cardiology office with c/o PHELAN. Pt reports having dyspnea while walking on ground level, as well as walking up and hills. Pt underwent PET stress, revealed LCx/ RCA ischemia. Pt referred to cardiac cath for further ischemic evaluation.   COVID-19 PCR ( (30 Sep 2022 13:05)    S/P LHC: Triple vessel disease, pt needs evaluation for CABG. Possible tx to [place], awaiting to confirm with Dr. Olson     PLAN:  -VS, diet, activity as per post cath orders  -Encourage PO fluids  -Continue current medications  Pt would prefer transfer to [place] for CABG evaluation.   -Plan of care discussed with patient and Dr Dunn  -Post cath instructions reviewed, patient verbalizes and understands instructions

## 2022-10-03 NOTE — DISCHARGE NOTE PROVIDER - CARE PROVIDER_API CALL
Judah Collins)  Cardiovascular Disease; Internal Medicine  [address] [facility], [address]  [place] [address]  Phone: [phone]  Fax: [phone]  Follow Up Time:

## 2022-10-03 NOTE — DISCHARGE NOTE PROVIDER - HOSPITAL COURSE
S/P LHC: Triple vessel disease, pt needs evaluation for CABG. Possible tx to [place], awaiting to confirm with Dr. Olson     PLAN:  -VS, diet, activity as per post cath orders  -Encourage PO fluids  -Continue current medications  -Post hydration NS @50cc x 4hrs   Pt would prefer transfer to [place] for CABG evaluation.   -Pt accepted by Dr. Olson @ [place]   -Plan of care discussed with patient and Dr Dunn

## 2022-10-03 NOTE — DISCHARGE NOTE PROVIDER - NSDCMRMEDTOKEN_GEN_ALL_CORE_FT
Renal Dosing/Monitoring  Medication: Famotidine   Current regimen:  20 mg PO every 12 hr  Recent Labs      01/03/18   1702  01/02/18   0952   CREA  1.80*  1.77*   BUN  32*  25     Estimated CrCl:  34.9 ml/min  Plan: Change to 20 mg PO Q 24 hours  per [facility] P&T Committee Protocol with respect to renal function. Pharmacy will continue to monitor patient daily and will make dosage adjustments based upon changing renal function.     Herrera Araiza, ShaD, BCPS atorvastatin 20 mg oral tablet: 1 tab(s) orally once a day  cyanocobalamin 1000 mcg oral tablet: 1 tab(s) orally once a day  Lasix 40 mg oral tablet: 1 tab(s) orally once a day  levothyroxine 88 mcg (0.088 mg) oral tablet: 1 tab(s) orally once a day  metoprolol succinate 25 mg oral tablet, extended release: 1 tab(s) orally once a day  sodium bicarbonate 325 mg oral tablet: 1 tab(s) orally 2 times a day  tamsulosin 0.4 mg oral capsule: 1 cap(s) orally once a day (at bedtime)  Tylenol 500 mg oral tablet: 2 tab(s) orally 2 times a day, As Needed   allopurinol 100 mg oral tablet: 1 tab(s) orally once a day (in the evening)  atorvastatin 20 mg oral tablet: 1 tab(s) orally once a day (in the evening)  cyanocobalamin 1000 mcg oral tablet: 2 tab(s) orally once a day  Lasix 40 mg oral tablet: 1 tab(s) orally once a day  levothyroxine 88 mcg (0.088 mg) oral tablet: 1 tab(s) orally once a day  metoprolol succinate 25 mg oral tablet, extended release: 1 tab(s) orally once a day  Nephro-Zoë oral tablet: 1 tab(s) orally once a day  sodium bicarbonate 325 mg oral tablet: 1 tab(s) orally once a day  tamsulosin 0.4 mg oral capsule: 1 cap(s) orally once a day (at bedtime)

## 2022-10-03 NOTE — ASU PATIENT PROFILE, ADULT - FALL HARM RISK - HARM RISK INTERVENTIONS

## 2022-10-03 NOTE — DISCHARGE NOTE PROVIDER - NSDCCPCAREPLAN_GEN_ALL_CORE_FT
PRINCIPAL DISCHARGE DIAGNOSIS  Diagnosis: 3-vessel CAD  Assessment and Plan of Treatment: critical lesions in LAD/RCA transfer for CABG evaluation

## 2022-10-03 NOTE — PACU DISCHARGE NOTE - COMMENTS
Patient s/p LHC via RFA. Dressing intact with no s/s of bleeding or hematoma. RLE warm and mobile. VS Stable. Patient denies pain. Report given to IDA Durham on 3E. Patient placed on Zoll monitor at this time and pending transport to 3E at this time

## 2022-10-03 NOTE — DISCHARGE NOTE PROVIDER - NSDCCPTREATMENT_GEN_ALL_CORE_FT
PRINCIPAL PROCEDURE  Procedure: Left heart catheterization  Findings and Treatment: no intervention , triple vessel disease, transfer for CABG evaluation       Area M Indication Text: Tumors in this location are included in Area M (cheek, forehead, scalp, neck, jawline and pretibial skin).  Mohs surgery is indicated for tumors in these anatomic locations.

## 2022-10-04 ENCOUNTER — TRANSCRIPTION ENCOUNTER (OUTPATIENT)
Age: 87
End: 2022-10-04

## 2022-10-04 VITALS
RESPIRATION RATE: 14 BRPM | OXYGEN SATURATION: 97 % | TEMPERATURE: 98 F | HEART RATE: 77 BPM | SYSTOLIC BLOOD PRESSURE: 138 MMHG | DIASTOLIC BLOOD PRESSURE: 61 MMHG

## 2022-10-04 RX ORDER — ALLOPURINOL 300 MG
1 TABLET ORAL
Qty: 0 | Refills: 0 | DISCHARGE

## 2022-10-04 RX ORDER — ACETAMINOPHEN 500 MG
2 TABLET ORAL
Qty: 0 | Refills: 0 | DISCHARGE

## 2022-10-04 RX ORDER — ATORVASTATIN CALCIUM 80 MG/1
1 TABLET, FILM COATED ORAL
Qty: 0 | Refills: 0 | DISCHARGE

## 2022-10-04 RX ADMIN — Medication 88 MICROGRAM(S): at 06:28

## 2022-10-04 RX ADMIN — Medication 325 MILLIGRAM(S): at 11:57

## 2022-10-04 RX ADMIN — Medication 25 MILLIGRAM(S): at 10:29

## 2022-10-04 RX ADMIN — Medication 40 MILLIGRAM(S): at 11:56

## 2022-10-04 NOTE — PHARMACOTHERAPY INTERVENTION NOTE - COMMENTS
Medication history complete, reviewed medication with patient provided list and confirmed with DrFirst.

## 2022-10-04 NOTE — DISCHARGE NOTE NURSING/CASE MANAGEMENT/SOCIAL WORK - PATIENT PORTAL LINK FT
You can access the FollowMyHealth Patient Portal offered by [facility] by registering at the following website: [email or web address]. By joining [facility]’s FollowMyHealth portal, you will also be able to view your health information using other applications (apps) compatible with our system.

## 2022-10-04 NOTE — DISCHARGE NOTE NURSING/CASE MANAGEMENT/SOCIAL WORK - NSDCFUADDAPPT_GEN_ALL_CORE_FT
Pt being transferred to [facility] accepted by Dr Olson     Pt being transferred to [facility] accepted by Dr Olson    appt made with Dr. Judah Collins  October 13, 2022 at 1:30 p.m.

## 2022-10-05 DIAGNOSIS — I10 ESSENTIAL (PRIMARY) HYPERTENSION: ICD-10-CM

## 2022-10-05 DIAGNOSIS — I25.110 ATHEROSCLEROTIC HEART DISEASE OF NATIVE CORONARY ARTERY WITH UNSTABLE ANGINA PECTORIS: ICD-10-CM

## 2022-10-18 NOTE — ED ADULT TRIAGE NOTE - CCCP TRG CHIEF CMPLNT
V2.0  [facility] Hospitalist Progress Note      Name:  Floridalma Jose /Age/Sex: 1964  (66 y.o. female)   MRN & CSN:  [id number] & [id number] Encounter Date/Time: 10/18/N2 NURSE MANRIQUE AT 0755 BY WESTON SAAVEDRA. 

## 2022-10-20 NOTE — ED PROVIDER NOTE - NS_ATTENDINGSCRIBE_ED_ALL_ED
[FreeTextEntry1] : YEYO ADAMS is a 41 year M who presents today as a new patient evaluation for vasectomy evaluation.\par \par He has 4 children: 15 and 10-year-old stepchildren, and 7 and 3-year-old biological children.  He is .  He does not desire to have any additional children.  He has never had surgery, or abnormal bleeding.  No history of scrotal surgery.\par \par Anticoagulation: None\par All: None\par Social: .  Half pack smoker, x4 to 5 years.\par PMHx: None\par PSHx: None\par FHx:  no  malignancy\par Labs: None\par \par Denies gross hematuria, flank pain, fevers, chills, nausea, vomiting. 
I personally performed the service described in the documentation recorded by the scribe in my presence, and it accurately and completely records my words and actions.

## 2022-11-14 PROBLEM — E78.5 HYPERLIPIDEMIA, UNSPECIFIED: Chronic | Status: ACTIVE | Noted: 2022-09-30

## 2022-11-14 PROBLEM — I25.10 ATHEROSCLEROTIC HEART DISEASE OF NATIVE CORONARY ARTERY WITHOUT ANGINA PECTORIS: Chronic | Status: ACTIVE | Noted: 2022-09-30

## 2022-11-14 PROBLEM — I10 ESSENTIAL (PRIMARY) HYPERTENSION: Chronic | Status: ACTIVE | Noted: 2022-09-30

## 2022-11-14 PROBLEM — N18.30 CHRONIC KIDNEY DISEASE, STAGE 3 UNSPECIFIED: Chronic | Status: ACTIVE | Noted: 2022-10-03

## 2022-11-14 PROBLEM — Z86.79 PERSONAL HISTORY OF OTHER DISEASES OF THE CIRCULATORY SYSTEM: Chronic | Status: ACTIVE | Noted: 2022-09-30

## 2022-11-30 ENCOUNTER — APPOINTMENT (OUTPATIENT)
Dept: OTOLARYNGOLOGY | Facility: CLINIC | Age: 87
End: 2022-11-30

## 2022-11-30 VITALS — HEIGHT: 64 IN | WEIGHT: 204 LBS | BODY MASS INDEX: 34.83 KG/M2 | TEMPERATURE: 95.9 F

## 2022-11-30 DIAGNOSIS — H61.23 IMPACTED CERUMEN, BILATERAL: ICD-10-CM

## 2022-11-30 DIAGNOSIS — H90.3 SENSORINEURAL HEARING LOSS, BILATERAL: ICD-10-CM

## 2022-11-30 DIAGNOSIS — H93.13 TINNITUS, BILATERAL: ICD-10-CM

## 2022-11-30 PROCEDURE — 69210 REMOVE IMPACTED EAR WAX UNI: CPT

## 2022-11-30 PROCEDURE — 99213 OFFICE O/P EST LOW 20 MIN: CPT | Mod: 25

## 2022-11-30 NOTE — ASSESSMENT
[FreeTextEntry1] : cerumen cleared au\par sn loss\par to have hearing aid reeval\par  20 minutes spent with patient with exam and counseling excluding time for any procedure.\par

## 2022-12-28 ENCOUNTER — APPOINTMENT (OUTPATIENT)
Dept: PHARMACY | Facility: CLINIC | Age: 87
End: 2022-12-28

## 2023-02-06 ENCOUNTER — INPATIENT (INPATIENT)
Facility: HOSPITAL | Age: 88
LOS: 14 days | Discharge: HOME | DRG: 674 | End: 2023-02-21
Attending: HOSPITALIST | Admitting: HOSPITALIST
Payer: MEDICARE

## 2023-02-06 VITALS
DIASTOLIC BLOOD PRESSURE: 56 MMHG | TEMPERATURE: 98 F | HEART RATE: 59 BPM | SYSTOLIC BLOOD PRESSURE: 118 MMHG | RESPIRATION RATE: 17 BRPM | OXYGEN SATURATION: 100 %

## 2023-02-06 DIAGNOSIS — N17.9 ACUTE KIDNEY FAILURE, UNSPECIFIED: ICD-10-CM

## 2023-02-06 DIAGNOSIS — Z90.710 ACQUIRED ABSENCE OF BOTH CERVIX AND UTERUS: Chronic | ICD-10-CM

## 2023-02-06 DIAGNOSIS — Z95.1 PRESENCE OF AORTOCORONARY BYPASS GRAFT: Chronic | ICD-10-CM

## 2023-02-06 DIAGNOSIS — Z90.49 ACQUIRED ABSENCE OF OTHER SPECIFIED PARTS OF DIGESTIVE TRACT: Chronic | ICD-10-CM

## 2023-02-06 LAB
ADD ON TEST-SPECIMEN IN LAB: SIGNIFICANT CHANGE UP
ALBUMIN SERPL ELPH-MCNC: 3.8 G/DL — SIGNIFICANT CHANGE UP (ref 3.3–5)
ALP SERPL-CCNC: 83 U/L — SIGNIFICANT CHANGE UP (ref 40–120)
ALT FLD-CCNC: 18 U/L — SIGNIFICANT CHANGE UP (ref 12–78)
ANION GAP SERPL CALC-SCNC: 9 MMOL/L — SIGNIFICANT CHANGE UP (ref 5–17)
APTT BLD: 21.7 SEC — LOW (ref 27.5–35.5)
AST SERPL-CCNC: 17 U/L — SIGNIFICANT CHANGE UP (ref 15–37)
BASOPHILS # BLD AUTO: 0.05 K/UL — SIGNIFICANT CHANGE UP (ref 0–0.2)
BASOPHILS NFR BLD AUTO: 0.6 % — SIGNIFICANT CHANGE UP (ref 0–2)
BILIRUB SERPL-MCNC: 0.7 MG/DL — SIGNIFICANT CHANGE UP (ref 0.2–1.2)
BUN SERPL-MCNC: 87 MG/DL — HIGH (ref 7–23)
C3 SERPL-MCNC: 95 MG/DL — SIGNIFICANT CHANGE UP (ref 81–157)
C4 SERPL-MCNC: 26 MG/DL — SIGNIFICANT CHANGE UP (ref 13–39)
CALCIUM SERPL-MCNC: 9.4 MG/DL — SIGNIFICANT CHANGE UP (ref 8.5–10.1)
CHLORIDE SERPL-SCNC: 107 MMOL/L — SIGNIFICANT CHANGE UP (ref 96–108)
CO2 SERPL-SCNC: 22 MMOL/L — SIGNIFICANT CHANGE UP (ref 22–31)
CREAT SERPL-MCNC: 6.44 MG/DL — HIGH (ref 0.5–1.3)
EGFR: 6 ML/MIN/1.73M2 — LOW
EOSINOPHIL # BLD AUTO: 0.8 K/UL — HIGH (ref 0–0.5)
EOSINOPHIL NFR BLD AUTO: 9.9 % — HIGH (ref 0–6)
FLUAV AG NPH QL: SIGNIFICANT CHANGE UP
FLUBV AG NPH QL: SIGNIFICANT CHANGE UP
GLUCOSE SERPL-MCNC: 124 MG/DL — HIGH (ref 70–99)
HAV IGM SER-ACNC: SIGNIFICANT CHANGE UP
HBV CORE IGM SER-ACNC: SIGNIFICANT CHANGE UP
HBV SURFACE AG SER-ACNC: SIGNIFICANT CHANGE UP
HCT VFR BLD CALC: 27.5 % — LOW (ref 34.5–45)
HCV AB S/CO SERPL IA: 0.04 S/CO — SIGNIFICANT CHANGE UP (ref 0–0.99)
HCV AB SERPL-IMP: SIGNIFICANT CHANGE UP
HGB BLD-MCNC: 9.3 G/DL — LOW (ref 11.5–15.5)
IMM GRANULOCYTES NFR BLD AUTO: 0.4 % — SIGNIFICANT CHANGE UP (ref 0–0.9)
INR BLD: 1.03 RATIO — SIGNIFICANT CHANGE UP (ref 0.88–1.16)
LYMPHOCYTES # BLD AUTO: 1.21 K/UL — SIGNIFICANT CHANGE UP (ref 1–3.3)
LYMPHOCYTES # BLD AUTO: 15 % — SIGNIFICANT CHANGE UP (ref 13–44)
MCHC RBC-ENTMCNC: 32.2 PG — SIGNIFICANT CHANGE UP (ref 27–34)
MCHC RBC-ENTMCNC: 33.8 GM/DL — SIGNIFICANT CHANGE UP (ref 32–36)
MCV RBC AUTO: 95.2 FL — SIGNIFICANT CHANGE UP (ref 80–100)
MONOCYTES # BLD AUTO: 0.57 K/UL — SIGNIFICANT CHANGE UP (ref 0–0.9)
MONOCYTES NFR BLD AUTO: 7.1 % — SIGNIFICANT CHANGE UP (ref 2–14)
NEUTROPHILS # BLD AUTO: 5.4 K/UL — SIGNIFICANT CHANGE UP (ref 1.8–7.4)
NEUTROPHILS NFR BLD AUTO: 67 % — SIGNIFICANT CHANGE UP (ref 43–77)
PLATELET # BLD AUTO: 153 K/UL — SIGNIFICANT CHANGE UP (ref 150–400)
POTASSIUM SERPL-MCNC: 4.1 MMOL/L — SIGNIFICANT CHANGE UP (ref 3.5–5.3)
POTASSIUM SERPL-SCNC: 4.1 MMOL/L — SIGNIFICANT CHANGE UP (ref 3.5–5.3)
PROT SERPL-MCNC: 6.8 GM/DL — SIGNIFICANT CHANGE UP (ref 6–8.3)
PROTHROM AB SERPL-ACNC: 11.9 SEC — SIGNIFICANT CHANGE UP (ref 10.5–13.4)
RBC # BLD: 2.89 M/UL — LOW (ref 3.8–5.2)
RBC # FLD: 12.8 % — SIGNIFICANT CHANGE UP (ref 10.3–14.5)
RSV RNA NPH QL NAA+NON-PROBE: SIGNIFICANT CHANGE UP
SARS-COV-2 RNA SPEC QL NAA+PROBE: SIGNIFICANT CHANGE UP
SODIUM SERPL-SCNC: 138 MMOL/L — SIGNIFICANT CHANGE UP (ref 135–145)
WBC # BLD: 8.06 K/UL — SIGNIFICANT CHANGE UP (ref 3.8–10.5)
WBC # FLD AUTO: 8.06 K/UL — SIGNIFICANT CHANGE UP (ref 3.8–10.5)

## 2023-02-06 PROCEDURE — 82668 ASSAY OF ERYTHROPOIETIN: CPT

## 2023-02-06 PROCEDURE — 71045 X-RAY EXAM CHEST 1 VIEW: CPT | Mod: 26

## 2023-02-06 PROCEDURE — 85027 COMPLETE CBC AUTOMATED: CPT

## 2023-02-06 PROCEDURE — 83970 ASSAY OF PARATHORMONE: CPT

## 2023-02-06 PROCEDURE — 93306 TTE W/DOPPLER COMPLETE: CPT

## 2023-02-06 PROCEDURE — 93975 VASCULAR STUDY: CPT

## 2023-02-06 PROCEDURE — 86900 BLOOD TYPING SEROLOGIC ABO: CPT

## 2023-02-06 PROCEDURE — 80048 BASIC METABOLIC PNL TOTAL CA: CPT

## 2023-02-06 PROCEDURE — 86334 IMMUNOFIX E-PHORESIS SERUM: CPT

## 2023-02-06 PROCEDURE — 83516 IMMUNOASSAY NONANTIBODY: CPT

## 2023-02-06 PROCEDURE — 76770 US EXAM ABDO BACK WALL COMP: CPT | Mod: 26,59

## 2023-02-06 PROCEDURE — 97163 PT EVAL HIGH COMPLEX 45 MIN: CPT | Mod: GP

## 2023-02-06 PROCEDURE — 85014 HEMATOCRIT: CPT

## 2023-02-06 PROCEDURE — 87635 SARS-COV-2 COVID-19 AMP PRB: CPT

## 2023-02-06 PROCEDURE — 82607 VITAMIN B-12: CPT

## 2023-02-06 PROCEDURE — 81001 URINALYSIS AUTO W/SCOPE: CPT

## 2023-02-06 PROCEDURE — 77001 FLUOROGUIDE FOR VEIN DEVICE: CPT

## 2023-02-06 PROCEDURE — C1894: CPT

## 2023-02-06 PROCEDURE — 97116 GAIT TRAINING THERAPY: CPT | Mod: GP

## 2023-02-06 PROCEDURE — 86850 RBC ANTIBODY SCREEN: CPT

## 2023-02-06 PROCEDURE — 99285 EMERGENCY DEPT VISIT HI MDM: CPT

## 2023-02-06 PROCEDURE — 93976 VASCULAR STUDY: CPT | Mod: 26

## 2023-02-06 PROCEDURE — 83540 ASSAY OF IRON: CPT

## 2023-02-06 PROCEDURE — C1750: CPT

## 2023-02-06 PROCEDURE — 86255 FLUORESCENT ANTIBODY SCREEN: CPT

## 2023-02-06 PROCEDURE — 85610 PROTHROMBIN TIME: CPT

## 2023-02-06 PROCEDURE — C1887: CPT

## 2023-02-06 PROCEDURE — C1769: CPT

## 2023-02-06 PROCEDURE — 83521 IG LIGHT CHAINS FREE EACH: CPT

## 2023-02-06 PROCEDURE — 99497 ADVNCD CARE PLAN 30 MIN: CPT | Mod: 25

## 2023-02-06 PROCEDURE — 93010 ELECTROCARDIOGRAM REPORT: CPT

## 2023-02-06 PROCEDURE — 84100 ASSAY OF PHOSPHORUS: CPT

## 2023-02-06 PROCEDURE — 84165 PROTEIN E-PHORESIS SERUM: CPT

## 2023-02-06 PROCEDURE — 80069 RENAL FUNCTION PANEL: CPT

## 2023-02-06 PROCEDURE — 84300 ASSAY OF URINE SODIUM: CPT

## 2023-02-06 PROCEDURE — 82570 ASSAY OF URINE CREATININE: CPT

## 2023-02-06 PROCEDURE — 99223 1ST HOSP IP/OBS HIGH 75: CPT

## 2023-02-06 PROCEDURE — 86901 BLOOD TYPING SEROLOGIC RH(D): CPT

## 2023-02-06 PROCEDURE — 76770 US EXAM ABDO BACK WALL COMP: CPT

## 2023-02-06 PROCEDURE — 84550 ASSAY OF BLOOD/URIC ACID: CPT

## 2023-02-06 PROCEDURE — 36558 INSERT TUNNELED CV CATH: CPT

## 2023-02-06 PROCEDURE — 82310 ASSAY OF CALCIUM: CPT

## 2023-02-06 PROCEDURE — 85730 THROMBOPLASTIN TIME PARTIAL: CPT

## 2023-02-06 PROCEDURE — 85045 AUTOMATED RETICULOCYTE COUNT: CPT

## 2023-02-06 PROCEDURE — 82728 ASSAY OF FERRITIN: CPT

## 2023-02-06 PROCEDURE — 97530 THERAPEUTIC ACTIVITIES: CPT | Mod: GP

## 2023-02-06 PROCEDURE — 86160 COMPLEMENT ANTIGEN: CPT

## 2023-02-06 PROCEDURE — 86706 HEP B SURFACE ANTIBODY: CPT

## 2023-02-06 PROCEDURE — 80053 COMPREHEN METABOLIC PANEL: CPT

## 2023-02-06 PROCEDURE — 84156 ASSAY OF PROTEIN URINE: CPT

## 2023-02-06 PROCEDURE — 90935 HEMODIALYSIS ONE EVALUATION: CPT

## 2023-02-06 PROCEDURE — 82746 ASSAY OF FOLIC ACID SERUM: CPT

## 2023-02-06 PROCEDURE — 84155 ASSAY OF PROTEIN SERUM: CPT

## 2023-02-06 PROCEDURE — 83735 ASSAY OF MAGNESIUM: CPT

## 2023-02-06 PROCEDURE — 85018 HEMOGLOBIN: CPT

## 2023-02-06 PROCEDURE — 86022 PLATELET ANTIBODIES: CPT

## 2023-02-06 PROCEDURE — 93970 EXTREMITY STUDY: CPT

## 2023-02-06 PROCEDURE — 36415 COLL VENOUS BLD VENIPUNCTURE: CPT

## 2023-02-06 PROCEDURE — 76937 US GUIDE VASCULAR ACCESS: CPT

## 2023-02-06 PROCEDURE — 86038 ANTINUCLEAR ANTIBODIES: CPT

## 2023-02-06 PROCEDURE — 83550 IRON BINDING TEST: CPT

## 2023-02-06 RX ORDER — FUROSEMIDE 40 MG
1 TABLET ORAL
Qty: 0 | Refills: 0 | DISCHARGE

## 2023-02-06 RX ORDER — ASPIRIN/CALCIUM CARB/MAGNESIUM 324 MG
81 TABLET ORAL DAILY
Refills: 0 | Status: DISCONTINUED | OUTPATIENT
Start: 2023-02-06 | End: 2023-02-06

## 2023-02-06 RX ORDER — FAMOTIDINE 10 MG/ML
10 INJECTION INTRAVENOUS DAILY
Refills: 0 | Status: DISCONTINUED | OUTPATIENT
Start: 2023-02-06 | End: 2023-02-21

## 2023-02-06 RX ORDER — AMIODARONE HYDROCHLORIDE 400 MG/1
200 TABLET ORAL DAILY
Refills: 0 | Status: DISCONTINUED | OUTPATIENT
Start: 2023-02-06 | End: 2023-02-21

## 2023-02-06 RX ORDER — METOPROLOL TARTRATE 50 MG
25 TABLET ORAL DAILY
Refills: 0 | Status: DISCONTINUED | OUTPATIENT
Start: 2023-02-06 | End: 2023-02-21

## 2023-02-06 RX ORDER — PREGABALIN 225 MG/1
2000 CAPSULE ORAL DAILY
Refills: 0 | Status: DISCONTINUED | OUTPATIENT
Start: 2023-02-06 | End: 2023-02-21

## 2023-02-06 RX ORDER — ALLOPURINOL 300 MG
1 TABLET ORAL
Qty: 0 | Refills: 0 | DISCHARGE

## 2023-02-06 RX ORDER — LANOLIN ALCOHOL/MO/W.PET/CERES
3 CREAM (GRAM) TOPICAL AT BEDTIME
Refills: 0 | Status: DISCONTINUED | OUTPATIENT
Start: 2023-02-06 | End: 2023-02-21

## 2023-02-06 RX ORDER — ATORVASTATIN CALCIUM 80 MG/1
20 TABLET, FILM COATED ORAL AT BEDTIME
Refills: 0 | Status: DISCONTINUED | OUTPATIENT
Start: 2023-02-06 | End: 2023-02-21

## 2023-02-06 RX ORDER — ONDANSETRON 8 MG/1
4 TABLET, FILM COATED ORAL EVERY 8 HOURS
Refills: 0 | Status: DISCONTINUED | OUTPATIENT
Start: 2023-02-06 | End: 2023-02-21

## 2023-02-06 RX ORDER — CLOPIDOGREL BISULFATE 75 MG/1
75 TABLET, FILM COATED ORAL DAILY
Refills: 0 | Status: DISCONTINUED | OUTPATIENT
Start: 2023-02-06 | End: 2023-02-06

## 2023-02-06 RX ORDER — LEVOTHYROXINE SODIUM 125 MCG
88 TABLET ORAL DAILY
Refills: 0 | Status: DISCONTINUED | OUTPATIENT
Start: 2023-02-06 | End: 2023-02-21

## 2023-02-06 RX ORDER — ACETAMINOPHEN 500 MG
650 TABLET ORAL EVERY 6 HOURS
Refills: 0 | Status: DISCONTINUED | OUTPATIENT
Start: 2023-02-06 | End: 2023-02-21

## 2023-02-06 RX ORDER — HEPARIN SODIUM 5000 [USP'U]/ML
5000 INJECTION INTRAVENOUS; SUBCUTANEOUS EVERY 12 HOURS
Refills: 0 | Status: DISCONTINUED | OUTPATIENT
Start: 2023-02-06 | End: 2023-02-20

## 2023-02-06 RX ADMIN — ATORVASTATIN CALCIUM 20 MILLIGRAM(S): 80 TABLET, FILM COATED ORAL at 23:04

## 2023-02-06 NOTE — H&P ADULT - ASSESSMENT
MEDICATIONS  (STANDING):  aMIOdarone    Tablet 200 milliGRAM(s) Oral daily  atorvastatin 20 milliGRAM(s) Oral at bedtime  cyanocobalamin 2000 MICROGram(s) Oral daily  famotidine    Tablet 10 milliGRAM(s) Oral daily  heparin   Injectable 5000 Unit(s) SubCutaneous every 12 hours  levothyroxine 88 MICROGram(s) Oral daily  metoprolol succinate ER 25 milliGRAM(s) Oral daily  Nephro-shawna 1 Tablet(s) Oral daily    MEDICATIONS  (PRN):  acetaminophen     Tablet .. 650 milliGRAM(s) Oral every 6 hours PRN Mild Pain (1 - 3)  aluminum hydroxide/magnesium hydroxide/simethicone Suspension 30 milliLiter(s) Oral every 4 hours PRN Dyspepsia  melatonin 3 milliGRAM(s) Oral at bedtime PRN Insomnia  ondansetron Injectable 4 milliGRAM(s) IV Push every 8 hours PRN Nausea and/or Vomiting      ASSESSMENT/PLAN    RADHA on CKD  -Uncleared etiology. Recent CABG.   -Personaal history of transient urinary retention 7/2022  -US ordered  -Nephrology consulted  -I/Os  -Avoid nephrotoxic agents and/or adjust dose accordingly  -IVF as needed for fluid balance  -Continue to monitor Cr/Electrolytes  -Serologies and other Analysis ordered by nephrology for further evaluation. Continue to follow.     CAD s/p CABG (10/2022) without angina  Paroxysmal Atrial Fibrillation (on Amiodarone; not AC)  HTN  HLD  -ASA/Plavix on hold since 2/3 in antcipation of possiblity requiring HD catheter  -Continue BB/Statin  -Resume ASA/plavix when possible  -TTE ordered  -Continue Amiodarone  -[id number]=4. Not on AC since 7/2022.   -Norvasc started 2 weeks prior to admission with reported subsequent increased LE edema. Discontinue Norvasc  -PRN Rx for elevated BP.     Normocytic Anemia  -Likely component of CKD  -Stable and at baseline  -Ferritin/Iron/b12/folate levels ordered  -No overt signs of bleeding.     DVT Prophylaxis: heparin subq    Goals of Care (GOC)/Advance Care Planning (ACP)  Date of Discussion/evaluation: 2/6/2023  Purpose of Discussion: In the setting of advanced age and multiple comorbidities, discussion of patient's wishes should there be any deterioration in health status.   Parties Present/Discussed with: Patient and myself. Daughter/HCP (Zaida Sandhu) present at bedside during conversation  Patient's Decision making capacity at the time of discussion: AAOx4 and has full medical decision making capacity  Presentation: As above  GOC: Code Status, HCP, Alternative Feeding Methods, Blood product Transfusions    PLAN:  Code Status: Full code  HCP: Primary is daughter, Zaida Sandhu ([phone]) and secondary is son, Isidro Nova ([phone])  Alternative Feeding methods: Would like to discuss or assess should the situation arise that it requires alternative feeding methods  Blood Product Transfusions: YES agreeable  Pressors: YES agreeable    ACP/GOC Time Spent: 17 minutes

## 2023-02-06 NOTE — PATIENT PROFILE ADULT - FALL HARM RISK - HARM RISK INTERVENTIONS

## 2023-02-06 NOTE — ED PROVIDER NOTE - OBJECTIVE STATEMENT
87F hx CKD referred to the ED by her nephrologist for admission -- creatinine reportedly '5'.  Patient fatigued but not other complaints.

## 2023-02-06 NOTE — PATIENT PROFILE ADULT - FUNCTIONAL ASSESSMENT - DAILY ACTIVITY 4.
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  Pt. says he's been having financial troubles.  The bank already took his cars for his business.  Pt. attempted to get loans from the bank today, but was refused due to his credit.  Pt. doesn't want to live anymore.  He questions what kind of country this is that we live in, when he's been a good person for 20 years, paid his taxes, and he can't get help when his business fails.  Pt. ran out of his haart meds yesterday also.  NO other complaints.   ROS: negative for fever, cough, headache, chest pain, shortness of breath, abd pain, nausea, vomiting, diarrhea, rash, paresthesia, and weakness--all other systems reviewed are negative.   PMH: HIV+; Meds: HAART; SH: Denies smoking/drinking/drug use
2 = A lot of assistance

## 2023-02-06 NOTE — ED ADULT TRIAGE NOTE - CHIEF COMPLAINT QUOTE
Pt arrives to ED sent in by MD Castaneda for elevated Creatinine (5.) Sent in for admission. denies complaints at this time.

## 2023-02-06 NOTE — PHARMACOTHERAPY INTERVENTION NOTE - COMMENTS
Med history complete, reviewed medications and allergies with patient and confirmed medication list with doctor first med profile, all medication related questions answered   Med history complete, reviewed medications and allergies with patient and patients daughter and confirmed medication list with doctor first med profile, all medication related questions answered

## 2023-02-06 NOTE — CONSULT NOTE ADULT - SUBJECTIVE AND OBJECTIVE BOX
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 Today pt is feeling well, noticed her BP has been running higher and placed on Norvasc per her cardiologist   Pt states she is urinating alot  Denies NSAID         PAST MEDICAL & SURGICAL HISTORY:  HTN (hypertension)      HLD (hyperlipidemia)      History of valvular heart disease      CAD (coronary artery disease)      Stage 3 chronic kidney disease      Home Medications:  acetaminophen 500 mg oral tablet: 1 tab(s) orally once a day, As Needed (06 Feb 2023 11:00)  amiodarone 200 mg oral tablet: 1 tab(s) orally once a day (06 Feb 2023 11:00)  amLODIPine 10 mg oral tablet: 1 tab(s) orally once a day (06 Feb 2023 11:00)  aspirin 81 mg oral tablet: 1 tab(s) orally once a day (06 Feb 2023 11:00)  atorvastatin 20 mg oral tablet: 1 tab(s) orally once a day (in the evening) (06 Feb 2023 10:22)  clopidogrel 75 mg oral tablet: 1 tab(s) orally once a day (06 Feb 2023 11:00)  cyanocobalamin 1000 mcg oral tablet: 2 tab(s) orally once a day (06 Feb 2023 10:22)  famotidine 20 mg oral tablet: 0.5 tab(s) orally once a day (06 Feb 2023 11:00)  levothyroxine 88 mcg (0.088 mg) oral tablet: 1 tab(s) orally once a day (06 Feb 2023 10:22)  metoprolol succinate 25 mg oral tablet, extended release: 1 tab(s) orally once a day (06 Feb 2023 10:22)  Nephro-Zoë oral tablet: 1 tab(s) orally once a day (06 Feb 2023 10:22)      MEDICATIONS  (STANDING):  aMIOdarone    Tablet 200 milliGRAM(s) Oral daily  aspirin enteric coated 81 milliGRAM(s) Oral daily  atorvastatin 20 milliGRAM(s) Oral at bedtime  clopidogrel Tablet 75 milliGRAM(s) Oral daily  cyanocobalamin 2000 MICROGram(s) Oral daily  famotidine    Tablet 10 milliGRAM(s) Oral daily  levothyroxine 88 MICROGram(s) Oral daily  metoprolol succinate ER 25 milliGRAM(s) Oral daily  Nephro-zoë 1 Tablet(s) Oral daily      Allergies    penicillin (Unknown)    Intolerances        SOCIAL HISTORY:  Denies ETOh,Smoking,     FAMILY HISTORY:      REVIEW OF SYSTEMS:    CONSTITUTIONAL: No weakness, fevers or chills  EYES/ENT: No visual changes;  No vertigo or throat pain   NECK: No pain or stiffness  RESPIRATORY: No cough, wheezing, hemoptysis; No shortness of breath  CARDIOVASCULAR: No chest pain or palpitations  GASTROINTESTINAL: No abdominal or epigastric pain. No nausea, vomiting, or hematemesis; No diarrhea or constipation. No melena or hematochezia.  GENITOURINARY: No dysuria, frequency or hematuria  NEUROLOGICAL: No numbness or weakness  SKIN: No itching, burning, rashes, or lesions   All other review of systems is negative unless indicated above.    VITAL:  T(C): , Max: 36.6 (02-06-23 @ 08:49)  T(F): , Max: 97.8 (02-06-23 @ 08:49)  HR: 63 (02-06-23 @ 10:52)  BP: 128/56 (02-06-23 @ 10:52)  BP(mean): 78 (02-06-23 @ 10:52)  RR: 18 (02-06-23 @ 10:52)  SpO2: 100% (02-06-23 @ 10:52)  Wt(kg): --    I and O's:        PHYSICAL EXAM:    Constitutional: NAD  HEENT:  EOMI,  MM  Neck: No LAD, No JVD  Respiratory: CTAB  Cardiovascular: S1 and S2 + M  Gastrointestinal: BS+, soft, NT/ND  Extremities: peripheral edema + , dark, red abd blue mottling in toes bilaterally   Neurological: A/O x 3, no focal deficits  : No Bose  Skin: No rashes  Access: Not applicable    LABS:                        9.3    8.06  )-----------( 153      ( 06 Feb 2023 09:49 )             27.5     02-06    138  |  107  |  87<H>  ----------------------------<  124<H>  4.1   |  22  |  6.44<H>    Ca    9.4      06 Feb 2023 09:49    TPro  6.8  /  Alb  3.8  /  TBili  0.7  /  DBili  x   /  AST  17  /  ALT  18  /  AlkPhos  83  02-06      Urine Studies:          RADIOLOGY & ADDITIONAL STUDIES:                       88 yo pleasant female with hx ofof HTN, CAD, CABG in Oct 2022 w 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 Pt was sent to ED by me  for further workup and mgt.    Today pt is feeling well, noticed her BP has been running higher and placed on Norvasc per her cardiologist but legs also getting swollen, no sob   pt statues her toes were hurting and seeing podiatrist for this    Pt states she is drinking and urinating alot   Denies NSAID use        PAST MEDICAL & SURGICAL HISTORY:  HTN (hypertension)      HLD (hyperlipidemia)      History of valvular heart disease      CAD (coronary artery disease)      Stage 3 chronic kidney disease      Home Medications:  acetaminophen 500 mg oral tablet: 1 tab(s) orally once a day, As Needed (06 Feb 2023 11:00)  amiodarone 200 mg oral tablet: 1 tab(s) orally once a day (06 Feb 2023 11:00)  amLODIPine 10 mg oral tablet: 1 tab(s) orally once a day (06 Feb 2023 11:00)  aspirin 81 mg oral tablet: 1 tab(s) orally once a day (06 Feb 2023 11:00)  atorvastatin 20 mg oral tablet: 1 tab(s) orally once a day (in the evening) (06 Feb 2023 10:22)  clopidogrel 75 mg oral tablet: 1 tab(s) orally once a day (06 Feb 2023 11:00)  cyanocobalamin 1000 mcg oral tablet: 2 tab(s) orally once a day (06 Feb 2023 10:22)  famotidine 20 mg oral tablet: 0.5 tab(s) orally once a day (06 Feb 2023 11:00)  levothyroxine 88 mcg (0.088 mg) oral tablet: 1 tab(s) orally once a day (06 Feb 2023 10:22)  metoprolol succinate 25 mg oral tablet, extended release: 1 tab(s) orally once a day (06 Feb 2023 10:22)  Nephro-Zoë oral tablet: 1 tab(s) orally once a day (06 Feb 2023 10:22)      MEDICATIONS  (STANDING):  aMIOdarone    Tablet 200 milliGRAM(s) Oral daily  aspirin enteric coated 81 milliGRAM(s) Oral daily  atorvastatin 20 milliGRAM(s) Oral at bedtime  clopidogrel Tablet 75 milliGRAM(s) Oral daily  cyanocobalamin 2000 MICROGram(s) Oral daily  famotidine    Tablet 10 milliGRAM(s) Oral daily  levothyroxine 88 MICROGram(s) Oral daily  metoprolol succinate ER 25 milliGRAM(s) Oral daily  Nephro-zoë 1 Tablet(s) Oral daily      Allergies    penicillin (Unknown)    Intolerances        SOCIAL HISTORY:  Denies ETOh,Smoking,     FAMILY HISTORY:   not contributory       REVIEW OF SYSTEMS:    CONSTITUTIONAL: No weakness, fevers or chills  EYES/ENT: No visual changes;  No vertigo or throat pain   NECK: No pain or stiffness  RESPIRATORY: No cough, wheezing, hemoptysis; No shortness of breath  CARDIOVASCULAR: No chest pain or palpitations  GASTROINTESTINAL: No abdominal or epigastric pain. No nausea, vomiting, or hematemesis; No diarrhea or constipation. No melena or hematochezia.  GENITOURINARY: No dysuria, frequency or hematuria  NEUROLOGICAL: No numbness or weakness  SKIN: No itching, burning, rashes, or lesions   All other review of systems is negative unless indicated above.    VITAL:  T(C): , Max: 36.6 (02-06-23 @ 08:49)  T(F): , Max: 97.8 (02-06-23 @ 08:49)  HR: 63 (02-06-23 @ 10:52)  BP: 128/56 (02-06-23 @ 10:52)  BP(mean): 78 (02-06-23 @ 10:52)  RR: 18 (02-06-23 @ 10:52)  SpO2: 100% (02-06-23 @ 10:52)  Wt(kg): --    I and O's:        PHYSICAL EXAM:    Constitutional: NAD  HEENT:  EOMI,  MM  Neck: No LAD, No JVD  Respiratory: CTAB  Cardiovascular: S1 and S2 + M  Gastrointestinal: BS+, soft, NT/ND  Extremities: peripheral edema + , dark, red abd blue mottling in toes bilaterally - livedo ??   Neurological: A/O x 3, no focal deficits  : No Bose  Skin: No rashes  Access: Not applicable    LABS:                        9.3    8.06  )-----------( 153      ( 06 Feb 2023 09:49 )             27.5     02-06    138  |  107  |  87<H>  ----------------------------<  124<H>  4.1   |  22  |  6.44<H>    Ca    9.4      06 Feb 2023 09:49    TPro  6.8  /  Alb  3.8  /  TBili  0.7  /  DBili  x   /  AST  17  /  ALT  18  /  AlkPhos  83  02-06      Urine Studies:          RADIOLOGY & ADDITIONAL STUDIES:

## 2023-02-06 NOTE — H&P ADULT - NSHPSOCIALHISTORY_GEN_ALL_CORE
Denies tobacco/smoking, EtOH or illicit drug use.   Lives at home. Ambulates with walker. Normally alone but since CABG in October 2022, patient has had 24/7 supervision in her house with her children

## 2023-02-06 NOTE — ED PROVIDER NOTE - NSICDXPASTMEDICALHX_GEN_ALL_CORE_FT
PAST MEDICAL HISTORY:  CAD (coronary artery disease)     History of valvular heart disease     HLD (hyperlipidemia)     HTN (hypertension)     Stage 3 chronic kidney disease

## 2023-02-06 NOTE — H&P ADULT - NSHPPHYSICALEXAM_GEN_ALL_CORE
PHYSICAL EXAM:    T(C): 36.4 (02-06-23 @ 10:52), Max: 36.6 (02-06-23 @ 08:49)  HR: 63 (02-06-23 @ 10:52) (59 - 63)  BP: 128/56 (02-06-23 @ 10:52) (118/56 - 128/56)  RR: 18 (02-06-23 @ 10:52) (17 - 18)  SpO2: 100% (02-06-23 @ 10:52) (100% - 100%)    General: AAOx3; NAD  Head: AT/NC  ENT: Moist Mucous Membranes; No Injury  Eyes: EOMI; PERRL  Neck: Non-tender; No JVD  CVS: RRR, S1&S2, murmur +; LE edema +  Respiratory: Lungs CTA B/L; Normal Respiratory Effort and saturation on RA  Abdomen/GI: Soft, non-tender, non-distended, no guarding, no rebound, normal bowel sounds  : No bladder distention, No Bose  Extremities: No cyanosis, No clubbing, LE 3+ pitting edema.   MSK: No CVA tenderness, Normal ROM, No injury  Neuro: AAOx3, CNII-XII grossly intact, non-focal  Psych: Appropriate, Cooperative,   Skin: Clean, Dry and Intact

## 2023-02-06 NOTE — PATIENT PROFILE ADULT - FALL HARM RISK - PATIENT NEEDS ASSISTANCE
MA called patient to reschedule his appt in [place]. He is unable to reached left him  to please give us a callback.    Standing/Walking/Toileting

## 2023-02-06 NOTE — H&P ADULT - NSHPLABSRESULTS_GEN_ALL_CORE
9.3    8.06  )-----------( 153      ( 06 Feb 2023 09:49 )             27.5     02-06    138  |  107  |  87<H>  ----------------------------<  124<H>  4.1   |  22  |  6.44<H>    Ca    9.4      06 Feb 2023 09:49    TPro  6.8  /  Alb  3.8  /  TBili  0.7  /  DBili  x   /  AST  17  /  ALT  18  /  AlkPhos  83  02-06      CAPILLARY BLOOD GLUCOSE    Activated Partial Thromboplastin Time in AM (02.06.23 @ 09:49)   Activated Partial Thromboplastin Time: 21.7: SProthrombin Time and INR, Plasma in AM (02.06.23 @ 09:49)   Prothrombin Time, Plasma: 11.9 sec   INR: 1.03: RFlu With COVID-19 By JUANI (02.06.23 @ 09:09)   SARS-CoV-2 Result: [facility]: This Respiratory Panel uses polymerase chain reaction (PCR) to detect for   influenza A; influenza B; respiratory syncytial virus; and SARS-CoV-2.   This test was validated by [facility] and is in use under the FDA   Emergency Use Authorization (EUA) for clinical labs CLIA-certified to   perform high complexity testing. Test results should be correlated with   clinical presentation, patient history, and epidemiology.   Influenza A Result: [facility]   Influenza B Result: [facility]   Resp Syn Virus Result: [facility]     RADIOLOGY:        I personally reviewed labs, imaging, ekg, orders and vitals. 9.3    8.06  )-----------( 153      ( 06 Feb 2023 09:49 )             27.5     02-06    138  |  107  |  87<H>  ----------------------------<  124<H>  4.1   |  22  |  6.44<H>    Ca    9.4      06 Feb 2023 09:49    TPro  6.8  /  Alb  3.8  /  TBili  0.7  /  DBili  x   /  AST  17  /  ALT  18  /  AlkPhos  83  02-06      CAPILLARY BLOOD GLUCOSE    Activated Partial Thromboplastin Time in AM (02.06.23 @ 09:49)   Activated Partial Thromboplastin Time: 21.7: SProthrombin Time and INR, Plasma in AM (02.06.23 @ 09:49)   Prothrombin Time, Plasma: 11.9 sec   INR: 1.03: RFlu With COVID-19 By JUANI (02.06.23 @ 09:09)   SARS-CoV-2 Result: [facility]: This Respiratory Panel uses polymerase chain reaction (PCR) to detect for   influenza A; influenza B; respiratory syncytial virus; and SARS-CoV-2.   This test was validated by [facility] and is in use under the FDA   Emergency Use Authorization (EUA) for clinical labs CLIA-certified to   perform high complexity testing. Test results should be correlated with   clinical presentation, patient history, and epidemiology.   Influenza A Result: [facility]   Influenza B Result: [facility]   Resp Syn Virus Result: [facility]     RADIOLOGY:  < from: Xray Chest 1 View- PORTABLE-Routine (Xray Chest 1 View- PORTABLE-Routine .) (02.06.23 @ 10:36) >    IMPRESSION:   No radiographic evidence of active chest disease.    < end of copied text >    < from: US Kidney and Bladder (02.06.23 @ 14:59) >    IMPRESSION:    Markedly atrophic left kidney which was not visualized.    No right hydronephrosis. Increased renal echogenicity and increased   resistiveindices consistent with medical renal disease.    Limited visualization of the right renal artery. No renal artery stenosis   is identified. If clinically indicated further evaluation may be   performed with MR angiogram.    < end of copied text >    < from: 12 Lead ECG (02.06.23 @ 10:02) >    Diagnosis Line *** Poor data quality, interpretation may be adversely affected  Sinus rhythm  Left axis deviation  Left ventricular hypertrophy with QRS widening  T wave abnormality, consider inferior ischemia  Abnormal ECG  When compared with ECG of 03-JUL-2022 07:33,  No significant change was found    < end of copied text >          I personally reviewed labs, imaging, ekg, orders and vitals.

## 2023-02-06 NOTE — CONSULT NOTE ADULT - ASSESSMENT
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 doubt thromboembolism    negative renal imaging outpatient ( no hydro or retention )    check c3, c4 , lipase, amylase    urine studies , protein    gentle IVF   check renal doppler, renal USS for repeat imaging   check echo - r/o cardiac emboli source   if Cr continues to worsen - likely need HD during this admission - pt and daughter at bedside and agreeable - no acute indication for today    in setting of solitary functioning kidney if HD started then could consider formal renal bx for dx    right now high risk for renal bx and would not alter management   hold ASA /plavix  as will likely need permath planned  - trend labs      d/w Dr baxter and Dr Bruner     Thank you for the courtesy of this consult. We will follow this patient with you.   Management is subject to change if new information becomes available or patient condition changes.         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 doubt thromboembolism    negative renal imaging outpatient ( no hydro or retention )    check c3, c4 , lipase, amylase    urine studies , protein    gentle IVF   check renal doppler, renal USS for repeat imaging   check echo - r/o cardiac emboli source   if Cr continues to worsen - likely need HD during this admission - pt and daughter at bedside and agreeable - no acute indication for today   hold ASA /plavix for now in case HD catheter is planned       d/w Dr baxter and Dr Bruner     Thank you for the courtesy of this consult. We will follow this patient with you.   Management is subject to change if new information becomes available or patient condition changes.         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 doubt thromboembolism    negative renal imaging outpatient ( no hydro or retention )    check c3, c4 , lipase, amylase    urine studies , protein    gentle IVF   check renal doppler, renal USS for repeat imaging   check echo - r/o cardiac emboli source   if Cr continues to worsen - likely need HD during this admission - pt and daughter at bedside and agreeable - no acute indication for today   hold ASA /plavix for now in case HD catheter is planned    check daily labs     d/w Dr baxter and admitting hospitalitst Dr Bruner regarding above     Thank you for the courtesy of this consult. We will follow this patient with you.   Management is subject to change if new information becomes available or patient condition changes.         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 doubt thromboembolism    negative renal imaging outpatient ( no hydro or retention )    check c3, c4 , lipase, amylase    check serologies for completeness    urine studies , protein    gentle IVF   check renal doppler, renal USS for repeat imaging   check echo - r/o cardiac emboli source   if Cr continues to worsen - likely need HD during this admission - pt and daughter at bedside and agreeable - no acute indication for today   hold ASA /plavix for now in case HD catheter is planned    check daily labs     d/w Dr baxter and admitting hospitalitst Dr Bruner regarding above     Thank you for the courtesy of this consult. We will follow this patient with you.   Management is subject to change if new information becomes available or patient condition changes.

## 2023-02-06 NOTE — H&P ADULT - HISTORY OF PRESENT ILLNESS
87F with PMH of CKDIIIB, Atrophic Left Kidney, HTN, HLD, Valvular HD, CAD s/p CABG (10/2022), Pulm HTN, Hypothyroidism presented to the ER after being sent by her nephrologist for further evaluation and worsening Cr. Cr in mid 2s back in 7/2022-10/2022. Underwent CABG October 2022. Since then no new urinary complaints. Reports baseline frequent urination that remains unchanged. Denies fever, chills, nausea, vomiting, abdominal pain/discomfort, sensation of incomplete void, dysuria. Intentional weight loss of 40-50 pounds in the last year attributed to diet changes. Reporting increased LE edema since starting norvasc for improved BP control two weeks ago.     In the ER Tmax 97.8, HR 59, /56, RR 17, SpO2 100% on RA. Hgb 9.32 (baseline ~9), MCV 95.2, BUN 87, Cr 6.44, RVP unremarkable.      87F with PMH of CKDIIIB, Atrophic Left Kidney, HTN, HLD, Valvular HD, CAD s/p CABG (10/2022), Pulm HTN, Hypothyroidism presented to the ER after being sent by her nephrologist for further evaluation and worsening Cr. Cr in mid 2s back in 7/2022-10/2022. Underwent CABG October 2022. Since then no new urinary complaints. Reports baseline frequent urination that remains unchanged. Denies fever, chills, nausea, vomiting, abdominal pain/discomfort, sensation of incomplete void, dysuria. Intentional weight loss of 40-50 pounds in the last year attributed to diet changes. Reporting increased LE edema since starting norvasc for improved BP control two weeks ago.     In the ER Tmax 97.8, HR 59, /56, RR 17, SpO2 100% on RA. Hgb 9.32 (baseline ~9), MCV 95.2, BUN 87, Cr 6.44, RVP unremarkable.

## 2023-02-06 NOTE — ED PROVIDER NOTE - CLINICAL SUMMARY MEDICAL DECISION MAKING FREE TEXT BOX
Worsening renal function in setting of solitary kidney.  Will admit for further eval, hydration, imaging.    D/W Tonya

## 2023-02-06 NOTE — H&P ADULT - NSICDXPASTSURGICALHX_GEN_ALL_CORE_FT
PAST SURGICAL HISTORY:  H/O: hysterectomy     History of appendectomy     S/P CABG (coronary artery bypass graft)

## 2023-02-07 LAB
ANION GAP SERPL CALC-SCNC: 10 MMOL/L — SIGNIFICANT CHANGE UP (ref 5–17)
APPEARANCE UR: ABNORMAL
BACTERIA # UR AUTO: ABNORMAL
BILIRUB UR-MCNC: NEGATIVE — SIGNIFICANT CHANGE UP
BUN SERPL-MCNC: 95 MG/DL — HIGH (ref 7–23)
CALCIUM SERPL-MCNC: 8.8 MG/DL — SIGNIFICANT CHANGE UP (ref 8.5–10.1)
CHLORIDE SERPL-SCNC: 113 MMOL/L — HIGH (ref 96–108)
CO2 SERPL-SCNC: 20 MMOL/L — LOW (ref 22–31)
COLOR SPEC: YELLOW — SIGNIFICANT CHANGE UP
CREAT ?TM UR-MCNC: 72 MG/DL — SIGNIFICANT CHANGE UP
CREAT SERPL-MCNC: 6.86 MG/DL — HIGH (ref 0.5–1.3)
DIFF PNL FLD: ABNORMAL
EGFR: 5 ML/MIN/1.73M2 — LOW
EPI CELLS # UR: SIGNIFICANT CHANGE UP
FERRITIN SERPL-MCNC: 608 NG/ML — HIGH (ref 15–150)
FOLATE SERPL-MCNC: 12.2 NG/ML — SIGNIFICANT CHANGE UP
GLUCOSE SERPL-MCNC: 98 MG/DL — SIGNIFICANT CHANGE UP (ref 70–99)
GLUCOSE UR QL: NEGATIVE — SIGNIFICANT CHANGE UP
HCT VFR BLD CALC: 22 % — LOW (ref 34.5–45)
HCT VFR BLD CALC: 23.6 % — LOW (ref 34.5–45)
HGB BLD-MCNC: 7.5 G/DL — LOW (ref 11.5–15.5)
HGB BLD-MCNC: 7.9 G/DL — LOW (ref 11.5–15.5)
IRON SATN MFR SERPL: 21 % — SIGNIFICANT CHANGE UP (ref 14–50)
IRON SATN MFR SERPL: 46 UG/DL — SIGNIFICANT CHANGE UP (ref 30–160)
KETONES UR-MCNC: NEGATIVE — SIGNIFICANT CHANGE UP
LEUKOCYTE ESTERASE UR-ACNC: ABNORMAL
MCHC RBC-ENTMCNC: 32.2 PG — SIGNIFICANT CHANGE UP (ref 27–34)
MCHC RBC-ENTMCNC: 33.5 GM/DL — SIGNIFICANT CHANGE UP (ref 32–36)
MCV RBC AUTO: 96.3 FL — SIGNIFICANT CHANGE UP (ref 80–100)
NITRITE UR-MCNC: NEGATIVE — SIGNIFICANT CHANGE UP
PH UR: 5 — SIGNIFICANT CHANGE UP (ref 5–8)
PLATELET # BLD AUTO: 134 K/UL — LOW (ref 150–400)
POTASSIUM SERPL-MCNC: 4.3 MMOL/L — SIGNIFICANT CHANGE UP (ref 3.5–5.3)
POTASSIUM SERPL-SCNC: 4.3 MMOL/L — SIGNIFICANT CHANGE UP (ref 3.5–5.3)
PROT UR-MCNC: 30 MG/DL
RBC # BLD: 2.27 M/UL — LOW (ref 3.8–5.2)
RBC # BLD: 2.45 M/UL — LOW (ref 3.8–5.2)
RBC # FLD: 13 % — SIGNIFICANT CHANGE UP (ref 10.3–14.5)
RBC CASTS # UR COMP ASSIST: SIGNIFICANT CHANGE UP /HPF (ref 0–4)
RETICS #: 37.7 K/UL — SIGNIFICANT CHANGE UP (ref 25–125)
RETICS/RBC NFR: 1.7 % — SIGNIFICANT CHANGE UP (ref 0.5–2.5)
SODIUM SERPL-SCNC: 143 MMOL/L — SIGNIFICANT CHANGE UP (ref 135–145)
SODIUM UR-SCNC: 41 MMOL/L — SIGNIFICANT CHANGE UP
SP GR SPEC: 1.01 — SIGNIFICANT CHANGE UP (ref 1.01–1.02)
TIBC SERPL-MCNC: 217 UG/DL — LOW (ref 220–430)
UIBC SERPL-MCNC: 171 UG/DL — SIGNIFICANT CHANGE UP (ref 110–370)
UROBILINOGEN FLD QL: NEGATIVE — SIGNIFICANT CHANGE UP
VIT B12 SERPL-MCNC: >2000 PG/ML — HIGH (ref 232–1245)
WBC # BLD: 7.44 K/UL — SIGNIFICANT CHANGE UP (ref 3.8–10.5)
WBC # FLD AUTO: 7.44 K/UL — SIGNIFICANT CHANGE UP (ref 3.8–10.5)
WBC UR QL: ABNORMAL /HPF (ref 0–5)

## 2023-02-07 PROCEDURE — 99232 SBSQ HOSP IP/OBS MODERATE 35: CPT

## 2023-02-07 PROCEDURE — 93306 TTE W/DOPPLER COMPLETE: CPT | Mod: 26

## 2023-02-07 RX ORDER — SODIUM CHLORIDE 9 MG/ML
1000 INJECTION, SOLUTION INTRAVENOUS
Refills: 0 | Status: DISCONTINUED | OUTPATIENT
Start: 2023-02-07 | End: 2023-02-08

## 2023-02-07 RX ADMIN — HEPARIN SODIUM 5000 UNIT(S): 5000 INJECTION INTRAVENOUS; SUBCUTANEOUS at 05:49

## 2023-02-07 RX ADMIN — Medication 25 MILLIGRAM(S): at 12:06

## 2023-02-07 RX ADMIN — PREGABALIN 2000 MICROGRAM(S): 225 CAPSULE ORAL at 12:05

## 2023-02-07 RX ADMIN — FAMOTIDINE 10 MILLIGRAM(S): 10 INJECTION INTRAVENOUS at 12:03

## 2023-02-07 RX ADMIN — HEPARIN SODIUM 5000 UNIT(S): 5000 INJECTION INTRAVENOUS; SUBCUTANEOUS at 16:51

## 2023-02-07 RX ADMIN — ATORVASTATIN CALCIUM 20 MILLIGRAM(S): 80 TABLET, FILM COATED ORAL at 21:51

## 2023-02-07 RX ADMIN — SODIUM CHLORIDE 50 MILLILITER(S): 9 INJECTION, SOLUTION INTRAVENOUS at 16:50

## 2023-02-07 RX ADMIN — Medication 1 TABLET(S): at 12:07

## 2023-02-07 RX ADMIN — Medication 88 MICROGRAM(S): at 05:49

## 2023-02-07 RX ADMIN — AMIODARONE HYDROCHLORIDE 200 MILLIGRAM(S): 400 TABLET ORAL at 12:06

## 2023-02-07 NOTE — PROGRESS NOTE ADULT - SUBJECTIVE AND OBJECTIVE BOX
Patient is a 87y Female who reports no complaints as new, underwent US     MEDICATIONS  (STANDING):  aMIOdarone    Tablet 200 milliGRAM(s) Oral daily  atorvastatin 20 milliGRAM(s) Oral at bedtime  cyanocobalamin 2000 MICROGram(s) Oral daily  famotidine    Tablet 10 milliGRAM(s) Oral daily  heparin   Injectable 5000 Unit(s) SubCutaneous every 12 hours  levothyroxine 88 MICROGram(s) Oral daily  metoprolol succinate ER 25 milliGRAM(s) Oral daily  Nephro-shawna 1 Tablet(s) Oral daily    MEDICATIONS  (PRN):  acetaminophen     Tablet .. 650 milliGRAM(s) Oral every 6 hours PRN Mild Pain (1 - 3)  aluminum hydroxide/magnesium hydroxide/simethicone Suspension 30 milliLiter(s) Oral every 4 hours PRN Dyspepsia  melatonin 3 milliGRAM(s) Oral at bedtime PRN Insomnia  ondansetron Injectable 4 milliGRAM(s) IV Push every 8 hours PRN Nausea and/or Vomiting        T(C): , Max: 36.9 (02-06-23 @ 23:00)  T(F): , Max: 98.4 (02-06-23 @ 23:00)  HR: 60 (02-07-23 @ 08:21)  BP: 121/53 (02-07-23 @ 08:21)  BP(mean): --  RR: 18 (02-07-23 @ 08:21)  SpO2: 94% (02-07-23 @ 08:21)  Wt(kg): --        PHYSICAL EXAM:    Constitutional: NAD, fraiul  HEENT: MM  dist  Cardiovascular: S1 and S2   Gastrointestinal: BS+, soft, NT/ND  Extremities:   peripheral edema  Neurological: Alert, conversant  dist        LABS:                        7.9    7.44  )-----------( 134      ( 07 Feb 2023 09:22 )             23.6     07 Feb 2023 09:22    143    |  113    |  95     ----------------------------<  98     4.3     |  20     |  6.86   06 Feb 2023 09:49    138    |  107    |  87     ----------------------------<  124    4.1     |  22     |  6.44     Ca    8.8        07 Feb 2023 09:22  Ca    9.4        06 Feb 2023 09:49    TPro  6.8    /  Alb  3.8    /  TBili  0.7    /  DBili  x      /  AST  17     /  ALT  18     /  AlkPhos  83     06 Feb 2023 09:49      C3 Complement, Serum: 95 mg/dL [81 - 157] (02-06 @ 14:30)  C4 Complement, Serum: 26 mg/dL [13 - 39] (02-06 @ 14:30)      Urine Studies:    Sodium, Random Urine: 41 mmol/L (02-06 @ 18:00)  Creatinine, Random Urine: 72 mg/dL (02-06 @ 18:00)        RADIOLOGY & ADDITIONAL STUDIES:

## 2023-02-07 NOTE — PROGRESS NOTE ADULT - SUBJECTIVE AND OBJECTIVE BOX
CHIEF COMPLAINT: Elevated Cr. Baseline increased urinary frequency unchanges    SUBJECTIVE/SIGNIFICANT INTERVAL EVENTS/OVERNIGHT EVENTS:    2/7: Reports no complaints. Cr still relatively same. Hgb lower. Recheck ordered. No overt signs of bleeding.     Review of Systems: 14 Point review of systems reviewed and reported as negative unless otherwise stated above    FROM H&P:  "87F with PMH of CKDIIIB, Atrophic Left Kidney, HTN, HLD, Valvular HD, CAD s/p CABG (10/2022), Pulm HTN, Hypothyroidism presented to the ER after being sent by her nephrologist for further evaluation and worsening Cr. Cr in mid 2s back in 7/2022-10/2022. Underwent CABG October 2022. Since then no new urinary complaints. Reports baseline frequent urination that remains unchanged. Denies fever, chills, nausea, vomiting, abdominal pain/discomfort, sensation of incomplete void, dysuria. Intentional weight loss of 40-50 pounds in the last year attributed to diet changes. Reporting increased LE edema since starting norvasc for improved BP control two weeks ago.     In the ER Tmax 97.8, HR 59, /56, RR 17, SpO2 100% on RA. Hgb 9.32 (baseline ~9), MCV 95.2, BUN 87, Cr 6.44, RVP unremarkable. "    PHYSICAL EXAM:    T(C): 36.6 (02-07-23 @ 15:26), Max: 36.9 (02-06-23 @ 23:00)  HR: 58 (02-07-23 @ 15:26) (55 - 68)  BP: 114/54 (02-07-23 @ 15:26) (114/54 - 122/55)  RR: 18 (02-07-23 @ 15:26) (18 - 18)  SpO2: 94% (02-07-23 @ 15:26) (94% - 94%)    General: AAOx3; NAD  Head: AT/NC  ENT: Moist Mucous Membranes; No Injury  Eyes: EOMI; PERRL  Neck: Non-tender; No JVD  CVS: RRR, S1&S2, murmur +; LE edema +  Respiratory: Lungs CTA B/L; Normal Respiratory Effort and saturation on RA  Abdomen/GI: Soft, non-tender, non-distended, no guarding, no rebound, normal bowel sounds  : No bladder distention, No Bose  Extremities: No cyanosis, No clubbing, LE 3+ pitting edema.   MSK: No CVA tenderness, Normal ROM, No injury  Neuro: AAOx3, CNII-XII grossly intact, non-focal  Psych: Appropriate, Cooperative,   Skin: Clean, Dry and Intact      LABS:                          7.9    7.44  )-----------( 134      ( 07 Feb 2023 09:22 )             23.6     02-07    143  |  113<H>  |  95<H>  ----------------------------<  98  4.3   |  20<L>  |  6.86<H>    Ca    8.8      07 Feb 2023 09:22    TPro  6.8  /  Alb  3.8  /  TBili  0.7  /  DBili  x   /  AST  17  /  ALT  18  /  AlkPhos  83  02-06      CAPILLARY BLOOD GLUCOSE      `Creatinine, Random Urine: 72: Sodium, Random Urine: 41:Iron with Total Binding Capacity (02.06.23 @ 14:30)   Iron - Total Binding Capacity.: 217 ug/dL   % Saturation, Iron: 21 %   Iron Total, Serum: 46 ug/dL   Unsaturated Iron Binding Capacity: 171 ug/dL B12 and Folate (02.06.23 @ 14:30)   Vitamin B12, Serum: >2000 pg/mL   Folate, Serum: 12.2 ng/mL Ferritin, Serum (02.06.23 @ 14:30)   Ferritin, Serum: 608 ng/mL C4 Complement, Serum (02.06.23 @ 14:30)   C4 Complement, Serum: 26 mg/dL C3 Complement, Serum (02.06.23 @ 14:30)   C3 Complement, Serum: 95 mg/dL Prothrombin Time and INR, Plasma in AM (02.06.23 @ 09:49)   Prothrombin Time, Plasma: 11.9 sec   INR: 1.03: Activated Partial Thromboplastin Time in AM (02.06.23 @ 09:49)   Activated Partial Thromboplastin Time: 21.7:         RADIOLOGY:  `< from: US Duplex Kidneys (02.06.23 @ 14:59) >  IMPRESSION:    Markedly atrophic left kidney which was not visualized.    No right hydronephrosis. Increased renal echogenicity and increased   resistiveindices consistent with medical renal disease.    Limited visualization of the right renal artery. No renal artery stenosis   is identified. If clinically indicated further evaluation may be   performed with MR angiogram.    < end of copied text >  `< from: Xray Chest 1 View- PORTABLE-Routine (Xray Chest 1 View- PORTABLE-Routine .) (02.06.23 @ 10:36) >    IMPRESSION:   No radiographic evidence of active chest disease.    < end of copied text >      EKG:  `< from: 12 Lead ECG (02.06.23 @ 10:02) >  Diagnosis Line *** Poor data quality, interpretation may be adversely affected  Sinus rhythm  Left axis deviation  Left ventricular hypertrophy with QRS widening  T wave abnormality, consider inferior ischemia  Abnormal ECG  When compared with ECG of 03-JUL-2022 07:33,  No significant change was found    < end of copied text >      ECHO:  `< from: TTE Echo Complete w/o Contrast w/ Doppler (02.07.23 @ 11:15) >   Summary     The mitral valve leaflets appear thickened.   Mild (1+) mitral regurgitation is present.   EA reversal of the mitral inflow consistent with reduced compliance of   the   left ventricle.   Moderate aortic sclerosis is present with minimally restricted valvular   opening.   Mild (1+) aortic regurgitation is present.   At least Mild tricuspid regurgitation is present.   Normal appearing pulmonic valve structure and function.   Trace pulmonic valvular regurgitation is present.   The left atrium ismildly dilated.   MIld concentric left ventricular hypertrophy is present.   Estimated left ventricular ejection fraction is 60-65 %.   There is a hypoechoic space anterior to the right ventricular free wall.   Cannot rule out loculated pericardial effusion Vs. pericardial fat pad.    < end of copied text >              I personally reviewed labs, imaging, ekg, orders and vitals.    Discussed case with:  [X]RN  [X]CM/RIRI  [X]Patient  [X]Family  [X]Specialist: Nephrology        MEDICATIONS  (STANDING):  aMIOdarone    Tablet 200 milliGRAM(s) Oral daily  atorvastatin 20 milliGRAM(s) Oral at bedtime  cyanocobalamin 2000 MICROGram(s) Oral daily  famotidine    Tablet 10 milliGRAM(s) Oral daily  heparin   Injectable 5000 Unit(s) SubCutaneous every 12 hours  levothyroxine 88 MICROGram(s) Oral daily  metoprolol succinate ER 25 milliGRAM(s) Oral daily  Nephro-shawna 1 Tablet(s) Oral daily  sodium chloride 0.45%. 1000 milliLiter(s) (50 mL/Hr) IV Continuous <Continuous>    MEDICATIONS  (PRN):  acetaminophen     Tablet .. 650 milliGRAM(s) Oral every 6 hours PRN Mild Pain (1 - 3)  aluminum hydroxide/magnesium hydroxide/simethicone Suspension 30 milliLiter(s) Oral every 4 hours PRN Dyspepsia  melatonin 3 milliGRAM(s) Oral at bedtime PRN Insomnia  ondansetron Injectable 4 milliGRAM(s) IV Push every 8 hours PRN Nausea and/or Vomiting

## 2023-02-07 NOTE — PHYSICAL THERAPY INITIAL EVALUATION ADULT - PERTINENT HX OF CURRENT PROBLEM, REHAB EVAL
87F with PMH of CKDIIIB, Atrophic Left Kidney, HTN, HLD, Valvular HD, CAD s/p CABG (10/2022), Pulm HTN, Hypothyroidism presented to the ER after being sent by her nephrologist for further evaluation and worsening Cr. 87F with PMH of CKDIIIB, Atrophic Left Kidney, HTN, HLD, Valvular HD, CAD s/p CABG (10/2022), Pulm HTN, Hypothyroidism presented to the ER after being sent by her nephrologist for further evaluation and worsening Cr. Pt. s/p RADHA on CKD 4 w progression.

## 2023-02-07 NOTE — PHYSICAL THERAPY INITIAL EVALUATION ADULT - ADDITIONAL COMMENTS
Lives at home. Ambulates with walker. Normally alone but since CABG in October 2022, patient has had 24/7 supervision in her house with her children Lives at home. Ambulates with walker. Normally alone but since CABG in October 2022, patient has had 24/7 supervision in her house with her children. Pt. gets home from garage with 2 steps and has chair-lift to her bedroom.

## 2023-02-07 NOTE — PROGRESS NOTE ADULT - ASSESSMENT
ASSESSMENT/PLAN    RADHA on CKD  -Uncleared etiology Appears to be intrinsic. ATN/Emboli? Unable to clinically determine. Recent CABG.   Personal history of transient urinary retention 7/2022. Bladders scans negative for significant PVR.   -US findings noted  -Nephrology consulted  -I/Os  -Avoid nephrotoxic agents and/or adjust dose accordingly  -IVF as needed for fluid balance  -Continue to monitor Cr/Electrolytes  -Serologies and other Analysis ordered by nephrology for further evaluation. Continue to follow.     CAD s/p CABG (10/2022) without angina  Paroxysmal Atrial Fibrillation (on Amiodarone; not AC)  HTN  HLD  -ASA/Plavix on hold since 2/3 in anticipation of possibility requiring HD catheter. Resume if ok with nephrology.   -Continue BB/Statin  -Resume ASA/plavix when possible  -TTE findings noted.   -Continue Amiodarone  -[id number]=3. Not on AC since 7/2022.   -Norvasc started 2 weeks prior to admission with reported subsequent increased LE edema. Discontinue Norvasc  -PRN Rx for elevated BP.     Normocytic Anemia  -Likely component of CKD  -Stable and at baseline  -Ferritin/Iron/b12/folate levels all ok  -No overt signs of bleeding.     DVT Prophylaxis: heparin subq    Disposition  IVF; monitor cr. Nephro recs. Monitor Hgb           Goals of Care (GOC)/Advance Care Planning (ACP)  Date of Discussion/evaluation: 2/6/2023    PLAN:  Code Status: Full code  HCP: Primary is daughter, Zaida Sandhu ([phone]) and secondary is son, Isidro Nova ([phone])  Alternative Feeding methods: Would like to discuss or assess should the situation arise that it requires alternative feeding methods  Blood Product Transfusions: YES agreeable  Pressors: YES agreeable

## 2023-02-07 NOTE — PHYSICAL THERAPY INITIAL EVALUATION ADULT - GENERAL OBSERVATIONS, REHAB EVAL
Pt. received semi-supine in bed had echo this AM, agreeable to PT for BS eval but refused OOB assessment.

## 2023-02-07 NOTE — PROGRESS NOTE ADULT - ASSESSMENT
88 yo female with hx of atrophic left kidney , CKD 4 w Cr ~ 2 post CABG in October and cr slowly rising since October    3--> 4 --> now Cr mid 5 and  sent to ED  with RADHA.    RADHA on CKD 4 w progression    Consideration of atheroembolic syndrome in setting of cardiac surgery and cath    negative renal imaging outpatient ( no hydro or retention )    FU c3, c4 , lipase, amylase    FU serologies for completeness    urine studies , protein fu   gentle IVF to resume   check renal doppler, renal USS for repeat imaging no obvious obstruction noted   No acute indication for HD     d/c with RN staff and team

## 2023-02-08 LAB
ANA TITR SER: NEGATIVE — SIGNIFICANT CHANGE UP
ANION GAP SERPL CALC-SCNC: 5 MMOL/L — SIGNIFICANT CHANGE UP (ref 5–17)
AUTO DIFF PNL BLD: NEGATIVE — SIGNIFICANT CHANGE UP
BUN SERPL-MCNC: 89 MG/DL — HIGH (ref 7–23)
C-ANCA SER-ACNC: NEGATIVE — SIGNIFICANT CHANGE UP
CALCIUM SERPL-MCNC: 8.3 MG/DL — LOW (ref 8.5–10.1)
CALCIUM SERPL-MCNC: 8.6 MG/DL — SIGNIFICANT CHANGE UP (ref 8.4–10.5)
CHLORIDE SERPL-SCNC: 113 MMOL/L — HIGH (ref 96–108)
CO2 SERPL-SCNC: 23 MMOL/L — SIGNIFICANT CHANGE UP (ref 22–31)
CREAT ?TM UR-MCNC: 59 MG/DL — SIGNIFICANT CHANGE UP
CREAT SERPL-MCNC: 6.19 MG/DL — HIGH (ref 0.5–1.3)
EGFR: 6 ML/MIN/1.73M2 — LOW
GLUCOSE SERPL-MCNC: 93 MG/DL — SIGNIFICANT CHANGE UP (ref 70–99)
HBV SURFACE AB SER-ACNC: SIGNIFICANT CHANGE UP
HCT VFR BLD CALC: 22.9 % — LOW (ref 34.5–45)
HGB BLD-MCNC: 7.5 G/DL — LOW (ref 11.5–15.5)
MAGNESIUM SERPL-MCNC: 2 MG/DL — SIGNIFICANT CHANGE UP (ref 1.6–2.6)
MCHC RBC-ENTMCNC: 31.8 PG — SIGNIFICANT CHANGE UP (ref 27–34)
MCHC RBC-ENTMCNC: 32.8 GM/DL — SIGNIFICANT CHANGE UP (ref 32–36)
MCV RBC AUTO: 97 FL — SIGNIFICANT CHANGE UP (ref 80–100)
MPO AB + PR3 PNL SER: SIGNIFICANT CHANGE UP
P-ANCA SER-ACNC: NEGATIVE — SIGNIFICANT CHANGE UP
PHOSPHATE SERPL-MCNC: 4.4 MG/DL — SIGNIFICANT CHANGE UP (ref 2.5–4.5)
PLATELET # BLD AUTO: 129 K/UL — LOW (ref 150–400)
POTASSIUM SERPL-MCNC: 3.9 MMOL/L — SIGNIFICANT CHANGE UP (ref 3.5–5.3)
POTASSIUM SERPL-SCNC: 3.9 MMOL/L — SIGNIFICANT CHANGE UP (ref 3.5–5.3)
PROT ?TM UR-MCNC: 33 MG/DL — HIGH (ref 0–12)
PROT/CREAT UR-RTO: 0.6 RATIO — HIGH (ref 0–0.2)
PTH-INTACT FLD-MCNC: 92 PG/ML — HIGH (ref 15–65)
RBC # BLD: 2.36 M/UL — LOW (ref 3.8–5.2)
RBC # FLD: 13.1 % — SIGNIFICANT CHANGE UP (ref 10.3–14.5)
SODIUM SERPL-SCNC: 141 MMOL/L — SIGNIFICANT CHANGE UP (ref 135–145)
WBC # BLD: 6.52 K/UL — SIGNIFICANT CHANGE UP (ref 3.8–10.5)
WBC # FLD AUTO: 6.52 K/UL — SIGNIFICANT CHANGE UP (ref 3.8–10.5)

## 2023-02-08 PROCEDURE — 99232 SBSQ HOSP IP/OBS MODERATE 35: CPT

## 2023-02-08 RX ORDER — SODIUM CHLORIDE 9 MG/ML
1000 INJECTION INTRAMUSCULAR; INTRAVENOUS; SUBCUTANEOUS
Refills: 0 | Status: DISCONTINUED | OUTPATIENT
Start: 2023-02-08 | End: 2023-02-09

## 2023-02-08 RX ADMIN — Medication 88 MICROGRAM(S): at 05:28

## 2023-02-08 RX ADMIN — PREGABALIN 2000 MICROGRAM(S): 225 CAPSULE ORAL at 10:11

## 2023-02-08 RX ADMIN — AMIODARONE HYDROCHLORIDE 200 MILLIGRAM(S): 400 TABLET ORAL at 12:14

## 2023-02-08 RX ADMIN — FAMOTIDINE 10 MILLIGRAM(S): 10 INJECTION INTRAVENOUS at 10:11

## 2023-02-08 RX ADMIN — Medication 25 MILLIGRAM(S): at 10:14

## 2023-02-08 RX ADMIN — ATORVASTATIN CALCIUM 20 MILLIGRAM(S): 80 TABLET, FILM COATED ORAL at 21:16

## 2023-02-08 RX ADMIN — HEPARIN SODIUM 5000 UNIT(S): 5000 INJECTION INTRAVENOUS; SUBCUTANEOUS at 17:31

## 2023-02-08 RX ADMIN — Medication 1 TABLET(S): at 12:14

## 2023-02-08 RX ADMIN — SODIUM CHLORIDE 50 MILLILITER(S): 9 INJECTION INTRAMUSCULAR; INTRAVENOUS; SUBCUTANEOUS at 12:13

## 2023-02-08 RX ADMIN — HEPARIN SODIUM 5000 UNIT(S): 5000 INJECTION INTRAVENOUS; SUBCUTANEOUS at 05:28

## 2023-02-08 NOTE — PROGRESS NOTE ADULT - SUBJECTIVE AND OBJECTIVE BOX
CHIEF COMPLAINT: Elevated Cr. Baseline increased urinary frequency unchanges    SUBJECTIVE/SIGNIFICANT INTERVAL EVENTS/OVERNIGHT EVENTS:    2/7: Reports no complaints. Cr still relatively same. Hgb lower. Recheck ordered. No overt signs of bleeding.   2/9: Feeling well. Reporting no complaints. No urinary complaints. Denies fever, chills, chest pain, nausea, vomiting, abdominal pain/discomfort.     Review of Systems: 14 Point review of systems reviewed and reported as negative unless otherwise stated above    FROM H&P:  "87F with PMH of CKDIIIB, Atrophic Left Kidney, HTN, HLD, Valvular HD, CAD s/p CABG (10/2022), Pulm HTN, Hypothyroidism presented to the ER after being sent by her nephrologist for further evaluation and worsening Cr. Cr in mid 2s back in 7/2022-10/2022. Underwent CABG October 2022. Since then no new urinary complaints. Reports baseline frequent urination that remains unchanged. Denies fever, chills, nausea, vomiting, abdominal pain/discomfort, sensation of incomplete void, dysuria. Intentional weight loss of 40-50 pounds in the last year attributed to diet changes. Reporting increased LE edema since starting norvasc for improved BP control two weeks ago.     In the ER Tmax 97.8, HR 59, /56, RR 17, SpO2 100% on RA. Hgb 9.32 (baseline ~9), MCV 95.2, BUN 87, Cr 6.44, RVP unremarkable. "    PHYSICAL EXAM:    T(C): 36.9 (02-08-23 @ 08:28), Max: 36.9 (02-08-23 @ 08:28)  HR: 65 (02-08-23 @ 10:14) (58 - 65)  BP: 110/65 (02-08-23 @ 10:14) (110/65 - 127/66)  RR: 18 (02-08-23 @ 08:28) (18 - 18)  SpO2: 94% (02-08-23 @ 08:28) (94% - 94%)    General: AAOx3; NAD  Head: AT/NC  ENT: Moist Mucous Membranes; No Injury  Eyes: EOMI; PERRL  Neck: Non-tender; No JVD  CVS: RRR, S1&S2, murmur +; LE edema +  Respiratory: Lungs CTA B/L; Normal Respiratory Effort and saturation on RA  Abdomen/GI: Soft, non-tender, non-distended, no guarding, no rebound, normal bowel sounds  : No bladder distention, No Bose  Extremities: No cyanosis, No clubbing, LE 3+ pitting edema.   MSK: No CVA tenderness, Normal ROM, No injury  Neuro: AAOx3, CNII-XII grossly intact, non-focal  Psych: Appropriate, Cooperative,   Skin: Clean, Dry and Intact      LABS:                                     7.5    6.52  )-----------( 129      ( 08 Feb 2023 06:39 )             22.9     02-08    141  |  113<H>  |  89<H>  ----------------------------<  93  3.9   |  23  |  6.19<H>    Ca    8.3<L>      08 Feb 2023 06:39  Phos  4.4     02-08  Mg     2.0     02-08        CAPILLARY BLOOD GLUCOSE    Antineutrophil Cytoplasmic Antibody (02.06.23 @ 14:30)   Atypical ANCA: Negative   Cytoplasmic (c-ANCA) Antibody: Negative   Perinuclear (p-ANCA) Antibody: Negative Iron with Total Binding Capacity (02.06.23 @ 14:30)   Iron - Total Binding Capacity.: 217 ug/dL   % Saturation, Iron: 21 %   Iron Total, Serum: 46 ug/dL   Unsaturated Iron Binding Capacity: 171 ug/dL B12 and Folate (02.06.23 @ 14:30)   Vitamin B12, Serum: >2000 pg/mL   Folate, Serum: 12.2 ng/mL Ferritin, Serum (02.06.23 @ 14:30)   Ferritin, Serum: 608 ng/mL C4 Complement, Serum (02.06.23 @ 14:30)   C4 Complement, Serum: 26 mg/dL C3 Complement, Serum (02.06.23 @ 14:30)   C3 Complement, Serum: 95 mg/dL                    7.9    7.44  )-----------( 134      ( 07 Feb 2023 09:22 )             23.6     02-07    143  |  113<H>  |  95<H>  ----------------------------<  98  4.3   |  20<L>  |  6.86<H>    Ca    8.8      07 Feb 2023 09:22    TPro  6.8  /  Alb  3.8  /  TBili  0.7  /  DBili  x   /  AST  17  /  ALT  18  /  AlkPhos  83  02-06      CAPILLARY BLOOD GLUCOSE      `Creatinine, Random Urine: 72: Sodium, Random Urine: 41:Iron with Total Binding Capacity (02.06.23 @ 14:30)   Iron - Total Binding Capacity.: 217 ug/dL   % Saturation, Iron: 21 %   Iron Total, Serum: 46 ug/dL   Unsaturated Iron Binding Capacity: 171 ug/dL B12 and Folate (02.06.23 @ 14:30)   Vitamin B12, Serum: >2000 pg/mL   Folate, Serum: 12.2 ng/mL Ferritin, Serum (02.06.23 @ 14:30)   Ferritin, Serum: 608 ng/mL C4 Complement, Serum (02.06.23 @ 14:30)   C4 Complement, Serum: 26 mg/dL C3 Complement, Serum (02.06.23 @ 14:30)   C3 Complement, Serum: 95 mg/dL Prothrombin Time and INR, Plasma in AM (02.06.23 @ 09:49)   Prothrombin Time, Plasma: 11.9 sec   INR: 1.03: Activated Partial Thromboplastin Time in AM (02.06.23 @ 09:49)   Activated Partial Thromboplastin Time: 21.7:         RADIOLOGY:  `< from: US Duplex Kidneys (02.06.23 @ 14:59) >  IMPRESSION:    Markedly atrophic left kidney which was not visualized.    No right hydronephrosis. Increased renal echogenicity and increased   resistiveindices consistent with medical renal disease.    Limited visualization of the right renal artery. No renal artery stenosis   is identified. If clinically indicated further evaluation may be   performed with MR angiogram.    < end of copied text >  `< from: Xray Chest 1 View- PORTABLE-Routine (Xray Chest 1 View- PORTABLE-Routine .) (02.06.23 @ 10:36) >    IMPRESSION:   No radiographic evidence of active chest disease.    < end of copied text >      EKG:  `< from: 12 Lead ECG (02.06.23 @ 10:02) >  Diagnosis Line *** Poor data quality, interpretation may be adversely affected  Sinus rhythm  Left axis deviation  Left ventricular hypertrophy with QRS widening  T wave abnormality, consider inferior ischemia  Abnormal ECG  When compared with ECG of 03-JUL-2022 07:33,  No significant change was found    < end of copied text >      ECHO:  `< from: TTE Echo Complete w/o Contrast w/ Doppler (02.07.23 @ 11:15) >   Summary     The mitral valve leaflets appear thickened.   Mild (1+) mitral regurgitation is present.   EA reversal of the mitral inflow consistent with reduced compliance of   the   left ventricle.   Moderate aortic sclerosis is present with minimally restricted valvular   opening.   Mild (1+) aortic regurgitation is present.   At least Mild tricuspid regurgitation is present.   Normal appearing pulmonic valve structure and function.   Trace pulmonic valvular regurgitation is present.   The left atrium ismildly dilated.   MIld concentric left ventricular hypertrophy is present.   Estimated left ventricular ejection fraction is 60-65 %.   There is a hypoechoic space anterior to the right ventricular free wall.   Cannot rule out loculated pericardial effusion Vs. pericardial fat pad.    < end of copied text >              I personally reviewed labs, imaging, ekg, orders and vitals.    Discussed case with:  [X]RN  [X]ELPIDIO/RIRI  [X]Patient  [X]Family  [X]Specialist: Nephrology

## 2023-02-08 NOTE — PROGRESS NOTE ADULT - SUBJECTIVE AND OBJECTIVE BOX
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 Pt was sent to ED by me  for further workup and mgt.    Today pt feeling well    no complaints   urinating well      Denies NSAID use        MEDICATIONS  (STANDING):  aMIOdarone    Tablet 200 milliGRAM(s) Oral daily  atorvastatin 20 milliGRAM(s) Oral at bedtime  cyanocobalamin 2000 MICROGram(s) Oral daily  famotidine    Tablet 10 milliGRAM(s) Oral daily  heparin   Injectable 5000 Unit(s) SubCutaneous every 12 hours  levothyroxine 88 MICROGram(s) Oral daily  metoprolol succinate ER 25 milliGRAM(s) Oral daily  Nephro-shawna 1 Tablet(s) Oral daily  sodium chloride 0.9%. 1000 milliLiter(s) (50 mL/Hr) IV Continuous <Continuous>      Vital Signs Last 24 Hrs  T(C): 36.9 (08 Feb 2023 08:28), Max: 36.9 (08 Feb 2023 08:28)  T(F): 98.5 (08 Feb 2023 08:28), Max: 98.5 (08 Feb 2023 08:28)  HR: 60 (08 Feb 2023 08:28) (58 - 68)  BP: 122/67 (08 Feb 2023 08:28) (114/54 - 127/66)  BP(mean): --  RR: 18 (08 Feb 2023 08:28) (18 - 18)  SpO2: 94% (08 Feb 2023 08:28) (94% - 94%)    Parameters below as of 08 Feb 2023 08:28  Patient On (Oxygen Delivery Method): room air      I&O's Detail    07 Feb 2023 07:01  -  08 Feb 2023 07:00  --------------------------------------------------------  IN:  Total IN: 0 mL    OUT:    Voided (mL): 50 mL  Total OUT: 50 mL    Total NET: -50 mL        PHYSICAL EXAM:    Constitutional: NAD  HEENT:  EOMI,  MM  Neck: No LAD, No JVD  Respiratory: CTAB  Cardiovascular: S1 and S2 + M  Gastrointestinal: BS+, soft, NT/ND  Extremities: peripheral edema + , dark, red abd blue mottling in toes bilaterally - livedo ??   Neurological: A/O x 3, no focal deficits  : No Bose  Skin: No rashes  Access: Not applicable    LABS:                                            7.5    6.52  )-----------( 129      ( 08 Feb 2023 06:39 )             22.9                         7.5    x     )-----------( x        ( 07 Feb 2023 21:05 )             22.0         141    |  113    |  89     ----------------------------<  93        08 Feb 2023 06:39  3.9     |  23     |  6.19     143    |  113    |  95     ----------------------------<  98        07 Feb 2023 09:22  4.3     |  20     |  6.86     138    |  107    |  87     ----------------------------<  124       06 Feb 2023 09:49  4.1     |  22     |  6.44     Ca    8.3        08 Feb 2023 06:39  Ca    8.8        07 Feb 2023 09:22    Phos  4.4       08 Feb 2023 06:39    Mg     2.0       08 Feb 2023 06:39    TPro  6.8    /  Alb  3.8    /  TBili  0.7    /        06 Feb 2023 09:49  DBili  x      /  AST  17     /  ALT  18     /  AlkPhos  83           TPro  6.8  /  Alb  3.8  /  TBili  0.7  /  DBili  x   /  AST  17  /  ALT  18  /  AlkPhos  83  02-06      Urine Studies:  Urinalysis (02.07.23 @ 18:20)   pH Urine: 5.0   Glucose Qualitative, Urine: Negative   Blood, Urine: Trace   Color: Yellow   Urine Appearance: Slightly Turbid   Bilirubin: Negative   Ketone - Urine: Negative   Specific Gravity: 1.010   Protein, Urine: 30 mg/dL   Urobilinogen: Negative   Nitrite: Negative   Leukocyte Esterase Concentration: Small   Urine Microscopic-Add On (NC) (02.07.23 @ 18:20)   Red Blood Cell - Urine: 0-2 /HPF   White Blood Cell - Urine: 6-10 /HPF   Bacteria: Many   Epithelial Cells: Few       RADIOLOGY & ADDITIONAL STUDIES:

## 2023-02-08 NOTE — PROGRESS NOTE ADULT - ASSESSMENT
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

## 2023-02-09 LAB
ANION GAP SERPL CALC-SCNC: 5 MMOL/L — SIGNIFICANT CHANGE UP (ref 5–17)
BUN SERPL-MCNC: 79 MG/DL — HIGH (ref 7–23)
CALCIUM SERPL-MCNC: 8.6 MG/DL — SIGNIFICANT CHANGE UP (ref 8.5–10.1)
CHLORIDE SERPL-SCNC: 115 MMOL/L — HIGH (ref 96–108)
CO2 SERPL-SCNC: 22 MMOL/L — SIGNIFICANT CHANGE UP (ref 22–31)
CREAT SERPL-MCNC: 5.68 MG/DL — HIGH (ref 0.5–1.3)
EGFR: 7 ML/MIN/1.73M2 — LOW
EPO SERPL-MCNC: 9.1 MIU/ML — SIGNIFICANT CHANGE UP (ref 2.6–18.5)
GBM IGG SER-ACNC: <0.2 — SIGNIFICANT CHANGE UP (ref 0–0.9)
GLUCOSE SERPL-MCNC: 103 MG/DL — HIGH (ref 70–99)
HCT VFR BLD CALC: 23.6 % — LOW (ref 34.5–45)
HGB BLD-MCNC: 8 G/DL — LOW (ref 11.5–15.5)
KAPPA LC SER QL IFE: 4.25 MG/DL — HIGH (ref 0.33–1.94)
KAPPA/LAMBDA FREE LIGHT CHAIN RATIO, SERUM: 1.81 RATIO — HIGH (ref 0.26–1.65)
LAMBDA LC SER QL IFE: 2.35 MG/DL — SIGNIFICANT CHANGE UP (ref 0.57–2.63)
MCHC RBC-ENTMCNC: 32.8 PG — SIGNIFICANT CHANGE UP (ref 27–34)
MCHC RBC-ENTMCNC: 33.9 GM/DL — SIGNIFICANT CHANGE UP (ref 32–36)
MCV RBC AUTO: 96.7 FL — SIGNIFICANT CHANGE UP (ref 80–100)
PLATELET # BLD AUTO: 127 K/UL — LOW (ref 150–400)
POTASSIUM SERPL-MCNC: 3.8 MMOL/L — SIGNIFICANT CHANGE UP (ref 3.5–5.3)
POTASSIUM SERPL-SCNC: 3.8 MMOL/L — SIGNIFICANT CHANGE UP (ref 3.5–5.3)
PROT SERPL-MCNC: 5.3 G/DL — LOW (ref 6–8.3)
PROT SERPL-MCNC: 5.3 G/DL — LOW (ref 6–8.3)
RBC # BLD: 2.44 M/UL — LOW (ref 3.8–5.2)
RBC # FLD: 13.1 % — SIGNIFICANT CHANGE UP (ref 10.3–14.5)
SODIUM SERPL-SCNC: 142 MMOL/L — SIGNIFICANT CHANGE UP (ref 135–145)
WBC # BLD: 7.33 K/UL — SIGNIFICANT CHANGE UP (ref 3.8–10.5)
WBC # FLD AUTO: 7.33 K/UL — SIGNIFICANT CHANGE UP (ref 3.8–10.5)

## 2023-02-09 PROCEDURE — 99232 SBSQ HOSP IP/OBS MODERATE 35: CPT

## 2023-02-09 RX ORDER — SODIUM CHLORIDE 9 MG/ML
1000 INJECTION INTRAMUSCULAR; INTRAVENOUS; SUBCUTANEOUS
Refills: 0 | Status: DISCONTINUED | OUTPATIENT
Start: 2023-02-09 | End: 2023-02-11

## 2023-02-09 RX ORDER — SODIUM CHLORIDE 9 MG/ML
1000 INJECTION INTRAMUSCULAR; INTRAVENOUS; SUBCUTANEOUS
Refills: 0 | Status: DISCONTINUED | OUTPATIENT
Start: 2023-02-09 | End: 2023-02-09

## 2023-02-09 RX ADMIN — Medication 1 TABLET(S): at 09:33

## 2023-02-09 RX ADMIN — AMIODARONE HYDROCHLORIDE 200 MILLIGRAM(S): 400 TABLET ORAL at 09:32

## 2023-02-09 RX ADMIN — SODIUM CHLORIDE 50 MILLILITER(S): 9 INJECTION INTRAMUSCULAR; INTRAVENOUS; SUBCUTANEOUS at 05:45

## 2023-02-09 RX ADMIN — PREGABALIN 2000 MICROGRAM(S): 225 CAPSULE ORAL at 09:33

## 2023-02-09 RX ADMIN — HEPARIN SODIUM 5000 UNIT(S): 5000 INJECTION INTRAVENOUS; SUBCUTANEOUS at 17:24

## 2023-02-09 RX ADMIN — Medication 88 MICROGRAM(S): at 05:45

## 2023-02-09 RX ADMIN — SODIUM CHLORIDE 50 MILLILITER(S): 9 INJECTION INTRAMUSCULAR; INTRAVENOUS; SUBCUTANEOUS at 21:37

## 2023-02-09 RX ADMIN — FAMOTIDINE 10 MILLIGRAM(S): 10 INJECTION INTRAVENOUS at 09:34

## 2023-02-09 RX ADMIN — ATORVASTATIN CALCIUM 20 MILLIGRAM(S): 80 TABLET, FILM COATED ORAL at 21:38

## 2023-02-09 RX ADMIN — Medication 25 MILLIGRAM(S): at 09:39

## 2023-02-09 RX ADMIN — HEPARIN SODIUM 5000 UNIT(S): 5000 INJECTION INTRAVENOUS; SUBCUTANEOUS at 05:45

## 2023-02-09 NOTE — PROGRESS NOTE ADULT - SUBJECTIVE AND OBJECTIVE BOX
CHIEF COMPLAINT: Elevated Cr. Baseline increased urinary frequency unchanges    SUBJECTIVE/SIGNIFICANT INTERVAL EVENTS/OVERNIGHT EVENTS:    2/7: Reports no complaints. Cr still relatively same. Hgb lower. Recheck ordered. No overt signs of bleeding.   2/8: Feeling well. Reporting no complaints. No urinary complaints. Denies fever, chills, chest pain, nausea, vomiting, abdominal pain/discomfort.   2/9: No complaints. Cr improving. Encourage adequate oral hydration    Review of Systems: 14 Point review of systems reviewed and reported as negative unless otherwise stated above    FROM H&P:  "87F with PMH of CKDIIIB, Atrophic Left Kidney, HTN, HLD, Valvular HD, CAD s/p CABG (10/2022), Pulm HTN, Hypothyroidism presented to the ER after being sent by her nephrologist for further evaluation and worsening Cr. Cr in mid 2s back in 7/2022-10/2022. Underwent CABG October 2022. Since then no new urinary complaints. Reports baseline frequent urination that remains unchanged. Denies fever, chills, nausea, vomiting, abdominal pain/discomfort, sensation of incomplete void, dysuria. Intentional weight loss of 40-50 pounds in the last year attributed to diet changes. Reporting increased LE edema since starting norvasc for improved BP control two weeks ago.     In the ER Tmax 97.8, HR 59, /56, RR 17, SpO2 100% on RA. Hgb 9.32 (baseline ~9), MCV 95.2, BUN 87, Cr 6.44, RVP unremarkable. "    PHYSICAL EXAM:    T(C): 36.3 (02-09-23 @ 08:19), Max: 36.9 (02-08-23 @ 15:57)  HR: 70 (02-09-23 @ 09:39) (60 - 70)  BP: 133/59 (02-09-23 @ 09:39) (129/54 - 134/61)  RR: 18 (02-09-23 @ 08:19) (18 - 18)  SpO2: 95% (02-09-23 @ 08:19) (94% - 97%)    General: AAOx3; NAD  Head: AT/NC  ENT: Moist Mucous Membranes; No Injury  Eyes: EOMI; PERRL  Neck: Non-tender; No JVD  CVS: RRR, S1&S2, murmur +; LE edema +  Respiratory: Lungs CTA B/L; Normal Respiratory Effort and saturation on RA  Abdomen/GI: Soft, non-tender, non-distended, no guarding, no rebound, normal bowel sounds  : No bladder distention, No Bose  Extremities: No cyanosis, No clubbing, LE 1+ pitting edema.   MSK: No CVA tenderness, Normal ROM, No injury  Neuro: AAOx3, CNII-XII grossly intact, non-focal  Psych: Appropriate, Cooperative,   Skin: Clean, Dry and Intact      LABS:                          8.0    7.33  )-----------( 127      ( 09 Feb 2023 07:09 )             23.6     02-09    142  |  115<H>  |  79<H>  ----------------------------<  103<H>  3.8   |  22  |  5.68<H>    Ca    8.6      09 Feb 2023 07:09  Phos  4.4     02-08  Mg     2.0     02-08    TPro  5.3<L>  /  Alb  x   /  TBili  x   /  DBili  x   /  AST  x   /  ALT  x   /  AlkPhos  x   02-08      CAPILLARY BLOOD GLUCOSE                                           7.5    6.52  )-----------( 129      ( 08 Feb 2023 06:39 )             22.9     02-08    141  |  113<H>  |  89<H>  ----------------------------<  93  3.9   |  23  |  6.19<H>    Ca    8.3<L>      08 Feb 2023 06:39  Phos  4.4     02-08  Mg     2.0     02-08        CAPILLARY BLOOD GLUCOSE    Antineutrophil Cytoplasmic Antibody (02.06.23 @ 14:30)   Atypical ANCA: Negative   Cytoplasmic (c-ANCA) Antibody: Negative   Perinuclear (p-ANCA) Antibody: Negative Iron with Total Binding Capacity (02.06.23 @ 14:30)   Iron - Total Binding Capacity.: 217 ug/dL   % Saturation, Iron: 21 %   Iron Total, Serum: 46 ug/dL   Unsaturated Iron Binding Capacity: 171 ug/dL B12 and Folate (02.06.23 @ 14:30)   Vitamin B12, Serum: >2000 pg/mL   Folate, Serum: 12.2 ng/mL Ferritin, Serum (02.06.23 @ 14:30)   Ferritin, Serum: 608 ng/mL C4 Complement, Serum (02.06.23 @ 14:30)   C4 Complement, Serum: 26 mg/dL C3 Complement, Serum (02.06.23 @ 14:30)   C3 Complement, Serum: 95 mg/dL                    7.9    7.44  )-----------( 134      ( 07 Feb 2023 09:22 )             23.6     02-07    143  |  113<H>  |  95<H>  ----------------------------<  98  4.3   |  20<L>  |  6.86<H>    Ca    8.8      07 Feb 2023 09:22    TPro  6.8  /  Alb  3.8  /  TBili  0.7  /  DBili  x   /  AST  17  /  ALT  18  /  AlkPhos  83  02-06      CAPILLARY BLOOD GLUCOSE      `Creatinine, Random Urine: 72: Sodium, Random Urine: 41:Iron with Total Binding Capacity (02.06.23 @ 14:30)   Iron - Total Binding Capacity.: 217 ug/dL   % Saturation, Iron: 21 %   Iron Total, Serum: 46 ug/dL   Unsaturated Iron Binding Capacity: 171 ug/dL B12 and Folate (02.06.23 @ 14:30)   Vitamin B12, Serum: >2000 pg/mL   Folate, Serum: 12.2 ng/mL Ferritin, Serum (02.06.23 @ 14:30)   Ferritin, Serum: 608 ng/mL C4 Complement, Serum (02.06.23 @ 14:30)   C4 Complement, Serum: 26 mg/dL C3 Complement, Serum (02.06.23 @ 14:30)   C3 Complement, Serum: 95 mg/dL Prothrombin Time and INR, Plasma in AM (02.06.23 @ 09:49)   Prothrombin Time, Plasma: 11.9 sec   INR: 1.03: Activated Partial Thromboplastin Time in AM (02.06.23 @ 09:49)   Activated Partial Thromboplastin Time: 21.7:         RADIOLOGY:  `< from: US Duplex Kidneys (02.06.23 @ 14:59) >  IMPRESSION:    Markedly atrophic left kidney which was not visualized.    No right hydronephrosis. Increased renal echogenicity and increased   resistiveindices consistent with medical renal disease.    Limited visualization of the right renal artery. No renal artery stenosis   is identified. If clinically indicated further evaluation may be   performed with MR angiogram.    < end of copied text >  `< from: Xray Chest 1 View- PORTABLE-Routine (Xray Chest 1 View- PORTABLE-Routine .) (02.06.23 @ 10:36) >    IMPRESSION:   No radiographic evidence of active chest disease.    < end of copied text >      EKG:  `< from: 12 Lead ECG (02.06.23 @ 10:02) >  Diagnosis Line *** Poor data quality, interpretation may be adversely affected  Sinus rhythm  Left axis deviation  Left ventricular hypertrophy with QRS widening  T wave abnormality, consider inferior ischemia  Abnormal ECG  When compared with ECG of 03-JUL-2022 07:33,  No significant change was found    < end of copied text >      ECHO:  `< from: TTE Echo Complete w/o Contrast w/ Doppler (02.07.23 @ 11:15) >   Summary     The mitral valve leaflets appear thickened.   Mild (1+) mitral regurgitation is present.   EA reversal of the mitral inflow consistent with reduced compliance of   the   left ventricle.   Moderate aortic sclerosis is present with minimally restricted valvular   opening.   Mild (1+) aortic regurgitation is present.   At least Mild tricuspid regurgitation is present.   Normal appearing pulmonic valve structure and function.   Trace pulmonic valvular regurgitation is present.   The left atrium ismildly dilated.   MIld concentric left ventricular hypertrophy is present.   Estimated left ventricular ejection fraction is 60-65 %.   There is a hypoechoic space anterior to the right ventricular free wall.   Cannot rule out loculated pericardial effusion Vs. pericardial fat pad.    < end of copied text >              I personally reviewed labs, imaging, ekg, orders and vitals.    Discussed case with:  [X]RN  [X]CM/RIRI  [X]Patient  [X]Family  [X]Specialist: Nephrology

## 2023-02-09 NOTE — PROGRESS NOTE ADULT - ASSESSMENT
MEDICATIONS  (STANDING):  aMIOdarone    Tablet 200 milliGRAM(s) Oral daily  atorvastatin 20 milliGRAM(s) Oral at bedtime  cyanocobalamin 2000 MICROGram(s) Oral daily  famotidine    Tablet 10 milliGRAM(s) Oral daily  heparin   Injectable 5000 Unit(s) SubCutaneous every 12 hours  levothyroxine 88 MICROGram(s) Oral daily  metoprolol succinate ER 25 milliGRAM(s) Oral daily  Nephro-shawna 1 Tablet(s) Oral daily  sodium chloride 0.9%. 1000 milliLiter(s) (50 mL/Hr) IV Continuous <Continuous>    MEDICATIONS  (PRN):  acetaminophen     Tablet .. 650 milliGRAM(s) Oral every 6 hours PRN Mild Pain (1 - 3)  aluminum hydroxide/magnesium hydroxide/simethicone Suspension 30 milliLiter(s) Oral every 4 hours PRN Dyspepsia  melatonin 3 milliGRAM(s) Oral at bedtime PRN Insomnia  ondansetron Injectable 4 milliGRAM(s) IV Push every 8 hours PRN Nausea and/or Vomiting        ASSESSMENT/PLAN    RADHA on CKD. Slowly improving  -Uncleared etiology Appears to be intrinsic. ATN/Emboli? Unable to clinically determine. Recent CABG.   Personal history of transient urinary retention 7/2022. Bladders scans negative for significant PVR.   -US findings noted  -Nephrology consulted  -I/Os  -Avoid nephrotoxic agents and/or adjust dose accordingly  -IVF as needed for fluid balance  -Continue to monitor Cr/Electrolytes  -Serologies and other Analysis ordered by nephrology for further evaluation.   -ANCA and CHINMAY negative. C3 and C4 complements WNL    CAD s/p CABG (10/2022) without angina  Paroxysmal Atrial Fibrillation (on Amiodarone; not AC)  HTN  HLD  -ASA/Plavix on hold since 2/3 in anticipation of possibility requiring HD catheter. Resume if ok with nephrology.   -Continue BB/Statin  -Resume ASA/plavix when possible  -TTE findings noted.   -Continue Amiodarone  -[id number]=1. Not on AC since 7/2022.   -Norvasc started 2 weeks prior to admission with reported subsequent increased LE edema. Discontinue Norvasc  -PRN Rx for elevated BP.     Normocytic Anemia  -Likely component of CKD  -Stable and at baseline  -Ferritin/Iron/b12/folate levels all ok  -No overt signs of bleeding.     DVT Prophylaxis: heparin subq    Disposition  IVF; monitor cr. Nephro recs. Monitor Hgb           Goals of Care (GOC)/Advance Care Planning (ACP)  Date of Discussion/evaluation: 2/6/2023    PLAN:  Code Status: Full code  HCP: Primary is daughterZaida ([phone]) and secondary is son, Isidro Nova ([phone])  Alternative Feeding methods: Would like to discuss or assess should the situation arise that it requires alternative feeding methods  Blood Product Transfusions: YES agreeable  Pressors: YES agreeable

## 2023-02-09 NOTE — PROGRESS NOTE ADULT - SUBJECTIVE AND OBJECTIVE BOX
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 Pt was sent to ED by me  for further workup and mgt.    Today pt feeling well    no complaints   urinating well      Denies NSAID use    MEDICATIONS  (STANDING):  aMIOdarone    Tablet 200 milliGRAM(s) Oral daily  atorvastatin 20 milliGRAM(s) Oral at bedtime  cyanocobalamin 2000 MICROGram(s) Oral daily  famotidine    Tablet 10 milliGRAM(s) Oral daily  heparin   Injectable 5000 Unit(s) SubCutaneous every 12 hours  levothyroxine 88 MICROGram(s) Oral daily  metoprolol succinate ER 25 milliGRAM(s) Oral daily  Nephro-shawna 1 Tablet(s) Oral daily  sodium chloride 0.9%. 1000 milliLiter(s) (50 mL/Hr) IV Continuous <Continuous>    Vital Signs Last 24 Hrs  T(C): 36.3 (09 Feb 2023 08:19), Max: 36.5 (08 Feb 2023 21:08)  T(F): 97.3 (09 Feb 2023 08:19), Max: 97.7 (08 Feb 2023 21:08)  HR: 70 (09 Feb 2023 09:39) (63 - 70)  BP: 133/59 (09 Feb 2023 09:39) (129/54 - 133/59)  BP(mean): 81 (08 Feb 2023 21:08) (81 - 81)  RR: 18 (09 Feb 2023 08:19) (18 - 18)  SpO2: 95% (09 Feb 2023 08:19) (94% - 95%)    Parameters below as of 09 Feb 2023 08:19  Patient On (Oxygen Delivery Method): room air    I&O's Detail    08 Feb 2023 07:01  -  09 Feb 2023 07:00  --------------------------------------------------------  IN:    Oral Fluid: 450 mL    sodium chloride 0.9%: 481 mL  Total IN: 931 mL    OUT:    Voided (mL): 400 mL  Total OUT: 400 mL    Total NET: 531 mL      09 Feb 2023 07:01  -  09 Feb 2023 16:24  --------------------------------------------------------  IN:    Oral Fluid: 340 mL  Total IN: 340 mL    OUT:  Total OUT: 0 mL    Total NET: 340 mL          PHYSICAL EXAM:    Constitutional: NAD  HEENT:  EOMI,  MM  Neck: No LAD, No JVD  Respiratory: CTAB  Cardiovascular: S1 and S2 + M  Gastrointestinal: BS+, soft, NT/ND  Extremities: mottling of toes resolved ?   Neurological: A/O x 3, no focal deficits  : No Bose  Skin: No rashes  Access: Not applicable    LABS:                                   8.0    7.33  )-----------( 127      ( 09 Feb 2023 07:09 )             23.6                         7.5    6.52  )-----------( 129      ( 08 Feb 2023 06:39 )             22.9       142    |  115    |  79     ----------------------------<  103       09 Feb 2023 07:09  3.8     |  22     |  5.68     141    |  113    |  89     ----------------------------<  93        08 Feb 2023 06:39  3.9     |  23     |  6.19     143    |  113    |  95     ----------------------------<  98        07 Feb 2023 09:22  4.3     |  20     |  6.86     Ca    8.6        09 Feb 2023 07:09  Ca    8.3        08 Feb 2023 06:39    Phos  4.4       08 Feb 2023 06:39    Mg     2.0       08 Feb 2023 06:39    TPro  5.3    /  Alb  x      /  TBili  x      /        08 Feb 2023 06:39  DBili  x      /  AST  x      /  ALT  x      /  AlkPhos  x        TPro  6.8    /  Alb  3.8    /  TBili  0.7    /        06 Feb 2023 09:49  DBili  x      /  AST  17     /  ALT  18     /  AlkPhos  83               Urine Studies:  Urinalysis (02.07.23 @ 18:20)   pH Urine: 5.0   Glucose Qualitative, Urine: Negative   Blood, Urine: Trace   Color: Yellow   Urine Appearance: Slightly Turbid   Bilirubin: Negative   Ketone - Urine: Negative   Specific Gravity: 1.010   Protein, Urine: 30 mg/dL   Urobilinogen: Negative   Nitrite: Negative   Leukocyte Esterase Concentration: Small   Urine Microscopic-Add On (NC) (02.07.23 @ 18:20)   Red Blood Cell - Urine: 0-2 /HPF   White Blood Cell - Urine: 6-10 /HPF   Bacteria: Many   Epithelial Cells: Few       RADIOLOGY & ADDITIONAL STUDIES:

## 2023-02-09 NOTE — PROGRESS NOTE ADULT - ASSESSMENT
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 prerenal component - will continue IVF    No HD as long as cr continues to improve    FenA> 1%    C3, c4 normal , other serologies negative so far    SPE, FLC,, IF negative so far    No recent IV contrast   avoid NSAID   renal doppler, renal USS - no hydro , left atrophic kidney - chronic    echo neg thrombus    No acute indication for HD today as long as Cr continues to imrpove    if Cr reaches ~ 3 then would observe off IVF x 24- 48 hrs if Cr rises    hold ASA /plavix for now in case HD catheter is planned     check daily labs     d/w Dr lloyd shipley pt in detail

## 2023-02-10 LAB
ANION GAP SERPL CALC-SCNC: 7 MMOL/L — SIGNIFICANT CHANGE UP (ref 5–17)
BUN SERPL-MCNC: 70 MG/DL — HIGH (ref 7–23)
CALCIUM SERPL-MCNC: 8.5 MG/DL — SIGNIFICANT CHANGE UP (ref 8.5–10.1)
CHLORIDE SERPL-SCNC: 118 MMOL/L — HIGH (ref 96–108)
CO2 SERPL-SCNC: 20 MMOL/L — LOW (ref 22–31)
CREAT SERPL-MCNC: 5.17 MG/DL — HIGH (ref 0.5–1.3)
EGFR: 8 ML/MIN/1.73M2 — LOW
GLUCOSE SERPL-MCNC: 96 MG/DL — SIGNIFICANT CHANGE UP (ref 70–99)
HCT VFR BLD CALC: 23 % — LOW (ref 34.5–45)
HGB BLD-MCNC: 7.7 G/DL — LOW (ref 11.5–15.5)
MCHC RBC-ENTMCNC: 32.4 PG — SIGNIFICANT CHANGE UP (ref 27–34)
MCHC RBC-ENTMCNC: 33.5 GM/DL — SIGNIFICANT CHANGE UP (ref 32–36)
MCV RBC AUTO: 96.6 FL — SIGNIFICANT CHANGE UP (ref 80–100)
PLATELET # BLD AUTO: 129 K/UL — LOW (ref 150–400)
POTASSIUM SERPL-MCNC: 4 MMOL/L — SIGNIFICANT CHANGE UP (ref 3.5–5.3)
POTASSIUM SERPL-SCNC: 4 MMOL/L — SIGNIFICANT CHANGE UP (ref 3.5–5.3)
RBC # BLD: 2.38 M/UL — LOW (ref 3.8–5.2)
RBC # FLD: 13.3 % — SIGNIFICANT CHANGE UP (ref 10.3–14.5)
SODIUM SERPL-SCNC: 145 MMOL/L — SIGNIFICANT CHANGE UP (ref 135–145)
URATE SERPL-MCNC: 9.8 MG/DL — HIGH (ref 2.5–7)
WBC # BLD: 7.5 K/UL — SIGNIFICANT CHANGE UP (ref 3.8–10.5)
WBC # FLD AUTO: 7.5 K/UL — SIGNIFICANT CHANGE UP (ref 3.8–10.5)

## 2023-02-10 PROCEDURE — 99232 SBSQ HOSP IP/OBS MODERATE 35: CPT

## 2023-02-10 RX ORDER — ASPIRIN/CALCIUM CARB/MAGNESIUM 324 MG
81 TABLET ORAL DAILY
Refills: 0 | Status: DISCONTINUED | OUTPATIENT
Start: 2023-02-10 | End: 2023-02-21

## 2023-02-10 RX ADMIN — PREGABALIN 2000 MICROGRAM(S): 225 CAPSULE ORAL at 09:21

## 2023-02-10 RX ADMIN — SODIUM CHLORIDE 50 MILLILITER(S): 9 INJECTION INTRAMUSCULAR; INTRAVENOUS; SUBCUTANEOUS at 16:16

## 2023-02-10 RX ADMIN — Medication 25 MILLIGRAM(S): at 09:21

## 2023-02-10 RX ADMIN — AMIODARONE HYDROCHLORIDE 200 MILLIGRAM(S): 400 TABLET ORAL at 09:22

## 2023-02-10 RX ADMIN — Medication 81 MILLIGRAM(S): at 17:03

## 2023-02-10 RX ADMIN — ATORVASTATIN CALCIUM 20 MILLIGRAM(S): 80 TABLET, FILM COATED ORAL at 21:26

## 2023-02-10 RX ADMIN — Medication 88 MICROGRAM(S): at 05:32

## 2023-02-10 RX ADMIN — HEPARIN SODIUM 5000 UNIT(S): 5000 INJECTION INTRAVENOUS; SUBCUTANEOUS at 17:03

## 2023-02-10 RX ADMIN — Medication 1 TABLET(S): at 09:21

## 2023-02-10 RX ADMIN — HEPARIN SODIUM 5000 UNIT(S): 5000 INJECTION INTRAVENOUS; SUBCUTANEOUS at 05:29

## 2023-02-10 RX ADMIN — FAMOTIDINE 10 MILLIGRAM(S): 10 INJECTION INTRAVENOUS at 09:21

## 2023-02-10 RX ADMIN — Medication 40 MILLIGRAM(S): at 10:09

## 2023-02-10 RX ADMIN — Medication 3 MILLIGRAM(S): at 21:26

## 2023-02-10 NOTE — PROGRESS NOTE ADULT - ASSESSMENT
88 yo female with hx of atrophic left kidney , CKD 4 w Cr ~ 2 post CABG in October and cr slowly rising since October    3--> 4 --> now Cr mid 5 and  sent to ED by me for further workup and possible dialysis initation     RADHA on CKD 4 w progression    but Cr continues to improve w low rate of IVF   ? prerenal component - will continue IVF    No HD as long as cr continues to improve    FenA> 1%    C3, c4 normal , other serologies negative so far    SPE, FLC,, IF negative so far    No recent IV contrast   avoid NSAID   renal doppler, renal USS - no hydro , left atrophic kidney - chronic    echo neg thrombus    No acute indication for HD today as long as Cr continues to imrpove w mild IVF    if Cr reaches ~ 3 then would observe off IVF x 24- 48 hrs if Cr rises    hold ASA /plavix for now in case HD catheter is planned     check daily labs     d/w Dr lloyd shipley pt in detail       * pt seen earlier, note now

## 2023-02-10 NOTE — PROGRESS NOTE ADULT - SUBJECTIVE AND OBJECTIVE BOX
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 Pt was sent to ED by me  for further workup and mgt.    Today pt feeling well    no complaints   urinating well      Denies NSAID use    MEDICATIONS  (STANDING):  aMIOdarone    Tablet 200 milliGRAM(s) Oral daily  atorvastatin 20 milliGRAM(s) Oral at bedtime  cyanocobalamin 2000 MICROGram(s) Oral daily  famotidine    Tablet 10 milliGRAM(s) Oral daily  heparin   Injectable 5000 Unit(s) SubCutaneous every 12 hours  levothyroxine 88 MICROGram(s) Oral daily  metoprolol succinate ER 25 milliGRAM(s) Oral daily  Nephro-shawna 1 Tablet(s) Oral daily  sodium chloride 0.9%. 1000 milliLiter(s) (50 mL/Hr) IV Continuous <Continuous>    Vital Signs Last 24 Hrs  T(C): 36.3 (09 Feb 2023 08:19), Max: 36.5 (08 Feb 2023 21:08)  T(F): 97.3 (09 Feb 2023 08:19), Max: 97.7 (08 Feb 2023 21:08)  HR: 70 (09 Feb 2023 09:39) (63 - 70)  BP: 133/59 (09 Feb 2023 09:39) (129/54 - 133/59)  BP(mean): 81 (08 Feb 2023 21:08) (81 - 81)  RR: 18 (09 Feb 2023 08:19) (18 - 18)  SpO2: 95% (09 Feb 2023 08:19) (94% - 95%)    Parameters below as of 09 Feb 2023 08:19  Patient On (Oxygen Delivery Method): room air    I&O's Detail    08 Feb 2023 07:01  -  09 Feb 2023 07:00  --------------------------------------------------------  IN:    Oral Fluid: 450 mL    sodium chloride 0.9%: 481 mL  Total IN: 931 mL    OUT:    Voided (mL): 400 mL  Total OUT: 400 mL    Total NET: 531 mL      09 Feb 2023 07:01  -  09 Feb 2023 16:24  --------------------------------------------------------  IN:    Oral Fluid: 340 mL  Total IN: 340 mL    OUT:  Total OUT: 0 mL    Total NET: 340 mL          PHYSICAL EXAM:    Constitutional: NAD  HEENT:  EOMI,  MM  Neck: No LAD, No JVD  Respiratory: CTAB  Cardiovascular: S1 and S2 + M  Gastrointestinal: BS+, soft, NT/ND  Extremities: no edema   Neurological: A/O x 3, no focal deficits  : No Bose  Skin: No rashes  Access: Not applicable    LABS:                                   7.7    7.50  )-----------( 129      ( 10 Feb 2023 06:41 )             23.0       145    |  118    |  70     ----------------------------<  96        10 Feb 2023 06:41  4.0     |  20     |  5.17     142    |  115    |  79     ----------------------------<  103       09 Feb 2023 07:09  3.8     |  22     |  5.68     Ca    8.5        10 Feb 2023 06:41    Phos  4.4       08 Feb 2023 06:39      Mg     2.0       08 Feb 2023 06:39    TPro  5.3    /  Alb  x      /  TBili  x      /        08 Feb 2023 06:39  DBili  x      /  AST  x      /  ALT  x      /  AlkPhos  x            Urine Studies:  Urinalysis (02.07.23 @ 18:20)   pH Urine: 5.0   Glucose Qualitative, Urine: Negative   Blood, Urine: Trace   Color: Yellow   Urine Appearance: Slightly Turbid   Bilirubin: Negative   Ketone - Urine: Negative   Specific Gravity: 1.010   Protein, Urine: 30 mg/dL   Urobilinogen: Negative   Nitrite: Negative   Leukocyte Esterase Concentration: Small   Urine Microscopic-Add On (NC) (02.07.23 @ 18:20)   Red Blood Cell - Urine: 0-2 /HPF   White Blood Cell - Urine: 6-10 /HPF   Bacteria: Many   Epithelial Cells: Few       RADIOLOGY & ADDITIONAL STUDIES:

## 2023-02-10 NOTE — PROGRESS NOTE ADULT - ASSESSMENT
MEDICATIONS  (STANDING):  aMIOdarone    Tablet 200 milliGRAM(s) Oral daily  aspirin enteric coated 81 milliGRAM(s) Oral daily  atorvastatin 20 milliGRAM(s) Oral at bedtime  cyanocobalamin 2000 MICROGram(s) Oral daily  famotidine    Tablet 10 milliGRAM(s) Oral daily  heparin   Injectable 5000 Unit(s) SubCutaneous every 12 hours  levothyroxine 88 MICROGram(s) Oral daily  metoprolol succinate ER 25 milliGRAM(s) Oral daily  Nephro-shawna 1 Tablet(s) Oral daily  predniSONE   Tablet 40 milliGRAM(s) Oral daily  sodium chloride 0.9%. 1000 milliLiter(s) (50 mL/Hr) IV Continuous <Continuous>    MEDICATIONS  (PRN):  acetaminophen     Tablet .. 650 milliGRAM(s) Oral every 6 hours PRN Mild Pain (1 - 3)  aluminum hydroxide/magnesium hydroxide/simethicone Suspension 30 milliLiter(s) Oral every 4 hours PRN Dyspepsia  melatonin 3 milliGRAM(s) Oral at bedtime PRN Insomnia  ondansetron Injectable 4 milliGRAM(s) IV Push every 8 hours PRN Nausea and/or Vomiting        ASSESSMENT/PLAN    RADHA on CKD. Slowly improving  -Uncleared etiology Appears to be intrinsic. ATN/Emboli? Unable to clinically determine. Recent CABG.   Personal history of transient urinary retention 7/2022. [place]adders scans negative for significant PVR.   -US findings noted  -Nephrology consulted  -I/Os  -Avoid nephrotoxic agents and/or adjust dose accordingly  -IVF as needed for fluid balance  -Continue to monitor Cr/Electrolytes  -Serologies and other Analysis ordered by nephrology for further evaluation.   -ANCA and CHINMAY negative. C3 and C4 complements WNL    Acute Gout Flare Right Great Toe  -Unable to clinically determine if present on admission.   -PO prednisone  -Uric Acid 9.8. Likely augmented by RADHA on CKD.     CAD s/p CABG (10/2022) without angina  Paroxysmal Atrial Fibrillation (on Amiodarone; not AC)  HTN  HLD  -ASA/Plavix on hold since 2/3 in anticipation of possibility requiring HD catheter. Resume aspirin (2/10). IF Cr continues to improve, tentatively resumption of plavix.   -Continue BB/Statin  -Resume ASA/plavix when possible  -TTE findings noted.   -Continue Amiodarone  -[id number]=6. Not on AC since 7/2022.   -Norvasc started 2 weeks prior to admission with reported subsequent increased LE edema. Discontinue Norvasc  -PRN Rx for elevated BP.     Normocytic Anemia. Stable.   -Likely component of CKD  -Stable and at baseline  -Ferritin/Iron/b12/folate levels all ok  -No overt signs of bleeding.     DVT Prophylaxis: heparin subq    Disposition  IVF; monitor cr. Nephro recs. Monitor Hgb. Prednisone for acute gout flare.         Goals of Care (GOC)/Advance Care Planning (ACP)  Date of Discussion/evaluation: 2/6/2023    PLAN:  Code Status: Full code  HCP: Primary is daughter, Zaida Sandhu ([phone]) and secondary is son, Isidro Nova ([phone])  Alternative Feeding methods: Would like to discuss or assess should the situation arise that it requires alternative feeding methods  Blood Product Transfusions: YES agreeable  Pressors: YES agreeable

## 2023-02-10 NOTE — PROGRESS NOTE ADULT - SUBJECTIVE AND OBJECTIVE BOX
CHIEF COMPLAINT: Elevated Cr. Baseline increased urinary frequency unchanges    SUBJECTIVE/SIGNIFICANT INTERVAL EVENTS/OVERNIGHT EVENTS:    2/7: Reports no complaints. Cr still relatively same. Hgb lower. Recheck ordered. No overt signs of bleeding.   2/8: Feeling well. Reporting no complaints. No urinary complaints. Denies fever, chills, chest pain, nausea, vomiting, abdominal pain/discomfort.   2/9: No complaints. Cr improving. Encourage adequate oral hydration  2/10: Feeling well. yesterday with right toe paint. base of right great toe red and tender. Uric acid elevated. Started prednisone today. Otherwise no new complaint. Cr improving    Review of Systems: 14 Point review of systems reviewed and reported as negative unless otherwise stated above    FROM H&P:  "87F with PMH of CKDIIIB, Atrophic Left Kidney, HTN, HLD, Valvular HD, CAD s/p CABG (10/2022), Pulm HTN, Hypothyroidism presented to the ER after being sent by her nephrologist for further evaluation and worsening Cr. Cr in mid 2s back in 7/2022-10/2022. Underwent CABG October 2022. Since then no new urinary complaints. Reports baseline frequent urination that remains unchanged. Denies fever, chills, nausea, vomiting, abdominal pain/discomfort, sensation of incomplete void, dysuria. Intentional weight loss of 40-50 pounds in the last year attributed to diet changes. Reporting increased LE edema since starting norvasc for improved BP control two weeks ago.     In the ER Tmax 97.8, HR 59, /56, RR 17, SpO2 100% on RA. Hgb 9.32 (baseline ~9), MCV 95.2, BUN 87, Cr 6.44, RVP unremarkable. "    PHYSICAL EXAM:    T(C): 36.6 (02-10-23 @ 08:25), Max: 36.6 (02-10-23 @ 08:25)  HR: 75 (02-10-23 @ 08:25) (72 - 75)  BP: 138/62 (02-10-23 @ 08:25) (138/62 - 154/82)  RR: 17 (02-10-23 @ 08:25) (16 - 18)  SpO2: 96% (02-10-23 @ 08:25) (96% - 96%)    General: AAOx3; NAD  Head: AT/NC  ENT: Moist Mucous Membranes; No Injury  Eyes: EOMI; PERRL  Neck: Non-tender; No JVD  CVS: RRR, S1&S2, murmur +; LE edema +  Respiratory: Lungs CTA B/L; Normal Respiratory Effort and saturation on RA  Abdomen/GI: Soft, non-tender, non-distended, no guarding, no rebound, normal bowel sounds  : No bladder distention, No Bose  Extremities: No cyanosis, No clubbing, LE 1+ pitting edema.   MSK: No CVA tenderness, Normal ROM, No injury  Neuro: AAOx3, CNII-XII grossly intact, non-focal  Psych: Appropriate, Cooperative,   Skin: Clean, Dry and Intact      LABS:                          7.7    7.50  )-----------( 129      ( 10 Feb 2023 06:41 )             23.0     02-10    145  |  118<H>  |  70<H>  ----------------------------<  96  4.0   |  20<L>  |  5.17<H>    Ca    8.5      10 Feb 2023 06:41        CAPILLARY BLOOD GLUCOSE                                8.0    7.33  )-----------( 127      ( 09 Feb 2023 07:09 )             23.6     02-09    142  |  115<H>  |  79<H>  ----------------------------<  103<H>  3.8   |  22  |  5.68<H>    Ca    8.6      09 Feb 2023 07:09  Phos  4.4     02-08  Mg     2.0     02-08    TPro  5.3<L>  /  Alb  x   /  TBili  x   /  DBili  x   /  AST  x   /  ALT  x   /  AlkPhos  x   02-08      CAPILLARY BLOOD GLUCOSE                                           7.5    6.52  )-----------( 129      ( 08 Feb 2023 06:39 )             22.9     02-08    141  |  113<H>  |  89<H>  ----------------------------<  93  3.9   |  23  |  6.19<H>    Ca    8.3<L>      08 Feb 2023 06:39  Phos  4.4     02-08  Mg     2.0     02-08        CAPILLARY BLOOD GLUCOSE    Antineutrophil Cytoplasmic Antibody (02.06.23 @ 14:30)   Atypical ANCA: Negative   Cytoplasmic (c-ANCA) Antibody: Negative   Perinuclear (p-ANCA) Antibody: Negative Iron with Total Binding Capacity (02.06.23 @ 14:30)   Iron - Total Binding Capacity.: 217 ug/dL   % Saturation, Iron: 21 %   Iron Total, Serum: 46 ug/dL   Unsaturated Iron Binding Capacity: 171 ug/dL B12 and Folate (02.06.23 @ 14:30)   Vitamin B12, Serum: >2000 pg/mL   Folate, Serum: 12.2 ng/mL Ferritin, Serum (02.06.23 @ 14:30)   Ferritin, Serum: 608 ng/mL C4 Complement, Serum (02.06.23 @ 14:30)   C4 Complement, Serum: 26 mg/dL C3 Complement, Serum (02.06.23 @ 14:30)   C3 Complement, Serum: 95 mg/dL                    7.9    7.44  )-----------( 134      ( 07 Feb 2023 09:22 )             23.6     02-07    143  |  113<H>  |  95<H>  ----------------------------<  98  4.3   |  20<L>  |  6.86<H>    Ca    8.8      07 Feb 2023 09:22    TPro  6.8  /  Alb  3.8  /  TBili  0.7  /  DBili  x   /  AST  17  /  ALT  18  /  AlkPhos  83  02-06      CAPILLARY BLOOD GLUCOSE      `Creatinine, Random Urine: 72: Sodium, Random Urine: 41:Iron with Total Binding Capacity (02.06.23 @ 14:30)   Iron - Total Binding Capacity.: 217 ug/dL   % Saturation, Iron: 21 %   Iron Total, Serum: 46 ug/dL   Unsaturated Iron Binding Capacity: 171 ug/dL B12 and Folate (02.06.23 @ 14:30)   Vitamin B12, Serum: >2000 pg/mL   Folate, Serum: 12.2 ng/mL Ferritin, Serum (02.06.23 @ 14:30)   Ferritin, Serum: 608 ng/mL C4 Complement, Serum (02.06.23 @ 14:30)   C4 Complement, Serum: 26 mg/dL C3 Complement, Serum (02.06.23 @ 14:30)   C3 Complement, Serum: 95 mg/dL Prothrombin Time and INR, Plasma in AM (02.06.23 @ 09:49)   Prothrombin Time, Plasma: 11.9 sec   INR: 1.03: Activated Partial Thromboplastin Time in AM (02.06.23 @ 09:49)   Activated Partial Thromboplastin Time: 21.7:     Uric Acid, Serum: 9.8 mg/dL (02.10.23 @ 06:41)         RADIOLOGY:  `< from: US Duplex Kidneys (02.06.23 @ 14:59) >  IMPRESSION:    Markedly atrophic left kidney which was not visualized.    No right hydronephrosis. Increased renal echogenicity and increased   resistiveindices consistent with medical renal disease.    Limited visualization of the right renal artery. No renal artery stenosis   is identified. If clinically indicated further evaluation may be   performed with MR angiogram.    < end of copied text >  `< from: Xray Chest 1 View- PORTABLE-Routine (Xray Chest 1 View- PORTABLE-Routine .) (02.06.23 @ 10:36) >    IMPRESSION:   No radiographic evidence of active chest disease.    < end of copied text >      EKG:  `< from: 12 Lead ECG (02.06.23 @ 10:02) >  Diagnosis Line *** Poor data quality, interpretation may be adversely affected  Sinus rhythm  Left axis deviation  Left ventricular hypertrophy with QRS widening  T wave abnormality, consider inferior ischemia  Abnormal ECG  When compared with ECG of 03-JUL-2022 07:33,  No significant change was found    < end of copied text >      ECHO:  `< from: TTE Echo Complete w/o Contrast w/ Doppler (02.07.23 @ 11:15) >   Summary     The mitral valve leaflets appear thickened.   Mild (1+) mitral regurgitation is present.   EA reversal of the mitral inflow consistent with reduced compliance of   the   left ventricle.   Moderate aortic sclerosis is present with minimally restricted valvular   opening.   Mild (1+) aortic regurgitation is present.   At least Mild tricuspid regurgitation is present.   Normal appearing pulmonic valve structure and function.   Trace pulmonic valvular regurgitation is present.   The left atrium ismildly dilated.   MIld concentric left ventricular hypertrophy is present.   Estimated left ventricular ejection fraction is 60-65 %.   There is a hypoechoic space anterior to the right ventricular free wall.   Cannot rule out loculated pericardial effusion Vs. pericardial fat pad.    < end of copied text >        I personally reviewed labs, orders and vitals.    Discussed case with:  [X]RN  [X]ELPIDIO/RIRI  [X]Patient  [X]Family  [X]Specialist: Nephrology

## 2023-02-11 LAB
ANION GAP SERPL CALC-SCNC: 7 MMOL/L — SIGNIFICANT CHANGE UP (ref 5–17)
BUN SERPL-MCNC: 67 MG/DL — HIGH (ref 7–23)
CALCIUM SERPL-MCNC: 8.5 MG/DL — SIGNIFICANT CHANGE UP (ref 8.5–10.1)
CHLORIDE SERPL-SCNC: 120 MMOL/L — HIGH (ref 96–108)
CO2 SERPL-SCNC: 19 MMOL/L — LOW (ref 22–31)
CREAT SERPL-MCNC: 5.21 MG/DL — HIGH (ref 0.5–1.3)
EGFR: 8 ML/MIN/1.73M2 — LOW
GLUCOSE SERPL-MCNC: 139 MG/DL — HIGH (ref 70–99)
HCT VFR BLD CALC: 23.1 % — LOW (ref 34.5–45)
HGB BLD-MCNC: 7.8 G/DL — LOW (ref 11.5–15.5)
MAGNESIUM SERPL-MCNC: 1.9 MG/DL — SIGNIFICANT CHANGE UP (ref 1.6–2.6)
MCHC RBC-ENTMCNC: 32.6 PG — SIGNIFICANT CHANGE UP (ref 27–34)
MCHC RBC-ENTMCNC: 33.8 GM/DL — SIGNIFICANT CHANGE UP (ref 32–36)
MCV RBC AUTO: 96.7 FL — SIGNIFICANT CHANGE UP (ref 80–100)
PHOSPHATE SERPL-MCNC: 4.3 MG/DL — SIGNIFICANT CHANGE UP (ref 2.5–4.5)
PLATELET # BLD AUTO: 118 K/UL — LOW (ref 150–400)
POTASSIUM SERPL-MCNC: 4.2 MMOL/L — SIGNIFICANT CHANGE UP (ref 3.5–5.3)
POTASSIUM SERPL-SCNC: 4.2 MMOL/L — SIGNIFICANT CHANGE UP (ref 3.5–5.3)
RBC # BLD: 2.39 M/UL — LOW (ref 3.8–5.2)
RBC # FLD: 13.1 % — SIGNIFICANT CHANGE UP (ref 10.3–14.5)
SODIUM SERPL-SCNC: 146 MMOL/L — HIGH (ref 135–145)
WBC # BLD: 6.42 K/UL — SIGNIFICANT CHANGE UP (ref 3.8–10.5)
WBC # FLD AUTO: 6.42 K/UL — SIGNIFICANT CHANGE UP (ref 3.8–10.5)

## 2023-02-11 PROCEDURE — 99232 SBSQ HOSP IP/OBS MODERATE 35: CPT

## 2023-02-11 RX ORDER — SODIUM CHLORIDE 9 MG/ML
1000 INJECTION, SOLUTION INTRAVENOUS
Refills: 0 | Status: DISCONTINUED | OUTPATIENT
Start: 2023-02-11 | End: 2023-02-13

## 2023-02-11 RX ADMIN — HEPARIN SODIUM 5000 UNIT(S): 5000 INJECTION INTRAVENOUS; SUBCUTANEOUS at 05:30

## 2023-02-11 RX ADMIN — AMIODARONE HYDROCHLORIDE 200 MILLIGRAM(S): 400 TABLET ORAL at 09:15

## 2023-02-11 RX ADMIN — ATORVASTATIN CALCIUM 20 MILLIGRAM(S): 80 TABLET, FILM COATED ORAL at 21:11

## 2023-02-11 RX ADMIN — FAMOTIDINE 10 MILLIGRAM(S): 10 INJECTION INTRAVENOUS at 09:13

## 2023-02-11 RX ADMIN — PREGABALIN 2000 MICROGRAM(S): 225 CAPSULE ORAL at 09:13

## 2023-02-11 RX ADMIN — Medication 40 MILLIGRAM(S): at 09:16

## 2023-02-11 RX ADMIN — SODIUM CHLORIDE 50 MILLILITER(S): 9 INJECTION, SOLUTION INTRAVENOUS at 10:55

## 2023-02-11 RX ADMIN — Medication 88 MICROGRAM(S): at 05:30

## 2023-02-11 RX ADMIN — Medication 1 TABLET(S): at 09:15

## 2023-02-11 RX ADMIN — Medication 25 MILLIGRAM(S): at 09:15

## 2023-02-11 RX ADMIN — HEPARIN SODIUM 5000 UNIT(S): 5000 INJECTION INTRAVENOUS; SUBCUTANEOUS at 18:02

## 2023-02-11 RX ADMIN — Medication 81 MILLIGRAM(S): at 09:16

## 2023-02-11 NOTE — PROGRESS NOTE ADULT - SUBJECTIVE AND OBJECTIVE BOX
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 Pt was sent to ED by me  for further workup and mgt.    Today pt feeling well    no complaints   urinating well      Denies NSAID use      MEDICATIONS  (STANDING):  aMIOdarone    Tablet 200 milliGRAM(s) Oral daily  aspirin enteric coated 81 milliGRAM(s) Oral daily  atorvastatin 20 milliGRAM(s) Oral at bedtime  cyanocobalamin 2000 MICROGram(s) Oral daily  famotidine    Tablet 10 milliGRAM(s) Oral daily  heparin   Injectable 5000 Unit(s) SubCutaneous every 12 hours  levothyroxine 88 MICROGram(s) Oral daily  metoprolol succinate ER 25 milliGRAM(s) Oral daily  Nephro-shawna 1 Tablet(s) Oral daily  predniSONE   Tablet 40 milliGRAM(s) Oral daily  sodium chloride 0.45%. 1000 milliLiter(s) (50 mL/Hr) IV Continuous <Continuous>      Vital Signs Last 24 Hrs  T(C): 36.9 (11 Feb 2023 16:13), Max: 37.1 (10 Feb 2023 21:02)  T(F): 98.4 (11 Feb 2023 16:13), Max: 98.8 (10 Feb 2023 21:02)  HR: 63 (11 Feb 2023 16:13) (62 - 65)  BP: 143/62 (11 Feb 2023 16:13) (122/60 - 143/67)  BP(mean): --  RR: 16 (11 Feb 2023 16:13) (16 - 18)  SpO2: 96% (11 Feb 2023 16:13) (95% - 98%)    Parameters below as of 11 Feb 2023 16:13  Patient On (Oxygen Delivery Method): room air    I&O's Detail    10 Feb 2023 07:01  -  11 Feb 2023 07:00  --------------------------------------------------------  IN:    Oral Fluid: 520 mL  Total IN: 520 mL    OUT:    Voided (mL): 900 mL  Total OUT: 900 mL    Total NET: -380 mL      11 Feb 2023 07:01  -  11 Feb 2023 17:49  --------------------------------------------------------  IN:    sodium chloride 0.45%: 400 mL  Total IN: 400 mL    OUT:  Total OUT: 0 mL    Total NET: 400 mL      PHYSICAL EXAM:    Constitutional: NAD  HEENT:  EOMI,  MM  Neck: No LAD, No JVD  Respiratory: CTAB  Cardiovascular: S1 and S2 + M  Gastrointestinal: BS+, soft, NT/ND  Extremities: no edema   Neurological: A/O x 3, no focal deficits  : No Bose  Skin: No rashes  Access: Not applicable    LABS:                                              7.8    6.42  )-----------( 118      ( 11 Feb 2023 07:38 )             23.1                         7.7    7.50  )-----------( 129      ( 10 Feb 2023 06:41 )             23.0         146    |  120    |  67     ----------------------------<  139       11 Feb 2023 07:38  4.2     |  19     |  5.21     145    |  118    |  70     ----------------------------<  96        10 Feb 2023 06:41  4.0     |  20     |  5.17     142    |  115    |  79     ----------------------------<  103       09 Feb 2023 07:09  3.8     |  22     |  5.68     Ca    8.5        11 Feb 2023 07:38  Ca    8.5        10 Feb 2023 06:41    Phos  4.3       11 Feb 2023 07:38  Phos  4.4       08 Feb 2023 06:39    Mg     1.9       11 Feb 2023 07:38  Mg     2.0       08 Feb 2023 06:39    TPro  5.3    /  Alb  x      /  TBili  x      /        08 Feb 2023 06:39  DBili  x      /  AST  x      /  ALT  x      /  AlkPhos  x              Urine Studies:  Urinalysis (02.07.23 @ 18:20)   pH Urine: 5.0   Glucose Qualitative, Urine: Negative   Blood, Urine: Trace   Color: Yellow   Urine Appearance: Slightly Turbid   Bilirubin: Negative   Ketone - Urine: Negative   Specific Gravity: 1.010   Protein, Urine: 30 mg/dL   Urobilinogen: Negative   Nitrite: Negative   Leukocyte Esterase Concentration: Small   Urine Microscopic-Add On (NC) (02.07.23 @ 18:20)   Red Blood Cell - Urine: 0-2 /HPF   White Blood Cell - Urine: 6-10 /HPF   Bacteria: Many   Epithelial Cells: Few       RADIOLOGY & ADDITIONAL STUDIES:

## 2023-02-11 NOTE — PROGRESS NOTE ADULT - ASSESSMENT
86 yo female with hx of atrophic left kidney , CKD 4 w Cr ~ 2 post CABG in October and cr slowly rising since October    3--> 4 --> now Cr mid 5 and  sent to ED by me for further workup and possible dialysis initation     RADHA on CKD 4 w slow progression since CABG   suspected atherembolic   Cr risen today despite IVF   continue to monitor - ? plateau      C3, c4 normal , other serologies negative so far    SPE, FLC,, IF negative so far    No recent IV contrast   avoid NSAID   renal doppler, renal USS - no hydro , left atrophic kidney - chronic    echo neg thrombus    No acute indication for HD today as long as Cr continues to improve w mild IVF   If Cr remains in 5's will need to arrange for HD as pt likely has reached her plateau     Hypernatremia    half NS     if Cr reaches ~ 3 then would observe off IVF x 24- 48 hrs if Cr rises    hold ASA /plavix for now in case HD catheter is planned     check daily labs     AOCKD   will arrange for KENNETH w HD      d/w Dr lloyd eduardow pt in detail       * pt seen earlier, note now

## 2023-02-11 NOTE — PROGRESS NOTE ADULT - SUBJECTIVE AND OBJECTIVE BOX
CHIEF COMPLAINT: Elevated Cr. Baseline increased urinary frequency unchanges    SUBJECTIVE/SIGNIFICANT INTERVAL EVENTS/OVERNIGHT EVENTS:    2/7: Reports no complaints. Cr still relatively same. Hgb lower. Recheck ordered. No overt signs of bleeding.   2/8: Feeling well. Reporting no complaints. No urinary complaints. Denies fever, chills, chest pain, nausea, vomiting, abdominal pain/discomfort.   2/9: No complaints. Cr improving. Encourage adequate oral hydration  2/10: Feeling well. yesterday with right toe paint. base of right great toe red and tender. Uric acid elevated. Started prednisone today. Otherwise no new complaint. Cr improving  2/11: No new complaints. Foot feels better. Cr stopped improving in last 24 hours. Continue IVF. Nephro recs.     Review of Systems: 14 Point review of systems reviewed and reported as negative unless otherwise stated above    FROM H&P:  "87F with PMH of CKDIIIB, Atrophic Left Kidney, HTN, HLD, Valvular HD, CAD s/p CABG (10/2022), Pulm HTN, Hypothyroidism presented to the ER after being sent by her nephrologist for further evaluation and worsening Cr. Cr in mid 2s back in 7/2022-10/2022. Underwent CABG October 2022. Since then no new urinary complaints. Reports baseline frequent urination that remains unchanged. Denies fever, chills, nausea, vomiting, abdominal pain/discomfort, sensation of incomplete void, dysuria. Intentional weight loss of 40-50 pounds in the last year attributed to diet changes. Reporting increased LE edema since starting norvasc for improved BP control two weeks ago.     In the ER Tmax 97.8, HR 59, /56, RR 17, SpO2 100% on RA. Hgb 9.32 (baseline ~9), MCV 95.2, BUN 87, Cr 6.44, RVP unremarkable. "    PHYSICAL EXAM:    T(C): 36.4 (02-11-23 @ 08:51), Max: 37.1 (02-10-23 @ 21:02)  HR: 62 (02-11-23 @ 08:51) (62 - 77)  BP: 122/60 (02-11-23 @ 08:51) (122/60 - 147/88)  RR: 16 (02-11-23 @ 08:51) (16 - 18)  SpO2: 98% (02-11-23 @ 08:51) (95% - 98%)    General: AAOx3; NAD  Head: AT/NC  ENT: Moist Mucous Membranes; No Injury  Eyes: EOMI; PERRL  Neck: Non-tender; No JVD  CVS: RRR, S1&S2, murmur +; LE edema +  Respiratory: Lungs CTA B/L; Normal Respiratory Effort and saturation on RA  Abdomen/GI: Soft, non-tender, non-distended, no guarding, no rebound, normal bowel sounds  : No bladder distention, No Bose  Extremities: No cyanosis, No clubbing, LE 1+ pitting edema.   MSK: No CVA tenderness, Normal ROM, No injury  Neuro: AAOx3, CNII-XII grossly intact, non-focal  Psych: Appropriate, Cooperative,   Skin: Clean, Dry and Intact      LABS:                          7.8    6.42  )-----------( 118      ( 11 Feb 2023 07:38 )             23.1     02-11    146<H>  |  120<H>  |  67<H>  ----------------------------<  139<H>  4.2   |  19<L>  |  5.21<H>    Ca    8.5      11 Feb 2023 07:38  Phos  4.3     02-11  Mg     1.9     02-11        CAPILLARY BLOOD GLUCOSE                                7.7    7.50  )-----------( 129      ( 10 Feb 2023 06:41 )             23.0     02-10    145  |  118<H>  |  70<H>  ----------------------------<  96  4.0   |  20<L>  |  5.17<H>    Ca    8.5      10 Feb 2023 06:41        CAPILLARY BLOOD GLUCOSE                                8.0    7.33  )-----------( 127      ( 09 Feb 2023 07:09 )             23.6     02-09    142  |  115<H>  |  79<H>  ----------------------------<  103<H>  3.8   |  22  |  5.68<H>    Ca    8.6      09 Feb 2023 07:09  Phos  4.4     02-08  Mg     2.0     02-08    TPro  5.3<L>  /  Alb  x   /  TBili  x   /  DBili  x   /  AST  x   /  ALT  x   /  AlkPhos  x   02-08      CAPILLARY BLOOD GLUCOSE                                           7.5    6.52  )-----------( 129      ( 08 Feb 2023 06:39 )             22.9     02-08    141  |  113<H>  |  89<H>  ----------------------------<  93  3.9   |  23  |  6.19<H>    Ca    8.3<L>      08 Feb 2023 06:39  Phos  4.4     02-08  Mg     2.0     02-08        CAPILLARY BLOOD GLUCOSE    Antineutrophil Cytoplasmic Antibody (02.06.23 @ 14:30)   Atypical ANCA: Negative   Cytoplasmic (c-ANCA) Antibody: Negative   Perinuclear (p-ANCA) Antibody: Negative Iron with Total Binding Capacity (02.06.23 @ 14:30)   Iron - Total Binding Capacity.: 217 ug/dL   % Saturation, Iron: 21 %   Iron Total, Serum: 46 ug/dL   Unsaturated Iron Binding Capacity: 171 ug/dL B12 and Folate (02.06.23 @ 14:30)   Vitamin B12, Serum: >2000 pg/mL   Folate, Serum: 12.2 ng/mL Ferritin, Serum (02.06.23 @ 14:30)   Ferritin, Serum: 608 ng/mL C4 Complement, Serum (02.06.23 @ 14:30)   C4 Complement, Serum: 26 mg/dL C3 Complement, Serum (02.06.23 @ 14:30)   C3 Complement, Serum: 95 mg/dL                    7.9    7.44  )-----------( 134      ( 07 Feb 2023 09:22 )             23.6     02-07    143  |  113<H>  |  95<H>  ----------------------------<  98  4.3   |  20<L>  |  6.86<H>    Ca    8.8      07 Feb 2023 09:22    TPro  6.8  /  Alb  3.8  /  TBili  0.7  /  DBili  x   /  AST  17  /  ALT  18  /  AlkPhos  83  02-06      CAPILLARY BLOOD GLUCOSE      `Creatinine, Random Urine: 72: Sodium, Random Urine: 41:Iron with Total Binding Capacity (02.06.23 @ 14:30)   Iron - Total Binding Capacity.: 217 ug/dL   % Saturation, Iron: 21 %   Iron Total, Serum: 46 ug/dL   Unsaturated Iron Binding Capacity: 171 ug/dL B12 and Folate (02.06.23 @ 14:30)   Vitamin B12, Serum: >2000 pg/mL   Folate, Serum: 12.2 ng/mL Ferritin, Serum (02.06.23 @ 14:30)   Ferritin, Serum: 608 ng/mL C4 Complement, Serum (02.06.23 @ 14:30)   C4 Complement, Serum: 26 mg/dL C3 Complement, Serum (02.06.23 @ 14:30)   C3 Complement, Serum: 95 mg/dL Prothrombin Time and INR, Plasma in AM (02.06.23 @ 09:49)   Prothrombin Time, Plasma: 11.9 sec   INR: 1.03: Activated Partial Thromboplastin Time in AM (02.06.23 @ 09:49)   Activated Partial Thromboplastin Time: 21.7:     Uric Acid, Serum: 9.8 mg/dL (02.10.23 @ 06:41)         RADIOLOGY:  `< from: US Duplex Kidneys (02.06.23 @ 14:59) >  IMPRESSION:    Markedly atrophic left kidney which was not visualized.    No right hydronephrosis. Increased renal echogenicity and increased   resistiveindices consistent with medical renal disease.    Limited visualization of the right renal artery. No renal artery stenosis   is identified. If clinically indicated further evaluation may be   performed with MR angiogram.    < end of copied text >  `< from: Xray Chest 1 View- PORTABLE-Routine (Xray Chest 1 View- PORTABLE-Routine .) (02.06.23 @ 10:36) >    IMPRESSION:   No radiographic evidence of active chest disease.    < end of copied text >      EKG:  `< from: 12 Lead ECG (02.06.23 @ 10:02) >  Diagnosis Line *** Poor data quality, interpretation may be adversely affected  Sinus rhythm  Left axis deviation  Left ventricular hypertrophy with QRS widening  T wave abnormality, consider inferior ischemia  Abnormal ECG  When compared with ECG of 03-JUL-2022 07:33,  No significant change was found    < end of copied text >      ECHO:  `< from: TTE Echo Complete w/o Contrast w/ Doppler (02.07.23 @ 11:15) >   Summary     The mitral valve leaflets appear thickened.   Mild (1+) mitral regurgitation is present.   EA reversal of the mitral inflow consistent with reduced compliance of   the   left ventricle.   Moderate aortic sclerosis is present with minimally restricted valvular   opening.   Mild (1+) aortic regurgitation is present.   At least Mild tricuspid regurgitation is present.   Normal appearing pulmonic valve structure and function.   Trace pulmonic valvular regurgitation is present.   The left atrium ismildly dilated.   MIld concentric left ventricular hypertrophy is present.   Estimated left ventricular ejection fraction is 60-65 %.   There is a hypoechoic space anterior to the right ventricular free wall.   Cannot rule out loculated pericardial effusion Vs. pericardial fat pad.    < end of copied text >        I personally reviewed labs, orders and vitals.    Discussed case with:  [X]RN  [X]ELPIDIO/RIRI  [X]Patient  [X]Family  [X]Specialist: Nephrology

## 2023-02-11 NOTE — PROGRESS NOTE ADULT - ASSESSMENT
MEDICATIONS  (STANDING):  aMIOdarone    Tablet 200 milliGRAM(s) Oral daily  aspirin enteric coated 81 milliGRAM(s) Oral daily  atorvastatin 20 milliGRAM(s) Oral at bedtime  cyanocobalamin 2000 MICROGram(s) Oral daily  famotidine    Tablet 10 milliGRAM(s) Oral daily  heparin   Injectable 5000 Unit(s) SubCutaneous every 12 hours  levothyroxine 88 MICROGram(s) Oral daily  metoprolol succinate ER 25 milliGRAM(s) Oral daily  Nephro-shawna 1 Tablet(s) Oral daily  predniSONE   Tablet 40 milliGRAM(s) Oral daily  sodium chloride 0.9%. 1000 milliLiter(s) (50 mL/Hr) IV Continuous <Continuous>    MEDICATIONS  (PRN):  acetaminophen     Tablet .. 650 milliGRAM(s) Oral every 6 hours PRN Mild Pain (1 - 3)  aluminum hydroxide/magnesium hydroxide/simethicone Suspension 30 milliLiter(s) Oral every 4 hours PRN Dyspepsia  melatonin 3 milliGRAM(s) Oral at bedtime PRN Insomnia  ondansetron Injectable 4 milliGRAM(s) IV Push every 8 hours PRN Nausea and/or Vomiting        ASSESSMENT/PLAN    RADHA on CKD. Slowly improving until 2/10-2/11 with flattening of Cr improvement.   -Uncleared etiology Appears to be intrinsic. ATN/Emboli? Unable to clinically determine. Recent CABG.   Personal history of transient urinary retention 7/2022. Bladders scans negative for significant PVR.   -US findings noted  -Nephrology consulted  -I/Os  -Avoid nephrotoxic agents and/or adjust dose accordingly  -IVF as needed for fluid balance  -Continue to monitor Cr/Electrolytes  -Serologies and other Analysis ordered by nephrology for further evaluation.   -ANCA and CHINMAY negative. C3 and C4 complements WNL    Acute Gout Flare Right Great Toe  -Unable to clinically determine if present on admission.   -PO prednisone  -Uric Acid 9.8. Likely augmented by RADHA on CKD.     CAD s/p CABG (10/2022) without angina  Paroxysmal Atrial Fibrillation (on Amiodarone; not AC)  HTN  HLD  -ASA/Plavix on hold since 2/3 in anticipation of possibility requiring HD catheter. Resume aspirin (2/10). IF Cr continues to improve, tentatively resumption of plavix.   -Continue BB/Statin  -Resume ASA/plavix when possible  -TTE findings noted.   -Continue Amiodarone  -[id number]=4. Not on AC since 7/2022.   -Norvasc started 2 weeks prior to admission with reported subsequent increased LE edema. Discontinue Norvasc  -PRN Rx for elevated BP.     Normocytic Anemia. Stable.   -Likely component of CKD  -Stable and at baseline  -Ferritin/Iron/b12/folate levels all ok  -No overt signs of bleeding.     DVT Prophylaxis: heparin subq    Disposition  IVF; monitor cr. Nephro recs. Monitor Hgb. Prednisone for acute gout flare.         Goals of Care (GOC)/Advance Care Planning (ACP)  Date of Discussion/evaluation: 2/6/2023    PLAN:  Code Status: Full code  HCP: Primary is daughterZaida ([phone]) and secondary is son, Isidro Nova ([phone])  Alternative Feeding methods: Would like to discuss or assess should the situation arise that it requires alternative feeding methods  Blood Product Transfusions: YES agreeable  Pressors: YES agreeable

## 2023-02-12 LAB
ANION GAP SERPL CALC-SCNC: 7 MMOL/L — SIGNIFICANT CHANGE UP (ref 5–17)
BUN SERPL-MCNC: 69 MG/DL — HIGH (ref 7–23)
CALCIUM SERPL-MCNC: 8.6 MG/DL — SIGNIFICANT CHANGE UP (ref 8.5–10.1)
CHLORIDE SERPL-SCNC: 116 MMOL/L — HIGH (ref 96–108)
CO2 SERPL-SCNC: 19 MMOL/L — LOW (ref 22–31)
CREAT SERPL-MCNC: 4.7 MG/DL — HIGH (ref 0.5–1.3)
EGFR: 9 ML/MIN/1.73M2 — LOW
GLUCOSE SERPL-MCNC: 133 MG/DL — HIGH (ref 70–99)
POTASSIUM SERPL-MCNC: 4.3 MMOL/L — SIGNIFICANT CHANGE UP (ref 3.5–5.3)
POTASSIUM SERPL-SCNC: 4.3 MMOL/L — SIGNIFICANT CHANGE UP (ref 3.5–5.3)
SODIUM SERPL-SCNC: 142 MMOL/L — SIGNIFICANT CHANGE UP (ref 135–145)

## 2023-02-12 PROCEDURE — 99232 SBSQ HOSP IP/OBS MODERATE 35: CPT

## 2023-02-12 RX ADMIN — Medication 88 MICROGRAM(S): at 06:03

## 2023-02-12 RX ADMIN — ATORVASTATIN CALCIUM 20 MILLIGRAM(S): 80 TABLET, FILM COATED ORAL at 21:38

## 2023-02-12 RX ADMIN — Medication 25 MILLIGRAM(S): at 10:08

## 2023-02-12 RX ADMIN — Medication 81 MILLIGRAM(S): at 10:08

## 2023-02-12 RX ADMIN — SODIUM CHLORIDE 50 MILLILITER(S): 9 INJECTION, SOLUTION INTRAVENOUS at 05:11

## 2023-02-12 RX ADMIN — Medication 1 TABLET(S): at 10:09

## 2023-02-12 RX ADMIN — AMIODARONE HYDROCHLORIDE 200 MILLIGRAM(S): 400 TABLET ORAL at 10:09

## 2023-02-12 RX ADMIN — FAMOTIDINE 10 MILLIGRAM(S): 10 INJECTION INTRAVENOUS at 10:09

## 2023-02-12 RX ADMIN — HEPARIN SODIUM 5000 UNIT(S): 5000 INJECTION INTRAVENOUS; SUBCUTANEOUS at 17:40

## 2023-02-12 RX ADMIN — HEPARIN SODIUM 5000 UNIT(S): 5000 INJECTION INTRAVENOUS; SUBCUTANEOUS at 06:03

## 2023-02-12 RX ADMIN — SODIUM CHLORIDE 50 MILLILITER(S): 9 INJECTION, SOLUTION INTRAVENOUS at 21:39

## 2023-02-12 RX ADMIN — PREGABALIN 2000 MICROGRAM(S): 225 CAPSULE ORAL at 10:09

## 2023-02-12 RX ADMIN — Medication 40 MILLIGRAM(S): at 10:09

## 2023-02-12 NOTE — PROGRESS NOTE ADULT - SUBJECTIVE AND OBJECTIVE BOX
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 Pt was sent to ED by me  for further workup and mgt.    Today pt feeling well    no complaints   urinating well      Denies NSAID use    MEDICATIONS  (STANDING):  aMIOdarone    Tablet 200 milliGRAM(s) Oral daily  aspirin enteric coated 81 milliGRAM(s) Oral daily  atorvastatin 20 milliGRAM(s) Oral at bedtime  cyanocobalamin 2000 MICROGram(s) Oral daily  famotidine    Tablet 10 milliGRAM(s) Oral daily  heparin   Injectable 5000 Unit(s) SubCutaneous every 12 hours  levothyroxine 88 MICROGram(s) Oral daily  metoprolol succinate ER 25 milliGRAM(s) Oral daily  Nephro-shawna 1 Tablet(s) Oral daily  predniSONE   Tablet 40 milliGRAM(s) Oral daily  sodium chloride 0.45%. 1000 milliLiter(s) (50 mL/Hr) IV Continuous <Continuous>      Vital Signs Last 24 Hrs  T(C): 37 (12 Feb 2023 14:53), Max: 37 (12 Feb 2023 14:53)  T(F): 98.6 (12 Feb 2023 14:53), Max: 98.6 (12 Feb 2023 14:53)  HR: 62 (12 Feb 2023 14:53) (62 - 62)  BP: 149/69 (12 Feb 2023 14:53) (144/57 - 149/69)  BP(mean): 94 (12 Feb 2023 14:53) (83 - 94)  RR: 19 (12 Feb 2023 14:53) (18 - 19)  SpO2: 96% (12 Feb 2023 14:53) (94% - 96%)    Parameters below as of 12 Feb 2023 14:53  Patient On (Oxygen Delivery Method): room air      I&O's Detail    11 Feb 2023 07:01  -  12 Feb 2023 07:00  --------------------------------------------------------  IN:    sodium chloride 0.45%: 1084 mL  Total IN: 1084 mL    OUT:    Voided (mL): 550 mL  Total OUT: 550 mL    Total NET: 534 mL      12 Feb 2023 07:01  -  12 Feb 2023 21:46  --------------------------------------------------------  IN:    sodium chloride 0.45%: 500 mL  Total IN: 500 mL    OUT:    Voided (mL): 1190 mL  Total OUT: 1190 mL    Total NET: -690 mL          PHYSICAL EXAM:    Constitutional: NAD  HEENT:  EOMI,  MM  Neck: No LAD, No JVD  Respiratory: CTAB  Cardiovascular: S1 and S2 + M  Gastrointestinal: BS+, soft, NT/ND  Extremities: no edema   Neurological: A/O x 3, no focal deficits  : No Bose  Skin: No rashes  Access: Not applicable    LABS:                                            7.8    6.42  )-----------( 118      ( 11 Feb 2023 07:38 )             23.1                    142    |  116    |  69     ----------------------------<  133       12 Feb 2023 07:25  4.3     |  19     |  4.70     146    |  120    |  67     ----------------------------<  139       11 Feb 2023 07:38  4.2     |  19     |  5.21     145    |  118    |  70     ----------------------------<  96        10 Feb 2023 06:41  4.0     |  20     |  5.17     Ca    8.6        12 Feb 2023 07:25  Ca    8.5        11 Feb 2023 07:38    Phos  4.3       11 Feb 2023 07:38    Mg     1.9       11 Feb 2023 07:38                Urine Studies:  Urinalysis (02.07.23 @ 18:20)   pH Urine: 5.0   Glucose Qualitative, Urine: Negative   Blood, Urine: Trace   Color: Yellow   Urine Appearance: Slightly Turbid   Bilirubin: Negative   Ketone - Urine: Negative   Specific Gravity: 1.010   Protein, Urine: 30 mg/dL   Urobilinogen: Negative   Nitrite: Negative   Leukocyte Esterase Concentration: Small   Urine Microscopic-Add On (NC) (02.07.23 @ 18:20)   Red Blood Cell - Urine: 0-2 /HPF   White Blood Cell - Urine: 6-10 /HPF   Bacteria: Many   Epithelial Cells: Few       RADIOLOGY & ADDITIONAL STUDIES:

## 2023-02-12 NOTE — PROGRESS NOTE ADULT - ASSESSMENT
MEDICATIONS  (STANDING):  aMIOdarone    Tablet 200 milliGRAM(s) Oral daily  aspirin enteric coated 81 milliGRAM(s) Oral daily  atorvastatin 20 milliGRAM(s) Oral at bedtime  cyanocobalamin 2000 MICROGram(s) Oral daily  famotidine    Tablet 10 milliGRAM(s) Oral daily  heparin   Injectable 5000 Unit(s) SubCutaneous every 12 hours  levothyroxine 88 MICROGram(s) Oral daily  metoprolol succinate ER 25 milliGRAM(s) Oral daily  Nephro-shawna 1 Tablet(s) Oral daily  predniSONE   Tablet 40 milliGRAM(s) Oral daily  sodium chloride 0.45%. 1000 milliLiter(s) (50 mL/Hr) IV Continuous <Continuous>    MEDICATIONS  (PRN):  acetaminophen     Tablet .. 650 milliGRAM(s) Oral every 6 hours PRN Mild Pain (1 - 3)  aluminum hydroxide/magnesium hydroxide/simethicone Suspension 30 milliLiter(s) Oral every 4 hours PRN Dyspepsia  melatonin 3 milliGRAM(s) Oral at bedtime PRN Insomnia  ondansetron Injectable 4 milliGRAM(s) IV Push every 8 hours PRN Nausea and/or Vomiting        ASSESSMENT/PLAN    RADHA on CKD. Slowly improving until 2/10-2/11 with flattening of Cr improvement.   -Uncleared etiology Appears to be intrinsic. ATN/Emboli? Unable to clinically determine. Recent CABG.   Personal history of transient urinary retention 7/2022. Bladders scans negative for significant PVR.   -US findings noted  -Nephrology consulted  -I/Os  -Avoid nephrotoxic agents and/or adjust dose accordingly  -IVF as needed for fluid balance  -Continue to monitor Cr/Electrolytes  -Serologies and other Analysis ordered by nephrology for further evaluation all unremarkable.   -ANCA and CHINMAY negative. C3 and C4 complements WNL    Acute Gout Flare Right Great Toe. Improving  -Unable to clinically determine if present on admission.   -PO prednisone  -Uric Acid 9.8. Likely augmented by RADHA on CKD.     CAD s/p CABG (10/2022) without angina  Paroxysmal Atrial Fibrillation (on Amiodarone; not AC)  HTN  HLD  -ASA/Plavix on hold since 2/3 in anticipation of possibility requiring HD catheter. Resume aspirin (2/10). IF Cr continues to improve, tentatively resumption of plavix.   -Continue BB/Statin  -Resume ASA/plavix when possible  -TTE findings noted.   -Continue Amiodarone  -[id number]=0. Not on AC since 7/2022.   -Norvasc started 2 weeks prior to admission with reported subsequent increased LE edema. Discontinue Norvasc  -PRN Rx for elevated BP.     Normocytic Anemia. Stable.   -Likely component of CKD  -Stable and at baseline  -Ferritin/Iron/b12/folate levels all ok  -No overt signs of bleeding.   -Epo low normal in the setting of CKD. Epo as per nephro    DVT Prophylaxis: heparin subq    Disposition  IVF; monitor cr. Nephro recs. Monitor Hgb. Prednisone for acute gout flare. Clinically improving. Cr slowly improving. Still likely >2 days prior to discharge.         Goals of Care (GOC)/Advance Care Planning (ACP)  Date of Discussion/evaluation: 2/6/2023    PLAN:  Code Status: Full code  HCP: Primary is daughterZaida ([phone]) and secondary is son, Isidro Nova ([phone])  Alternative Feeding methods: Would like to discuss or assess should the situation arise that it requires alternative feeding methods  Blood Product Transfusions: YES agreeable  Pressors: YES agreeable

## 2023-02-12 NOTE — PROGRESS NOTE ADULT - SUBJECTIVE AND OBJECTIVE BOX
CHIEF COMPLAINT: Elevated Cr. Baseline increased urinary frequency unchanges    SUBJECTIVE/SIGNIFICANT INTERVAL EVENTS/OVERNIGHT EVENTS:    2/7: Reports no complaints. Cr still relatively same. Hgb lower. Recheck ordered. No overt signs of bleeding.   2/8: Feeling well. Reporting no complaints. No urinary complaints. Denies fever, chills, chest pain, nausea, vomiting, abdominal pain/discomfort.   2/9: No complaints. Cr improving. Encourage adequate oral hydration  2/10: Feeling well. yesterday with right toe paint. base of right great toe red and tender. Uric acid elevated. Started prednisone today. Otherwise no new complaint. Cr improving  2/11: No new complaints. Foot feels better. Cr stopped improving in last 24 hours. Continue IVF. Nephro recs.   2/12: Right feeling much better (pain near gone). Cr improving. Tolerating IVF. No urinary complaints.     Review of Systems: 14 Point review of systems reviewed and reported as negative unless otherwise stated above    FROM H&P:  "87F with PMH of CKDIIIB, Atrophic Left Kidney, HTN, HLD, Valvular HD, CAD s/p CABG (10/2022), Pulm HTN, Hypothyroidism presented to the ER after being sent by her nephrologist for further evaluation and worsening Cr. Cr in mid 2s back in 7/2022-10/2022. Underwent CABG October 2022. Since then no new urinary complaints. Reports baseline frequent urination that remains unchanged. Denies fever, chills, nausea, vomiting, abdominal pain/discomfort, sensation of incomplete void, dysuria. Intentional weight loss of 40-50 pounds in the last year attributed to diet changes. Reporting increased LE edema since starting norvasc for improved BP control two weeks ago.     In the ER Tmax 97.8, HR 59, /56, RR 17, SpO2 100% on RA. Hgb 9.32 (baseline ~9), MCV 95.2, BUN 87, Cr 6.44, RVP unremarkable. "    PHYSICAL EXAM:    T(C): 36.3 (02-12-23 @ 07:44), Max: 36.9 (02-11-23 @ 16:13)  HR: 62 (02-12-23 @ 07:44) (62 - 65)  BP: 144/57 (02-12-23 @ 07:44) (134/65 - 144/57)  RR: 18 (02-12-23 @ 07:44) (16 - 18)  SpO2: 94% (02-12-23 @ 07:44) (94% - 96%)    General: AAOx3; NAD  Head: AT/NC  ENT: Moist Mucous Membranes; No Injury  Eyes: EOMI; PERRL  Neck: Non-tender; No JVD  CVS: RRR, S1&S2, murmur +; LE edema +  Respiratory: Lungs CTA B/L; Normal Respiratory Effort and saturation on RA  Abdomen/GI: Soft, non-tender, non-distended, no guarding, no rebound, normal bowel sounds  : No bladder distention, No Bose  Extremities: No cyanosis, No clubbing, LE 1+ pitting edema.   MSK: No CVA tenderness, Normal ROM, No injury  Neuro: AAOx3, CNII-XII grossly intact, non-focal  Psych: Appropriate, Cooperative,   Skin: Clean, Dry and Intact      LABS:                          7.8    6.42  )-----------( 118      ( 11 Feb 2023 07:38 )             23.1     02-12    142  |  116<H>  |  69<H>  ----------------------------<  133<H>  4.3   |  19<L>  |  4.70<H>    Ca    8.6      12 Feb 2023 07:25  Phos  4.3     02-11  Mg     1.9     02-11        CAPILLARY BLOOD GLUCOSE      Creatinine, Serum: 4.70 mg/dL (02.12.23 @ 07:25)   Creatinine, Serum: 5.21 mg/dL (02.11.23 @ 07:38)   Creatinine, Serum: 5.17 mg/dL (02.10.23 @ 06:41)   Creatinine, Serum: 5.68 mg/dL (02.09.23 @ 07:09)   Creatinine, Serum: 6.19 mg/dL (02.08.23 @ 06:39)   Creatinine, Serum: 6.86 mg/dL (02.07.23 @ 09:22)   Creatinine, Serum: 6.44 mg/dL (02.06.23 @ 09:49)   Creatinine, Serum: 2.41 mg/dL (07.08.22 @ 06:47)   Antineutrophil Cytoplasmic Antibody (02.06.23 @ 14:30)   Atypical ANCA: Negative   Cytoplasmic (c-ANCA) Antibody: Negative   Perinuclear (p-ANCA) Antibody: Negative Iron with Total Binding Capacity (02.06.23 @ 14:30)   Iron - Total Binding Capacity.: 217 ug/dL   % Saturation, Iron: 21 %   Iron Total, Serum: 46 ug/dL   Unsaturated Iron Binding Capacity: 171 ug/dL B12 and Folate (02.06.23 @ 14:30)   Vitamin B12, Serum: >2000 pg/mL   Folate, Serum: 12.2 ng/mL Ferritin, Serum (02.06.23 @ 14:30)   Ferritin, Serum: 608 ng/mL C4 Complement, Serum (02.06.23 @ 14:30)   C4 Complement, Serum: 26 mg/dL C3 Complement, Serum (02.06.23 @ 14:30)   C3 Complement, Serum: 95 mg/dL                    7.9    7.44  )-----------( 134      ( 07 Feb 2023 09:22 )             23.6     02-07    143  |  113<H>  |  95<H>  ----------------------------<  98  4.3   |  20<L>  |  6.86<H>    Ca    8.8      07 Feb 2023 09:22    TPro  6.8  /  Alb  3.8  /  TBili  0.7  /  DBili  x   /  AST  17  /  ALT  18  /  AlkPhos  83  02-06      CAPILLARY BLOOD GLUCOSE      `Creatinine, Random Urine: 72: Sodium, Random Urine: 41:Iron with Total Binding Capacity (02.06.23 @ 14:30)   Iron - Total Binding Capacity.: 217 ug/dL   % Saturation, Iron: 21 %   Iron Total, Serum: 46 ug/dL   Unsaturated Iron Binding Capacity: 171 ug/dL B12 and Folate (02.06.23 @ 14:30)   Vitamin B12, Serum: >2000 pg/mL   Folate, Serum: 12.2 ng/mL Ferritin, Serum (02.06.23 @ 14:30)   Ferritin, Serum: 608 ng/mL C4 Complement, Serum (02.06.23 @ 14:30)   C4 Complement, Serum: 26 mg/dL C3 Complement, Serum (02.06.23 @ 14:30)   C3 Complement, Serum: 95 mg/dL Prothrombin Time and INR, Plasma in AM (02.06.23 @ 09:49)   Prothrombin Time, Plasma: 11.9 sec   INR: 1.03: Activated Partial Thromboplastin Time in AM (02.06.23 @ 09:49)   Activated Partial Thromboplastin Time: 21.7:     Uric Acid, Serum: 9.8 mg/dL (02.10.23 @ 06:41)         RADIOLOGY:  `< from: US Duplex Kidneys (02.06.23 @ 14:59) >  IMPRESSION:    Markedly atrophic left kidney which was not visualized.    No right hydronephrosis. Increased renal echogenicity and increased   resistiveindices consistent with medical renal disease.    Limited visualization of the right renal artery. No renal artery stenosis   is identified. If clinically indicated further evaluation may be   performed with MR angiogram.    < end of copied text >  `< from: Xray Chest 1 View- PORTABLE-Routine (Xray Chest 1 View- PORTABLE-Routine .) (02.06.23 @ 10:36) >    IMPRESSION:   No radiographic evidence of active chest disease.    < end of copied text >      EKG:  `< from: 12 Lead ECG (02.06.23 @ 10:02) >  Diagnosis Line *** Poor data quality, interpretation may be adversely affected  Sinus rhythm  Left axis deviation  Left ventricular hypertrophy with QRS widening  T wave abnormality, consider inferior ischemia  Abnormal ECG  When compared with ECG of 03-JUL-2022 07:33,  No significant change was found    < end of copied text >      ECHO:  `< from: TTE Echo Complete w/o Contrast w/ Doppler (02.07.23 @ 11:15) >   Summary     The mitral valve leaflets appear thickened.   Mild (1+) mitral regurgitation is present.   EA reversal of the mitral inflow consistent with reduced compliance of   the   left ventricle.   Moderate aortic sclerosis is present with minimally restricted valvular   opening.   Mild (1+) aortic regurgitation is present.   At least Mild tricuspid regurgitation is present.   Normal appearing pulmonic valve structure and function.   Trace pulmonic valvular regurgitation is present.   The left atrium ismildly dilated.   MIld concentric left ventricular hypertrophy is present.   Estimated left ventricular ejection fraction is 60-65 %.   There is a hypoechoic space anterior to the right ventricular free wall.   Cannot rule out loculated pericardial effusion Vs. pericardial fat pad.    < end of copied text >        I personally reviewed labs, orders and vitals.    Discussed case with:  [X]RN  [X]CM/RIRI  [X]Patient  [X]Family  [X]Specialist: Nephrology

## 2023-02-12 NOTE — PROGRESS NOTE ADULT - ASSESSMENT
88 yo female with hx of atrophic left kidney , CKD 4 w Cr ~ 2 post CABG in October and cr slowly rising since October    3--> 4 --> now Cr mid 5 and  sent to ED by me for further workup and possible dialysis initation     RADHA on CKD 4 w slow progression since CABG    baseline Cr ~ 2   Cr impromng w gentle IVF - continue     C3, c4 normal , other serologies negative so far    SPE, FLC,, IF negative so far    No recent IV contrast   avoid NSAID   renal doppler, renal USS - no hydro , left atrophic kidney - chronic    echo neg thrombus    No acute indication for HD today as long as Cr continues to improve w mild IVF   If Cr contineus to improve ~ 3 then would observe off IVF x 24- 48 hrs if Cr rises    check daily labs      d/w Dr lloyd eduardow pt in detail       * pt seen earlier, note now

## 2023-02-13 ENCOUNTER — TRANSCRIPTION ENCOUNTER (OUTPATIENT)
Age: 88
End: 2023-02-13

## 2023-02-13 LAB
% ALBUMIN: 63.3 % — SIGNIFICANT CHANGE UP
% ALPHA 1: 6.9 % — SIGNIFICANT CHANGE UP
% ALPHA 2: 12.1 % — SIGNIFICANT CHANGE UP
% BETA: 9.3 % — SIGNIFICANT CHANGE UP
% GAMMA: 8.4 % — SIGNIFICANT CHANGE UP
ALBUMIN SERPL ELPH-MCNC: 3.4 G/DL — LOW (ref 3.6–5.5)
ALBUMIN/GLOB SERPL ELPH: 1.8 RATIO — SIGNIFICANT CHANGE UP
ALPHA1 GLOB SERPL ELPH-MCNC: 0.4 G/DL — SIGNIFICANT CHANGE UP (ref 0.1–0.4)
ALPHA2 GLOB SERPL ELPH-MCNC: 0.6 G/DL — SIGNIFICANT CHANGE UP (ref 0.5–1)
ANION GAP SERPL CALC-SCNC: 8 MMOL/L — SIGNIFICANT CHANGE UP (ref 5–17)
B-GLOBULIN SERPL ELPH-MCNC: 0.5 G/DL — SIGNIFICANT CHANGE UP (ref 0.5–1)
BUN SERPL-MCNC: 73 MG/DL — HIGH (ref 7–23)
CALCIUM SERPL-MCNC: 8.4 MG/DL — LOW (ref 8.5–10.1)
CHLORIDE SERPL-SCNC: 115 MMOL/L — HIGH (ref 96–108)
CO2 SERPL-SCNC: 19 MMOL/L — LOW (ref 22–31)
CREAT SERPL-MCNC: 4.67 MG/DL — HIGH (ref 0.5–1.3)
EGFR: 9 ML/MIN/1.73M2 — LOW
GAMMA GLOBULIN: 0.4 G/DL — LOW (ref 0.6–1.6)
GLUCOSE SERPL-MCNC: 127 MG/DL — HIGH (ref 70–99)
HCT VFR BLD CALC: 23.9 % — LOW (ref 34.5–45)
HGB BLD-MCNC: 8.2 G/DL — LOW (ref 11.5–15.5)
INTERPRETATION SERPL IFE-IMP: SIGNIFICANT CHANGE UP
MAGNESIUM SERPL-MCNC: 1.8 MG/DL — SIGNIFICANT CHANGE UP (ref 1.6–2.6)
MCHC RBC-ENTMCNC: 32.9 PG — SIGNIFICANT CHANGE UP (ref 27–34)
MCHC RBC-ENTMCNC: 34.3 GM/DL — SIGNIFICANT CHANGE UP (ref 32–36)
MCV RBC AUTO: 96 FL — SIGNIFICANT CHANGE UP (ref 80–100)
PHOSPHATE SERPL-MCNC: 3.8 MG/DL — SIGNIFICANT CHANGE UP (ref 2.5–4.5)
PLATELET # BLD AUTO: 157 K/UL — SIGNIFICANT CHANGE UP (ref 150–400)
POTASSIUM SERPL-MCNC: 4 MMOL/L — SIGNIFICANT CHANGE UP (ref 3.5–5.3)
POTASSIUM SERPL-SCNC: 4 MMOL/L — SIGNIFICANT CHANGE UP (ref 3.5–5.3)
PROT PATTERN SERPL ELPH-IMP: SIGNIFICANT CHANGE UP
RBC # BLD: 2.49 M/UL — LOW (ref 3.8–5.2)
RBC # FLD: 13.3 % — SIGNIFICANT CHANGE UP (ref 10.3–14.5)
SODIUM SERPL-SCNC: 142 MMOL/L — SIGNIFICANT CHANGE UP (ref 135–145)
WBC # BLD: 9.18 K/UL — SIGNIFICANT CHANGE UP (ref 3.8–10.5)
WBC # FLD AUTO: 9.18 K/UL — SIGNIFICANT CHANGE UP (ref 3.8–10.5)

## 2023-02-13 PROCEDURE — 99232 SBSQ HOSP IP/OBS MODERATE 35: CPT

## 2023-02-13 RX ADMIN — AMIODARONE HYDROCHLORIDE 200 MILLIGRAM(S): 400 TABLET ORAL at 11:06

## 2023-02-13 RX ADMIN — FAMOTIDINE 10 MILLIGRAM(S): 10 INJECTION INTRAVENOUS at 11:08

## 2023-02-13 RX ADMIN — PREGABALIN 2000 MICROGRAM(S): 225 CAPSULE ORAL at 11:09

## 2023-02-13 RX ADMIN — HEPARIN SODIUM 5000 UNIT(S): 5000 INJECTION INTRAVENOUS; SUBCUTANEOUS at 17:59

## 2023-02-13 RX ADMIN — Medication 25 MILLIGRAM(S): at 11:06

## 2023-02-13 RX ADMIN — Medication 40 MILLIGRAM(S): at 11:07

## 2023-02-13 RX ADMIN — Medication 1 TABLET(S): at 11:05

## 2023-02-13 RX ADMIN — SODIUM CHLORIDE 50 MILLILITER(S): 9 INJECTION, SOLUTION INTRAVENOUS at 12:30

## 2023-02-13 RX ADMIN — SODIUM CHLORIDE 50 MILLILITER(S): 9 INJECTION, SOLUTION INTRAVENOUS at 17:48

## 2023-02-13 RX ADMIN — HEPARIN SODIUM 5000 UNIT(S): 5000 INJECTION INTRAVENOUS; SUBCUTANEOUS at 06:00

## 2023-02-13 RX ADMIN — Medication 88 MICROGRAM(S): at 06:00

## 2023-02-13 RX ADMIN — Medication 81 MILLIGRAM(S): at 11:06

## 2023-02-13 RX ADMIN — ATORVASTATIN CALCIUM 20 MILLIGRAM(S): 80 TABLET, FILM COATED ORAL at 21:25

## 2023-02-13 NOTE — PROGRESS NOTE ADULT - SUBJECTIVE AND OBJECTIVE BOX
Patient is a 87y Female who reports no complaints as new  reports taking very good pO    MEDICATIONS  (STANDING):  aMIOdarone    Tablet 200 milliGRAM(s) Oral daily  aspirin enteric coated 81 milliGRAM(s) Oral daily  atorvastatin 20 milliGRAM(s) Oral at bedtime  cyanocobalamin 2000 MICROGram(s) Oral daily  famotidine    Tablet 10 milliGRAM(s) Oral daily  heparin   Injectable 5000 Unit(s) SubCutaneous every 12 hours  levothyroxine 88 MICROGram(s) Oral daily  metoprolol succinate ER 25 milliGRAM(s) Oral daily  Nephro-shawna 1 Tablet(s) Oral daily  predniSONE   Tablet 40 milliGRAM(s) Oral daily    MEDICATIONS  (PRN):  acetaminophen     Tablet .. 650 milliGRAM(s) Oral every 6 hours PRN Mild Pain (1 - 3)  aluminum hydroxide/magnesium hydroxide/simethicone Suspension 30 milliLiter(s) Oral every 4 hours PRN Dyspepsia  melatonin 3 milliGRAM(s) Oral at bedtime PRN Insomnia  ondansetron Injectable 4 milliGRAM(s) IV Push every 8 hours PRN Nausea and/or Vomiting        T(C): , Max: 36.9 (02-13-23 @ 08:48)  T(F): , Max: 98.4 (02-13-23 @ 08:48)  HR: 61 (02-13-23 @ 17:13)  BP: 139/73 (02-13-23 @ 17:13)  BP(mean): --  RR: 18 (02-13-23 @ 17:13)  SpO2: 96% (02-13-23 @ 17:13)  Wt(kg): --    02-12 @ 07:01  -  02-13 @ 07:00  --------------------------------------------------------  IN: 500 mL / OUT: 1190 mL / NET: -690 mL    02-13 @ 07:01  -  02-13 @ 21:02  --------------------------------------------------------  IN: 840 mL / OUT: 400 mL / NET: 440 mL          PHYSICAL EXAM:    Constitutional: NAD, frail  HEENT: PERRLA, EOMI,  MMM  Neck: No LAD, No JVD  Respiratory dist  Cardiovascular: S1 and S2   Extremities: No peripheral edema  Neurological: Alert, pleasent        LABS:                        8.2    9.18  )-----------( 157      ( 13 Feb 2023 07:34 )             23.9     13 Feb 2023 07:34    142    |  115    |  73     ----------------------------<  127    4.0     |  19     |  4.67   12 Feb 2023 07:25    142    |  116    |  69     ----------------------------<  133    4.3     |  19     |  4.70   11 Feb 2023 07:38    146    |  120    |  67     ----------------------------<  139    4.2     |  19     |  5.21   10 Feb 2023 06:41    145    |  118    |  70     ----------------------------<  96     4.0     |  20     |  5.17     Ca    8.4        13 Feb 2023 07:34  Ca    8.6        12 Feb 2023 07:25  Ca    8.5        11 Feb 2023 07:38  Ca    8.5        10 Feb 2023 06:41  Phos  3.8       13 Feb 2023 07:34  Phos  4.3       11 Feb 2023 07:38  Mg     1.8       13 Feb 2023 07:34  Mg     1.9       11 Feb 2023 07:38            Urine Studies:          RADIOLOGY & ADDITIONAL STUDIES:

## 2023-02-13 NOTE — DISCHARGE NOTE NURSING/CASE MANAGEMENT/SOCIAL WORK - PATIENT PORTAL LINK FT
You can access the FollowMyHealth Patient Portal offered by [facility] by registering at the following website: [email or web address]. By joining [facility]’s FollowMyHealth portal, you will also be able to view your health information using other applications (apps) compatible with our system.

## 2023-02-13 NOTE — PROGRESS NOTE ADULT - ASSESSMENT
MEDICATIONS  (STANDING):  aMIOdarone    Tablet 200 milliGRAM(s) Oral daily  aspirin enteric coated 81 milliGRAM(s) Oral daily  atorvastatin 20 milliGRAM(s) Oral at bedtime  cyanocobalamin 2000 MICROGram(s) Oral daily  famotidine    Tablet 10 milliGRAM(s) Oral daily  heparin   Injectable 5000 Unit(s) SubCutaneous every 12 hours  levothyroxine 88 MICROGram(s) Oral daily  metoprolol succinate ER 25 milliGRAM(s) Oral daily  Nephro-shawna 1 Tablet(s) Oral daily  predniSONE   Tablet 40 milliGRAM(s) Oral daily  sodium chloride 0.45%. 1000 milliLiter(s) (50 mL/Hr) IV Continuous <Continuous>    MEDICATIONS  (PRN):  acetaminophen     Tablet .. 650 milliGRAM(s) Oral every 6 hours PRN Mild Pain (1 - 3)  aluminum hydroxide/magnesium hydroxide/simethicone Suspension 30 milliLiter(s) Oral every 4 hours PRN Dyspepsia  melatonin 3 milliGRAM(s) Oral at bedtime PRN Insomnia  ondansetron Injectable 4 milliGRAM(s) IV Push every 8 hours PRN Nausea and/or Vomiting        ASSESSMENT/PLAN    RADHA on CKD. Slowly improving  -Uncleared etiology Appears to be intrinsic. ATN/Emboli? Unable to clinically determine. Recent CABG.   Personal history of transient urinary retention 7/2022. [place] scans negative for significant PVR.   -US findings noted  -Nephrology consulted  -I/Os  -Avoid nephrotoxic agents and/or adjust dose accordingly  -IVF as needed for fluid balance  -Continue to monitor Cr/Electrolytes  -Serologies and other Analysis ordered by nephrology for further evaluation all unremarkable.   -ANCA and CHINMAY negative. C3 and C4 complements WNL    Acute Gout Flare Right Great Toe. Improving  -Unable to clinically determine if present on admission.   -PO prednisone  -Uric Acid 9.8. Likely augmented by RADHA on CKD.     CAD s/p CABG (10/2022) without angina  Paroxysmal Atrial Fibrillation (on Amiodarone; not AC)  HTN  HLD  -ASA/Plavix on hold since 2/3 in anticipation of possibility requiring HD catheter. Resume aspirin (2/10). IF Cr continues to improve, tentatively resumption of plavix.   -Continue BB/Statin  -Resume ASA/plavix when possible  -TTE findings noted.   -Continue Amiodarone  -[id number]. Not on AC since 7/2022.   -Norvasc started 2 weeks prior to admission with reported subsequent increased LE edema. Discontinue Norvasc  -PRN Rx for elevated BP.     Normocytic Anemia. Stable.   -Likely component of CKD  -Stable and at baseline  -Ferritin/Iron/b12/folate levels all ok  -No overt signs of bleeding.   -Epo low normal in the setting of CKD. Epo as per nephro    DVT Prophylaxis: heparin subq    Disposition  IVF; monitor cr. Nephro recs. Monitor Hgb. Prednisone for acute gout flare. Clinically improving. Cr slowly improving. Still requiring IVF and closely monitoring of IVF/Cr. In additional likely to require a day to observe off IVF when discontinue by nephrology. This treatment plant likely to last  for 3-5 more days.         Goals of Care (GOC)/Advance Care Planning (ACP)  Date of Discussion/evaluation: 2/6/2023    PLAN:  Code Status: Full code  HCP: Primary is daughter, Zaida Sandhu ([phone]) and secondary is son, Isidro Nova ([phone])  Alternative Feeding methods: Would like to discuss or assess should the situation arise that it requires alternative feeding methods  Blood Product Transfusions: YES agreeable  Pressors: YES agreeable

## 2023-02-13 NOTE — PROGRESS NOTE ADULT - SUBJECTIVE AND OBJECTIVE BOX
CHIEF COMPLAINT: Elevated Cr. Baseline increased urinary frequency unchanges    SUBJECTIVE/SIGNIFICANT INTERVAL EVENTS/OVERNIGHT EVENTS:    2/7: Reports no complaints. Cr still relatively same. Hgb lower. Recheck ordered. No overt signs of bleeding.   2/8: Feeling well. Reporting no complaints. No urinary complaints. Denies fever, chills, chest pain, nausea, vomiting, abdominal pain/discomfort.   2/9: No complaints. Cr improving. Encourage adequate oral hydration  2/10: Feeling well. yesterday with right toe paint. base of right great toe red and tender. Uric acid elevated. Started prednisone today. Otherwise no new complaint. Cr improving  2/11: No new complaints. Foot feels better. Cr stopped improving in last 24 hours. Continue IVF. Nephro recs.   2/12: Right feeling much better (pain near gone). Cr improving. Tolerating IVF. No urinary complaints.   2/13: No more pain. No urinary complaints. Cr practically unchanged from day prior. Continue IVF. Nephro recs.     Review of Systems: 14 Point review of systems reviewed and reported as negative unless otherwise stated above    FROM H&P:  "87F with PMH of CKDIIIB, Atrophic Left Kidney, HTN, HLD, Valvular HD, CAD s/p CABG (10/2022), Pulm HTN, Hypothyroidism presented to the ER after being sent by her nephrologist for further evaluation and worsening Cr. Cr in mid 2s back in 7/2022-10/2022. Underwent CABG October 2022. Since then no new urinary complaints. Reports baseline frequent urination that remains unchanged. Denies fever, chills, nausea, vomiting, abdominal pain/discomfort, sensation of incomplete void, dysuria. Intentional weight loss of 40-50 pounds in the last year attributed to diet changes. Reporting increased LE edema since starting norvasc for improved BP control two weeks ago.     In the ER Tmax 97.8, HR 59, /56, RR 17, SpO2 100% on RA. Hgb 9.32 (baseline ~9), MCV 95.2, BUN 87, Cr 6.44, RVP unremarkable. "    PHYSICAL EXAM:    T(C): 36.9 (02-13-23 @ 08:48), Max: 36.9 (02-13-23 @ 08:48)  HR: 75 (02-13-23 @ 08:48) (60 - 75)  BP: 156/59 (02-13-23 @ 08:48) (151/62 - 156/59)  RR: 18 (02-13-23 @ 08:48) (18 - 18)  SpO2: 96% (02-13-23 @ 08:48) (95% - 96%)    General: AAOx3; NAD  Head: AT/NC  ENT: Moist Mucous Membranes; No Injury  Eyes: EOMI; PERRL  Neck: Non-tender; No JVD  CVS: RRR, S1&S2, murmur +; LE edema +  Respiratory: Lungs CTA B/L; Normal Respiratory Effort and saturation on RA  Abdomen/GI: Soft, non-tender, non-distended, no guarding, no rebound, normal bowel sounds  : No bladder distention, No Bose  Extremities: No cyanosis, No clubbing, LE 1+ pitting edema.   MSK: No CVA tenderness, Normal ROM, No injury  Neuro: AAOx3, CNII-XII grossly intact, non-focal  Psych: Appropriate, Cooperative,   Skin: Clean, Dry and Intact      LABS:                          8.2    9.18  )-----------( 157      ( 13 Feb 2023 07:34 )             23.9     02-13    142  |  115<H>  |  73<H>  ----------------------------<  127<H>  4.0   |  19<L>  |  4.67<H>    Ca    8.4<L>      13 Feb 2023 07:34  Phos  3.8     02-13  Mg     1.8     02-13        CAPILLARY BLOOD GLUCOSE                                7.8    6.42  )-----------( 118      ( 11 Feb 2023 07:38 )             23.1     02-12    142  |  116<H>  |  69<H>  ----------------------------<  133<H>  4.3   |  19<L>  |  4.70<H>    Ca    8.6      12 Feb 2023 07:25  Phos  4.3     02-11  Mg     1.9     02-11        CAPILLARY BLOOD GLUCOSE      Creatinine, Serum: 4.70 mg/dL (02.12.23 @ 07:25)   Creatinine, Serum: 5.21 mg/dL (02.11.23 @ 07:38)   Creatinine, Serum: 5.17 mg/dL (02.10.23 @ 06:41)   Creatinine, Serum: 5.68 mg/dL (02.09.23 @ 07:09)   Creatinine, Serum: 6.19 mg/dL (02.08.23 @ 06:39)   Creatinine, Serum: 6.86 mg/dL (02.07.23 @ 09:22)   Creatinine, Serum: 6.44 mg/dL (02.06.23 @ 09:49)   Creatinine, Serum: 2.41 mg/dL (07.08.22 @ 06:47)   Antineutrophil Cytoplasmic Antibody (02.06.23 @ 14:30)   Atypical ANCA: Negative   Cytoplasmic (c-ANCA) Antibody: Negative   Perinuclear (p-ANCA) Antibody: Negative Iron with Total Binding Capacity (02.06.23 @ 14:30)   Iron - Total Binding Capacity.: 217 ug/dL   % Saturation, Iron: 21 %   Iron Total, Serum: 46 ug/dL   Unsaturated Iron Binding Capacity: 171 ug/dL B12 and Folate (02.06.23 @ 14:30)   Vitamin B12, Serum: >2000 pg/mL   Folate, Serum: 12.2 ng/mL Ferritin, Serum (02.06.23 @ 14:30)   Ferritin, Serum: 608 ng/mL C4 Complement, Serum (02.06.23 @ 14:30)   C4 Complement, Serum: 26 mg/dL C3 Complement, Serum (02.06.23 @ 14:30)   C3 Complement, Serum: 95 mg/dL                    7.9    7.44  )-----------( 134      ( 07 Feb 2023 09:22 )             23.6     02-07    143  |  113<H>  |  95<H>  ----------------------------<  98  4.3   |  20<L>  |  6.86<H>    Ca    8.8      07 Feb 2023 09:22    TPro  6.8  /  Alb  3.8  /  TBili  0.7  /  DBili  x   /  AST  17  /  ALT  18  /  AlkPhos  83  02-06      CAPILLARY BLOOD GLUCOSE      `Creatinine, Random Urine: 72: Sodium, Random Urine: 41:Iron with Total Binding Capacity (02.06.23 @ 14:30)   Iron - Total Binding Capacity.: 217 ug/dL   % Saturation, Iron: 21 %   Iron Total, Serum: 46 ug/dL   Unsaturated Iron Binding Capacity: 171 ug/dL B12 and Folate (02.06.23 @ 14:30)   Vitamin B12, Serum: >2000 pg/mL   Folate, Serum: 12.2 ng/mL Ferritin, Serum (02.06.23 @ 14:30)   Ferritin, Serum: 608 ng/mL C4 Complement, Serum (02.06.23 @ 14:30)   C4 Complement, Serum: 26 mg/dL C3 Complement, Serum (02.06.23 @ 14:30)   C3 Complement, Serum: 95 mg/dL Prothrombin Time and INR, Plasma in AM (02.06.23 @ 09:49)   Prothrombin Time, Plasma: 11.9 sec   INR: 1.03: Activated Partial Thromboplastin Time in AM (02.06.23 @ 09:49)   Activated Partial Thromboplastin Time: 21.7:     Uric Acid, Serum: 9.8 mg/dL (02.10.23 @ 06:41)         RADIOLOGY:  `< from: US Duplex Kidneys (02.06.23 @ 14:59) >  IMPRESSION:    Markedly atrophic left kidney which was not visualized.    No right hydronephrosis. Increased renal echogenicity and increased   resistiveindices consistent with medical renal disease.    Limited visualization of the right renal artery. No renal artery stenosis   is identified. If clinically indicated further evaluation may be   performed with MR angiogram.    < end of copied text >  `< from: Xray Chest 1 View- PORTABLE-Routine (Xray Chest 1 View- PORTABLE-Routine .) (02.06.23 @ 10:36) >    IMPRESSION:   No radiographic evidence of active chest disease.    < end of copied text >      EKG:  `< from: 12 Lead ECG (02.06.23 @ 10:02) >  Diagnosis Line *** Poor data quality, interpretation may be adversely affected  Sinus rhythm  Left axis deviation  Left ventricular hypertrophy with QRS widening  T wave abnormality, consider inferior ischemia  Abnormal ECG  When compared with ECG of 03-JUL-2022 07:33,  No significant change was found    < end of copied text >      ECHO:  `< from: TTE Echo Complete w/o Contrast w/ Doppler (02.07.23 @ 11:15) >   Summary     The mitral valve leaflets appear thickened.   Mild (1+) mitral regurgitation is present.   EA reversal of the mitral inflow consistent with reduced compliance of   the   left ventricle.   Moderate aortic sclerosis is present with minimally restricted valvular   opening.   Mild (1+) aortic regurgitation is present.   At least Mild tricuspid regurgitation is present.   Normal appearing pulmonic valve structure and function.   Trace pulmonic valvular regurgitation is present.   The left atrium ismildly dilated.   MIld concentric left ventricular hypertrophy is present.   Estimated left ventricular ejection fraction is 60-65 %.   There is a hypoechoic space anterior to the right ventricular free wall.   Cannot rule out loculated pericardial effusion Vs. pericardial fat pad.    < end of copied text >        I personally reviewed labs, orders and vitals.    Discussed case with:  [X]RN  [X]ELPIDIO/RIRI  [X]Patient  [X]Family  [X]Specialist: Nephrology

## 2023-02-13 NOTE — PROGRESS NOTE ADULT - ASSESSMENT
86 yo female with hx of atrophic left kidney , CKD 4 w Cr ~ 2 post CABG in October and cr slowly rising since October    3--> 4 --> now Cr mid 5 and  sent to ED by me for further workup and possible dialysis initiation     RADHA on CKD 4 w slow progression since CABG    baseline Cr ~ 2   Cr improving, taking good po   Stop IVF and monitor renals  No acute indication for HD today , hopeful for downtrend off fluid   check daily labs    dc with staff, team

## 2023-02-14 LAB
ANION GAP SERPL CALC-SCNC: 9 MMOL/L — SIGNIFICANT CHANGE UP (ref 5–17)
BUN SERPL-MCNC: 76 MG/DL — HIGH (ref 7–23)
CALCIUM SERPL-MCNC: 8.2 MG/DL — LOW (ref 8.5–10.1)
CHLORIDE SERPL-SCNC: 117 MMOL/L — HIGH (ref 96–108)
CO2 SERPL-SCNC: 15 MMOL/L — LOW (ref 22–31)
CREAT SERPL-MCNC: 4.56 MG/DL — HIGH (ref 0.5–1.3)
EGFR: 9 ML/MIN/1.73M2 — LOW
GLUCOSE SERPL-MCNC: 121 MG/DL — HIGH (ref 70–99)
POTASSIUM SERPL-MCNC: 4.6 MMOL/L — SIGNIFICANT CHANGE UP (ref 3.5–5.3)
POTASSIUM SERPL-SCNC: 4.6 MMOL/L — SIGNIFICANT CHANGE UP (ref 3.5–5.3)
SODIUM SERPL-SCNC: 141 MMOL/L — SIGNIFICANT CHANGE UP (ref 135–145)

## 2023-02-14 PROCEDURE — 99232 SBSQ HOSP IP/OBS MODERATE 35: CPT

## 2023-02-14 RX ORDER — SODIUM CHLORIDE 9 MG/ML
1000 INJECTION INTRAMUSCULAR; INTRAVENOUS; SUBCUTANEOUS
Refills: 0 | Status: DISCONTINUED | OUTPATIENT
Start: 2023-02-14 | End: 2023-02-15

## 2023-02-14 RX ADMIN — PREGABALIN 2000 MICROGRAM(S): 225 CAPSULE ORAL at 10:10

## 2023-02-14 RX ADMIN — SODIUM CHLORIDE 50 MILLILITER(S): 9 INJECTION INTRAMUSCULAR; INTRAVENOUS; SUBCUTANEOUS at 22:24

## 2023-02-14 RX ADMIN — HEPARIN SODIUM 5000 UNIT(S): 5000 INJECTION INTRAVENOUS; SUBCUTANEOUS at 05:11

## 2023-02-14 RX ADMIN — Medication 88 MICROGRAM(S): at 05:11

## 2023-02-14 RX ADMIN — HEPARIN SODIUM 5000 UNIT(S): 5000 INJECTION INTRAVENOUS; SUBCUTANEOUS at 18:11

## 2023-02-14 RX ADMIN — ATORVASTATIN CALCIUM 20 MILLIGRAM(S): 80 TABLET, FILM COATED ORAL at 22:24

## 2023-02-14 RX ADMIN — FAMOTIDINE 10 MILLIGRAM(S): 10 INJECTION INTRAVENOUS at 10:10

## 2023-02-14 RX ADMIN — Medication 25 MILLIGRAM(S): at 10:14

## 2023-02-14 RX ADMIN — AMIODARONE HYDROCHLORIDE 200 MILLIGRAM(S): 400 TABLET ORAL at 10:10

## 2023-02-14 RX ADMIN — Medication 40 MILLIGRAM(S): at 10:10

## 2023-02-14 RX ADMIN — Medication 81 MILLIGRAM(S): at 10:10

## 2023-02-14 RX ADMIN — Medication 1 TABLET(S): at 10:10

## 2023-02-14 NOTE — PROGRESS NOTE ADULT - ASSESSMENT
88 yo female with hx of atrophic left kidney , CKD 4 w Cr ~ 2 post CABG in October and cr slowly rising since October    3--> 4 --> now Cr mid 5 and  sent to ED by me for further workup and possible dialysis initiation     RADHA on CKD 4 w slow progression since CABG    baseline Cr ~ 2   Cr improving, taking good po   can monitor with oral intake only   check daily labs   Start oral bicarb tabs    dc with staff, team  seen earlier, note now

## 2023-02-14 NOTE — PROGRESS NOTE ADULT - SUBJECTIVE AND OBJECTIVE BOX
Patient is a 87y Female who reports no complaints as new. Taking po    MEDICATIONS  (STANDING):  aMIOdarone    Tablet 200 milliGRAM(s) Oral daily  aspirin enteric coated 81 milliGRAM(s) Oral daily  atorvastatin 20 milliGRAM(s) Oral at bedtime  cyanocobalamin 2000 MICROGram(s) Oral daily  famotidine    Tablet 10 milliGRAM(s) Oral daily  heparin   Injectable 5000 Unit(s) SubCutaneous every 12 hours  levothyroxine 88 MICROGram(s) Oral daily  metoprolol succinate ER 25 milliGRAM(s) Oral daily  Nephro-shawna 1 Tablet(s) Oral daily  predniSONE   Tablet 40 milliGRAM(s) Oral daily  sodium chloride 0.9%. 1000 milliLiter(s) (50 mL/Hr) IV Continuous <Continuous>    MEDICATIONS  (PRN):  acetaminophen     Tablet .. 650 milliGRAM(s) Oral every 6 hours PRN Mild Pain (1 - 3)  aluminum hydroxide/magnesium hydroxide/simethicone Suspension 30 milliLiter(s) Oral every 4 hours PRN Dyspepsia  melatonin 3 milliGRAM(s) Oral at bedtime PRN Insomnia  ondansetron Injectable 4 milliGRAM(s) IV Push every 8 hours PRN Nausea and/or Vomiting        T(C): , Max: 36.6 (02-14-23 @ 08:00)  T(F): , Max: 97.9 (02-14-23 @ 08:00)  HR: 56 (02-14-23 @ 15:34)  BP: 156/72 (02-14-23 @ 15:34)  BP(mean): --  RR: 18 (02-14-23 @ 15:34)  SpO2: 97% (02-14-23 @ 15:34)  Wt(kg): --    02-13 @ 07:01  -  02-14 @ 07:00  --------------------------------------------------------  IN: 840 mL / OUT: 400 mL / NET: 440 mL    02-14 @ 07:01  -  02-15 @ 00:03  --------------------------------------------------------  IN: 450 mL / OUT: 0 mL / NET: 450 mL          PHYSICAL EXAM:    Constitutional: NAD, frail  HEENT:  MM  dist  Cardiovascular: S1 and S2   Extremities: chr peripheral edema  Neurological: please nt        LABS:                        8.2    9.18  )-----------( 157      ( 13 Feb 2023 07:34 )             23.9     14 Feb 2023 06:34    141    |  117    |  76     ----------------------------<  121    4.6     |  15     |  4.56   13 Feb 2023 07:34    142    |  115    |  73     ----------------------------<  127    4.0     |  19     |  4.67   12 Feb 2023 07:25    142    |  116    |  69     ----------------------------<  133    4.3     |  19     |  4.70   11 Feb 2023 07:38    146    |  120    |  67     ----------------------------<  139    4.2     |  19     |  5.21     Ca    8.2        14 Feb 2023 06:34  Ca    8.4        13 Feb 2023 07:34  Ca    8.6        12 Feb 2023 07:25  Ca    8.5        11 Feb 2023 07:38  Phos  3.8       13 Feb 2023 07:34  Phos  4.3       11 Feb 2023 07:38  Mg     1.8       13 Feb 2023 07:34  Mg     1.9       11 Feb 2023 07:38            Urine Studies:          RADIOLOGY & ADDITIONAL STUDIES:

## 2023-02-14 NOTE — PROGRESS NOTE ADULT - SUBJECTIVE AND OBJECTIVE BOX
CHIEF COMPLAINT: Elevated Cr. Baseline increased urinary frequency unchanges    SUBJECTIVE/SIGNIFICANT INTERVAL EVENTS/OVERNIGHT EVENTS:    2/7: Reports no complaints. Cr still relatively same. Hgb lower. Recheck ordered. No overt signs of bleeding.   2/8: Feeling well. Reporting no complaints. No urinary complaints. Denies fever, chills, chest pain, nausea, vomiting, abdominal pain/discomfort.   2/9: No complaints. Cr improving. Encourage adequate oral hydration  2/10: Feeling well. yesterday with right toe paint. base of right great toe red and tender. Uric acid elevated. Started prednisone today. Otherwise no new complaint. Cr improving  2/11: No new complaints. Foot feels better. Cr stopped improving in last 24 hours. Continue IVF. Nephro recs.   2/12: Right feeling much better (pain near gone). Cr improving. Tolerating IVF. No urinary complaints.   2/13: No more pain. No urinary complaints. Cr practically unchanged from day prior. Continue IVF. Nephro recs.   2/14: No acute complaints. Vitals stable. Minimal improvement in Cr    Review of Systems: 14 Point review of systems reviewed and reported as negative unless otherwise stated above    FROM H&P:  "87F with PMH of CKDIIIB, Atrophic Left Kidney, HTN, HLD, Valvular HD, CAD s/p CABG (10/2022), Pulm HTN, Hypothyroidism presented to the ER after being sent by her nephrologist for further evaluation and worsening Cr. Cr in mid 2s back in 7/2022-10/2022. Underwent CABG October 2022. Since then no new urinary complaints. Reports baseline frequent urination that remains unchanged. Denies fever, chills, nausea, vomiting, abdominal pain/discomfort, sensation of incomplete void, dysuria. Intentional weight loss of 40-50 pounds in the last year attributed to diet changes. Reporting increased LE edema since starting norvasc for improved BP control two weeks ago.     In the ER Tmax 97.8, HR 59, /56, RR 17, SpO2 100% on RA. Hgb 9.32 (baseline ~9), MCV 95.2, BUN 87, Cr 6.44, RVP unremarkable. "    PHYSICAL EXAM:    Vital Signs Last 24 Hrs  T(C): 36.6 (14 Feb 2023 08:00), Max: 36.8 (13 Feb 2023 17:13)  T(F): 97.9 (14 Feb 2023 08:00), Max: 98.3 (13 Feb 2023 17:13)  HR: 67 (14 Feb 2023 10:19) (55 - 67)  BP: 155/70 (14 Feb 2023 10:19) (139/73 - 155/70)  RR: 18 (14 Feb 2023 10:19) (18 - 18)  SpO2: 97% (14 Feb 2023 10:19) (96% - 97%)    Parameters below as of 14 Feb 2023 10:19  Patient On (Oxygen Delivery Method): room air        General: AAOx3; NAD  Head: AT/NC  ENT: Moist Mucous Membranes; No Injury  Eyes: EOMI; PERRL  Neck: Non-tender; No JVD  CVS: RRR, S1&S2, murmur +; LE edema +  Respiratory: Lungs CTA B/L; Normal Respiratory Effort and saturation on RA  Abdomen/GI: Soft, non-tender, non-distended, no guarding, no rebound, normal bowel sounds  : No bladder distention, No Bose  Extremities: No cyanosis, No clubbing, LE 1+ pitting edema.   MSK: No CVA tenderness, Normal ROM, No injury  Neuro: AAOx3, CNII-XII grossly intact, non-focal  Psych: Appropriate, Cooperative,   Skin: Clean, Dry and Intact      LABS:                        8.2    9.18  )-----------( 157      ( 13 Feb 2023 07:34 )             23.9   02-14    141  |  117<H>  |  76<H>  ----------------------------<  121<H>  4.6   |  15<L>  |  4.56<H>    Ca    8.2<L>      14 Feb 2023 06:34  Phos  3.8     02-13  Mg     1.8     02-13                                8.2    9.18  )-----------( 157      ( 13 Feb 2023 07:34 )             23.9     02-13    142  |  115<H>  |  73<H>  ----------------------------<  127<H>  4.0   |  19<L>  |  4.67<H>    Ca    8.4<L>      13 Feb 2023 07:34  Phos  3.8     02-13  Mg     1.8     02-13        CAPILLARY BLOOD GLUCOSE                                7.8    6.42  )-----------( 118      ( 11 Feb 2023 07:38 )             23.1     02-12    142  |  116<H>  |  69<H>  ----------------------------<  133<H>  4.3   |  19<L>  |  4.70<H>    Ca    8.6      12 Feb 2023 07:25  Phos  4.3     02-11  Mg     1.9     02-11        CAPILLARY BLOOD GLUCOSE      Creatinine, Serum: 4.70 mg/dL (02.12.23 @ 07:25)   Creatinine, Serum: 5.21 mg/dL (02.11.23 @ 07:38)   Creatinine, Serum: 5.17 mg/dL (02.10.23 @ 06:41)   Creatinine, Serum: 5.68 mg/dL (02.09.23 @ 07:09)   Creatinine, Serum: 6.19 mg/dL (02.08.23 @ 06:39)   Creatinine, Serum: 6.86 mg/dL (02.07.23 @ 09:22)   Creatinine, Serum: 6.44 mg/dL (02.06.23 @ 09:49)   Creatinine, Serum: 2.41 mg/dL (07.08.22 @ 06:47)   Antineutrophil Cytoplasmic Antibody (02.06.23 @ 14:30)   Atypical ANCA: Negative   Cytoplasmic (c-ANCA) Antibody: Negative   Perinuclear (p-ANCA) Antibody: Negative Iron with Total Binding Capacity (02.06.23 @ 14:30)   Iron - Total Binding Capacity.: 217 ug/dL   % Saturation, Iron: 21 %   Iron Total, Serum: 46 ug/dL   Unsaturated Iron Binding Capacity: 171 ug/dL B12 and Folate (02.06.23 @ 14:30)   Vitamin B12, Serum: >2000 pg/mL   Folate, Serum: 12.2 ng/mL Ferritin, Serum (02.06.23 @ 14:30)   Ferritin, Serum: 608 ng/mL C4 Complement, Serum (02.06.23 @ 14:30)   C4 Complement, Serum: 26 mg/dL C3 Complement, Serum (02.06.23 @ 14:30)   C3 Complement, Serum: 95 mg/dL                    7.9    7.44  )-----------( 134      ( 07 Feb 2023 09:22 )             23.6     02-07    143  |  113<H>  |  95<H>  ----------------------------<  98  4.3   |  20<L>  |  6.86<H>    Ca    8.8      07 Feb 2023 09:22    TPro  6.8  /  Alb  3.8  /  TBili  0.7  /  DBili  x   /  AST  17  /  ALT  18  /  AlkPhos  83  02-06      CAPILLARY BLOOD GLUCOSE      `Creatinine, Random Urine: 72: Sodium, Random Urine: 41:Iron with Total Binding Capacity (02.06.23 @ 14:30)   Iron - Total Binding Capacity.: 217 ug/dL   % Saturation, Iron: 21 %   Iron Total, Serum: 46 ug/dL   Unsaturated Iron Binding Capacity: 171 ug/dL B12 and Folate (02.06.23 @ 14:30)   Vitamin B12, Serum: >2000 pg/mL   Folate, Serum: 12.2 ng/mL Ferritin, Serum (02.06.23 @ 14:30)   Ferritin, Serum: 608 ng/mL C4 Complement, Serum (02.06.23 @ 14:30)   C4 Complement, Serum: 26 mg/dL C3 Complement, Serum (02.06.23 @ 14:30)   C3 Complement, Serum: 95 mg/dL Prothrombin Time and INR, Plasma in AM (02.06.23 @ 09:49)   Prothrombin Time, Plasma: 11.9 sec   INR: 1.03: Activated Partial Thromboplastin Time in AM (02.06.23 @ 09:49)   Activated Partial Thromboplastin Time: 21.7:     Uric Acid, Serum: 9.8 mg/dL (02.10.23 @ 06:41)         RADIOLOGY:  `< from: US Duplex Kidneys (02.06.23 @ 14:59) >  IMPRESSION:    Markedly atrophic left kidney which was not visualized.    No right hydronephrosis. Increased renal echogenicity and increased   resistiveindices consistent with medical renal disease.    Limited visualization of the right renal artery. No renal artery stenosis   is identified. If clinically indicated further evaluation may be   performed with MR angiogram.    < end of copied text >  `< from: Xray Chest 1 View- PORTABLE-Routine (Xray Chest 1 View- PORTABLE-Routine .) (02.06.23 @ 10:36) >    IMPRESSION:   No radiographic evidence of active chest disease.    < end of copied text >      EKG:  `< from: 12 Lead ECG (02.06.23 @ 10:02) >  Diagnosis Line *** Poor data quality, interpretation may be adversely affected  Sinus rhythm  Left axis deviation  Left ventricular hypertrophy with QRS widening  T wave abnormality, consider inferior ischemia  Abnormal ECG  When compared with ECG of 03-JUL-2022 07:33,  No significant change was found    < end of copied text >      ECHO:  `< from: TTE Echo Complete w/o Contrast w/ Doppler (02.07.23 @ 11:15) >   Summary     The mitral valve leaflets appear thickened.   Mild (1+) mitral regurgitation is present.   EA reversal of the mitral inflow consistent with reduced compliance of   the   left ventricle.   Moderate aortic sclerosis is present with minimally restricted valvular   opening.   Mild (1+) aortic regurgitation is present.   At least Mild tricuspid regurgitation is present.   Normal appearing pulmonic valve structure and function.   Trace pulmonic valvular regurgitation is present.   The left atrium ismildly dilated.   MIld concentric left ventricular hypertrophy is present.   Estimated left ventricular ejection fraction is 60-65 %.   There is a hypoechoic space anterior to the right ventricular free wall.   Cannot rule out loculated pericardial effusion Vs. pericardial fat pad.    < end of copied text >        I personally reviewed labs, orders and vitals.    Discussed case with:  [X]RN  [X]ELPIDIO/RIRI  [X]Patient  [X]Family  [X]Specialist: Nephrology

## 2023-02-14 NOTE — PROGRESS NOTE ADULT - ASSESSMENT
MEDICATIONS  (STANDING):  aMIOdarone    Tablet 200 milliGRAM(s) Oral daily  aspirin enteric coated 81 milliGRAM(s) Oral daily  atorvastatin 20 milliGRAM(s) Oral at bedtime  cyanocobalamin 2000 MICROGram(s) Oral daily  famotidine    Tablet 10 milliGRAM(s) Oral daily  heparin   Injectable 5000 Unit(s) SubCutaneous every 12 hours  levothyroxine 88 MICROGram(s) Oral daily  metoprolol succinate ER 25 milliGRAM(s) Oral daily  Nephro-shawna 1 Tablet(s) Oral daily  predniSONE   Tablet 40 milliGRAM(s) Oral daily    MEDICATIONS  (PRN):  acetaminophen     Tablet .. 650 milliGRAM(s) Oral every 6 hours PRN Mild Pain (1 - 3)  aluminum hydroxide/magnesium hydroxide/simethicone Suspension 30 milliLiter(s) Oral every 4 hours PRN Dyspepsia  melatonin 3 milliGRAM(s) Oral at bedtime PRN Insomnia  ondansetron Injectable 4 milliGRAM(s) IV Push every 8 hours PRN Nausea and/or Vomiting          ASSESSMENT/PLAN    RADHA on CKD. Slowly improving  -Uncleared etiology Appears to be intrinsic. ATN/Emboli? Unable to clinically determine. Recent CABG.   Personal history of transient urinary retention 7/2022. Bladders scans negative for significant PVR.   -US findings noted  -Nephrology consulted  -I/Os  -Avoid nephrotoxic agents and/or adjust dose accordingly  -IVF as needed for fluid balance  -Continue to monitor Cr/Electrolytes  -Serologies and other Analysis ordered by nephrology for further evaluation all unremarkable.   -ANCA and CHINMAY negative. C3 and C4 complements WNL    Acute Gout Flare Right Great Toe. Improving  -Unable to clinically determine if present on admission.   -PO prednisone  -Uric Acid 9.8. Likely augmented by RADHA on CKD.     CAD s/p CABG (10/2022) without angina  Paroxysmal Atrial Fibrillation (on Amiodarone; not AC)  HTN  HLD  -ASA/Plavix on hold since 2/3 in anticipation of possibility requiring HD catheter. Resume aspirin (2/10). IF Cr continues to improve, tentatively resumption of plavix.   -Continue BB/Statin  -Resume ASA/plavix when possible  -TTE findings noted.   -Continue Amiodarone  -[id number]. Not on AC since 7/2022.   -Norvasc started 2 weeks prior to admission with reported subsequent increased LE edema. Discontinue Norvasc  -PRN Rx for elevated BP.     Normocytic Anemia. Stable.   -Likely component of CKD  -Stable and at baseline  -Ferritin/Iron/b12/folate levels all ok  -No overt signs of bleeding.   -Epo low normal in the setting of CKD. Epo as per nephro    DVT Prophylaxis: heparin subq    Disposition  IVF; monitor cr. Nephro recs. Monitor Hgb. Prednisone for acute gout flare. Clinically improving. Cr slowly improving. Still requiring IVF and closely monitoring of IVF/Cr. In additional likely to require a day to observe off IVF when discontinue by nephrology. This treatment plan likely to last  for 3-5 more days.         Goals of Care (GOC)/Advance Care Planning (ACP)  Date of Discussion/evaluation: 2/6/2023    PLAN:  Code Status: Full code  HCP: Primary is daughterZaida ([phone]) and secondary is son, Isidro Nova ([phone])  Alternative Feeding methods: Would like to discuss or assess should the situation arise that it requires alternative feeding methods  Blood Product Transfusions: YES agreeable  Pressors: YES agreeable

## 2023-02-15 LAB
ALBUMIN SERPL ELPH-MCNC: 2.9 G/DL — LOW (ref 3.3–5)
ANION GAP SERPL CALC-SCNC: 8 MMOL/L — SIGNIFICANT CHANGE UP (ref 5–17)
BUN SERPL-MCNC: 86 MG/DL — HIGH (ref 7–23)
CALCIUM SERPL-MCNC: 8.4 MG/DL — LOW (ref 8.5–10.1)
CHLORIDE SERPL-SCNC: 116 MMOL/L — HIGH (ref 96–108)
CO2 SERPL-SCNC: 18 MMOL/L — LOW (ref 22–31)
CREAT SERPL-MCNC: 4.2 MG/DL — HIGH (ref 0.5–1.3)
EGFR: 10 ML/MIN/1.73M2 — LOW
GLUCOSE SERPL-MCNC: 110 MG/DL — HIGH (ref 70–99)
PHOSPHATE SERPL-MCNC: 4.5 MG/DL — SIGNIFICANT CHANGE UP (ref 2.5–4.5)
POTASSIUM SERPL-MCNC: 4.5 MMOL/L — SIGNIFICANT CHANGE UP (ref 3.5–5.3)
POTASSIUM SERPL-SCNC: 4.5 MMOL/L — SIGNIFICANT CHANGE UP (ref 3.5–5.3)
SODIUM SERPL-SCNC: 142 MMOL/L — SIGNIFICANT CHANGE UP (ref 135–145)

## 2023-02-15 PROCEDURE — 99232 SBSQ HOSP IP/OBS MODERATE 35: CPT

## 2023-02-15 RX ORDER — SODIUM BICARBONATE 1 MEQ/ML
650 SYRINGE (ML) INTRAVENOUS
Refills: 0 | Status: DISCONTINUED | OUTPATIENT
Start: 2023-02-15 | End: 2023-02-19

## 2023-02-15 RX ADMIN — Medication 81 MILLIGRAM(S): at 10:18

## 2023-02-15 RX ADMIN — Medication 88 MICROGRAM(S): at 05:26

## 2023-02-15 RX ADMIN — AMIODARONE HYDROCHLORIDE 200 MILLIGRAM(S): 400 TABLET ORAL at 10:17

## 2023-02-15 RX ADMIN — Medication 25 MILLIGRAM(S): at 09:56

## 2023-02-15 RX ADMIN — HEPARIN SODIUM 5000 UNIT(S): 5000 INJECTION INTRAVENOUS; SUBCUTANEOUS at 18:07

## 2023-02-15 RX ADMIN — HEPARIN SODIUM 5000 UNIT(S): 5000 INJECTION INTRAVENOUS; SUBCUTANEOUS at 05:26

## 2023-02-15 RX ADMIN — PREGABALIN 2000 MICROGRAM(S): 225 CAPSULE ORAL at 10:19

## 2023-02-15 RX ADMIN — FAMOTIDINE 10 MILLIGRAM(S): 10 INJECTION INTRAVENOUS at 10:18

## 2023-02-15 RX ADMIN — ATORVASTATIN CALCIUM 20 MILLIGRAM(S): 80 TABLET, FILM COATED ORAL at 20:54

## 2023-02-15 RX ADMIN — Medication 650 MILLIGRAM(S): at 10:18

## 2023-02-15 RX ADMIN — Medication 40 MILLIGRAM(S): at 10:18

## 2023-02-15 RX ADMIN — Medication 1 TABLET(S): at 10:19

## 2023-02-15 RX ADMIN — Medication 650 MILLIGRAM(S): at 20:54

## 2023-02-15 NOTE — PROGRESS NOTE ADULT - ASSESSMENT
86 yo female with hx of atrophic left kidney , CKD 4 w Cr ~ 2 post CABG in October and cr slowly rising since October    3--> 4 --> now Cr mid 5     RADHA on CKD 4 w slow progression since CABG    baseline Cr ~ 2   Cr improving, taking good po   can monitor with oral intake only, IVF resumed by medical teams    Started oral bicarb tabs    dc with staff, team  Dr Rodarte to resume care

## 2023-02-15 NOTE — PROGRESS NOTE ADULT - SUBJECTIVE AND OBJECTIVE BOX
CHIEF COMPLAINT: Elevated Cr. Baseline increased urinary frequency unchanges    SUBJECTIVE/SIGNIFICANT INTERVAL EVENTS/OVERNIGHT EVENTS:    2/7: Reports no complaints. Cr still relatively same. Hgb lower. Recheck ordered. No overt signs of bleeding.   2/8: Feeling well. Reporting no complaints. No urinary complaints. Denies fever, chills, chest pain, nausea, vomiting, abdominal pain/discomfort.   2/9: No complaints. Cr improving. Encourage adequate oral hydration  2/10: Feeling well. yesterday with right toe paint. base of right great toe red and tender. Uric acid elevated. Started prednisone today. Otherwise no new complaint. Cr improving  2/11: No new complaints. Foot feels better. Cr stopped improving in last 24 hours. Continue IVF. Nephro recs.   2/12: Right feeling much better (pain near gone). Cr improving. Tolerating IVF. No urinary complaints.   2/13: No more pain. No urinary complaints. Cr practically unchanged from day prior. Continue IVF. Nephro recs.   2/14: No acute complaints. Vitals stable. Minimal improvement in Cr  2/15. Mild improvement in Cr. No complaints. Vitals stable     Review of Systems: 14 Point review of systems reviewed and reported as negative unless otherwise stated above    FROM H&P:  "87F with PMH of CKDIIIB, Atrophic Left Kidney, HTN, HLD, Valvular HD, CAD s/p CABG (10/2022), Pulm HTN, Hypothyroidism presented to the ER after being sent by her nephrologist for further evaluation and worsening Cr. Cr in mid 2s back in 7/2022-10/2022. Underwent CABG October 2022. Since then no new urinary complaints. Reports baseline frequent urination that remains unchanged. Denies fever, chills, nausea, vomiting, abdominal pain/discomfort, sensation of incomplete void, dysuria. Intentional weight loss of 40-50 pounds in the last year attributed to diet changes. Reporting increased LE edema since starting norvasc for improved BP control two weeks ago.     In the ER Tmax 97.8, HR 59, /56, RR 17, SpO2 100% on RA. Hgb 9.32 (baseline ~9), MCV 95.2, BUN 87, Cr 6.44, RVP unremarkable. "    PHYSICAL EXAM:    Vital Signs Last 24 Hrs  T(C): 36.4 (15 Feb 2023 15:19), Max: 36.4 (15 Feb 2023 15:19)  T(F): 97.5 (15 Feb 2023 15:19), Max: 97.5 (15 Feb 2023 15:19)  HR: 59 (15 Feb 2023 15:19) (56 - 98)  BP: 136/71 (15 Feb 2023 15:19) (136/71 - 158/68)  RR: 18 (15 Feb 2023 15:19) (18 - 18)  SpO2: 100% (15 Feb 2023 15:19) (97% - 100%)    Parameters below as of 15 Feb 2023 15:19  Patient On (Oxygen Delivery Method): room air            General: AAOx3; NAD  Head: AT/NC  ENT: Moist Mucous Membranes; No Injury  Eyes: EOMI; PERRL  Neck: Non-tender; No JVD  CVS: RRR, S1&S2, murmur   Respiratory: Lungs CTA B/L; Normal Respiratory Effort and saturation on RA  Abdomen/GI: Soft, non-tender, non-distended, no guarding, no rebound, normal bowel sounds  : No bladder distention, No Bose  Extremities: No cyanosis, No clubbing,   MSK: No CVA tenderness, Normal ROM, No injury  Neuro: AAOx3, CNII-XII grossly intact, non-focal  Psych: Appropriate, Cooperative,   Skin: Clean, Dry and Intact      LABS:  Renal Panel (02.15.23 @ 06:52)    Chloride, Serum: 116 mmol/L    Sodium, Serum: 142 mmol/L    Potassium, Serum: 4.5 mmol/L    Carbon Dioxide, Serum: 18 mmol/L    Anion Gap, Serum: 8 mmol/L    Blood Urea Nitrogen, Serum: 86 mg/dL    Creatinine, Serum: 4.20 mg/dL    Glucose, Serum: 110 mg/dL    Calcium, Total Serum: 8.4 mg/dL    Albumin, Serum: 2.9 g/dL    eGFR: 10: The estimated glomerular filtration rate (eGFR) is calculated using the  2021 CKD-EPI creatinine equation, which does not have a coefficient for  race and is validated in individuals 18 years of age and older (N Engl J  Med 2021; 385:8366-2831). Creatinine-based eGFR may be inaccurate in  various situations including but not limited to extremes of muscle mass,  altered dietary protein intake, or medications that affect renal tubular  creatinine secretion. mL/min/1.73m2    Phosphorus Level, Serum: 4.5 mg/dL        CAPILLARY BLOOD GLUCOSE      Creatinine, Serum: 4.70 mg/dL (02.12.23 @ 07:25)   Creatinine, Serum: 5.21 mg/dL (02.11.23 @ 07:38)   Creatinine, Serum: 5.17 mg/dL (02.10.23 @ 06:41)   Creatinine, Serum: 5.68 mg/dL (02.09.23 @ 07:09)   Creatinine, Serum: 6.19 mg/dL (02.08.23 @ 06:39)   Creatinine, Serum: 6.86 mg/dL (02.07.23 @ 09:22)   Creatinine, Serum: 6.44 mg/dL (02.06.23 @ 09:49)   02-15    142  |  116<H>  |  86<H>  ----------------------------<  110<H>  4.5   |  18<L>  |  4.20<H>    Ca    8.4<L>      15 Feb 2023 06:52  Phos  4.5     02-15    TPro  x   /  Alb  2.9<L>  /  TBili  x   /  DBili  x   /  AST  x   /  ALT  x   /  AlkPhos  x   02-15  Creatinine, Serum: 2.41 mg/dL (07.08.22 @ 06:47)   Antineutrophil Cytoplasmic Antibody (02.06.23 @ 14:30)   Atypical ANCA: Negative   Cytoplasmic (c-ANCA) Antibody: Negative   Perinuclear (p-ANCA) Antibody: Negative Iron with Total Binding Capacity (02.06.23 @ 14:30)   Iron - Total Binding Capacity.: 217 ug/dL   % Saturation, Iron: 21 %   Iron Total, Serum: 46 ug/dL   Unsaturated Iron Binding Capacity: 171 ug/dL B12 and Folate (02.06.23 @ 14:30)   Vitamin B12, Serum: >2000 pg/mL   Folate, Serum: 12.2 ng/mL Ferritin, Serum (02.06.23 @ 14:30)   Ferritin, Serum: 608 ng/mL C4 Complement, Serum (02.06.23 @ 14:30)   C4 Complement, Serum: 26 mg/dL C3 Complement, Serum (02.06.23 @ 14:30)   C3 Complement, Serum: 95 mg/dL                    7.9    7.44  )-----------( 134      ( 07 Feb 2023 09:22 )             23.6     02-07    143  |  113<H>  |  95<H>  ----------------------------<  98  4.3   |  20<L>  |  6.86<H>    Ca    8.8      07 Feb 2023 09:22    TPro  6.8  /  Alb  3.8  /  TBili  0.7  /  DBili  x   /  AST  17  /  ALT  18  /  AlkPhos  83  02-06      CAPILLARY BLOOD GLUCOSE      `Creatinine, Random Urine: 72: Sodium, Random Urine: 41:Iron with Total Binding Capacity (02.06.23 @ 14:30)   Iron - Total Binding Capacity.: 217 ug/dL   % Saturation, Iron: 21 %   Iron Total, Serum: 46 ug/dL   Unsaturated Iron Binding Capacity: 171 ug/dL B12 and Folate (02.06.23 @ 14:30)   Vitamin B12, Serum: >2000 pg/mL   Folate, Serum: 12.2 ng/mL Ferritin, Serum (02.06.23 @ 14:30)   Ferritin, Serum: 608 ng/mL C4 Complement, Serum (02.06.23 @ 14:30)   C4 Complement, Serum: 26 mg/dL C3 Complement, Serum (02.06.23 @ 14:30)   C3 Complement, Serum: 95 mg/dL Prothrombin Time and INR, Plasma in AM (02.06.23 @ 09:49)   Prothrombin Time, Plasma: 11.9 sec   INR: 1.03: Activated Partial Thromboplastin Time in AM (02.06.23 @ 09:49)   Activated Partial Thromboplastin Time: 21.7:     Uric Acid, Serum: 9.8 mg/dL (02.10.23 @ 06:41)         RADIOLOGY:  `< from: US Duplex Kidneys (02.06.23 @ 14:59) >  IMPRESSION:    Markedly atrophic left kidney which was not visualized.    No right hydronephrosis. Increased renal echogenicity and increased   resistiveindices consistent with medical renal disease.    Limited visualization of the right renal artery. No renal artery stenosis   is identified. If clinically indicated further evaluation may be   performed with MR angiogram.    < end of copied text >  `< from: Xray Chest 1 View- PORTABLE-Routine (Xray Chest 1 View- PORTABLE-Routine .) (02.06.23 @ 10:36) >    IMPRESSION:   No radiographic evidence of active chest disease.    < end of copied text >      EKG:  `< from: 12 Lead ECG (02.06.23 @ 10:02) >  Diagnosis Line *** Poor data quality, interpretation may be adversely affected  Sinus rhythm  Left axis deviation  Left ventricular hypertrophy with QRS widening  T wave abnormality, consider inferior ischemia  Abnormal ECG  When compared with ECG of 03-JUL-2022 07:33,  No significant change was found    < end of copied text >      ECHO:  `< from: TTE Echo Complete w/o Contrast w/ Doppler (02.07.23 @ 11:15) >   Summary     The mitral valve leaflets appear thickened.   Mild (1+) mitral regurgitation is present.   EA reversal of the mitral inflow consistent with reduced compliance of   the   left ventricle.   Moderate aortic sclerosis is present with minimally restricted valvular   opening.   Mild (1+) aortic regurgitation is present.   At least Mild tricuspid regurgitation is present.   Normal appearing pulmonic valve structure and function.   Trace pulmonic valvular regurgitation is present.   The left atrium ismildly dilated.   MIld concentric left ventricular hypertrophy is present.   Estimated left ventricular ejection fraction is 60-65 %.   There is a hypoechoic space anterior to the right ventricular free wall.   Cannot rule out loculated pericardial effusion Vs. pericardial fat pad.    < end of copied text >        I personally reviewed labs, orders and vitals.    Discussed case with:  [X]RN  [X]CM/RIRI  [X]Patient  [X]Family  [X]Specialist: Nephrology

## 2023-02-15 NOTE — PROGRESS NOTE ADULT - SUBJECTIVE AND OBJECTIVE BOX
Patient is a 87y Female who reports no complaints as new, eager to leave. Drinking well         MEDICATIONS  (STANDING):  aMIOdarone    Tablet 200 milliGRAM(s) Oral daily  aspirin enteric coated 81 milliGRAM(s) Oral daily  atorvastatin 20 milliGRAM(s) Oral at bedtime  cyanocobalamin 2000 MICROGram(s) Oral daily  famotidine    Tablet 10 milliGRAM(s) Oral daily  heparin   Injectable 5000 Unit(s) SubCutaneous every 12 hours  levothyroxine 88 MICROGram(s) Oral daily  metoprolol succinate ER 25 milliGRAM(s) Oral daily  Nephro-shawna 1 Tablet(s) Oral daily  predniSONE   Tablet 40 milliGRAM(s) Oral daily  sodium bicarbonate 650 milliGRAM(s) Oral two times a day  sodium chloride 0.9%. 1000 milliLiter(s) (50 mL/Hr) IV Continuous <Continuous>    MEDICATIONS  (PRN):  acetaminophen     Tablet .. 650 milliGRAM(s) Oral every 6 hours PRN Mild Pain (1 - 3)  aluminum hydroxide/magnesium hydroxide/simethicone Suspension 30 milliLiter(s) Oral every 4 hours PRN Dyspepsia  melatonin 3 milliGRAM(s) Oral at bedtime PRN Insomnia  ondansetron Injectable 4 milliGRAM(s) IV Push every 8 hours PRN Nausea and/or Vomiting        T(C): , Max: 36.5 (02-14-23 @ 15:34)  T(F): , Max: 97.7 (02-14-23 @ 15:34)  HR: 98 (02-15-23 @ 08:09)  BP: 150/62 (02-15-23 @ 08:09)  BP(mean): --  RR: 18 (02-15-23 @ 08:09)  SpO2: 98% (02-15-23 @ 08:09)  Wt(kg): --    02-14 @ 07:01  -  02-15 @ 07:00  --------------------------------------------------------  IN: 450 mL / OUT: 0 mL / NET: 450 mL          PHYSICAL EXAM:    Constitutional: NAD   HEENT:  MM  dist  Cardiovascular: S1 and S2   Gastrointestinal: BS+, soft, NT/ND  Extremities:   peripheral edema  Neurological: A/O x 3            LABS:    15 Feb 2023 06:52    142    |  116    |  86     ----------------------------<  110    4.5     |  18     |  4.20   14 Feb 2023 06:34    141    |  117    |  76     ----------------------------<  121    4.6     |  15     |  4.56   13 Feb 2023 07:34    142    |  115    |  73     ----------------------------<  127    4.0     |  19     |  4.67   12 Feb 2023 07:25    142    |  116    |  69     ----------------------------<  133    4.3     |  19     |  4.70     Ca    8.4        15 Feb 2023 06:52  Ca    8.2        14 Feb 2023 06:34  Ca    8.4        13 Feb 2023 07:34  Ca    8.6        12 Feb 2023 07:25  Phos  4.5       15 Feb 2023 06:52  Phos  3.8       13 Feb 2023 07:34  Mg     1.8       13 Feb 2023 07:34    TPro  x      /  Alb  2.9    /  TBili  x      /  DBili  x      /  AST  x      /  ALT  x      /  AlkPhos  x      15 Feb 2023 06:52          Urine Studies:          RADIOLOGY & ADDITIONAL STUDIES:

## 2023-02-15 NOTE — PROGRESS NOTE ADULT - ASSESSMENT
MEDICATIONS  (STANDING):  aMIOdarone    Tablet 200 milliGRAM(s) Oral daily  aspirin enteric coated 81 milliGRAM(s) Oral daily  atorvastatin 20 milliGRAM(s) Oral at bedtime  cyanocobalamin 2000 MICROGram(s) Oral daily  famotidine    Tablet 10 milliGRAM(s) Oral daily  heparin   Injectable 5000 Unit(s) SubCutaneous every 12 hours  levothyroxine 88 MICROGram(s) Oral daily  metoprolol succinate ER 25 milliGRAM(s) Oral daily  Nephro-shawna 1 Tablet(s) Oral daily  predniSONE   Tablet 40 milliGRAM(s) Oral daily  sodium bicarbonate 650 milliGRAM(s) Oral two times a day  sodium chloride 0.9%. 1000 milliLiter(s) (50 mL/Hr) IV Continuous <Continuous>    MEDICATIONS  (PRN):  acetaminophen     Tablet .. 650 milliGRAM(s) Oral every 6 hours PRN Mild Pain (1 - 3)  aluminum hydroxide/magnesium hydroxide/simethicone Suspension 30 milliLiter(s) Oral every 4 hours PRN Dyspepsia  melatonin 3 milliGRAM(s) Oral at bedtime PRN Insomnia  ondansetron Injectable 4 milliGRAM(s) IV Push every 8 hours PRN Nausea and/or Vomiting          ASSESSMENT/PLAN    RADHA on CKD. Slowly improving  -Uncleared etiology Appears to be intrinsic. ATN/Emboli? Unable to clinically determine. Recent CABG.   Personal history of transient urinary retention 7/2022. Bladders scans negative for significant PVR.   -US findings noted  -Nephrology consulted  -I/Os  -Avoid nephrotoxic agents and/or adjust dose accordingly  -IVF as needed for fluid balance  -Continue to monitor Cr/Electrolytes  -Serologies and other Analysis ordered by nephrology for further evaluation all unremarkable.   -ANCA and CHINMAY negative. C3 and C4 complements WNL    Acute Gout Flare Right Great Toe. Improving  -Unable to clinically determine if present on admission.   -PO prednisone  -Uric Acid 9.8. Likely augmented by RADHA on CKD.     CAD s/p CABG (10/2022) without angina  Paroxysmal Atrial Fibrillation (on Amiodarone; not AC)  HTN  HLD  -ASA/Plavix on hold since 2/3 in anticipation of possibility requiring HD catheter. Resume aspirin (2/10). IF Cr continues to improve, tentatively resumption of plavix.   -Continue BB/Statin  -Resume ASA/plavix when possible  -TTE findings noted.   -Continue Amiodarone  -[id number]=7. Not on AC since 7/2022.   -Norvasc started 2 weeks prior to admission with reported subsequent increased LE edema. Discontinue Norvasc  -PRN Rx for elevated BP.     Normocytic Anemia. Stable.   -Likely component of CKD  -Stable and at baseline  -Ferritin/Iron/b12/folate levels all ok  -No overt signs of bleeding.   -Epo low normal in the setting of CKD. Epo as per nephro    DVT Prophylaxis: heparin subq    Disposition  IVF; monitor cr. Nephro recs. Monitor Hgb. Prednisone for acute gout flare. Clinically improving. Cr slowly improving. Still requiring IVF and closely monitoring of IVF/Cr. In additional likely to require a day to observe off IVF when discontinue by nephrology. This treatment plan likely to last  for 3-5 more days.         Goals of Care (GOC)/Advance Care Planning (ACP)  Date of Discussion/evaluation: 2/6/2023    PLAN:  Code Status: Full code  HCP: Primary is daughterZaida ([phone]) and secondary is son, Isidro Nova ([phone])  Alternative Feeding methods: Would like to discuss or assess should the situation arise that it requires alternative feeding methods  Blood Product Transfusions: YES agreeable  Pressors: YES agreeable         MEDICATIONS  (STANDING):  aMIOdarone    Tablet 200 milliGRAM(s) Oral daily  aspirin enteric coated 81 milliGRAM(s) Oral daily  atorvastatin 20 milliGRAM(s) Oral at bedtime  cyanocobalamin 2000 MICROGram(s) Oral daily  famotidine    Tablet 10 milliGRAM(s) Oral daily  heparin   Injectable 5000 Unit(s) SubCutaneous every 12 hours  levothyroxine 88 MICROGram(s) Oral daily  metoprolol succinate ER 25 milliGRAM(s) Oral daily  Nephro-shawna 1 Tablet(s) Oral daily  predniSONE   Tablet 40 milliGRAM(s) Oral daily  sodium bicarbonate 650 milliGRAM(s) Oral two times a day  sodium chloride 0.9%. 1000 milliLiter(s) (50 mL/Hr) IV Continuous <Continuous>    MEDICATIONS  (PRN):  acetaminophen     Tablet .. 650 milliGRAM(s) Oral every 6 hours PRN Mild Pain (1 - 3)  aluminum hydroxide/magnesium hydroxide/simethicone Suspension 30 milliLiter(s) Oral every 4 hours PRN Dyspepsia  melatonin 3 milliGRAM(s) Oral at bedtime PRN Insomnia  ondansetron Injectable 4 milliGRAM(s) IV Push every 8 hours PRN Nausea and/or Vomiting          ASSESSMENT/PLAN    RADHA on CKD, metabolic acidosis   -Uncleared etiology Appears to be intrinsic. ATN/Emboli? Unable to clinically determine. Recent CABG.   -Personal history of transient urinary retention 7/2022. Bladders scans negative for significant PVR.   -IVF as needed for fluid balance  -encourage PO hydration   -Sodium bicarbonate   -I/Os  -Continue to monitor Cr/Electrolytes  -Serologies and other Analysis ordered by nephrology for further evaluation all unremarkable.   -ANCA and CHINMAY negative. C3 and C4 complements WNL  -Avoid nephrotoxic agents and/or adjust dose accordingly  -Nephrology consult and recommendations noted       #Acute Gout Flare Right Great Toe.  -Prednisone   -clinically getting better     #CAD s/p CABG (10/2022) without angina; Paroxysmal Atrial Fibrillation (on Amiodarone; not AC); HTN; HLD  -on ASA   -plan to resume Plavix once Cr stablizes   -on Amiodarone for afib, [id number]. Not on AC since 7/2022. not on AC   -c/w BB and statin   -TTE findings noted.   -Continue Amiodarone  -Norvasc started 2 weeks prior to admission with reported subsequent increased LE edema. Discontinue Norvasc  -PRN Rx for elevated BP.     Normocytic Anemia. Stable.   -Likely component of CKD  -Stable and at baseline  -Ferritin/Iron/b12/folate levels all ok  -No overt signs of bleeding.   -Epo low normal in the setting of CKD. Epo as per nephro    DVT Prophylaxis:   heparin subq    Disposition  IVF; monitor cr. Nephro recs. Monitor Hgb. Prednisone for acute gout flare. Clinically improving. Cr slowly improving. Still requiring IVF and closely monitoring of IVF/Cr. In additional likely to require a day to observe off IVF when discontinue by nephrology. This treatment plan likely to last  for 3-5 more days.         Goals of Care (GOC)/Advance Care Planning (ACP)  Date of Discussion/evaluation: 2/6/2023

## 2023-02-16 DIAGNOSIS — N17.9 ACUTE KIDNEY FAILURE, UNSPECIFIED: ICD-10-CM

## 2023-02-16 LAB
ALBUMIN SERPL ELPH-MCNC: 2.8 G/DL — LOW (ref 3.3–5)
ANION GAP SERPL CALC-SCNC: 8 MMOL/L — SIGNIFICANT CHANGE UP (ref 5–17)
APTT BLD: 28.2 SEC — SIGNIFICANT CHANGE UP (ref 27.5–35.5)
BUN SERPL-MCNC: 85 MG/DL — HIGH (ref 7–23)
CALCIUM SERPL-MCNC: 8.5 MG/DL — SIGNIFICANT CHANGE UP (ref 8.5–10.1)
CHLORIDE SERPL-SCNC: 116 MMOL/L — HIGH (ref 96–108)
CO2 SERPL-SCNC: 19 MMOL/L — LOW (ref 22–31)
CREAT SERPL-MCNC: 4.25 MG/DL — HIGH (ref 0.5–1.3)
EGFR: 10 ML/MIN/1.73M2 — LOW
GLUCOSE SERPL-MCNC: 117 MG/DL — HIGH (ref 70–99)
HCT VFR BLD CALC: 25.7 % — LOW (ref 34.5–45)
HGB BLD-MCNC: 8.8 G/DL — LOW (ref 11.5–15.5)
INR BLD: 1.03 RATIO — SIGNIFICANT CHANGE UP (ref 0.88–1.16)
MCHC RBC-ENTMCNC: 32.5 PG — SIGNIFICANT CHANGE UP (ref 27–34)
MCHC RBC-ENTMCNC: 34.2 GM/DL — SIGNIFICANT CHANGE UP (ref 32–36)
MCV RBC AUTO: 94.8 FL — SIGNIFICANT CHANGE UP (ref 80–100)
PHOSPHATE SERPL-MCNC: 3.9 MG/DL — SIGNIFICANT CHANGE UP (ref 2.5–4.5)
PLATELET # BLD AUTO: 168 K/UL — SIGNIFICANT CHANGE UP (ref 150–400)
POTASSIUM SERPL-MCNC: 4.2 MMOL/L — SIGNIFICANT CHANGE UP (ref 3.5–5.3)
POTASSIUM SERPL-SCNC: 4.2 MMOL/L — SIGNIFICANT CHANGE UP (ref 3.5–5.3)
PROTHROM AB SERPL-ACNC: 11.9 SEC — SIGNIFICANT CHANGE UP (ref 10.5–13.4)
RBC # BLD: 2.71 M/UL — LOW (ref 3.8–5.2)
RBC # FLD: 13.6 % — SIGNIFICANT CHANGE UP (ref 10.3–14.5)
SARS-COV-2 RNA SPEC QL NAA+PROBE: SIGNIFICANT CHANGE UP
SODIUM SERPL-SCNC: 143 MMOL/L — SIGNIFICANT CHANGE UP (ref 135–145)
WBC # BLD: 11.29 K/UL — HIGH (ref 3.8–10.5)
WBC # FLD AUTO: 11.29 K/UL — HIGH (ref 3.8–10.5)

## 2023-02-16 PROCEDURE — 99232 SBSQ HOSP IP/OBS MODERATE 35: CPT

## 2023-02-16 PROCEDURE — 99221 1ST HOSP IP/OBS SF/LOW 40: CPT

## 2023-02-16 RX ORDER — HYDRALAZINE HCL 50 MG
25 TABLET ORAL
Refills: 0 | Status: DISCONTINUED | OUTPATIENT
Start: 2023-02-16 | End: 2023-02-19

## 2023-02-16 RX ADMIN — Medication 650 MILLIGRAM(S): at 21:12

## 2023-02-16 RX ADMIN — Medication 1 TABLET(S): at 10:52

## 2023-02-16 RX ADMIN — AMIODARONE HYDROCHLORIDE 200 MILLIGRAM(S): 400 TABLET ORAL at 10:53

## 2023-02-16 RX ADMIN — Medication 40 MILLIGRAM(S): at 10:52

## 2023-02-16 RX ADMIN — HEPARIN SODIUM 5000 UNIT(S): 5000 INJECTION INTRAVENOUS; SUBCUTANEOUS at 05:48

## 2023-02-16 RX ADMIN — Medication 25 MILLIGRAM(S): at 10:52

## 2023-02-16 RX ADMIN — Medication 88 MICROGRAM(S): at 05:48

## 2023-02-16 RX ADMIN — ATORVASTATIN CALCIUM 20 MILLIGRAM(S): 80 TABLET, FILM COATED ORAL at 21:12

## 2023-02-16 RX ADMIN — FAMOTIDINE 10 MILLIGRAM(S): 10 INJECTION INTRAVENOUS at 10:51

## 2023-02-16 RX ADMIN — PREGABALIN 2000 MICROGRAM(S): 225 CAPSULE ORAL at 10:52

## 2023-02-16 RX ADMIN — Medication 81 MILLIGRAM(S): at 10:54

## 2023-02-16 RX ADMIN — Medication 650 MILLIGRAM(S): at 10:52

## 2023-02-16 RX ADMIN — Medication 25 MILLIGRAM(S): at 21:12

## 2023-02-16 NOTE — PROGRESS NOTE ADULT - ASSESSMENT
86 yo female with hx of atrophic left kidney , CKD 4 w Cr ~ 2 post CABG in October and cr slowly rising since October    3--> 4 --> now Cr mid 5     RADHA on CKD 4 w slow progression since CABG    baseline Cr ~ 2   Cr not improving off IVF    d/w pt and family at length - suspect pt renal fxn will continue to decline in setting of solitary functionng kidney    will need to arrange for HD - discussed indications, risks and benefits with pt and family ( daughter)    they are agreeable to proceeed   plan for permcath w IR    plan for venous mapping and evenutal AVF if no improvement in future      dw with staff, team    * pt seen earlier, note now

## 2023-02-16 NOTE — CONSULT NOTE ADULT - PROBLEM SELECTOR RECOMMENDATION 9
- Reviewed with Dr. Villalta. Plan for IR placement of TDC tomorrow  - Keep pt NPO after midnight  - Repeat AM labs  - Hold Heparin 4 hours prior

## 2023-02-16 NOTE — PROGRESS NOTE ADULT - ASSESSMENT
MEDICATIONS  (STANDING):  aMIOdarone    Tablet 200 milliGRAM(s) Oral daily  aspirin enteric coated 81 milliGRAM(s) Oral daily  atorvastatin 20 milliGRAM(s) Oral at bedtime  cyanocobalamin 2000 MICROGram(s) Oral daily  famotidine    Tablet 10 milliGRAM(s) Oral daily  heparin   Injectable 5000 Unit(s) SubCutaneous every 12 hours  levothyroxine 88 MICROGram(s) Oral daily  metoprolol succinate ER 25 milliGRAM(s) Oral daily  Nephro-shawna 1 Tablet(s) Oral daily  sodium bicarbonate 650 milliGRAM(s) Oral two times a day    MEDICATIONS  (PRN):  acetaminophen     Tablet .. 650 milliGRAM(s) Oral every 6 hours PRN Mild Pain (1 - 3)  aluminum hydroxide/magnesium hydroxide/simethicone Suspension 30 milliLiter(s) Oral every 4 hours PRN Dyspepsia  melatonin 3 milliGRAM(s) Oral at bedtime PRN Insomnia  ondansetron Injectable 4 milliGRAM(s) IV Push every 8 hours PRN Nausea and/or Vomiting          ASSESSMENT/PLAN    RADHA on CKD, metabolic acidosis   -Uncleared etiology Appears to be intrinsic. ATN/Emboli? Unable to clinically determine. Recent CABG.   -Personal history of transient urinary retention 7/2022. [place] scans negative for significant PVR.   -IVF as needed for fluid balance  -encourage PO hydration   -Sodium bicarbonate   -I/Os  -Continue to monitor Cr/Electrolytes  -Serologies and other Analysis ordered by nephrology for further evaluation all unremarkable.   -ANCA and CHINMAY negative. C3 and C4 complements WNL  -Avoid nephrotoxic agents and/or adjust dose accordingly  -Nephrology consult and recommendations noted       #Acute Gout Flare Right Great Toe.  -Prednisone   -clinically getting better     #CAD s/p CABG (10/2022) without angina; Paroxysmal Atrial Fibrillation (on Amiodarone; not AC); HTN; HLD  -on ASA   -plan to resume Plavix once Cr stablizes and after the IR procedure   -on Amiodarone for afib, [id number]=0. Not on AC since 7/2022. not on AC   -c/w BB and statin   -TTE findings noted.   -Continue Amiodarone  -Norvasc started 2 weeks prior to admission with reported subsequent increased LE edema. Discontinue Norvasc  -PRN Rx for elevated BP.     Normocytic Anemia. Stable.   -Likely component of CKD  -Stable and at baseline  -Ferritin/Iron/b12/folate levels all ok  -No overt signs of bleeding.   -Epo low normal in the setting of CKD. Epo as per nephro    DVT Prophylaxis:   heparin subq    Disposition    -NPO after midnight for the IR procedure   -discussed with Nephrology: Dr. Rodarte

## 2023-02-16 NOTE — PROGRESS NOTE ADULT - SUBJECTIVE AND OBJECTIVE BOX
Patient is a 87y Female who reports no complaints as new, eager to leave. Drinking well but renal indices not improving     MEDICATIONS  (STANDING):  aMIOdarone    Tablet 200 milliGRAM(s) Oral daily  aspirin enteric coated 81 milliGRAM(s) Oral daily  atorvastatin 20 milliGRAM(s) Oral at bedtime  cyanocobalamin 2000 MICROGram(s) Oral daily  famotidine    Tablet 10 milliGRAM(s) Oral daily  heparin   Injectable 5000 Unit(s) SubCutaneous every 12 hours  hydrALAZINE 25 milliGRAM(s) Oral two times a day  levothyroxine 88 MICROGram(s) Oral daily  metoprolol succinate ER 25 milliGRAM(s) Oral daily  Nephro-shawna 1 Tablet(s) Oral daily  sodium bicarbonate 650 milliGRAM(s) Oral two times a day          T(C): , Max: 36.5 (02-14-23 @ 15:34)  T(F): , Max: 97.7 (02-14-23 @ 15:34)  HR: 98 (02-15-23 @ 08:09)  BP: 150/62 (02-15-23 @ 08:09)  BP(mean): --  RR: 18 (02-15-23 @ 08:09)  SpO2: 98% (02-15-23 @ 08:09)  Wt(kg): --    02-14 @ 07:01  -  02-15 @ 07:00  --------------------------------------------------------  IN: 450 mL / OUT: 0 mL / NET: 450 mL          PHYSICAL EXAM:    Constitutional: NAD   HEENT:  MM  dist  Cardiovascular: S1 and S2   Gastrointestinal: BS+, soft, NT/ND  Extremities:   peripheral edema  Neurological: A/O x 3            LABS:                                       8.8    11.29 )-----------( 168      ( 16 Feb 2023 11:41 )             25.7       143    |  116    |  85     ----------------------------<  117       16 Feb 2023 06:33  4.2     |  19     |  4.25     142    |  116    |  86     ----------------------------<  110       15 Feb 2023 06:52  4.5     |  18     |  4.20     Ca    8.5        16 Feb 2023 06:33    Phos  3.9       16 Feb 2023 06:33        TPro  x      /  Alb  2.8    /  TBili  x      /        16 Feb 2023 06:33  DBili  x      /  AST  x      /  ALT  x      /  AlkPhos  x              Urine Studies:          RADIOLOGY & ADDITIONAL STUDIES:

## 2023-02-16 NOTE — PROGRESS NOTE ADULT - SUBJECTIVE AND OBJECTIVE BOX
CHIEF COMPLAINT: Elevated Cr. Baseline increased urinary frequency unchanges    SUBJECTIVE/SIGNIFICANT INTERVAL EVENTS/OVERNIGHT EVENTS:    2/7: Reports no complaints. Cr still relatively same. Hgb lower. Recheck ordered. No overt signs of bleeding.   2/8: Feeling well. Reporting no complaints. No urinary complaints. Denies fever, chills, chest pain, nausea, vomiting, abdominal pain/discomfort.   2/9: No complaints. Cr improving. Encourage adequate oral hydration  2/10: Feeling well. yesterday with right toe paint. base of right great toe red and tender. Uric acid elevated. Started prednisone today. Otherwise no new complaint. Cr improving  2/11: No new complaints. Foot feels better. Cr stopped improving in last 24 hours. Continue IVF. Nephro recs.   2/12: Right feeling much better (pain near gone). Cr improving. Tolerating IVF. No urinary complaints.   2/13: No more pain. No urinary complaints. Cr practically unchanged from day prior. Continue IVF. Nephro recs.   2/14: No acute complaints. Vitals stable. Minimal improvement in Cr  2/15. Mild improvement in Cr. No complaints. Vitals stable   2/16: Patient off fluids x1 days. No improvement in Cr. Other vitals stable. Discussed with Dr. Rodarte.    Review of Systems: 14 Point review of systems reviewed and reported as negative unless otherwise stated above    FROM H&P:  "87F with PMH of CKDIIIB, Atrophic Left Kidney, HTN, HLD, Valvular HD, CAD s/p CABG (10/2022), Pulm HTN, Hypothyroidism presented to the ER after being sent by her nephrologist for further evaluation and worsening Cr. Cr in mid 2s back in 7/2022-10/2022. Underwent CABG October 2022. Since then no new urinary complaints. Reports baseline frequent urination that remains unchanged. Denies fever, chills, nausea, vomiting, abdominal pain/discomfort, sensation of incomplete void, dysuria. Intentional weight loss of 40-50 pounds in the last year attributed to diet changes. Reporting increased LE edema since starting norvasc for improved BP control two weeks ago.     In the ER Tmax 97.8, HR 59, /56, RR 17, SpO2 100% on RA. Hgb 9.32 (baseline ~9), MCV 95.2, BUN 87, Cr 6.44, RVP unremarkable. "    PHYSICAL EXAM:    Vital Signs Last 24 Hrs  T(C): 36.6 (16 Feb 2023 15:38), Max: 36.7 (16 Feb 2023 09:01)  T(F): 97.9 (16 Feb 2023 15:38), Max: 98.1 (16 Feb 2023 09:01)  HR: 62 (16 Feb 2023 15:38) (58 - 72)  BP: 166/71 (16 Feb 2023 15:38) (142/65 - 173/70)  RR: 18 (16 Feb 2023 15:38) (17 - 18)  SpO2: 96% (16 Feb 2023 15:38) (96% - 99%)    Parameters below as of 16 Feb 2023 15:38  Patient On (Oxygen Delivery Method): room air                General: AAOx3; NAD  Head: AT/NC  ENT: Moist Mucous Membranes; No Injury  Eyes: EOMI; PERRL  Neck: Non-tender; No JVD  CVS: RRR, S1&S2, murmur   Respiratory: Lungs CTA B/L; Normal Respiratory Effort and saturation on RA  Abdomen/GI: Soft, non-tender, non-distended, no guarding, no rebound, normal bowel sounds  : No bladder distention, No Bose  Extremities: No cyanosis, No clubbing,   MSK: No CVA tenderness, Normal ROM, No injury  Neuro: AAOx3, CNII-XII grossly intact, non-focal  Psych: Appropriate, Cooperative,   Skin: Clean, Dry and Intact      LABS:  Renal Panel (02.15.23 @ 06:52)    Chloride, Serum: 116 mmol/L    Sodium, Serum: 142 mmol/L    Potassium, Serum: 4.5 mmol/L    Carbon Dioxide, Serum: 18 mmol/L    Anion Gap, Serum: 8 mmol/L    Blood Urea Nitrogen, Serum: 86 mg/dL    Creatinine, Serum: 4.20 mg/dL    Glucose, Serum: 110 mg/dL    Calcium, Total Serum: 8.4 mg/dL    Albumin, Serum: 2.9 g/dL    eGFR: 10: The estimated glomerular filtration rate (eGFR) is calculated using the  2021 CKD-EPI creatinine equation, which does not have a coefficient for  race and is validated in individuals 18 years of age and older (N Engl J  Med 2021; 385:2439-1938). Creatinine-based eGFR may be inaccurate in  various situations including but not limited to extremes of muscle mass,  altered dietary protein intake, or medications that affect renal tubular  creatinine secretion. mL/min/1.73m2    Phosphorus Level, Serum: 4.5 mg/dL        CAPILLARY BLOOD GLUCOSE      Creatinine, Serum: 4.70 mg/dL (02.12.23 @ 07:25)   Creatinine, Serum: 5.21 mg/dL (02.11.23 @ 07:38)   Creatinine, Serum: 5.17 mg/dL (02.10.23 @ 06:41)   Creatinine, Serum: 5.68 mg/dL (02.09.23 @ 07:09)   Creatinine, Serum: 6.19 mg/dL (02.08.23 @ 06:39)   Creatinine, Serum: 6.86 mg/dL (02.07.23 @ 09:22)   Creatinine, Serum: 6.44 mg/dL (02.06.23 @ 09:49)   02-15    142  |  116<H>  |  86<H>  ----------------------------<  110<H>  4.5   |  18<L>  |  4.20<H>    Ca    8.4<L>      15 Feb 2023 06:52  Phos  4.5     02-15    TPro  x   /  Alb  2.9<L>  /  TBili  x   /  DBili  x   /  AST  x   /  ALT  x   /  AlkPhos  x   02-15  Creatinine, Serum: 2.41 mg/dL (07.08.22 @ 06:47)   Antineutrophil Cytoplasmic Antibody (02.06.23 @ 14:30)   Atypical ANCA: Negative   Cytoplasmic (c-ANCA) Antibody: Negative   Perinuclear (p-ANCA) Antibody: Negative Iron with Total Binding Capacity (02.06.23 @ 14:30)   Iron - Total Binding Capacity.: 217 ug/dL   % Saturation, Iron: 21 %   Iron Total, Serum: 46 ug/dL   Unsaturated Iron Binding Capacity: 171 ug/dL B12 and Folate (02.06.23 @ 14:30)   Vitamin B12, Serum: >2000 pg/mL   Folate, Serum: 12.2 ng/mL Ferritin, Serum (02.06.23 @ 14:30)   Ferritin, Serum: 608 ng/mL C4 Complement, Serum (02.06.23 @ 14:30)   C4 Complement, Serum: 26 mg/dL C3 Complement, Serum (02.06.23 @ 14:30)   C3 Complement, Serum: 95 mg/dL                    7.9    7.44  )-----------( 134      ( 07 Feb 2023 09:22 )             23.6     02-07    143  |  113<H>  |  95<H>  ----------------------------<  98  4.3   |  20<L>  |  6.86<H>    Ca    8.8      07 Feb 2023 09:22    TPro  6.8  /  Alb  3.8  /  TBili  0.7  /  DBili  x   /  AST  17  /  ALT  18  /  AlkPhos  83  02-06      CAPILLARY BLOOD GLUCOSE      `Creatinine, Random Urine: 72: Sodium, Random Urine: 41:Iron with Total Binding Capacity (02.06.23 @ 14:30)   Iron - Total Binding Capacity.: 217 ug/dL   % Saturation, Iron: 21 %   Iron Total, Serum: 46 ug/dL   Unsaturated Iron Binding Capacity: 171 ug/dL B12 and Folate (02.06.23 @ 14:30)   Vitamin B12, Serum: >2000 pg/mL   Folate, Serum: 12.2 ng/mL Ferritin, Serum (02.06.23 @ 14:30)   Ferritin, Serum: 608 ng/mL C4 Complement, Serum (02.06.23 @ 14:30)   C4 Complement, Serum: 26 mg/dL C3 Complement, Serum (02.06.23 @ 14:30)   C3 Complement, Serum: 95 mg/dL Prothrombin Time and INR, Plasma in AM (02.06.23 @ 09:49)   Prothrombin Time, Plasma: 11.9 sec   INR: 1.03: Activated Partial Thromboplastin Time in AM (02.06.23 @ 09:49)   Activated Partial Thromboplastin Time: 21.7:     Uric Acid, Serum: 9.8 mg/dL (02.10.23 @ 06:41)         RADIOLOGY:  `< from: US Duplex Kidneys (02.06.23 @ 14:59) >  IMPRESSION:    Markedly atrophic left kidney which was not visualized.    No right hydronephrosis. Increased renal echogenicity and increased   resistiveindices consistent with medical renal disease.    Limited visualization of the right renal artery. No renal artery stenosis   is identified. If clinically indicated further evaluation may be   performed with MR angiogram.    < end of copied text >  `< from: Xray Chest 1 View- PORTABLE-Routine (Xray Chest 1 View- PORTABLE-Routine .) (02.06.23 @ 10:36) >    IMPRESSION:   No radiographic evidence of active chest disease.    < end of copied text >      EKG:  `< from: 12 Lead ECG (02.06.23 @ 10:02) >  Diagnosis Line *** Poor data quality, interpretation may be adversely affected  Sinus rhythm  Left axis deviation  Left ventricular hypertrophy with QRS widening  T wave abnormality, consider inferior ischemia  Abnormal ECG  When compared with ECG of 03-JUL-2022 07:33,  No significant change was found    < end of copied text >      ECHO:  `< from: TTE Echo Complete w/o Contrast w/ Doppler (02.07.23 @ 11:15) >   Summary     The mitral valve leaflets appear thickened.   Mild (1+) mitral regurgitation is present.   EA reversal of the mitral inflow consistent with reduced compliance of   the   left ventricle.   Moderate aortic sclerosis is present with minimally restricted valvular   opening.   Mild (1+) aortic regurgitation is present.   At least Mild tricuspid regurgitation is present.   Normal appearing pulmonic valve structure and function.   Trace pulmonic valvular regurgitation is present.   The left atrium ismildly dilated.   MIld concentric left ventricular hypertrophy is present.   Estimated left ventricular ejection fraction is 60-65 %.   There is a hypoechoic space anterior to the right ventricular free wall.   Cannot rule out loculated pericardial effusion Vs. pericardial fat pad.    < end of copied text >        I personally reviewed labs, orders and vitals.    Discussed case with:  [X]RN  [X]CM/SW  [X]Patient  [X]Family  [X]Specialist: Nephrology

## 2023-02-16 NOTE — CONSULT NOTE ADULT - ASSESSMENT
88yo F with RADHA on CKD, without renal recovery referred to IR for placement of Tunneled Dialysis Catheter  - Needs updated CBC/CMP/Coags/Covid PCR

## 2023-02-16 NOTE — CONSULT NOTE ADULT - SUBJECTIVE AND OBJECTIVE BOX
Chief Complaint:  Patient is a 87y old  Female who presents with a chief complaint of needs dialysis      HPI:  87F with PMH of CKDIIIB, Atrophic Left Kidney, HTN, HLD, Valvular HD, CAD s/p CABG (10/2022), Pulm HTN, Hypothyroidism presented to the ER after being sent by her nephrologist for further evaluation and worsening Cr. Pt found to have RADHA on CKD with slow progression since CABG. IR consulted for placement of tunneled dialysis catheter by Dr. Castaneda. Pt seen at bedside, denied any complaints including fever, CP, SOB, NVD.     Allergies  penicillin (Unknown)    MEDICATIONS  (STANDING):  aMIOdarone    Tablet 200 milliGRAM(s) Oral daily  aspirin enteric coated 81 milliGRAM(s) Oral daily  atorvastatin 20 milliGRAM(s) Oral at bedtime  cyanocobalamin 2000 MICROGram(s) Oral daily  famotidine    Tablet 10 milliGRAM(s) Oral daily  heparin   Injectable 5000 Unit(s) SubCutaneous every 12 hours  levothyroxine 88 MICROGram(s) Oral daily  metoprolol succinate ER 25 milliGRAM(s) Oral daily  Nephro-shawna 1 Tablet(s) Oral daily  sodium bicarbonate 650 milliGRAM(s) Oral two times a day    MEDICATIONS  (PRN):  acetaminophen     Tablet .. 650 milliGRAM(s) Oral every 6 hours PRN Mild Pain (1 - 3)  aluminum hydroxide/magnesium hydroxide/simethicone Suspension 30 milliLiter(s) Oral every 4 hours PRN Dyspepsia  melatonin 3 milliGRAM(s) Oral at bedtime PRN Insomnia  ondansetron Injectable 4 milliGRAM(s) IV Push every 8 hours PRN Nausea and/or Vomiting      PAST MEDICAL & SURGICAL HISTORY:  HTN (hypertension)      HLD (hyperlipidemia)      History of valvular heart disease      CAD (coronary artery disease)      Stage 3 chronic kidney disease      S/P CABG (coronary artery bypass graft)      H/O: hysterectomy      History of appendectomy          FAMILY HISTORY:  FHx: heart disease (Father)        Review of Systems:  As per HPI    Vital Signs Last 24 Hrs  T(C): 36.7 (16 Feb 2023 09:01), Max: 36.7 (16 Feb 2023 09:01)  T(F): 98.1 (16 Feb 2023 09:01), Max: 98.1 (16 Feb 2023 09:01)  HR: 72 (16 Feb 2023 10:46) (58 - 72)  BP: 142/65 (16 Feb 2023 10:46) (136/71 - 173/70)  BP(mean): --  RR: 17 (16 Feb 2023 10:46) (17 - 18)  SpO2: 97% (16 Feb 2023 10:46) (97% - 100%)    Parameters below as of 16 Feb 2023 10:46  Patient On (Oxygen Delivery Method): room air        GENERAL APPEARANCE: Well developed, in no acute distress.    LUNGS: Auscultation of the lungs revealed normal breath sounds without any other adventitious sounds or rubs.    CARDIOVASCULAR: There was a regular rate and rhythm    ABDOMEN: Soft and nontender with normal bowel sounds.     NEUROLOGIC: Alert and oriented x 3. Normal affect.     CBC                        8.8    11.29 )-----------( 168      ( 16 Feb 2023 11:41 )             25.7       Chemistry  02-16    143  |  116<H>  |  85<H>  ----------------------------<  117<H>  4.2   |  19<L>  |  4.25<H>    Ca    8.5      16 Feb 2023 06:33  Phos  3.9     02-16    TPro  x   /  Alb  2.8<L>  /  TBili  x   /  DBili  x   /  AST  x   /  ALT  x   /  AlkPhos  x   02-16    Coags  PT/INR - ( 16 Feb 2023 11:41 )   PT: 11.9 sec;   INR: 1.03 ratio    PTT - ( 16 Feb 2023 11:41 )  PTT:28.2 sec

## 2023-02-17 LAB
ALBUMIN SERPL ELPH-MCNC: 2.9 G/DL — LOW (ref 3.3–5)
ALP SERPL-CCNC: 67 U/L — SIGNIFICANT CHANGE UP (ref 40–120)
ALT FLD-CCNC: 36 U/L — SIGNIFICANT CHANGE UP (ref 12–78)
ANION GAP SERPL CALC-SCNC: 7 MMOL/L — SIGNIFICANT CHANGE UP (ref 5–17)
APTT BLD: 22.3 SEC — LOW (ref 27.5–35.5)
AST SERPL-CCNC: 16 U/L — SIGNIFICANT CHANGE UP (ref 15–37)
BILIRUB SERPL-MCNC: 0.6 MG/DL — SIGNIFICANT CHANGE UP (ref 0.2–1.2)
BUN SERPL-MCNC: 94 MG/DL — HIGH (ref 7–23)
CALCIUM SERPL-MCNC: 8.3 MG/DL — LOW (ref 8.5–10.1)
CALCIUM SERPL-MCNC: 8.6 MG/DL — SIGNIFICANT CHANGE UP (ref 8.4–10.5)
CHLORIDE SERPL-SCNC: 117 MMOL/L — HIGH (ref 96–108)
CO2 SERPL-SCNC: 19 MMOL/L — LOW (ref 22–31)
CREAT SERPL-MCNC: 4.35 MG/DL — HIGH (ref 0.5–1.3)
EGFR: 9 ML/MIN/1.73M2 — LOW
FERRITIN SERPL-MCNC: 602 NG/ML — HIGH (ref 15–150)
GLUCOSE SERPL-MCNC: 120 MG/DL — HIGH (ref 70–99)
HCT VFR BLD CALC: 25.2 % — LOW (ref 34.5–45)
HGB BLD-MCNC: 8.5 G/DL — LOW (ref 11.5–15.5)
INR BLD: 1.01 RATIO — SIGNIFICANT CHANGE UP (ref 0.88–1.16)
IRON SATN MFR SERPL: 111 UG/DL — SIGNIFICANT CHANGE UP (ref 30–160)
IRON SATN MFR SERPL: 58 % — HIGH (ref 14–50)
MCHC RBC-ENTMCNC: 32.3 PG — SIGNIFICANT CHANGE UP (ref 27–34)
MCHC RBC-ENTMCNC: 33.7 GM/DL — SIGNIFICANT CHANGE UP (ref 32–36)
MCV RBC AUTO: 95.8 FL — SIGNIFICANT CHANGE UP (ref 80–100)
PLATELET # BLD AUTO: 159 K/UL — SIGNIFICANT CHANGE UP (ref 150–400)
POTASSIUM SERPL-MCNC: 4.6 MMOL/L — SIGNIFICANT CHANGE UP (ref 3.5–5.3)
POTASSIUM SERPL-SCNC: 4.6 MMOL/L — SIGNIFICANT CHANGE UP (ref 3.5–5.3)
PROT SERPL-MCNC: 5.7 GM/DL — LOW (ref 6–8.3)
PROTHROM AB SERPL-ACNC: 11.7 SEC — SIGNIFICANT CHANGE UP (ref 10.5–13.4)
PTH-INTACT FLD-MCNC: 147 PG/ML — HIGH (ref 15–65)
RBC # BLD: 2.63 M/UL — LOW (ref 3.8–5.2)
RBC # FLD: 13.8 % — SIGNIFICANT CHANGE UP (ref 10.3–14.5)
SODIUM SERPL-SCNC: 143 MMOL/L — SIGNIFICANT CHANGE UP (ref 135–145)
TIBC SERPL-MCNC: 193 UG/DL — LOW (ref 220–430)
UIBC SERPL-MCNC: 81 UG/DL — LOW (ref 110–370)
WBC # BLD: 11.15 K/UL — HIGH (ref 3.8–10.5)
WBC # FLD AUTO: 11.15 K/UL — HIGH (ref 3.8–10.5)

## 2023-02-17 PROCEDURE — 36558 INSERT TUNNELED CV CATH: CPT

## 2023-02-17 PROCEDURE — 77001 FLUOROGUIDE FOR VEIN DEVICE: CPT | Mod: 26

## 2023-02-17 PROCEDURE — 76937 US GUIDE VASCULAR ACCESS: CPT | Mod: 26

## 2023-02-17 PROCEDURE — 99232 SBSQ HOSP IP/OBS MODERATE 35: CPT

## 2023-02-17 RX ORDER — ERYTHROPOIETIN 10000 [IU]/ML
8000 INJECTION, SOLUTION INTRAVENOUS; SUBCUTANEOUS
Refills: 0 | Status: DISCONTINUED | OUTPATIENT
Start: 2023-02-20 | End: 2023-02-21

## 2023-02-17 RX ORDER — ONDANSETRON 8 MG/1
4 TABLET, FILM COATED ORAL ONCE
Refills: 0 | Status: DISCONTINUED | OUTPATIENT
Start: 2023-02-17 | End: 2023-02-17

## 2023-02-17 RX ORDER — FENTANYL CITRATE 50 UG/ML
25 INJECTION INTRAVENOUS
Refills: 0 | Status: DISCONTINUED | OUTPATIENT
Start: 2023-02-17 | End: 2023-02-17

## 2023-02-17 RX ORDER — ERYTHROPOIETIN 10000 [IU]/ML
8000 INJECTION, SOLUTION INTRAVENOUS; SUBCUTANEOUS ONCE
Refills: 0 | Status: COMPLETED | OUTPATIENT
Start: 2023-02-18 | End: 2023-02-18

## 2023-02-17 RX ORDER — FENTANYL CITRATE 50 UG/ML
50 INJECTION INTRAVENOUS
Refills: 0 | Status: DISCONTINUED | OUTPATIENT
Start: 2023-02-17 | End: 2023-02-17

## 2023-02-17 RX ADMIN — Medication 88 MICROGRAM(S): at 05:48

## 2023-02-17 RX ADMIN — AMIODARONE HYDROCHLORIDE 200 MILLIGRAM(S): 400 TABLET ORAL at 10:04

## 2023-02-17 RX ADMIN — FAMOTIDINE 10 MILLIGRAM(S): 10 INJECTION INTRAVENOUS at 18:06

## 2023-02-17 RX ADMIN — ATORVASTATIN CALCIUM 20 MILLIGRAM(S): 80 TABLET, FILM COATED ORAL at 21:53

## 2023-02-17 RX ADMIN — Medication 25 MILLIGRAM(S): at 17:55

## 2023-02-17 RX ADMIN — HEPARIN SODIUM 5000 UNIT(S): 5000 INJECTION INTRAVENOUS; SUBCUTANEOUS at 17:53

## 2023-02-17 RX ADMIN — Medication 650 MILLIGRAM(S): at 19:26

## 2023-02-17 RX ADMIN — Medication 25 MILLIGRAM(S): at 10:04

## 2023-02-17 RX ADMIN — Medication 1 TABLET(S): at 17:53

## 2023-02-17 RX ADMIN — PREGABALIN 2000 MICROGRAM(S): 225 CAPSULE ORAL at 17:55

## 2023-02-17 RX ADMIN — Medication 650 MILLIGRAM(S): at 21:53

## 2023-02-17 RX ADMIN — Medication 81 MILLIGRAM(S): at 17:55

## 2023-02-17 RX ADMIN — Medication 650 MILLIGRAM(S): at 19:08

## 2023-02-17 NOTE — PROGRESS NOTE ADULT - SUBJECTIVE AND OBJECTIVE BOX
Patient is a 87y Female who reports no complaints   advised on HD planning, pt understandably upset but agreeable to proceed    MEDICATIONS  (STANDING):  aMIOdarone    Tablet 200 milliGRAM(s) Oral daily  aspirin enteric coated 81 milliGRAM(s) Oral daily  atorvastatin 20 milliGRAM(s) Oral at bedtime  cyanocobalamin 2000 MICROGram(s) Oral daily  famotidine    Tablet 10 milliGRAM(s) Oral daily  heparin   Injectable 5000 Unit(s) SubCutaneous every 12 hours  hydrALAZINE 25 milliGRAM(s) Oral two times a day  levothyroxine 88 MICROGram(s) Oral daily  metoprolol succinate ER 25 milliGRAM(s) Oral daily  Nephro-shawna 1 Tablet(s) Oral daily  sodium bicarbonate 650 milliGRAM(s) Oral two times a day    Vital Signs Last 24 Hrs  T(C): 36.4 (17 Feb 2023 11:11), Max: 37 (16 Feb 2023 20:47)  T(F): 97.5 (17 Feb 2023 11:11), Max: 98.6 (16 Feb 2023 20:47)  HR: 69 (17 Feb 2023 11:45) (61 - 73)  BP: 136/50 (17 Feb 2023 11:30) (136/50 - 166/71)  BP(mean): --  RR: 14 (17 Feb 2023 11:45) (12 - 18)  SpO2: 96% (17 Feb 2023 11:45) (96% - 98%)    Parameters below as of 17 Feb 2023 11:11  Patient On (Oxygen Delivery Method): room air    I&O's Detail        PHYSICAL EXAM:    Constitutional: NAD   HEENT:  MM  dist  Cardiovascular: S1 and S2   Gastrointestinal: BS+, soft, NT/ND  Extremities:   peripheral edema  Neurological: A/O x 3            LABS:                          8.5    11.15 )-----------( 159      ( 17 Feb 2023 05:55 )             25.2                         8.8    11.29 )-----------( 168      ( 16 Feb 2023 11:41 )             25.7       143    |  117    |  94     ----------------------------<  120       17 Feb 2023 05:55  4.6     |  19     |  4.35     143    |  116    |  85     ----------------------------<  117       16 Feb 2023 06:33  4.2     |  19     |  4.25     142    |  116    |  86     ----------------------------<  110       15 Feb 2023 06:52  4.5     |  18     |  4.20     Ca    8.3        17 Feb 2023 05:55  Ca    8.5        16 Feb 2023 06:33    Phos  4.0       17 Feb 2023 05:55  Phos  3.9       16 Feb 2023 06:33      TPro  5.7    /  Alb  2.9    /  TBili  0.6    /        17 Feb 2023 05:55  DBili  x      /  AST  16     /  ALT  36     /  AlkPhos  67       TPro  x      /  Alb  2.8    /  TBili  x      /        16 Feb 2023 06:33  DBili  x      /  AST  x      /  ALT  x      /  AlkPhos  x                    Urine Studies:          RADIOLOGY & ADDITIONAL STUDIES:

## 2023-02-17 NOTE — PROGRESS NOTE ADULT - SUBJECTIVE AND OBJECTIVE BOX
second visit   seen on HD   s/p permcath    tolerating HD well    HD #2 tomorrow     d/w HD , reassured pt

## 2023-02-17 NOTE — PROGRESS NOTE ADULT - ASSESSMENT
88 yo female with hx of atrophic left kidney , CKD 4 w Cr ~ 2 post CABG in October and cr slowly rising since October    3--> 4 --> now Cr mid 5     RADHA on CKD 4 w slow progression since CABG    suspected atherembolic    baseline Cr ~ 2   Cr not improving off IVF    HD today after  permcath w IR    HD #2 tomorrow then MWF thereafter     Anemia    KENNETH w HD      plan for venous mapping and evenutal AVF if no improvement in future    vascualr Dr Hill for prelim consult and plan for AVF if needed in near future     d.w  plan for HD outpatient at [facility] next week     dr Causey covering weekend  Dr Moe covering next week

## 2023-02-17 NOTE — PROGRESS NOTE ADULT - SUBJECTIVE AND OBJECTIVE BOX
CHIEF COMPLAINT: Elevated Cr. Baseline increased urinary frequency unchanges    SUBJECTIVE/SIGNIFICANT INTERVAL EVENTS/OVERNIGHT EVENTS:    2/7: Reports no complaints. Cr still relatively same. Hgb lower. Recheck ordered. No overt signs of bleeding.   2/8: Feeling well. Reporting no complaints. No urinary complaints. Denies fever, chills, chest pain, nausea, vomiting, abdominal pain/discomfort.   2/9: No complaints. Cr improving. Encourage adequate oral hydration  2/10: Feeling well. yesterday with right toe paint. base of right great toe red and tender. Uric acid elevated. Started prednisone today. Otherwise no new complaint. Cr improving  2/11: No new complaints. Foot feels better. Cr stopped improving in last 24 hours. Continue IVF. Nephro recs.   2/12: Right feeling much better (pain near gone). Cr improving. Tolerating IVF. No urinary complaints.   2/13: No more pain. No urinary complaints. Cr practically unchanged from day prior. Continue IVF. Nephro recs.   2/14: No acute complaints. Vitals stable. Minimal improvement in Cr  2/15. Mild improvement in Cr. No complaints. Vitals stable   2/16: Patient off fluids x1 days. No improvement in Cr. Other vitals stable. Discussed with Dr. Rodarte.  2/17: s/p right permacath. HD today. No acute complaints.     Review of Systems: 14 Point review of systems reviewed and reported as negative unless otherwise stated above    FROM H&P:  "87F with PMH of CKDIIIB, Atrophic Left Kidney, HTN, HLD, Valvular HD, CAD s/p CABG (10/2022), Pulm HTN, Hypothyroidism presented to the ER after being sent by her nephrologist for further evaluation and worsening Cr. Cr in mid 2s back in 7/2022-10/2022. Underwent CABG October 2022. Since then no new urinary complaints. Reports baseline frequent urination that remains unchanged. Denies fever, chills, nausea, vomiting, abdominal pain/discomfort, sensation of incomplete void, dysuria. Intentional weight loss of 40-50 pounds in the last year attributed to diet changes. Reporting increased LE edema since starting norvasc for improved BP control two weeks ago.     In the ER Tmax 97.8, HR 59, /56, RR 17, SpO2 100% on RA. Hgb 9.32 (baseline ~9), MCV 95.2, BUN 87, Cr 6.44, RVP unremarkable. "    PHYSICAL EXAM:    Vital Signs Last 24 Hrs  T(C): 36 (17 Feb 2023 16:36), Max: 37.2 (17 Feb 2023 12:11)  T(F): 96.8 (17 Feb 2023 16:36), Max: 99 (17 Feb 2023 12:11)  HR: 70 (17 Feb 2023 16:58) (61 - 74)  BP: 150/89 (17 Feb 2023 16:58) (128/56 - 190/88)  RR: 17 (17 Feb 2023 16:58) (12 - 18)  SpO2: 98% (17 Feb 2023 12:30) (95% - 98%)    Parameters below as of 17 Feb 2023 16:58  Patient On (Oxygen Delivery Method): room air                  General: AAOx3; NAD  Head: AT/NC  ENT: Moist Mucous Membranes; No Injury  Eyes: EOMI; PERRL  Neck: Non-tender; No JVD  CVS: RRR, S1&S2, murmur, right permacath   Respiratory: Lungs CTA B/L; Normal Respiratory Effort and saturation on RA  Abdomen/GI: Soft, non-tender, non-distended, no guarding, no rebound, normal bowel sounds  : No bladder distention, No Bose  Extremities: No cyanosis, No clubbing,   MSK: No CVA tenderness, Normal ROM, No injury  Neuro: AAOx3, CNII-XII grossly intact, non-focal  Psych: Appropriate, Cooperative,   Skin: Clean, Dry and Intact      LABS:  Renal Panel (02.15.23 @ 06:52)    Chloride, Serum: 116 mmol/L    Sodium, Serum: 142 mmol/L    Potassium, Serum: 4.5 mmol/L    Carbon Dioxide, Serum: 18 mmol/L    Anion Gap, Serum: 8 mmol/L    Blood Urea Nitrogen, Serum: 86 mg/dL    Creatinine, Serum: 4.20 mg/dL    Glucose, Serum: 110 mg/dL    Calcium, Total Serum: 8.4 mg/dL    Albumin, Serum: 2.9 g/dL    eGFR: 10: The estimated glomerular filtration rate (eGFR) is calculated using the  2021 CKD-EPI creatinine equation, which does not have a coefficient for  race and is validated in individuals 18 years of age and older (N Engl J  Med 2021; 385:4698-3982). Creatinine-based eGFR may be inaccurate in  various situations including but not limited to extremes of muscle mass,  altered dietary protein intake, or medications that affect renal tubular  creatinine secretion. mL/min/1.73m2    Phosphorus Level, Serum: 4.5 mg/dL        CAPILLARY BLOOD GLUCOSE      Creatinine, Serum: 4.70 mg/dL (02.12.23 @ 07:25)   Creatinine, Serum: 5.21 mg/dL (02.11.23 @ 07:38)   Creatinine, Serum: 5.17 mg/dL (02.10.23 @ 06:41)   Creatinine, Serum: 5.68 mg/dL (02.09.23 @ 07:09)   Creatinine, Serum: 6.19 mg/dL (02.08.23 @ 06:39)   Creatinine, Serum: 6.86 mg/dL (02.07.23 @ 09:22)   Creatinine, Serum: 6.44 mg/dL (02.06.23 @ 09:49)   02-15    142  |  116<H>  |  86<H>  ----------------------------<  110<H>  4.5   |  18<L>  |  4.20<H>    Ca    8.4<L>      15 Feb 2023 06:52  Phos  4.5     02-15    TPro  x   /  Alb  2.9<L>  /  TBili  x   /  DBili  x   /  AST  x   /  ALT  x   /  AlkPhos  x   02-15  Creatinine, Serum: 2.41 mg/dL (07.08.22 @ 06:47)   Antineutrophil Cytoplasmic Antibody (02.06.23 @ 14:30)   Atypical ANCA: Negative   Cytoplasmic (c-ANCA) Antibody: Negative   Perinuclear (p-ANCA) Antibody: Negative Iron with Total Binding Capacity (02.06.23 @ 14:30)   Iron - Total Binding Capacity.: 217 ug/dL   % Saturation, Iron: 21 %   Iron Total, Serum: 46 ug/dL   Unsaturated Iron Binding Capacity: 171 ug/dL B12 and Folate (02.06.23 @ 14:30)   Vitamin B12, Serum: >2000 pg/mL   Folate, Serum: 12.2 ng/mL Ferritin, Serum (02.06.23 @ 14:30)   Ferritin, Serum: 608 ng/mL C4 Complement, Serum (02.06.23 @ 14:30)   C4 Complement, Serum: 26 mg/dL C3 Complement, Serum (02.06.23 @ 14:30)   C3 Complement, Serum: 95 mg/dL                    7.9    7.44  )-----------( 134      ( 07 Feb 2023 09:22 )             23.6     02-07    143  |  113<H>  |  95<H>  ----------------------------<  98  4.3   |  20<L>  |  6.86<H>    Ca    8.8      07 Feb 2023 09:22    TPro  6.8  /  Alb  3.8  /  TBili  0.7  /  DBili  x   /  AST  17  /  ALT  18  /  AlkPhos  83  02-06      CAPILLARY BLOOD GLUCOSE      `Creatinine, Random Urine: 72: Sodium, Random Urine: 41:Iron with Total Binding Capacity (02.06.23 @ 14:30)   Iron - Total Binding Capacity.: 217 ug/dL   % Saturation, Iron: 21 %   Iron Total, Serum: 46 ug/dL   Unsaturated Iron Binding Capacity: 171 ug/dL B12 and Folate (02.06.23 @ 14:30)   Vitamin B12, Serum: >2000 pg/mL   Folate, Serum: 12.2 ng/mL Ferritin, Serum (02.06.23 @ 14:30)   Ferritin, Serum: 608 ng/mL C4 Complement, Serum (02.06.23 @ 14:30)   C4 Complement, Serum: 26 mg/dL C3 Complement, Serum (02.06.23 @ 14:30)   C3 Complement, Serum: 95 mg/dL Prothrombin Time and INR, Plasma in AM (02.06.23 @ 09:49)   Prothrombin Time, Plasma: 11.9 sec   INR: 1.03: Activated Partial Thromboplastin Time in AM (02.06.23 @ 09:49)   Activated Partial Thromboplastin Time: 21.7:     Uric Acid, Serum: 9.8 mg/dL (02.10.23 @ 06:41)         RADIOLOGY:  `< from: US Duplex Kidneys (02.06.23 @ 14:59) >  IMPRESSION:    Markedly atrophic left kidney which was not visualized.    No right hydronephrosis. Increased renal echogenicity and increased   resistiveindices consistent with medical renal disease.    Limited visualization of the right renal artery. No renal artery stenosis   is identified. If clinically indicated further evaluation may be   performed with MR angiogram.    < end of copied text >  `< from: Xray Chest 1 View- PORTABLE-Routine (Xray Chest 1 View- PORTABLE-Routine .) (02.06.23 @ 10:36) >    IMPRESSION:   No radiographic evidence of active chest disease.    < end of copied text >      EKG:  `< from: 12 Lead ECG (02.06.23 @ 10:02) >  Diagnosis Line *** Poor data quality, interpretation may be adversely affected  Sinus rhythm  Left axis deviation  Left ventricular hypertrophy with QRS widening  T wave abnormality, consider inferior ischemia  Abnormal ECG  When compared with ECG of 03-JUL-2022 07:33,  No significant change was found    < end of copied text >      ECHO:  `< from: TTE Echo Complete w/o Contrast w/ Doppler (02.07.23 @ 11:15) >   Summary     The mitral valve leaflets appear thickened.   Mild (1+) mitral regurgitation is present.   EA reversal of the mitral inflow consistent with reduced compliance of   the   left ventricle.   Moderate aortic sclerosis is present with minimally restricted valvular   opening.   Mild (1+) aortic regurgitation is present.   At least Mild tricuspid regurgitation is present.   Normal appearing pulmonic valve structure and function.   Trace pulmonic valvular regurgitation is present.   The left atrium ismildly dilated.   MIld concentric left ventricular hypertrophy is present.   Estimated left ventricular ejection fraction is 60-65 %.   There is a hypoechoic space anterior to the right ventricular free wall.   Cannot rule out loculated pericardial effusion Vs. pericardial fat pad.    < end of copied text >        I personally reviewed labs, orders and vitals.    Discussed case with:  [X]RN  [X]CM/SW  [X]Patient  [X]Family  [X]Specialist: Nephrology

## 2023-02-17 NOTE — PROGRESS NOTE ADULT - ASSESSMENT
MEDICATIONS  (STANDING):  aMIOdarone    Tablet 200 milliGRAM(s) Oral daily  aspirin enteric coated 81 milliGRAM(s) Oral daily  atorvastatin 20 milliGRAM(s) Oral at bedtime  cyanocobalamin 2000 MICROGram(s) Oral daily  famotidine    Tablet 10 milliGRAM(s) Oral daily  heparin   Injectable 5000 Unit(s) SubCutaneous every 12 hours  hydrALAZINE 25 milliGRAM(s) Oral two times a day  levothyroxine 88 MICROGram(s) Oral daily  metoprolol succinate ER 25 milliGRAM(s) Oral daily  Nephro-shawna 1 Tablet(s) Oral daily  sodium bicarbonate 650 milliGRAM(s) Oral two times a day    MEDICATIONS  (PRN):  acetaminophen     Tablet .. 650 milliGRAM(s) Oral every 6 hours PRN Mild Pain (1 - 3)  aluminum hydroxide/magnesium hydroxide/simethicone Suspension 30 milliLiter(s) Oral every 4 hours PRN Dyspepsia  melatonin 3 milliGRAM(s) Oral at bedtime PRN Insomnia  ondansetron Injectable 4 milliGRAM(s) IV Push every 8 hours PRN Nausea and/or Vomiting          ASSESSMENT/PLAN    RADHA on CKD, metabolic acidosis   -Uncleared etiology Appears to be intrinsic. ATN/Emboli? Unable to clinically determine. Recent CABG.   -Personal history of transient urinary retention 7/2022. [place] scans negative for significant PVR.   -right HD catheter (02/17), per IR   -on HD  -vascular consult for vein mapping, AVF   -Sodium bicarbonate   -I/Os  -Continue to monitor Cr/Electrolytes  -Serologies and other Analysis ordered by nephrology for further evaluation all unremarkable.   -ANCA and CHINMAY negative. C3 and C4 complements WNL  -Avoid nephrotoxic agents and/or adjust dose accordingly  -Nephrology consult and recommendations noted       #Acute Gout Flare Right Great Toe.  -Prednisone (finished course)  -resolved     #CAD s/p CABG (10/2022) without angina; Paroxysmal Atrial Fibrillation (on Amiodarone; not AC); HTN; HLD  -on ASA   -plan to resume Plavix once Cr stabilizes and after the IR procedure   -on Amiodarone for afib, [id number]. Not on AC since 7/2022. not on AC   -c/w BB and statin   -TTE findings noted.   -Continue Amiodarone  -Norvasc started 2 weeks prior to admission with reported subsequent increased LE edema. Discontinue Norvasc  -Hydralazine 25mg BID  -PRN Rx for elevated BP.     Normocytic Anemia. Stable.   -Likely component of CKD  -Stable and at baseline  -Ferritin/Iron/b12/folate levels all ok  -No overt signs of bleeding.   -Epo low normal in the setting of CKD. Epo as per nephro    DVT Prophylaxis:   heparin subq

## 2023-02-18 LAB
ALBUMIN SERPL ELPH-MCNC: 2.8 G/DL — LOW (ref 3.3–5)
ANION GAP SERPL CALC-SCNC: 6 MMOL/L — SIGNIFICANT CHANGE UP (ref 5–17)
ANISOCYTOSIS BLD QL: SLIGHT — SIGNIFICANT CHANGE UP
BUN SERPL-MCNC: 59 MG/DL — HIGH (ref 7–23)
CALCIUM SERPL-MCNC: 7.8 MG/DL — LOW (ref 8.5–10.1)
CHLORIDE SERPL-SCNC: 113 MMOL/L — HIGH (ref 96–108)
CO2 SERPL-SCNC: 24 MMOL/L — SIGNIFICANT CHANGE UP (ref 22–31)
CREAT SERPL-MCNC: 3.25 MG/DL — HIGH (ref 0.5–1.3)
EGFR: 13 ML/MIN/1.73M2 — LOW
GLUCOSE SERPL-MCNC: 134 MG/DL — HIGH (ref 70–99)
HCT VFR BLD CALC: 23.5 % — LOW (ref 34.5–45)
HGB BLD-MCNC: 8.2 G/DL — LOW (ref 11.5–15.5)
HYPOCHROMIA BLD QL: SLIGHT — SIGNIFICANT CHANGE UP
MANUAL SMEAR VERIFICATION: SIGNIFICANT CHANGE UP
MCHC RBC-ENTMCNC: 33.3 PG — SIGNIFICANT CHANGE UP (ref 27–34)
MCHC RBC-ENTMCNC: 34.9 GM/DL — SIGNIFICANT CHANGE UP (ref 32–36)
MCV RBC AUTO: 95.5 FL — SIGNIFICANT CHANGE UP (ref 80–100)
OVALOCYTES BLD QL SMEAR: SLIGHT — SIGNIFICANT CHANGE UP
PHOSPHATE SERPL-MCNC: 2.8 MG/DL — SIGNIFICANT CHANGE UP (ref 2.5–4.5)
PLAT MORPH BLD: NORMAL — SIGNIFICANT CHANGE UP
PLATELET # BLD AUTO: 95 K/UL — LOW (ref 150–400)
PLATELET COUNT - ESTIMATE: ABNORMAL
POTASSIUM SERPL-MCNC: 3.7 MMOL/L — SIGNIFICANT CHANGE UP (ref 3.5–5.3)
POTASSIUM SERPL-SCNC: 3.7 MMOL/L — SIGNIFICANT CHANGE UP (ref 3.5–5.3)
RBC # BLD: 2.46 M/UL — LOW (ref 3.8–5.2)
RBC # FLD: 14.1 % — SIGNIFICANT CHANGE UP (ref 10.3–14.5)
RBC BLD AUTO: ABNORMAL
SODIUM SERPL-SCNC: 143 MMOL/L — SIGNIFICANT CHANGE UP (ref 135–145)
WBC # BLD: 5.34 K/UL — SIGNIFICANT CHANGE UP (ref 3.8–10.5)
WBC # FLD AUTO: 5.34 K/UL — SIGNIFICANT CHANGE UP (ref 3.8–10.5)

## 2023-02-18 PROCEDURE — 93970 EXTREMITY STUDY: CPT | Mod: 26

## 2023-02-18 PROCEDURE — 99232 SBSQ HOSP IP/OBS MODERATE 35: CPT

## 2023-02-18 PROCEDURE — 99221 1ST HOSP IP/OBS SF/LOW 40: CPT

## 2023-02-18 RX ADMIN — AMIODARONE HYDROCHLORIDE 200 MILLIGRAM(S): 400 TABLET ORAL at 09:34

## 2023-02-18 RX ADMIN — Medication 25 MILLIGRAM(S): at 09:23

## 2023-02-18 RX ADMIN — Medication 88 MICROGRAM(S): at 06:17

## 2023-02-18 RX ADMIN — ATORVASTATIN CALCIUM 20 MILLIGRAM(S): 80 TABLET, FILM COATED ORAL at 21:00

## 2023-02-18 RX ADMIN — Medication 650 MILLIGRAM(S): at 21:01

## 2023-02-18 RX ADMIN — FAMOTIDINE 10 MILLIGRAM(S): 10 INJECTION INTRAVENOUS at 09:22

## 2023-02-18 RX ADMIN — Medication 650 MILLIGRAM(S): at 21:00

## 2023-02-18 RX ADMIN — Medication 81 MILLIGRAM(S): at 09:22

## 2023-02-18 RX ADMIN — PREGABALIN 2000 MICROGRAM(S): 225 CAPSULE ORAL at 09:21

## 2023-02-18 RX ADMIN — Medication 650 MILLIGRAM(S): at 22:00

## 2023-02-18 RX ADMIN — HEPARIN SODIUM 5000 UNIT(S): 5000 INJECTION INTRAVENOUS; SUBCUTANEOUS at 06:17

## 2023-02-18 RX ADMIN — Medication 1 TABLET(S): at 09:34

## 2023-02-18 RX ADMIN — Medication 650 MILLIGRAM(S): at 09:33

## 2023-02-18 RX ADMIN — ERYTHROPOIETIN 8000 UNIT(S): 10000 INJECTION, SOLUTION INTRAVENOUS; SUBCUTANEOUS at 14:54

## 2023-02-18 NOTE — CONSULT NOTE ADULT - SUBJECTIVE AND OBJECTIVE BOX
History of Present Illness:  Reason for Admission: Elevated Cr/LE edema  History of Present Illness:   87F with PMH of CKDIIIB, Atrophic Left Kidney, HTN, HLD, Valvular HD, CAD s/p CABG (10/2022), Pulm HTN, Hypothyroidism presented to the ER after being sent by her nephrologist for further evaluation and worsening Cr. Cr in mid 2s back in 7/2022-10/2022. Underwent CABG October 2022. Since then no new urinary complaints. Reports baseline frequent urination that remains unchanged. Denies fever, chills, nausea, vomiting, abdominal pain/discomfort, sensation of incomplete void, dysuria. Intentional weight loss of 40-50 pounds in the last year attributed to diet changes. Reporting increased LE edema since starting norvasc for improved BP control two weeks ago.     In the ER Tmax 97.8, HR 59, /56, RR 17, SpO2 100% on RA. Hgb 9.32 (baseline ~9), MCV 95.2, BUN 87, Cr 6.44, RVP unremarkable.     History as above.  Currently on dialysis via R IJ catheter placed yesterday.  She is R hand dominant. According to patient, she underwent vein mapping but the results are not yet in the results.              Review of Systems:  Review of Systems: Review of Systems: 14 Point review of systems reviewed and reported as negative unless otherwise stated in HPI  Other Review of Systems: All other review of systems negative, except as noted in HPI      Allergies and Intolerances:        Allergies:  	penicillin: Drug, Unknown    Home Medications:   * Patient Currently Takes Medications as of 06-Feb-2023 10:22 documented in Structured Notes  · 	cyanocobalamin 1000 mcg oral tablet: Last Dose Taken:  , 2 tab(s) orally once a day  · 	metoprolol succinate 25 mg oral tablet, extended release: Last Dose Taken:  , 1 tab(s) orally once a day  · 	Nephro-Zoë oral tablet: Last Dose Taken:  , 1 tab(s) orally once a day  · 	levothyroxine 88 mcg (0.088 mg) oral tablet: Last Dose Taken:  , 1 tab(s) orally once a day  · 	atorvastatin 20 mg oral tablet: Last Dose Taken:  , 1 tab(s) orally once a day (in the evening)  · 	amiodarone 200 mg oral tablet: Last Dose Taken:  , 1 tab(s) orally once a day  · 	clopidogrel 75 mg oral tablet: Last Dose Taken:  , 1 tab(s) orally once a day  · 	amLODIPine 10 mg oral tablet: Last Dose Taken:  , 1 tab(s) orally once a day  · 	famotidine 20 mg oral tablet: Last Dose Taken:  , 0.5 tab(s) orally once a day  · 	aspirin 81 mg oral tablet: Last Dose Taken:  , 1 tab(s) orally once a day  · 	acetaminophen 500 mg oral tablet: Last Dose Taken:  , 1 tab(s) orally once a day, As Needed    Patient History:    Past Medical, Past Surgical, and Family History:  PAST MEDICAL HISTORY:  CAD (coronary artery disease)     History of valvular heart disease     HLD (hyperlipidemia)     HTN (hypertension)     Stage 3 chronic kidney disease.     PAST SURGICAL HISTORY:  H/O: hysterectomy     History of appendectomy     S/P CABG (coronary artery bypass graft).     FAMILY HISTORY:  Father  Still living? No  FHx: heart disease, Age at diagnosis: Age Unknown.     Social History:  · Substance use	No  · Social History (marital status, living situation, occupation, and sexual history)	Denies tobacco/smoking, EtOH or illicit drug use.   Lives at home. Ambulates with walker. Normally alone but since CABG in October 2022, patient has had 24/7 supervision in her house with her children     Tobacco Screening:  · Core Measure Site	Yes  · Has the patient used tobacco in the past 30 days?	No    Risk Assessment:    Present on Admission:  Deep Venous Thrombosis	no  Pulmonary Embolus	no     HIV Screening:  · In accordance with [place] State law, we offer every patient who comes to our ED an HIV test. Would you like to be tested today?	Opt out

## 2023-02-18 NOTE — CONSULT NOTE ADULT - ASSESSMENT
Physical Exam:   Physical Exam: PHYSICAL EXAM:    T(C): 36.4 (02-06-23 @ 10:52), Max: 36.6 (02-06-23 @ 08:49)  HR: 63 (02-06-23 @ 10:52) (59 - 63)  BP: 128/56 (02-06-23 @ 10:52) (118/56 - 128/56)  RR: 18 (02-06-23 @ 10:52) (17 - 18)  SpO2: 100% (02-06-23 @ 10:52) (100% - 100%)    General: AAOx3; NAD  Head: AT/NC  ENT: Moist Mucous Membranes; No Injury  Eyes: EOMI; PERRL  Neck: Non-tender; No JVD  CVS: RRR, S1&S2, murmur +; LE edema +  Respiratory: Lungs CTA B/L; Normal Respiratory Effort and saturation on RA  Abdomen/GI: Soft, non-tender, non-distended, no guarding, no rebound, normal bowel sounds  : No bladder distention, No Bose  Extremities: 2-2+ radial pulses bilaterally; IV in L upper extremity; 2+ femoral pulses bilaterally; no palpable distal pulses bilaterally; both feet pink, warm and well perfused without ulcers or gangrenous changes   MSK: No CVA tenderness, Normal ROM, No injury  Neuro: AAOx3, CNII-XII grossly intact, non-focal  Psych: Appropriate, Cooperative,   Skin: Clean, Dry and Intact       Labs and Results:  Labs, Radiology, Cardiology, and Other Results: 9.3    8.06  )-----------( 153      ( 06 Feb 2023 09:49 )             27.5     02-06    138  |  107  |  87<H>  ----------------------------<  124<H>  4.1   |  22  |  6.44<H>    Ca    9.4      06 Feb 2023 09:49    TPro  6.8  /  Alb  3.8  /  TBili  0.7  /  DBili  x   /  AST  17  /  ALT  18  /  AlkPhos  83  02-06      CAPILLARY BLOOD GLUCOSE    Activated Partial Thromboplastin Time in AM (02.06.23 @ 09:49)   Activated Partial Thromboplastin Time: 21.7: SProthrombin Time and INR, Plasma in AM (02.06.23 @ 09:49)   Prothrombin Time, Plasma: 11.9 sec   INR: 1.03: RFlu With COVID-19 By JUANI (02.06.23 @ 09:09)   SARS-CoV-2 Result: NotDetec: This Respiratory Panel uses polymerase chain reaction (PCR) to detect for   influenza A; influenza B; respiratory syncytial virus; and SARS-CoV-2.   This test was validated by [facility] and is in use under the FDA   Emergency Use Authorization (EUA) for clinical labs CLIA-certified to   perform high complexity testing. Test results should be correlated with   clinical presentation, patient history, and epidemiology.   Influenza A Result: NotDetec   Influenza B Result: NotDetec   Resp Syn Virus Result: NotDetec     RADIOLOGY:  < from: Xray Chest 1 View- PORTABLE-Routine (Xray Chest 1 View- PORTABLE-Routine .) (02.06.23 @ 10:36) >    IMPRESSION:   No radiographic evidence of active chest disease.    < end of copied text >    < from: US Kidney and Bladder (02.06.23 @ 14:59) >    IMPRESSION:    Markedly atrophic left kidney which was not visualized.    No right hydronephrosis. Increased renal echogenicity and increased   resistiveindices consistent with medical renal disease.    Limited visualization of the right renal artery. No renal artery stenosis   is identified. If clinically indicated further evaluation may be   performed with MR angiogram.    < end of copied text >    < from: 12 Lead ECG (02.06.23 @ 10:02) >    Diagnosis Line *** Poor data quality, interpretation may be adversely affected  Sinus rhythm  Left axis deviation  Left ventricular hypertrophy with QRS widening  T wave abnormality, consider inferior ischemia  Abnormal ECG  When compared with ECG of 03-JUL-2022 07:33,  No significant change was found    < end of copied text >          I personally reviewed labs, imaging, ekg, orders and vitals.      Assessment and Plan:   · VTE Risk Assessment	VTE Assessment already completed for this visit  · Completed VTE Risk Assessment(s)	Medical Assessment Completed on: 06-Feb-2023 11:49  · Completed VTE Risk Assessment(s)	Refer to the Assessment tab to view/cancel completed assessment.     Assessment:  · Assessment	  MEDICATIONS  (STANDING):  aMIOdarone    Tablet 200 milliGRAM(s) Oral daily  atorvastatin 20 milliGRAM(s) Oral at bedtime  cyanocobalamin 2000 MICROGram(s) Oral daily  famotidine    Tablet 10 milliGRAM(s) Oral daily  heparin   Injectable 5000 Unit(s) SubCutaneous every 12 hours  levothyroxine 88 MICROGram(s) Oral daily  metoprolol succinate ER 25 milliGRAM(s) Oral daily  Nephro-shawna 1 Tablet(s) Oral daily    MEDICATIONS  (PRN):  acetaminophen     Tablet .. 650 milliGRAM(s) Oral every 6 hours PRN Mild Pain (1 - 3)  aluminum hydroxide/magnesium hydroxide/simethicone Suspension 30 milliLiter(s) Oral every 4 hours PRN Dyspepsia  melatonin 3 milliGRAM(s) Oral at bedtime PRN Insomnia  ondansetron Injectable 4 milliGRAM(s) IV Push every 8 hours PRN Nausea and/or Vomiting      ASSESSMENT/PLAN    RADHA on CKD  -Uncleared etiology. Recent CABG.   -Personaal history of transient urinary retention 7/2022  -US ordered  -Nephrology consulted  -I/Os  -Avoid nephrotoxic agents and/or adjust dose accordingly  -IVF as needed for fluid balance  -Continue to monitor Cr/Electrolytes  -Serologies and other Analysis ordered by nephrology for further evaluation. Continue to follow.     renal failure  will check vein mapping and make decision about most suitable access site    CAD s/p CABG (10/2022) without angina  Paroxysmal Atrial Fibrillation (on Amiodarone; not AC)  HTN  HLD  -ASA/Plavix on hold since 2/3 in antcipation of possiblity requiring HD catheter  -Continue BB/Statin  -Resume ASA/plavix when possible  -TTE ordered  -Continue Amiodarone  -[id number]. Not on AC since 7/2022.   -Norvasc started 2 weeks prior to admission with reported subsequent increased LE edema. Discontinue Norvasc  -PRN Rx for elevated BP.     Normocytic Anemia  -Likely component of CKD  -Stable and at baseline  -Ferritin/Iron/b12/folate levels ordered  -No overt signs of bleeding.     DVT Prophylaxis: heparin subq    Goals of Care (GOC)/Advance Care Planning (ACP)  Date of Discussion/evaluation: 2/6/2023  Purpose of Discussion: In the setting of advanced age and multiple comorbidities, discussion of patient's wishes should there be any deterioration in health status.   Parties Present/Discussed with: Patient and myself. Daughter/HCP (Zaida Sandhu) present at bedside during conversation  Patient's Decision making capacity at the time of discussion: AAOx4 and has full medical decision making capacity  Presentation: As above  GOC: Code Status, HCP, Alternative Feeding Methods, Blood product Transfusions    PLAN:  Code Status: Full code  HCP: Primary is daughter, Zaida Sandhu ([phone]) and secondary is son, Isidro Nova ([phone])  Alternative Feeding methods: Would like to discuss or assess should the situation arise that it requires alternative feeding methods  Blood Product Transfusions: YES agreeable  Pressors: YES agreeable

## 2023-02-18 NOTE — CONSULT NOTE ADULT - CONSULT REASON
access creation
RADHA on CKD   soltitary functioning kidney
88yo F with RADHA on CKD, without renal recovery referred to IR for placement of Tunneled Dialysis Catheter

## 2023-02-18 NOTE — PROGRESS NOTE ADULT - SUBJECTIVE AND OBJECTIVE BOX
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 Pt was sent to ED by me  for further workup and mgt.    Today pt is feeling well, noticed her BP has been running higher and placed on Norvasc per her cardiologist but legs also getting swollen, no sob   pt statues her toes were hurting and seeing podiatrist for this    Pt states she is drinking and urinating alot   Denies NSAID use    MEDICATIONS  (STANDING):  aMIOdarone    Tablet 200 milliGRAM(s) Oral daily  aspirin enteric coated 81 milliGRAM(s) Oral daily  atorvastatin 20 milliGRAM(s) Oral at bedtime  cyanocobalamin 2000 MICROGram(s) Oral daily  epoetin asiya-epbx (RETACRIT) Injectable 8000 Unit(s) IV Push once  famotidine    Tablet 10 milliGRAM(s) Oral daily  heparin   Injectable 5000 Unit(s) SubCutaneous every 12 hours  hydrALAZINE 25 milliGRAM(s) Oral two times a day  levothyroxine 88 MICROGram(s) Oral daily  metoprolol succinate ER 25 milliGRAM(s) Oral daily  Nephro-shawna 1 Tablet(s) Oral daily  sodium bicarbonate 650 milliGRAM(s) Oral two times a day    MEDICATIONS  (PRN):  acetaminophen     Tablet .. 650 milliGRAM(s) Oral every 6 hours PRN Mild Pain (1 - 3)  aluminum hydroxide/magnesium hydroxide/simethicone Suspension 30 milliLiter(s) Oral every 4 hours PRN Dyspepsia  melatonin 3 milliGRAM(s) Oral at bedtime PRN Insomnia  ondansetron Injectable 4 milliGRAM(s) IV Push every 8 hours PRN Nausea and/or Vomiting          Vital Signs Last 24 Hrs  T(C): 36.1 (18 Feb 2023 12:59), Max: 37 (17 Feb 2023 21:53)  T(F): 97 (18 Feb 2023 12:59), Max: 98.6 (17 Feb 2023 21:53)  HR: 65 (18 Feb 2023 13:30) (62 - 74)  BP: 109/56 (18 Feb 2023 13:30) (109/56 - 190/88)  BP(mean): 86 (17 Feb 2023 17:49) (86 - 86)  RR: 17 (18 Feb 2023 13:30) (17 - 18)  SpO2: 95% (18 Feb 2023 12:59) (95% - 97%)    Parameters below as of 18 Feb 2023 12:59  Patient On (Oxygen Delivery Method): room air      Daily     Daily       PHYSICAL EXAM:  GENERAL:   CHEST/LUNG:   HEART:   ABDOMEN:   EXTREMITIES:   SKIN:     LABS:                        8.5    11.15 )-----------( 159      ( 17 Feb 2023 05:55 )             25.2     02-18    143  |  113<H>  |  59<H>  ----------------------------<  134<H>  3.7   |  24  |  3.25<H>    Ca    7.8<L>      18 Feb 2023 11:36  Phos  2.8     02-18    TPro  x   /  Alb  2.8<L>  /  TBili  x   /  DBili  x   /  AST  x   /  ALT  x   /  AlkPhos  x   02-18    PT/INR - ( 17 Feb 2023 05:55 )   PT: 11.7 sec;   INR: 1.01 ratio         PTT - ( 17 Feb 2023 05:55 )  PTT:22.3 sec    Phosphorus Level, Serum: 2.8 mg/dL (02-18 @ 11:36)          RADIOLOGY & ADDITIONAL TESTS:   NEPHROLOGY INTERVAL HPI/OVERNIGHT EVENTS:    Date of Service: 02-18-23 @ 13:54    Covering for Michael Moe/ Aster    2/8--No distress.  Feeling well.  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 Pt was sent to ED by me  for further workup and mgt.    Today pt is feeling well, noticed her BP has been running higher and placed on Norvasc per her cardiologist but legs also getting swollen, no sob   pt statues her toes were hurting and seeing podiatrist for this    Pt states she is drinking and urinating alot   Denies NSAID use    MEDICATIONS  (STANDING):  aMIOdarone    Tablet 200 milliGRAM(s) Oral daily  aspirin enteric coated 81 milliGRAM(s) Oral daily  atorvastatin 20 milliGRAM(s) Oral at bedtime  cyanocobalamin 2000 MICROGram(s) Oral daily  epoetin asiya-epbx (RETACRIT) Injectable 8000 Unit(s) IV Push once  famotidine    Tablet 10 milliGRAM(s) Oral daily  heparin   Injectable 5000 Unit(s) SubCutaneous every 12 hours  hydrALAZINE 25 milliGRAM(s) Oral two times a day  levothyroxine 88 MICROGram(s) Oral daily  metoprolol succinate ER 25 milliGRAM(s) Oral daily  Nephro-shawna 1 Tablet(s) Oral daily  sodium bicarbonate 650 milliGRAM(s) Oral two times a day    MEDICATIONS  (PRN):  acetaminophen     Tablet .. 650 milliGRAM(s) Oral every 6 hours PRN Mild Pain (1 - 3)  aluminum hydroxide/magnesium hydroxide/simethicone Suspension 30 milliLiter(s) Oral every 4 hours PRN Dyspepsia  melatonin 3 milliGRAM(s) Oral at bedtime PRN Insomnia  ondansetron Injectable 4 milliGRAM(s) IV Push every 8 hours PRN Nausea and/or Vomiting    Vital Signs Last 24 Hrs  T(C): 36.1 (18 Feb 2023 12:59), Max: 37 (17 Feb 2023 21:53)  T(F): 97 (18 Feb 2023 12:59), Max: 98.6 (17 Feb 2023 21:53)  HR: 65 (18 Feb 2023 13:30) (62 - 74)  BP: 109/56 (18 Feb 2023 13:30) (109/56 - 190/88)  BP(mean): 86 (17 Feb 2023 17:49) (86 - 86)  RR: 17 (18 Feb 2023 13:30) (17 - 18)  SpO2: 95% (18 Feb 2023 12:59) (95% - 97%)    Parameters below as of 18 Feb 2023 12:59  Patient On (Oxygen Delivery Method): room air    PHYSICAL EXAM:  GENERAL: No distress.  CHEST/LUNG: Clear to aus  HEART: S1S2 RRR  ABDOMEN: soft  EXTREMITIES: no edema  SKIN:     LABS:                        8.5    11.15 )-----------( 159      ( 17 Feb 2023 05:55 )             25.2     02-18    143  |  113<H>  |  59<H>  ----------------------------<  134<H>  3.7   |  24  |  3.25<H>    Ca    7.8<L>      18 Feb 2023 11:36  Phos  2.8     02-18    TPro  x   /  Alb  2.8<L>  /  TBili  x   /  DBili  x   /  AST  x   /  ALT  x   /  AlkPhos  x   02-18    PT/INR - ( 17 Feb 2023 05:55 )   PT: 11.7 sec;   INR: 1.01 ratio         PTT - ( 17 Feb 2023 05:55 )  PTT:22.3 sec    Phosphorus Level, Serum: 2.8 mg/dL (02-18 @ 11:36)          RADIOLOGY & ADDITIONAL TESTS:

## 2023-02-18 NOTE — PROGRESS NOTE ADULT - SUBJECTIVE AND OBJECTIVE BOX
CHIEF COMPLAINT: Elevated Cr. Baseline increased urinary frequency unchanges    SUBJECTIVE/SIGNIFICANT INTERVAL EVENTS/OVERNIGHT EVENTS:    2/7: Reports no complaints. Cr still relatively same. Hgb lower. Recheck ordered. No overt signs of bleeding.   2/8: Feeling well. Reporting no complaints. No urinary complaints. Denies fever, chills, chest pain, nausea, vomiting, abdominal pain/discomfort.   2/9: No complaints. Cr improving. Encourage adequate oral hydration  2/10: Feeling well. yesterday with right toe paint. base of right great toe red and tender. Uric acid elevated. Started prednisone today. Otherwise no new complaint. Cr improving  2/11: No new complaints. Foot feels better. Cr stopped improving in last 24 hours. Continue IVF. Nephro recs.   2/12: Right feeling much better (pain near gone). Cr improving. Tolerating IVF. No urinary complaints.   2/13: No more pain. No urinary complaints. Cr practically unchanged from day prior. Continue IVF. Nephro recs.   2/14: No acute complaints. Vitals stable. Minimal improvement in Cr  2/15. Mild improvement in Cr. No complaints. Vitals stable   2/16: Patient off fluids x1 days. No improvement in Cr. Other vitals stable. Discussed with Dr. Rodarte.  2/17: s/p right permacath. HD today. No acute complaints.   2/18: Seen and evaluated at beside. s/p HD yesterday, plan for HD today.     Review of Systems: 14 Point review of systems reviewed and reported as negative unless otherwise stated above    FROM H&P:  "87F with PMH of CKDIIIB, Atrophic Left Kidney, HTN, HLD, Valvular HD, CAD s/p CABG (10/2022), Pulm HTN, Hypothyroidism presented to the ER after being sent by her nephrologist for further evaluation and worsening Cr. Cr in mid 2s back in 7/2022-10/2022. Underwent CABG October 2022. Since then no new urinary complaints. Reports baseline frequent urination that remains unchanged. Denies fever, chills, nausea, vomiting, abdominal pain/discomfort, sensation of incomplete void, dysuria. Intentional weight loss of 40-50 pounds in the last year attributed to diet changes. Reporting increased LE edema since starting norvasc for improved BP control two weeks ago.     In the ER Tmax 97.8, HR 59, /56, RR 17, SpO2 100% on RA. Hgb 9.32 (baseline ~9), MCV 95.2, BUN 87, Cr 6.44, RVP unremarkable. "    PHYSICAL EXAM:    Vital Signs Last 24 Hrs  T(C): 37 (17 Feb 2023 21:53), Max: 37 (17 Feb 2023 21:53)  T(F): 98.6 (17 Feb 2023 21:53), Max: 98.6 (17 Feb 2023 21:53)  HR: 65 (18 Feb 2023 09:16) (62 - 74)  BP: 147/75 (18 Feb 2023 09:16) (124/54 - 190/88)  BP(mean): 86 (17 Feb 2023 17:49) (86 - 86)  RR: 18 (18 Feb 2023 07:51) (17 - 18)  SpO2: 97% (18 Feb 2023 07:51) (95% - 97%)    Parameters below as of 18 Feb 2023 07:51  Patient On (Oxygen Delivery Method): room air                    General: AAOx3; NAD  Head: AT/NC  ENT: Moist Mucous Membranes; No Injury  Eyes: EOMI; PERRL  Neck: Non-tender; No JVD  CVS: RRR, S1&S2, murmur, right PermCath   Respiratory: Lungs CTA B/L; Normal Respiratory Effort and saturation on RA  Abdomen/GI: Soft, non-tender, non-distended, no guarding, no rebound, normal bowel sounds  : No bladder distention, No Bose  Extremities: No cyanosis, No clubbing,   MSK: No CVA tenderness, Normal ROM, No injury  Neuro: AAOx3, CNII-XII grossly intact, non-focal  Psych: Appropriate, Cooperative,   Skin: Clean, Dry and Intact      LABS:  Renal Panel (02.15.23 @ 06:52)    Chloride, Serum: 116 mmol/L    Sodium, Serum: 142 mmol/L    Potassium, Serum: 4.5 mmol/L    Carbon Dioxide, Serum: 18 mmol/L    Anion Gap, Serum: 8 mmol/L    Blood Urea Nitrogen, Serum: 86 mg/dL    Creatinine, Serum: 4.20 mg/dL    Glucose, Serum: 110 mg/dL    Calcium, Total Serum: 8.4 mg/dL    Albumin, Serum: 2.9 g/dL    eGFR: 10: The estimated glomerular filtration rate (eGFR) is calculated using the  2021 CKD-EPI creatinine equation, which does not have a coefficient for  race and is validated in individuals 18 years of age and older (N Engl J  Med 2021; 385:1945-6129). Creatinine-based eGFR may be inaccurate in  various situations including but not limited to extremes of muscle mass,  altered dietary protein intake, or medications that affect renal tubular  creatinine secretion. mL/min/1.73m2    Phosphorus Level, Serum: 4.5 mg/dL        CAPILLARY BLOOD GLUCOSE      Creatinine, Serum: 4.70 mg/dL (02.12.23 @ 07:25)   Creatinine, Serum: 5.21 mg/dL (02.11.23 @ 07:38)   Creatinine, Serum: 5.17 mg/dL (02.10.23 @ 06:41)   Creatinine, Serum: 5.68 mg/dL (02.09.23 @ 07:09)   Creatinine, Serum: 6.19 mg/dL (02.08.23 @ 06:39)   Creatinine, Serum: 6.86 mg/dL (02.07.23 @ 09:22)   Creatinine, Serum: 6.44 mg/dL (02.06.23 @ 09:49)   02-15    142  |  116<H>  |  86<H>  ----------------------------<  110<H>  4.5   |  18<L>  |  4.20<H>    Ca    8.4<L>      15 Feb 2023 06:52  Phos  4.5     02-15    TPro  x   /  Alb  2.9<L>  /  TBili  x   /  DBili  x   /  AST  x   /  ALT  x   /  AlkPhos  x   02-15  Creatinine, Serum: 2.41 mg/dL (07.08.22 @ 06:47)   Antineutrophil Cytoplasmic Antibody (02.06.23 @ 14:30)   Atypical ANCA: Negative   Cytoplasmic (c-ANCA) Antibody: Negative   Perinuclear (p-ANCA) Antibody: Negative Iron with Total Binding Capacity (02.06.23 @ 14:30)   Iron - Total Binding Capacity.: 217 ug/dL   % Saturation, Iron: 21 %   Iron Total, Serum: 46 ug/dL   Unsaturated Iron Binding Capacity: 171 ug/dL B12 and Folate (02.06.23 @ 14:30)   Vitamin B12, Serum: >2000 pg/mL   Folate, Serum: 12.2 ng/mL Ferritin, Serum (02.06.23 @ 14:30)   Ferritin, Serum: 608 ng/mL C4 Complement, Serum (02.06.23 @ 14:30)   C4 Complement, Serum: 26 mg/dL C3 Complement, Serum (02.06.23 @ 14:30)   C3 Complement, Serum: 95 mg/dL                    7.9    7.44  )-----------( 134      ( 07 Feb 2023 09:22 )             23.6     02-07    143  |  113<H>  |  95<H>  ----------------------------<  98  4.3   |  20<L>  |  6.86<H>    Ca    8.8      07 Feb 2023 09:22    TPro  6.8  /  Alb  3.8  /  TBili  0.7  /  DBili  x   /  AST  17  /  ALT  18  /  AlkPhos  83  02-06      CAPILLARY BLOOD GLUCOSE      `Creatinine, Random Urine: 72: Sodium, Random Urine: 41:Iron with Total Binding Capacity (02.06.23 @ 14:30)   Iron - Total Binding Capacity.: 217 ug/dL   % Saturation, Iron: 21 %   Iron Total, Serum: 46 ug/dL   Unsaturated Iron Binding Capacity: 171 ug/dL B12 and Folate (02.06.23 @ 14:30)   Vitamin B12, Serum: >2000 pg/mL   Folate, Serum: 12.2 ng/mL Ferritin, Serum (02.06.23 @ 14:30)   Ferritin, Serum: 608 ng/mL C4 Complement, Serum (02.06.23 @ 14:30)   C4 Complement, Serum: 26 mg/dL C3 Complement, Serum (02.06.23 @ 14:30)   C3 Complement, Serum: 95 mg/dL Prothrombin Time and INR, Plasma in AM (02.06.23 @ 09:49)   Prothrombin Time, Plasma: 11.9 sec   INR: 1.03: Activated Partial Thromboplastin Time in AM (02.06.23 @ 09:49)   Activated Partial Thromboplastin Time: 21.7:     Uric Acid, Serum: 9.8 mg/dL (02.10.23 @ 06:41)         RADIOLOGY:  `< from: US Duplex Kidneys (02.06.23 @ 14:59) >  IMPRESSION:    Markedly atrophic left kidney which was not visualized.    No right hydronephrosis. Increased renal echogenicity and increased   resistiveindices consistent with medical renal disease.    Limited visualization of the right renal artery. No renal artery stenosis   is identified. If clinically indicated further evaluation may be   performed with MR angiogram.    < end of copied text >  `< from: Xray Chest 1 View- PORTABLE-Routine (Xray Chest 1 View- PORTABLE-Routine .) (02.06.23 @ 10:36) >    IMPRESSION:   No radiographic evidence of active chest disease.    < end of copied text >      EKG:  `< from: 12 Lead ECG (02.06.23 @ 10:02) >  Diagnosis Line *** Poor data quality, interpretation may be adversely affected  Sinus rhythm  Left axis deviation  Left ventricular hypertrophy with QRS widening  T wave abnormality, consider inferior ischemia  Abnormal ECG  When compared with ECG of 03-JUL-2022 07:33,  No significant change was found    < end of copied text >      ECHO:  `< from: TTE Echo Complete w/o Contrast w/ Doppler (02.07.23 @ 11:15) >   Summary     The mitral valve leaflets appear thickened.   Mild (1+) mitral regurgitation is present.   EA reversal of the mitral inflow consistent with reduced compliance of   the   left ventricle.   Moderate aortic sclerosis is present with minimally restricted valvular   opening.   Mild (1+) aortic regurgitation is present.   At least Mild tricuspid regurgitation is present.   Normal appearing pulmonic valve structure and function.   Trace pulmonic valvular regurgitation is present.   The left atrium ismildly dilated.   MIld concentric left ventricular hypertrophy is present.   Estimated left ventricular ejection fraction is 60-65 %.   There is a hypoechoic space anterior to the right ventricular free wall.   Cannot rule out loculated pericardial effusion Vs. pericardial fat pad.    < end of copied text >        I personally reviewed labs, orders and vitals.    Discussed case with:  [X]RN  [X]ELPIDIO/RIRI  [X]Patient  [X]Family  [X]Specialist: Nephrology

## 2023-02-18 NOTE — PROGRESS NOTE ADULT - ASSESSMENT
88 yo female with hx of atrophic left kidney , CKD 4 w Cr ~ 2 post CABG in October and cr slowly rising since October    3--> 4 --> now Cr mid 5     RADHA on CKD 4 w slow progression since CABG    suspected atherembolic    baseline Cr ~ 2   Cr not improving off IVF    HD today after  permcath w IR    HD #2 tomorrow then MWF thereafter     Anemia    KENNETH w HD      plan for venous mapping and evenutal AVF if no improvement in future    vascualr Dr Hill for prelim consult and plan for AVF if needed in near future     violetta  plan for HD outpatient at [facility] next week     2/18 SY                 88 yo female with hx of atrophic left kidney , CKD 4 w Cr ~ 2 post CABG in October and cr slowly rising since October    3--> 4 --> now Cr mid 5     RADHA on CKD 4 w slow progression since CABG    suspected atherembolic    baseline Cr ~ 2   Cr not improving off IVF    HD today after  permcath w IR    HD #2 tomorrow then MWF thereafter     Anemia    KENNETH w HD      plan for venous mapping and evenutal AVF if no improvement in future    vascualr Dr Hill for prelim consult and plan for AVF if needed in near future     d.w  plan for HD outpatient at [facility] next week     2/18 SY  --RADHA/CKD : likely progressed to End stage.   second HD today--tolerating tx well.    Next HD on Monday.  --AVF evaluation : vein mapping done.  --D/c planning.

## 2023-02-18 NOTE — PROGRESS NOTE ADULT - ASSESSMENT
MEDICATIONS  (STANDING):  aMIOdarone    Tablet 200 milliGRAM(s) Oral daily  aspirin enteric coated 81 milliGRAM(s) Oral daily  atorvastatin 20 milliGRAM(s) Oral at bedtime  cyanocobalamin 2000 MICROGram(s) Oral daily  epoetin asiya-epbx (RETACRIT) Injectable 8000 Unit(s) IV Push once  famotidine    Tablet 10 milliGRAM(s) Oral daily  heparin   Injectable 5000 Unit(s) SubCutaneous every 12 hours  hydrALAZINE 25 milliGRAM(s) Oral two times a day  levothyroxine 88 MICROGram(s) Oral daily  metoprolol succinate ER 25 milliGRAM(s) Oral daily  Nephro-shawna 1 Tablet(s) Oral daily  sodium bicarbonate 650 milliGRAM(s) Oral two times a day    MEDICATIONS  (PRN):  acetaminophen     Tablet .. 650 milliGRAM(s) Oral every 6 hours PRN Mild Pain (1 - 3)  aluminum hydroxide/magnesium hydroxide/simethicone Suspension 30 milliLiter(s) Oral every 4 hours PRN Dyspepsia  melatonin 3 milliGRAM(s) Oral at bedtime PRN Insomnia  ondansetron Injectable 4 milliGRAM(s) IV Push every 8 hours PRN Nausea and/or Vomiting        ASSESSMENT/PLAN    RADHA on CKD, metabolic acidosis   -Uncleared etiology Appears to be intrinsic. ATN/Emboli? Unable to clinically determine. Recent CABG.   -Personal history of transient urinary retention 7/2022. Bladders scans negative for significant PVR.   -right HD catheter (02/17), per IR   -on HD  -vascular consult for vein mapping, AVF creating   -Sodium bicarbonate   -I/Os  -Continue to monitor Cr/Electrolytes  -Serologies and other Analysis ordered by nephrology for further evaluation all unremarkable.   -ANCA and CHINMAY negative. C3 and C4 complements WNL  -Avoid nephrotoxic agents and/or adjust dose accordingly  -Nephrology consult and recommendations noted       #Acute Gout Flare Right Great Toe.  -Prednisone (finished course)  -resolved     #CAD s/p CABG (10/2022) without angina; Paroxysmal Atrial Fibrillation (on Amiodarone; not AC); HTN; HLD  -on ASA   -plan to resume Plavix once Cr stabilizes and after the IR procedure   -on Amiodarone for afib, [id number]=9. Not on AC since 7/2022. not on AC   -c/w BB and statin   -TTE findings noted.   -Continue Amiodarone  -Norvasc started 2 weeks prior to admission with reported subsequent increased LE edema. Discontinue Norvasc  -Hydralazine 25mg BID  -PRN Rx for elevated BP.     Normocytic Anemia. Stable.   -Likely component of CKD  -Stable and at baseline  -Ferritin/Iron/b12/folate levels all ok  -No overt signs of bleeding.   -Epo low normal in the setting of CKD. Epo as per nephro    DVT Prophylaxis:   heparin subq

## 2023-02-19 LAB
ALBUMIN SERPL ELPH-MCNC: 2.6 G/DL — LOW (ref 3.3–5)
ANION GAP SERPL CALC-SCNC: 3 MMOL/L — LOW (ref 5–17)
BUN SERPL-MCNC: 38 MG/DL — HIGH (ref 7–23)
CALCIUM SERPL-MCNC: 7.9 MG/DL — LOW (ref 8.5–10.1)
CHLORIDE SERPL-SCNC: 110 MMOL/L — HIGH (ref 96–108)
CO2 SERPL-SCNC: 29 MMOL/L — SIGNIFICANT CHANGE UP (ref 22–31)
CREAT SERPL-MCNC: 2.56 MG/DL — HIGH (ref 0.5–1.3)
EGFR: 18 ML/MIN/1.73M2 — LOW
GLUCOSE SERPL-MCNC: 98 MG/DL — SIGNIFICANT CHANGE UP (ref 70–99)
PHOSPHATE SERPL-MCNC: 2.4 MG/DL — LOW (ref 2.5–4.5)
POTASSIUM SERPL-MCNC: 3.7 MMOL/L — SIGNIFICANT CHANGE UP (ref 3.5–5.3)
POTASSIUM SERPL-SCNC: 3.7 MMOL/L — SIGNIFICANT CHANGE UP (ref 3.5–5.3)
SODIUM SERPL-SCNC: 142 MMOL/L — SIGNIFICANT CHANGE UP (ref 135–145)

## 2023-02-19 PROCEDURE — 99232 SBSQ HOSP IP/OBS MODERATE 35: CPT

## 2023-02-19 RX ORDER — CLOPIDOGREL BISULFATE 75 MG/1
75 TABLET, FILM COATED ORAL DAILY
Refills: 0 | Status: DISCONTINUED | OUTPATIENT
Start: 2023-02-19 | End: 2023-02-21

## 2023-02-19 RX ORDER — NYSTATIN CREAM 100000 [USP'U]/G
1 CREAM TOPICAL
Refills: 0 | Status: DISCONTINUED | OUTPATIENT
Start: 2023-02-19 | End: 2023-02-21

## 2023-02-19 RX ORDER — HYDRALAZINE HCL 50 MG
25 TABLET ORAL THREE TIMES A DAY
Refills: 0 | Status: DISCONTINUED | OUTPATIENT
Start: 2023-02-19 | End: 2023-02-21

## 2023-02-19 RX ADMIN — Medication 650 MILLIGRAM(S): at 09:32

## 2023-02-19 RX ADMIN — Medication 650 MILLIGRAM(S): at 23:00

## 2023-02-19 RX ADMIN — Medication 88 MICROGRAM(S): at 06:04

## 2023-02-19 RX ADMIN — FAMOTIDINE 10 MILLIGRAM(S): 10 INJECTION INTRAVENOUS at 09:32

## 2023-02-19 RX ADMIN — ATORVASTATIN CALCIUM 20 MILLIGRAM(S): 80 TABLET, FILM COATED ORAL at 21:58

## 2023-02-19 RX ADMIN — Medication 81 MILLIGRAM(S): at 09:32

## 2023-02-19 RX ADMIN — PREGABALIN 2000 MICROGRAM(S): 225 CAPSULE ORAL at 09:32

## 2023-02-19 RX ADMIN — Medication 1 TABLET(S): at 09:31

## 2023-02-19 RX ADMIN — Medication 25 MILLIGRAM(S): at 09:31

## 2023-02-19 RX ADMIN — HEPARIN SODIUM 5000 UNIT(S): 5000 INJECTION INTRAVENOUS; SUBCUTANEOUS at 06:04

## 2023-02-19 RX ADMIN — HEPARIN SODIUM 5000 UNIT(S): 5000 INJECTION INTRAVENOUS; SUBCUTANEOUS at 17:12

## 2023-02-19 RX ADMIN — AMIODARONE HYDROCHLORIDE 200 MILLIGRAM(S): 400 TABLET ORAL at 09:32

## 2023-02-19 RX ADMIN — Medication 25 MILLIGRAM(S): at 21:58

## 2023-02-19 RX ADMIN — NYSTATIN CREAM 1 APPLICATION(S): 100000 CREAM TOPICAL at 21:59

## 2023-02-19 RX ADMIN — Medication 650 MILLIGRAM(S): at 21:58

## 2023-02-19 NOTE — PROGRESS NOTE ADULT - ASSESSMENT
MEDICATIONS  (STANDING):  aMIOdarone    Tablet 200 milliGRAM(s) Oral daily  aspirin enteric coated 81 milliGRAM(s) Oral daily  atorvastatin 20 milliGRAM(s) Oral at bedtime  cyanocobalamin 2000 MICROGram(s) Oral daily  famotidine    Tablet 10 milliGRAM(s) Oral daily  heparin   Injectable 5000 Unit(s) SubCutaneous every 12 hours  hydrALAZINE 25 milliGRAM(s) Oral three times a day  levothyroxine 88 MICROGram(s) Oral daily  metoprolol succinate ER 25 milliGRAM(s) Oral daily  Nephro-shawna 1 Tablet(s) Oral daily  nystatin Powder 1 Application(s) Topical two times a day  sodium bicarbonate 650 milliGRAM(s) Oral two times a day    MEDICATIONS  (PRN):  acetaminophen     Tablet .. 650 milliGRAM(s) Oral every 6 hours PRN Mild Pain (1 - 3)  aluminum hydroxide/magnesium hydroxide/simethicone Suspension 30 milliLiter(s) Oral every 4 hours PRN Dyspepsia  melatonin 3 milliGRAM(s) Oral at bedtime PRN Insomnia  ondansetron Injectable 4 milliGRAM(s) IV Push every 8 hours PRN Nausea and/or Vomiting        ASSESSMENT/PLAN    RADHA on CKD, metabolic acidosis   -Uncleared etiology Appears to be intrinsic. ATN/Emboli? Unable to clinically determine. Recent CABG.   -Personal history of transient urinary retention 7/2022. Bladders scans negative for significant PVR.   -Serologies and other Analysis ordered by nephrology for further evaluation all unremarkable.   -ANCA and CHINMAY negative. C3 and C4 complements WNL  -right HD catheter (02/17), per IR   -on HD: M/W/F  -vascular consult for AVF: vein mapping done   -Sodium bicarbonate   -I/Os  -Continue to monitor Cr/Electrolytes  -Avoid nephrotoxic agents and/or adjust dose accordingly  -Nephrology consult and recommendations noted       #Acute Gout Flare Right Great Toe.  -Prednisone (finished course)  -resolved     #CAD s/p CABG (10/2022) without angina; Paroxysmal Atrial Fibrillation (on Amiodarone; not AC); HTN; HLD  -on ASA   -plan to resume Plavix once Cr stabilizes and after the IR procedure   -on Amiodarone for afib, [id number]. Not on AC since 7/2022. not on AC   -c/w BB and statin   -TTE findings noted.   -Continue Amiodarone  -Norvasc started 2 weeks prior to admission with reported subsequent increased LE edema. Discontinue Norvasc  -Hydralazine 25mg TID   -PRN Rx for elevated BP.     Normocytic Anemia. Stable.   -Likely component of CKD  -Stable and at baseline  -Ferritin/Iron/b12/folate levels all ok  -No overt signs of bleeding.   -Epo low normal in the setting of CKD. Epo as per nephro    DVT Prophylaxis:   heparin subq

## 2023-02-19 NOTE — PROGRESS NOTE ADULT - SUBJECTIVE AND OBJECTIVE BOX
CHIEF COMPLAINT: Elevated Cr. Baseline increased urinary frequency unchanges    SUBJECTIVE/SIGNIFICANT INTERVAL EVENTS/OVERNIGHT EVENTS:    2/7: Reports no complaints. Cr still relatively same. Hgb lower. Recheck ordered. No overt signs of bleeding.   2/8: Feeling well. Reporting no complaints. No urinary complaints. Denies fever, chills, chest pain, nausea, vomiting, abdominal pain/discomfort.   2/9: No complaints. Cr improving. Encourage adequate oral hydration  2/10: Feeling well. yesterday with right toe paint. base of right great toe red and tender. Uric acid elevated. Started prednisone today. Otherwise no new complaint. Cr improving  2/11: No new complaints. Foot feels better. Cr stopped improving in last 24 hours. Continue IVF. Nephro recs.   2/12: Right feeling much better (pain near gone). Cr improving. Tolerating IVF. No urinary complaints.   2/13: No more pain. No urinary complaints. Cr practically unchanged from day prior. Continue IVF. Nephro recs.   2/14: No acute complaints. Vitals stable. Minimal improvement in Cr  2/15. Mild improvement in Cr. No complaints. Vitals stable   2/16: Patient off fluids x1 days. No improvement in Cr. Other vitals stable. Discussed with Dr. Rodarte.  2/17: s/p right permacath. HD today. No acute complaints.   2/18: Seen and evaluated at beside. s/p HD yesterday, plan for HD today.   2/19: Evaluated at bedside. Patient is more understanding/improved outlook regarding dialysis. Has no complaints.     Review of Systems: 14 Point review of systems reviewed and reported as negative unless otherwise stated above    FROM H&P:  "87F with PMH of CKDIIIB, Atrophic Left Kidney, HTN, HLD, Valvular HD, CAD s/p CABG (10/2022), Pulm HTN, Hypothyroidism presented to the ER after being sent by her nephrologist for further evaluation and worsening Cr. Cr in mid 2s back in 7/2022-10/2022. Underwent CABG October 2022. Since then no new urinary complaints. Reports baseline frequent urination that remains unchanged. Denies fever, chills, nausea, vomiting, abdominal pain/discomfort, sensation of incomplete void, dysuria. Intentional weight loss of 40-50 pounds in the last year attributed to diet changes. Reporting increased LE edema since starting norvasc for improved BP control two weeks ago.     In the ER Tmax 97.8, HR 59, /56, RR 17, SpO2 100% on RA. Hgb 9.32 (baseline ~9), MCV 95.2, BUN 87, Cr 6.44, RVP unremarkable. "    PHYSICAL EXAM:    Vital Signs Last 24 Hrs  T(C): 36.5 (19 Feb 2023 07:56), Max: 36.5 (18 Feb 2023 20:55)  T(F): 97.7 (19 Feb 2023 07:56), Max: 97.7 (18 Feb 2023 20:55)  HR: 68 (19 Feb 2023 07:56) (63 - 69)  BP: 162/65 (19 Feb 2023 07:56) (126/55 - 165/73)  BP(mean): 81 (18 Feb 2023 20:55) (81 - 81)  RR: 19 (19 Feb 2023 07:56) (17 - 19)  SpO2: 97% (19 Feb 2023 07:56) (96% - 99%)    Parameters below as of 19 Feb 2023 07:56  Patient On (Oxygen Delivery Method): room air      General: AAOx3; NAD  Head: AT/NC  ENT: Moist Mucous Membranes; No Injury  Eyes: EOMI; PERRL  Neck: Non-tender; No JVD  CVS: RRR, S1&S2, murmur, right PermCath   Respiratory: Lungs CTA B/L; Normal Respiratory Effort and saturation on RA  Abdomen/GI: Soft, non-tender, non-distended, no guarding, no rebound, normal bowel sounds  : No bladder distention, No Bose  Extremities: No cyanosis, No clubbing,   MSK: No CVA tenderness, Normal ROM, No injury  Neuro: AAOx3, CNII-XII grossly intact, non-focal  Psych: Appropriate, Cooperative,   Skin: Clean, Dry and Intact                            8.2    5.34  )-----------( 95       ( 18 Feb 2023 14:00 )             23.5   02-19    142  |  110<H>  |  38<H>  ----------------------------<  98  3.7   |  29  |  2.56<H>    Ca    7.9<L>      19 Feb 2023 07:48  Phos  2.4     02-19    TPro  x   /  Alb  2.6<L>  /  TBili  x   /  DBili  x   /  AST  x   /  ALT  x   /  AlkPhos  x   02-19          CAPILLARY BLOOD GLUCOSE      Creatinine, Serum: 4.70 mg/dL (02.12.23 @ 07:25)   Creatinine, Serum: 5.21 mg/dL (02.11.23 @ 07:38)   Creatinine, Serum: 5.17 mg/dL (02.10.23 @ 06:41)   Creatinine, Serum: 5.68 mg/dL (02.09.23 @ 07:09)   Creatinine, Serum: 6.19 mg/dL (02.08.23 @ 06:39)   Creatinine, Serum: 6.86 mg/dL (02.07.23 @ 09:22)   Creatinine, Serum: 6.44 mg/dL (02.06.23 @ 09:49)   02-15    142  |  116<H>  |  86<H>  ----------------------------<  110<H>  4.5   |  18<L>  |  4.20<H>    Ca    8.4<L>      15 Feb 2023 06:52  Phos  4.5     02-15    TPro  x   /  Alb  2.9<L>  /  TBili  x   /  DBili  x   /  AST  x   /  ALT  x   /  AlkPhos  x   02-15  Creatinine, Serum: 2.41 mg/dL (07.08.22 @ 06:47)   Antineutrophil Cytoplasmic Antibody (02.06.23 @ 14:30)   Atypical ANCA: Negative   Cytoplasmic (c-ANCA) Antibody: Negative   Perinuclear (p-ANCA) Antibody: Negative Iron with Total Binding Capacity (02.06.23 @ 14:30)   Iron - Total Binding Capacity.: 217 ug/dL   % Saturation, Iron: 21 %   Iron Total, Serum: 46 ug/dL   Unsaturated Iron Binding Capacity: 171 ug/dL B12 and Folate (02.06.23 @ 14:30)   Vitamin B12, Serum: >2000 pg/mL   Folate, Serum: 12.2 ng/mL Ferritin, Serum (02.06.23 @ 14:30)   Ferritin, Serum: 608 ng/mL C4 Complement, Serum (02.06.23 @ 14:30)   C4 Complement, Serum: 26 mg/dL C3 Complement, Serum (02.06.23 @ 14:30)   C3 Complement, Serum: 95 mg/dL                    7.9    7.44  )-----------( 134      ( 07 Feb 2023 09:22 )             23.6     02-07    143  |  113<H>  |  95<H>  ----------------------------<  98  4.3   |  20<L>  |  6.86<H>    Ca    8.8      07 Feb 2023 09:22    TPro  6.8  /  Alb  3.8  /  TBili  0.7  /  DBili  x   /  AST  17  /  ALT  18  /  AlkPhos  83  02-06      CAPILLARY BLOOD GLUCOSE      `Creatinine, Random Urine: 72: Sodium, Random Urine: 41:Iron with Total Binding Capacity (02.06.23 @ 14:30)   Iron - Total Binding Capacity.: 217 ug/dL   % Saturation, Iron: 21 %   Iron Total, Serum: 46 ug/dL   Unsaturated Iron Binding Capacity: 171 ug/dL B12 and Folate (02.06.23 @ 14:30)   Vitamin B12, Serum: >2000 pg/mL   Folate, Serum: 12.2 ng/mL Ferritin, Serum (02.06.23 @ 14:30)   Ferritin, Serum: 608 ng/mL C4 Complement, Serum (02.06.23 @ 14:30)   C4 Complement, Serum: 26 mg/dL C3 Complement, Serum (02.06.23 @ 14:30)   C3 Complement, Serum: 95 mg/dL Prothrombin Time and INR, Plasma in AM (02.06.23 @ 09:49)   Prothrombin Time, Plasma: 11.9 sec   INR: 1.03: Activated Partial Thromboplastin Time in AM (02.06.23 @ 09:49)   Activated Partial Thromboplastin Time: 21.7:     Uric Acid, Serum: 9.8 mg/dL (02.10.23 @ 06:41)         RADIOLOGY:  `< from: US Duplex Kidneys (02.06.23 @ 14:59) >  IMPRESSION:    Markedly atrophic left kidney which was not visualized.    No right hydronephrosis. Increased renal echogenicity and increased   resistiveindices consistent with medical renal disease.    Limited visualization of the right renal artery. No renal artery stenosis   is identified. If clinically indicated further evaluation may be   performed with MR angiogram.    < end of copied text >  `< from: Xray Chest 1 View- PORTABLE-Routine (Xray Chest 1 View- PORTABLE-Routine .) (02.06.23 @ 10:36) >    IMPRESSION:   No radiographic evidence of active chest disease.    < from: US Duplex Venous Upper Ext Complete, Bilateral (02.18.23 @ 09:57) >  IMPRESSION: Patent bilateral cephalic and basilic veins.    Vein mapping as above.    Due to the IV, the left cephalic vein could not be seen at the proximal   and mid forearm.    The right cephalic vein could not be seen at the antecubital fossa.    The right basilic vein could not be seen at the distal forearm and wrist.    The left basilic vein could not be seen atthe distal forearm and wrist.    < end of copied text >            EKG:  `< from: 12 Lead ECG (02.06.23 @ 10:02) >  Diagnosis Line *** Poor data quality, interpretation may be adversely affected  Sinus rhythm  Left axis deviation  Left ventricular hypertrophy with QRS widening  T wave abnormality, consider inferior ischemia  Abnormal ECG  When compared with ECG of 03-JUL-2022 07:33,  No significant change was found    < end of copied text >      ECHO:  `< from: TTE Echo Complete w/o Contrast w/ Doppler (02.07.23 @ 11:15) >   Summary     The mitral valve leaflets appear thickened.   Mild (1+) mitral regurgitation is present.   EA reversal of the mitral inflow consistent with reduced compliance of   the   left ventricle.   Moderate aortic sclerosis is present with minimally restricted valvular   opening.   Mild (1+) aortic regurgitation is present.   At least Mild tricuspid regurgitation is present.   Normal appearing pulmonic valve structure and function.   Trace pulmonic valvular regurgitation is present.   The left atrium ismildly dilated.   MIld concentric left ventricular hypertrophy is present.   Estimated left ventricular ejection fraction is 60-65 %.   There is a hypoechoic space anterior to the right ventricular free wall.   Cannot rule out loculated pericardial effusion Vs. pericardial fat pad.    < end of copied text >

## 2023-02-20 ENCOUNTER — TRANSCRIPTION ENCOUNTER (OUTPATIENT)
Age: 88
End: 2023-02-20

## 2023-02-20 LAB
ALBUMIN SERPL ELPH-MCNC: 2.5 G/DL — LOW (ref 3.3–5)
ANION GAP SERPL CALC-SCNC: 3 MMOL/L — LOW (ref 5–17)
BUN SERPL-MCNC: 39 MG/DL — HIGH (ref 7–23)
CALCIUM SERPL-MCNC: 7.7 MG/DL — LOW (ref 8.5–10.1)
CHLORIDE SERPL-SCNC: 110 MMOL/L — HIGH (ref 96–108)
CO2 SERPL-SCNC: 30 MMOL/L — SIGNIFICANT CHANGE UP (ref 22–31)
CREAT SERPL-MCNC: 2.96 MG/DL — HIGH (ref 0.5–1.3)
EGFR: 15 ML/MIN/1.73M2 — LOW
GLUCOSE SERPL-MCNC: 100 MG/DL — HIGH (ref 70–99)
HCT VFR BLD CALC: 22.8 % — LOW (ref 34.5–45)
HGB BLD-MCNC: 7.6 G/DL — LOW (ref 11.5–15.5)
MCHC RBC-ENTMCNC: 32.8 PG — SIGNIFICANT CHANGE UP (ref 27–34)
MCHC RBC-ENTMCNC: 33.3 GM/DL — SIGNIFICANT CHANGE UP (ref 32–36)
MCV RBC AUTO: 98.3 FL — SIGNIFICANT CHANGE UP (ref 80–100)
PHOSPHATE SERPL-MCNC: 2 MG/DL — LOW (ref 2.5–4.5)
PLATELET # BLD AUTO: 76 K/UL — LOW (ref 150–400)
POTASSIUM SERPL-MCNC: 3.5 MMOL/L — SIGNIFICANT CHANGE UP (ref 3.5–5.3)
POTASSIUM SERPL-SCNC: 3.5 MMOL/L — SIGNIFICANT CHANGE UP (ref 3.5–5.3)
RBC # BLD: 2.32 M/UL — LOW (ref 3.8–5.2)
RBC # FLD: 14.3 % — SIGNIFICANT CHANGE UP (ref 10.3–14.5)
SODIUM SERPL-SCNC: 143 MMOL/L — SIGNIFICANT CHANGE UP (ref 135–145)
WBC # BLD: 9.23 K/UL — SIGNIFICANT CHANGE UP (ref 3.8–10.5)
WBC # FLD AUTO: 9.23 K/UL — SIGNIFICANT CHANGE UP (ref 3.8–10.5)

## 2023-02-20 PROCEDURE — 99232 SBSQ HOSP IP/OBS MODERATE 35: CPT

## 2023-02-20 RX ORDER — AMLODIPINE BESYLATE 2.5 MG/1
1 TABLET ORAL
Qty: 0 | Refills: 0 | DISCHARGE

## 2023-02-20 RX ADMIN — NYSTATIN CREAM 1 APPLICATION(S): 100000 CREAM TOPICAL at 21:00

## 2023-02-20 RX ADMIN — HEPARIN SODIUM 5000 UNIT(S): 5000 INJECTION INTRAVENOUS; SUBCUTANEOUS at 06:25

## 2023-02-20 RX ADMIN — FAMOTIDINE 10 MILLIGRAM(S): 10 INJECTION INTRAVENOUS at 16:45

## 2023-02-20 RX ADMIN — ATORVASTATIN CALCIUM 20 MILLIGRAM(S): 80 TABLET, FILM COATED ORAL at 21:00

## 2023-02-20 RX ADMIN — Medication 25 MILLIGRAM(S): at 06:25

## 2023-02-20 RX ADMIN — AMIODARONE HYDROCHLORIDE 200 MILLIGRAM(S): 400 TABLET ORAL at 10:23

## 2023-02-20 RX ADMIN — ERYTHROPOIETIN 8000 UNIT(S): 10000 INJECTION, SOLUTION INTRAVENOUS; SUBCUTANEOUS at 14:17

## 2023-02-20 RX ADMIN — Medication 81 MILLIGRAM(S): at 16:47

## 2023-02-20 RX ADMIN — Medication 88 MICROGRAM(S): at 06:25

## 2023-02-20 RX ADMIN — Medication 1 TABLET(S): at 16:46

## 2023-02-20 RX ADMIN — Medication 25 MILLIGRAM(S): at 16:50

## 2023-02-20 RX ADMIN — Medication 650 MILLIGRAM(S): at 16:52

## 2023-02-20 RX ADMIN — Medication 25 MILLIGRAM(S): at 21:00

## 2023-02-20 RX ADMIN — Medication 25 MILLIGRAM(S): at 16:47

## 2023-02-20 RX ADMIN — NYSTATIN CREAM 1 APPLICATION(S): 100000 CREAM TOPICAL at 10:22

## 2023-02-20 RX ADMIN — PREGABALIN 2000 MICROGRAM(S): 225 CAPSULE ORAL at 16:46

## 2023-02-20 RX ADMIN — Medication 650 MILLIGRAM(S): at 17:52

## 2023-02-20 NOTE — DISCHARGE NOTE PROVIDER - NSDCMRMEDTOKEN_GEN_ALL_CORE_FT
acetaminophen 500 mg oral tablet: 1 tab(s) orally once a day, As Needed  amiodarone 200 mg oral tablet: 1 tab(s) orally once a day  aspirin 81 mg oral tablet: 1 tab(s) orally once a day  atorvastatin 20 mg oral tablet: 1 tab(s) orally once a day (in the evening)  clopidogrel 75 mg oral tablet: 1 tab(s) orally once a day  cyanocobalamin 1000 mcg oral tablet: 2 tab(s) orally once a day  famotidine 20 mg oral tablet: 0.5 tab(s) orally once a day  levothyroxine 88 mcg (0.088 mg) oral tablet: 1 tab(s) orally once a day  metoprolol succinate 25 mg oral tablet, extended release: 1 tab(s) orally once a day  Nephro-Zoë oral tablet: 1 tab(s) orally once a day   acetaminophen 500 mg oral tablet: 1 tab(s) orally once a day, As Needed  amiodarone 200 mg oral tablet: 1 tab(s) orally once a day  aspirin 81 mg oral tablet: 1 tab(s) orally once a day  atorvastatin 20 mg oral tablet: 1 tab(s) orally once a day (in the evening)  clopidogrel 75 mg oral tablet: 1 tab(s) orally once a day  cyanocobalamin 1000 mcg oral tablet: 2 tab(s) orally once a day  famotidine 20 mg oral tablet: 0.5 tab(s) orally once a day  hydrALAZINE 25 mg oral tablet: 1 tab(s) orally every 12 hours   levothyroxine 88 mcg (0.088 mg) oral tablet: 1 tab(s) orally once a day  metoprolol succinate 25 mg oral tablet, extended release: 1 tab(s) orally once a day  Nephro-Zoë oral tablet: 1 tab(s) orally once a day

## 2023-02-20 NOTE — DISCHARGE NOTE PROVIDER - NSDCHHNEEDSERVICE_GEN_ALL_CORE
Central venous access care/Medication teaching and assessment/Observation and assessment/Teaching and training Central venous access care/Medication teaching and assessment/Observation and assessment/Rehabilitation services/Teaching and training

## 2023-02-20 NOTE — PROGRESS NOTE ADULT - ASSESSMENT
86 yo female with hx of atrophic left kidney , CKD 4 w Cr ~ 2 post CABG in October and cr slowly rising since October    3--> 4 --> now Cr mid 5     RADHA on CKD 4 w slow progression since CABG    suspected atherembolic    baseline Cr ~ 2   Cr not improving off IVF    HD today after  permcath w IR    HD #2 tomorrow then MWF thereafter     Anemia    KENNETH w HD      plan for venous mapping and evenutal AVF if no improvement in future    vascualr Dr Hill for prelim consult and plan for AVF if needed in near future     d.w  plan for HD outpatient at [facility] next week     2/18 SY  --RADHA/CKD : likely progressed to End stage.   second HD today--tolerating tx well.    Next HD on Monday.  --AVF evaluation : vein mapping done.  --D/c planning.    2/20 SY  --RADHA/CKD : HD dependent now.  3rd tx today.  Tolerating fairly well. continue MWF shcedule.    Pt can start as outpt this Wednesday.  --AVF : vein mapping done.  Follow up as outpt.  --Recheck Plts in am to determine d.c.

## 2023-02-20 NOTE — DISCHARGE NOTE PROVIDER - NSDCACTIVITY_GEN_ALL_CORE
No restrictions Ambulate/Activity as tolerated with assistive device(s)/Do not drive or operate machinery

## 2023-02-20 NOTE — DISCHARGE NOTE PROVIDER - CARE PROVIDERS DIRECT ADDRESSES
[email or web address]

## 2023-02-20 NOTE — DISCHARGE NOTE PROVIDER - HOSPITAL COURSE
RADHA on CKD, metabolic acidosis   -Uncleared etiology Appears to be intrinsic. ATN/Emboli? Unable to clinically determine. Recent CABG.   -Personal history of transient urinary retention 7/2022. Bladders scans negative for significant PVR.   -Serologies and other Analysis ordered by nephrology for further evaluation all unremarkable.   -ANCA and CHINMAY negative. C3 and C4 complements WNL  -right HD catheter (02/17), per IR   -on HD: M/W/F  -vascular consult for AVF: vein mapping done   -Sodium bicarbonate   -I/Os  -Continue to monitor Cr/Electrolytes  -Avoid nephrotoxic agents and/or adjust dose accordingly  -Nephrology consult and recommendations noted       #Acute Gout Flare Right Great Toe.  -Prednisone (finished course)  -resolved     #CAD s/p CABG (10/2022) without angina; Paroxysmal Atrial Fibrillation (on Amiodarone; not AC); HTN; HLD  -on ASA   -plan to resume Plavix once Cr stabilizes and after the IR procedure   -on Amiodarone for afib, [id number]=8. Not on AC since 7/2022. not on AC   -c/w BB and statin   -TTE findings noted.   -Continue Amiodarone  -Norvasc started 2 weeks prior to admission with reported subsequent increased LE edema. Discontinue Norvasc  -Hydralazine 25mg TID   -PRN Rx for elevated BP.     Normocytic Anemia. Stable.   -Likely component of CKD  -Stable and at baseline  -Ferritin/Iron/b12/folate levels all ok  -No overt signs of bleeding.   -Epo low normal in the setting of CKD. Epo as per nephro   RADHA on CKD, metabolic acidosis   -Uncleared etiology Appears to be intrinsic. ATN/Emboli? Unable to clinically determine. Recent CABG.   -Personal history of transient urinary retention 7/2022. Bladders scans negative for significant PVR.   -Serologies and other Analysis ordered by nephrology for further evaluation all unremarkable.   -ANCA and CHINMAY negative. C3 and C4 complements WNL  -right HD catheter (02/17), per IR   -on HD: M/W/F  -vascular consult for AVF: vein mapping done   -I/Os  -Continue to monitor Cr/Electrolytes  -Avoid nephrotoxic agents and/or adjust dose accordingly  -Nephrology consult and recommendations noted       #Acute Gout Flare Right Great Toe.  -Prednisone (finished course)  -resolved     #CAD s/p CABG (10/2022) without angina; Paroxysmal Atrial Fibrillation (on Amiodarone; not AC); HTN; HLD  -on ASA   -plan to resume Plavix once Cr stabilizes and after the IR procedure   -on Amiodarone for afib, [id number]=6. Not on AC since 7/2022. not on AC   -c/w BB and statin   -TTE findings noted.   -Continue Amiodarone  -Norvasc started 2 weeks prior to admission with reported subsequent increased LE edema. Discontinue Norvasc  -Hydralazine 25mg TID   -PRN Rx for elevated BP.     Normocytic Anemia. Stable.   -Likely component of CKD  -Stable and at baseline  -Ferritin/Iron/b12/folate levels all ok  -No overt signs of bleeding.   -Epo low normal in the setting of CKD. Epo as per nephro   RADHA on CKD, metabolic acidosis   -Uncleared etiology Appears to be intrinsic. ATN/Emboli? Unable to clinically determine. Recent CABG.   -Personal history of transient urinary retention 7/2022. Bladders scans negative for significant PVR.   -Serologies and other Analysis ordered by nephrology for further evaluation all unremarkable.   -ANCA and CHNIMAY negative. C3 and C4 complements WNL  -right HD catheter (02/17), per IR   -on HD: M/W/F  -vascular consult for AVF: vein mapping done   -I/Os  -Continue to monitor Cr/Electrolytes  -Avoid nephrotoxic agents and/or adjust dose accordingly  -Nephrology consult and recommendations noted       #Acute Gout Flare Right Great Toe.  -Prednisone (finished course)  -resolved     #CAD s/p CABG (10/2022) without angina; Paroxysmal Atrial Fibrillation (on Amiodarone; not AC); HTN; HLD  -on ASA   -plan to resume Plavix once Cr stabilizes and after the IR procedure   -on Amiodarone for afib, [id number]=9. Not on AC since 7/2022. not on AC   -c/w BB and statin   -TTE findings noted.   -Continue Amiodarone  -Norvasc started 2 weeks prior to admission with reported subsequent increased LE edema. Discontinue Norvasc  -Hydralazine 25mg TID   -PRN Rx for elevated BP.     Normocytic Anemia. Stable.   -Likely component of CKD  -Stable and at baseline  -Ferritin/Iron/b12/folate levels all ok  -No overt signs of bleeding.   -Epo low normal in the setting of CKD. Epo as per nephro    Thrombocytopeania.  -Slight worsening post HD catheter and initiation of HD  -Heparin subq discontinued  -4T 3-4. HIT labs sent but then subsequent stabalization of plt without worsening drop.   -Continue ASA/Plavix. Repeat blood work outpatient in 5-7 days.   -Counseled patient on easy bleeding and brusing and what to monitor for. Daughter at bedside during discussion    Remained stable. HD outpatient set up. Aymptomatic and hemodynamically stable. Discharge home with home care in stable condition and close outpatient ,  T(C): 36.8 (02-21-23 @ 08:04), Max: 36.8 (02-21-23 @ 08:04)  HR: 70 (02-21-23 @ 14:44) (69 - 84)  BP: 129/57 (02-21-23 @ 14:44) (110/54 - 166/90)  RR: 18 (02-21-23 @ 14:44) (18 - 19)  SpO2: 97% (02-21-23 @ 14:44) (93% - 97%)    General: AAOx3; NAD  Head: AT/NC  ENT: Moist Mucous Membranes; No Injury  Eyes: EOMI; PERRL  Neck: Non-tender; No JVD  CVS: RRR, S1&S2, murmur, right PermCath   Respiratory: Lungs CTA B/L; Normal Respiratory Effort and saturation on RA  Abdomen/GI: Soft, non-tender, non-distended, no guarding, no rebound, normal bowel sounds  : No bladder distention, No Bose  Extremities: No cyanosis, No clubbing,   MSK: No CVA tenderness, Normal ROM, No injury  Neuro: AAOx3, CNII-XII grossly intact, non-focal  Psych: Appropriate, Cooperative,   Skin: Clean, Dry and Intact  Discharge Management: 39 minutes  Date of Discharge/Service: 2/21/2023

## 2023-02-20 NOTE — PROGRESS NOTE ADULT - ASSESSMENT
MEDICATIONS  (STANDING):  aMIOdarone    Tablet 200 milliGRAM(s) Oral daily  aspirin enteric coated 81 milliGRAM(s) Oral daily  atorvastatin 20 milliGRAM(s) Oral at bedtime  clopidogrel Tablet 75 milliGRAM(s) Oral daily  cyanocobalamin 2000 MICROGram(s) Oral daily  epoetin asiya-epbx (RETACRIT) Injectable 8000 Unit(s) IV Push <User Schedule>  famotidine    Tablet 10 milliGRAM(s) Oral daily  hydrALAZINE 25 milliGRAM(s) Oral three times a day  levothyroxine 88 MICROGram(s) Oral daily  metoprolol succinate ER 25 milliGRAM(s) Oral daily  Nephro-shawna 1 Tablet(s) Oral daily  nystatin Powder 1 Application(s) Topical two times a day    MEDICATIONS  (PRN):  acetaminophen     Tablet .. 650 milliGRAM(s) Oral every 6 hours PRN Mild Pain (1 - 3)  aluminum hydroxide/magnesium hydroxide/simethicone Suspension 30 milliLiter(s) Oral every 4 hours PRN Dyspepsia  melatonin 3 milliGRAM(s) Oral at bedtime PRN Insomnia  ondansetron Injectable 4 milliGRAM(s) IV Push every 8 hours PRN Nausea and/or Vomiting          ASSESSMENT/PLAN    RADHA on CKD, metabolic acidosis   -Uncleared etiology Appears to be intrinsic. ATN/Emboli? Unable to clinically determine. Recent CABG.   -Personal history of transient urinary retention 7/2022. Bladders scans negative for significant PVR.   -Serologies and other Analysis ordered by nephrology for further evaluation all unremarkable.   -ANCA and CHINMAY negative. C3 and C4 complements WNL  -right HD catheter (02/17), per IR   -on HD: M/W/F  -vascular consult for AVF: vein mapping done   -I/Os  -Continue to monitor Cr/Electrolytes  -Avoid nephrotoxic agents and/or adjust dose accordingly  -Nephrology consult and recommendations noted       #Acute Gout Flare Right Great Toe.  -Prednisone (finished course)  -resolved     #CAD s/p CABG (10/2022) without angina; Paroxysmal Atrial Fibrillation (on Amiodarone; not AC); HTN; HLD  -on ASA   -plan to resume Plavix once Cr stabilizes and after the IR procedure   -on Amiodarone for afib, [id number]=8. Not on AC since 7/2022. not on AC   -c/w BB and statin   -TTE findings noted.   -Continue Amiodarone  -Norvasc started 2 weeks prior to admission with reported subsequent increased LE edema. Discontinue Norvasc  -Hydralazine 25mg TID   -PRN Rx for elevated BP.     Normocytic Anemia. Stable.   -Likely component of CKD  -Stable and at baseline  -Ferritin/Iron/b12/folate levels all ok  -No overt signs of bleeding.   -Epo low normal in the setting of CKD. Epo as per nephro    #Thrombocytopenia   -f/u on HIT workup       DVT Prophylaxis:   -SCDs    Dispo   -HD M/W/F  -outpt f/u with vascular   -f/u on HIT workup

## 2023-02-20 NOTE — DISCHARGE NOTE PROVIDER - NSDCCAREPROVSEEN_GEN_ALL_CORE_FT
Pawan, Floyd Moe, Ashish Davila, Husam Miranda, Rico Bruner, James Rodarte, Manas Causey, Suk-Hyeon Gennaro, Dennys Medina, Ander

## 2023-02-20 NOTE — DISCHARGE NOTE PROVIDER - NSDCHHASSISTDEVIC_GEN_ALL_CORE_FT
Deconditioning/weakness with recent ortho surgery augmented by acute kidney injury and initiation of new dialysis

## 2023-02-20 NOTE — DISCHARGE NOTE PROVIDER - NSDCCPCAREPLAN_GEN_ALL_CORE_FT
PRINCIPAL DISCHARGE DIAGNOSIS  Diagnosis: Acute renal failure  Assessment and Plan of Treatment:       SECONDARY DISCHARGE DIAGNOSES  Diagnosis: Acute gout  Assessment and Plan of Treatment: Resolved     PRINCIPAL DISCHARGE DIAGNOSIS  Diagnosis: Acute renal failure  Assessment and Plan of Treatment: Continue HD as scheduled outpatient and follow up closely with your nephrologist and primary medical doctor.      SECONDARY DISCHARGE DIAGNOSES  Diagnosis: Acute gout  Assessment and Plan of Treatment: Resolved. No need for long term prevention medication at discharge however they may benefit to be started in the near future. Follow up with your nephrologist to further evaluation.    Diagnosis: Thrombocytopenia  Assessment and Plan of Treatment: Drop in platelet count. Subsequently stable but still on lower side. Aspirin and plavix inhibit the effects of platelets so the combination of low platelets and thos medications can make it easier to bleed and bruise but at the same time those medications support the heart conditions you have. Continue to take the medications as prescribed and caution as you are more proned to bleeding and bruising. Any significant or persistent bleeding, seek medication evaluation. Get repeat blood work with your primary medical doctor and/or nephrologist for further evaluation and follow up. If they remain persistently low, a outpatient Hematology evaluation may be warranted.

## 2023-02-20 NOTE — DISCHARGE NOTE PROVIDER - CARE PROVIDER_API CALL
Manas Rodarte)  Internal Medicine; Nephrology  [address] [place] [address]  Phone: [phone]  Fax: [phone]  Established Patient  Follow Up Time:    Manas Rodarte)  Internal Medicine; Nephrology  [address] [place] [address]  Phone: [phone]  Fax: [phone]  Established Patient  Follow Up Time: 1 week    Judah Collins)  Cardiovascular Disease; Internal Medicine  [address] [facility], [address]  [place] [address]  Phone: [phone]  Fax: [phone]  Established Patient  Follow Up Time: 1 week

## 2023-02-20 NOTE — PROGRESS NOTE ADULT - SUBJECTIVE AND OBJECTIVE BOX
CHIEF COMPLAINT: Elevated Cr. Baseline increased urinary frequency unchanges    SUBJECTIVE/SIGNIFICANT INTERVAL EVENTS/OVERNIGHT EVENTS:    2/7: Reports no complaints. Cr still relatively same. Hgb lower. Recheck ordered. No overt signs of bleeding.   2/8: Feeling well. Reporting no complaints. No urinary complaints. Denies fever, chills, chest pain, nausea, vomiting, abdominal pain/discomfort.   2/9: No complaints. Cr improving. Encourage adequate oral hydration  2/10: Feeling well. yesterday with right toe paint. base of right great toe red and tender. Uric acid elevated. Started prednisone today. Otherwise no new complaint. Cr improving  2/11: No new complaints. Foot feels better. Cr stopped improving in last 24 hours. Continue IVF. Nephro recs.   2/12: Right feeling much better (pain near gone). Cr improving. Tolerating IVF. No urinary complaints.   2/13: No more pain. No urinary complaints. Cr practically unchanged from day prior. Continue IVF. Nephro recs.   2/14: No acute complaints. Vitals stable. Minimal improvement in Cr  2/15. Mild improvement in Cr. No complaints. Vitals stable   2/16: Patient off fluids x1 days. No improvement in Cr. Other vitals stable. Discussed with Dr. Rodarte.  2/17: s/p right permacath. HD today. No acute complaints.   2/18: Seen and evaluated at beside. s/p HD yesterday, plan for HD today.   2/19: Evaluated at bedside. Patient is more understanding/improved outlook regarding dialysis. Has no complaints.   2/20: Seen and evaluated. HD today. Plan was to discharge. CBC showed: low plts.     Review of Systems: 14 Point review of systems reviewed and reported as negative unless otherwise stated above    FROM H&P:  "87F with PMH of CKDIIIB, Atrophic Left Kidney, HTN, HLD, Valvular HD, CAD s/p CABG (10/2022), Pulm HTN, Hypothyroidism presented to the ER after being sent by her nephrologist for further evaluation and worsening Cr. Cr in mid 2s back in 7/2022-10/2022. Underwent CABG October 2022. Since then no new urinary complaints. Reports baseline frequent urination that remains unchanged. Denies fever, chills, nausea, vomiting, abdominal pain/discomfort, sensation of incomplete void, dysuria. Intentional weight loss of 40-50 pounds in the last year attributed to diet changes. Reporting increased LE edema since starting norvasc for improved BP control two weeks ago.     In the ER Tmax 97.8, HR 59, /56, RR 17, SpO2 100% on RA. Hgb 9.32 (baseline ~9), MCV 95.2, BUN 87, Cr 6.44, RVP unremarkable. "    PHYSICAL EXAM:    Vital Signs Last 24 Hrs  T(C): 36.7 (20 Feb 2023 16:48), Max: 36.7 (19 Feb 2023 21:55)  T(F): 98.1 (20 Feb 2023 16:48), Max: 98.1 (19 Feb 2023 21:55)  HR: 79 (20 Feb 2023 16:48) (68 - 84)  BP: 141/86 (20 Feb 2023 16:48) (141/77 - 188/85)  BP(mean): 92 (20 Feb 2023 06:19) (72 - 92)  RR: 19 (20 Feb 2023 16:48) (18 - 19)  SpO2: 97% (20 Feb 2023 16:48) (95% - 100%)    Parameters below as of 20 Feb 2023 16:48  Patient On (Oxygen Delivery Method): room air          General: AAOx3; NAD  Head: AT/NC  ENT: Moist Mucous Membranes; No Injury  Eyes: EOMI; PERRL  Neck: Non-tender; No JVD  CVS: RRR, S1&S2, murmur, right PermCath   Respiratory: Lungs CTA B/L; Normal Respiratory Effort and saturation on RA  Abdomen/GI: Soft, non-tender, non-distended, no guarding, no rebound, normal bowel sounds  : No bladder distention, No Bose  Extremities: No cyanosis, No clubbing,   MSK: No CVA tenderness, Normal ROM, No injury  Neuro: AAOx3, CNII-XII grossly intact, non-focal  Psych: Appropriate, Cooperative,   Skin: Clean, Dry and Intact                            7.6    9.23  )-----------( 76       ( 20 Feb 2023 13:01 )             22.8   02-20    143  |  110<H>  |  39<H>  ----------------------------<  100<H>  3.5   |  30  |  2.96<H>    Ca    7.7<L>      20 Feb 2023 13:01  Phos  2.0     02-20    TPro  x   /  Alb  2.5<L>  /  TBili  x   /  DBili  x   /  AST  x   /  ALT  x   /  AlkPhos  x   02-20            CAPILLARY BLOOD GLUCOSE      Creatinine, Serum: 4.70 mg/dL (02.12.23 @ 07:25)   Creatinine, Serum: 5.21 mg/dL (02.11.23 @ 07:38)   Creatinine, Serum: 5.17 mg/dL (02.10.23 @ 06:41)   Creatinine, Serum: 5.68 mg/dL (02.09.23 @ 07:09)   Creatinine, Serum: 6.19 mg/dL (02.08.23 @ 06:39)   Creatinine, Serum: 6.86 mg/dL (02.07.23 @ 09:22)   Creatinine, Serum: 6.44 mg/dL (02.06.23 @ 09:49)   02-15    142  |  116<H>  |  86<H>  ----------------------------<  110<H>  4.5   |  18<L>  |  4.20<H>    Ca    8.4<L>      15 Feb 2023 06:52  Phos  4.5     02-15    TPro  x   /  Alb  2.9<L>  /  TBili  x   /  DBili  x   /  AST  x   /  ALT  x   /  AlkPhos  x   02-15  Creatinine, Serum: 2.41 mg/dL (07.08.22 @ 06:47)   Antineutrophil Cytoplasmic Antibody (02.06.23 @ 14:30)   Atypical ANCA: Negative   Cytoplasmic (c-ANCA) Antibody: Negative   Perinuclear (p-ANCA) Antibody: Negative Iron with Total Binding Capacity (02.06.23 @ 14:30)   Iron - Total Binding Capacity.: 217 ug/dL   % Saturation, Iron: 21 %   Iron Total, Serum: 46 ug/dL   Unsaturated Iron Binding Capacity: 171 ug/dL B12 and Folate (02.06.23 @ 14:30)   Vitamin B12, Serum: >2000 pg/mL   Folate, Serum: 12.2 ng/mL Ferritin, Serum (02.06.23 @ 14:30)   Ferritin, Serum: 608 ng/mL C4 Complement, Serum (02.06.23 @ 14:30)   C4 Complement, Serum: 26 mg/dL C3 Complement, Serum (02.06.23 @ 14:30)   C3 Complement, Serum: 95 mg/dL                    7.9    7.44  )-----------( 134      ( 07 Feb 2023 09:22 )             23.6     02-07    143  |  113<H>  |  95<H>  ----------------------------<  98  4.3   |  20<L>  |  6.86<H>    Ca    8.8      07 Feb 2023 09:22    TPro  6.8  /  Alb  3.8  /  TBili  0.7  /  DBili  x   /  AST  17  /  ALT  18  /  AlkPhos  83  02-06      CAPILLARY BLOOD GLUCOSE      `Creatinine, Random Urine: 72: Sodium, Random Urine: 41:Iron with Total Binding Capacity (02.06.23 @ 14:30)   Iron - Total Binding Capacity.: 217 ug/dL   % Saturation, Iron: 21 %   Iron Total, Serum: 46 ug/dL   Unsaturated Iron Binding Capacity: 171 ug/dL B12 and Folate (02.06.23 @ 14:30)   Vitamin B12, Serum: >2000 pg/mL   Folate, Serum: 12.2 ng/mL Ferritin, Serum (02.06.23 @ 14:30)   Ferritin, Serum: 608 ng/mL C4 Complement, Serum (02.06.23 @ 14:30)   C4 Complement, Serum: 26 mg/dL C3 Complement, Serum (02.06.23 @ 14:30)   C3 Complement, Serum: 95 mg/dL Prothrombin Time and INR, Plasma in AM (02.06.23 @ 09:49)   Prothrombin Time, Plasma: 11.9 sec   INR: 1.03: Activated Partial Thromboplastin Time in AM (02.06.23 @ 09:49)   Activated Partial Thromboplastin Time: 21.7:     Uric Acid, Serum: 9.8 mg/dL (02.10.23 @ 06:41)         RADIOLOGY:  `< from: US Duplex Kidneys (02.06.23 @ 14:59) >  IMPRESSION:    Markedly atrophic left kidney which was not visualized.    No right hydronephrosis. Increased renal echogenicity and increased   resistiveindices consistent with medical renal disease.    Limited visualization of the right renal artery. No renal artery stenosis   is identified. If clinically indicated further evaluation may be   performed with MR angiogram.    < end of copied text >  `< from: Xray Chest 1 View- PORTABLE-Routine (Xray Chest 1 View- PORTABLE-Routine .) (02.06.23 @ 10:36) >    IMPRESSION:   No radiographic evidence of active chest disease.    < from: US Duplex Venous Upper Ext Complete, Bilateral (02.18.23 @ 09:57) >  IMPRESSION: Patent bilateral cephalic and basilic veins.    Vein mapping as above.    Due to the IV, the left cephalic vein could not be seen at the proximal   and mid forearm.    The right cephalic vein could not be seen at the antecubital fossa.    The right basilic vein could not be seen at the distal forearm and wrist.    The left basilic vein could not be seen atthe distal forearm and wrist.    < end of copied text >            EKG:  `< from: 12 Lead ECG (02.06.23 @ 10:02) >  Diagnosis Line *** Poor data quality, interpretation may be adversely affected  Sinus rhythm  Left axis deviation  Left ventricular hypertrophy with QRS widening  T wave abnormality, consider inferior ischemia  Abnormal ECG  When compared with ECG of 03-JUL-2022 07:33,  No significant change was found    < end of copied text >      ECHO:  `< from: TTE Echo Complete w/o Contrast w/ Doppler (02.07.23 @ 11:15) >   Summary     The mitral valve leaflets appear thickened.   Mild (1+) mitral regurgitation is present.   EA reversal of the mitral inflow consistent with reduced compliance of   the   left ventricle.   Moderate aortic sclerosis is present with minimally restricted valvular   opening.   Mild (1+) aortic regurgitation is present.   At least Mild tricuspid regurgitation is present.   Normal appearing pulmonic valve structure and function.   Trace pulmonic valvular regurgitation is present.   The left atrium ismildly dilated.   MIld concentric left ventricular hypertrophy is present.   Estimated left ventricular ejection fraction is 60-65 %.   There is a hypoechoic space anterior to the right ventricular free wall.   Cannot rule out loculated pericardial effusion Vs. pericardial fat pad.    < end of copied text >

## 2023-02-20 NOTE — DISCHARGE NOTE PROVIDER - NSDCPNSUBOBJ_GEN_ALL_CORE
CHIEF COMPLAINT: Elevated Cr. Baseline increased urinary frequency unchanges    SUBJECTIVE/SIGNIFICANT INTERVAL EVENTS/OVERNIGHT EVENTS:    2/7: Reports no complaints. Cr still relatively same. Hgb lower. Recheck ordered. No overt signs of bleeding.   2/8: Feeling well. Reporting no complaints. No urinary complaints. Denies fever, chills, chest pain, nausea, vomiting, abdominal pain/discomfort.   2/9: No complaints. Cr improving. Encourage adequate oral hydration  2/10: Feeling well. yesterday with right toe paint. base of right great toe red and tender. Uric acid elevated. Started prednisone today. Otherwise no new complaint. Cr improving  2/11: No new complaints. Foot feels better. Cr stopped improving in last 24 hours. Continue IVF. Nephro recs.   2/12: Right feeling much better (pain near gone). Cr improving. Tolerating IVF. No urinary complaints.   2/13: No more pain. No urinary complaints. Cr practically unchanged from day prior. Continue IVF. Nephro recs.   2/14: No acute complaints. Vitals stable. Minimal improvement in Cr  2/15. Mild improvement in Cr. No complaints. Vitals stable   2/16: Patient off fluids x1 days. No improvement in Cr. Other vitals stable. Discussed with Dr. Rodarte.  2/17: s/p right permacath. HD today. No acute complaints.   2/18: Seen and evaluated at beside. s/p HD yesterday, plan for HD today.   2/19: Evaluated at bedside. Patient is more understanding/improved outlook regarding dialysis. Has no complaints.     Review of Systems: 14 Point review of systems reviewed and reported as negative unless otherwise stated above    FROM H&P:  "87F with PMH of CKDIIIB, Atrophic Left Kidney, HTN, HLD, Valvular HD, CAD s/p CABG (10/2022), Pulm HTN, Hypothyroidism presented to the ER after being sent by her nephrologist for further evaluation and worsening Cr. Cr in mid 2s back in 7/2022-10/2022. Underwent CABG October 2022. Since then no new urinary complaints. Reports baseline frequent urination that remains unchanged. Denies fever, chills, nausea, vomiting, abdominal pain/discomfort, sensation of incomplete void, dysuria. Intentional weight loss of 40-50 pounds in the last year attributed to diet changes. Reporting increased LE edema since starting norvasc for improved BP control two weeks ago.     In the ER Tmax 97.8, HR 59, /56, RR 17, SpO2 100% on RA. Hgb 9.32 (baseline ~9), MCV 95.2, BUN 87, Cr 6.44, RVP unremarkable. "    PHYSICAL EXAM:    Vital Signs Last 24 Hrs  T(C): 36.5 (19 Feb 2023 07:56), Max: 36.5 (18 Feb 2023 20:55)  T(F): 97.7 (19 Feb 2023 07:56), Max: 97.7 (18 Feb 2023 20:55)  HR: 68 (19 Feb 2023 07:56) (63 - 69)  BP: 162/65 (19 Feb 2023 07:56) (126/55 - 165/73)  BP(mean): 81 (18 Feb 2023 20:55) (81 - 81)  RR: 19 (19 Feb 2023 07:56) (17 - 19)  SpO2: 97% (19 Feb 2023 07:56) (96% - 99%)    Parameters below as of 19 Feb 2023 07:56  Patient On (Oxygen Delivery Method): room air      General: AAOx3; NAD  Head: AT/NC  ENT: Moist Mucous Membranes; No Injury  Eyes: EOMI; PERRL  Neck: Non-tender; No JVD  CVS: RRR, S1&S2, murmur, right PermCath   Respiratory: Lungs CTA B/L; Normal Respiratory Effort and saturation on RA  Abdomen/GI: Soft, non-tender, non-distended, no guarding, no rebound, normal bowel sounds  : No bladder distention, No Bose  Extremities: No cyanosis, No clubbing,   MSK: No CVA tenderness, Normal ROM, No injury  Neuro: AAOx3, CNII-XII grossly intact, non-focal  Psych: Appropriate, Cooperative,   Skin: Clean, Dry and Intact                            8.2    5.34  )-----------( 95       ( 18 Feb 2023 14:00 )             23.5   02-19    142  |  110<H>  |  38<H>  ----------------------------<  98  3.7   |  29  |  2.56<H>    Ca    7.9<L>      19 Feb 2023 07:48  Phos  2.4     02-19    TPro  x   /  Alb  2.6<L>  /  TBili  x   /  DBili  x   /  AST  x   /  ALT  x   /  AlkPhos  x   02-19          CAPILLARY BLOOD GLUCOSE      Creatinine, Serum: 4.70 mg/dL (02.12.23 @ 07:25)   Creatinine, Serum: 5.21 mg/dL (02.11.23 @ 07:38)   Creatinine, Serum: 5.17 mg/dL (02.10.23 @ 06:41)   Creatinine, Serum: 5.68 mg/dL (02.09.23 @ 07:09)   Creatinine, Serum: 6.19 mg/dL (02.08.23 @ 06:39)   Creatinine, Serum: 6.86 mg/dL (02.07.23 @ 09:22)   Creatinine, Serum: 6.44 mg/dL (02.06.23 @ 09:49)   02-15    142  |  116<H>  |  86<H>  ----------------------------<  110<H>  4.5   |  18<L>  |  4.20<H>    Ca    8.4<L>      15 Feb 2023 06:52  Phos  4.5     02-15    TPro  x   /  Alb  2.9<L>  /  TBili  x   /  DBili  x   /  AST  x   /  ALT  x   /  AlkPhos  x   02-15  Creatinine, Serum: 2.41 mg/dL (07.08.22 @ 06:47)   Antineutrophil Cytoplasmic Antibody (02.06.23 @ 14:30)   Atypical ANCA: Negative   Cytoplasmic (c-ANCA) Antibody: Negative   Perinuclear (p-ANCA) Antibody: Negative Iron with Total Binding Capacity (02.06.23 @ 14:30)   Iron - Total Binding Capacity.: 217 ug/dL   % Saturation, Iron: 21 %   Iron Total, Serum: 46 ug/dL   Unsaturated Iron Binding Capacity: 171 ug/dL B12 and Folate (02.06.23 @ 14:30)   Vitamin B12, Serum: >2000 pg/mL   Folate, Serum: 12.2 ng/mL Ferritin, Serum (02.06.23 @ 14:30)   Ferritin, Serum: 608 ng/mL C4 Complement, Serum (02.06.23 @ 14:30)   C4 Complement, Serum: 26 mg/dL C3 Complement, Serum (02.06.23 @ 14:30)   C3 Complement, Serum: 95 mg/dL                    7.9    7.44  )-----------( 134      ( 07 Feb 2023 09:22 )             23.6     02-07    143  |  113<H>  |  95<H>  ----------------------------<  98  4.3   |  20<L>  |  6.86<H>    Ca    8.8      07 Feb 2023 09:22    TPro  6.8  /  Alb  3.8  /  TBili  0.7  /  DBili  x   /  AST  17  /  ALT  18  /  AlkPhos  83  02-06      CAPILLARY BLOOD GLUCOSE      `Creatinine, Random Urine: 72: Sodium, Random Urine: 41:Iron with Total Binding Capacity (02.06.23 @ 14:30)   Iron - Total Binding Capacity.: 217 ug/dL   % Saturation, Iron: 21 %   Iron Total, Serum: 46 ug/dL   Unsaturated Iron Binding Capacity: 171 ug/dL B12 and Folate (02.06.23 @ 14:30)   Vitamin B12, Serum: >2000 pg/mL   Folate, Serum: 12.2 ng/mL Ferritin, Serum (02.06.23 @ 14:30)   Ferritin, Serum: 608 ng/mL C4 Complement, Serum (02.06.23 @ 14:30)   C4 Complement, Serum: 26 mg/dL C3 Complement, Serum (02.06.23 @ 14:30)   C3 Complement, Serum: 95 mg/dL Prothrombin Time and INR, Plasma in AM (02.06.23 @ 09:49)   Prothrombin Time, Plasma: 11.9 sec   INR: 1.03: Activated Partial Thromboplastin Time in AM (02.06.23 @ 09:49)   Activated Partial Thromboplastin Time: 21.7:     Uric Acid, Serum: 9.8 mg/dL (02.10.23 @ 06:41)         RADIOLOGY:  `< from: US Duplex Kidneys (02.06.23 @ 14:59) >  IMPRESSION:    Markedly atrophic left kidney which was not visualized.    No right hydronephrosis. Increased renal echogenicity and increased   resistiveindices consistent with medical renal disease.    Limited visualization of the right renal artery. No renal artery stenosis   is identified. If clinically indicated further evaluation may be   performed with MR angiogram.    < end of copied text >  `< from: Xray Chest 1 View- PORTABLE-Routine (Xray Chest 1 View- PORTABLE-Routine .) (02.06.23 @ 10:36) >    IMPRESSION:   No radiographic evidence of active chest disease.    < from: US Duplex Venous Upper Ext Complete, Bilateral (02.18.23 @ 09:57) >  IMPRESSION: Patent bilateral cephalic and basilic veins.    Vein mapping as above.    Due to the IV, the left cephalic vein could not be seen at the proximal   and mid forearm.    The right cephalic vein could not be seen at the antecubital fossa.    The right basilic vein could not be seen at the distal forearm and wrist.    The left basilic vein could not be seen atthe distal forearm and wrist.    < end of copied text >            EKG:  `< from: 12 Lead ECG (02.06.23 @ 10:02) >  Diagnosis Line *** Poor data quality, interpretation may be adversely affected  Sinus rhythm  Left axis deviation  Left ventricular hypertrophy with QRS widening  T wave abnormality, consider inferior ischemia  Abnormal ECG  When compared with ECG of 03-JUL-2022 07:33,  No significant change was found    < end of copied text >      ECHO:  `< from: TTE Echo Complete w/o Contrast w/ Doppler (02.07.23 @ 11:15) >   Summary     The mitral valve leaflets appear thickened.   Mild (1+) mitral regurgitation is present.   EA reversal of the mitral inflow consistent with reduced compliance of   the   left ventricle.   Moderate aortic sclerosis is present with minimally restricted valvular   opening.   Mild (1+) aortic regurgitation is present.   At least Mild tricuspid regurgitation is present.   Normal appearing pulmonic valve structure and function.   Trace pulmonic valvular regurgitation is present.   The left atrium ismildly dilated.   MIld concentric left ventricular hypertrophy is present.   Estimated left ventricular ejection fraction is 60-65 %.   There is a hypoechoic space anterior to the right ventricular free wall.   Cannot rule out loculated pericardial effusion Vs. pericardial fat pad.    < end of copied text >                      Assessment and Plan:   · Assessment    MEDICATIONS  (STANDING):  aMIOdarone    Tablet 200 milliGRAM(s) Oral daily  aspirin enteric coated 81 milliGRAM(s) Oral daily  atorvastatin 20 milliGRAM(s) Oral at bedtime  cyanocobalamin 2000 MICROGram(s) Oral daily  famotidine    Tablet 10 milliGRAM(s) Oral daily  heparin   Injectable 5000 Unit(s) SubCutaneous every 12 hours  hydrALAZINE 25 milliGRAM(s) Oral three times a day  levothyroxine 88 MICROGram(s) Oral daily  metoprolol succinate ER 25 milliGRAM(s) Oral daily  Nephro-shawna 1 Tablet(s) Oral daily  nystatin Powder 1 Application(s) Topical two times a day  sodium bicarbonate 650 milliGRAM(s) Oral two times a day    MEDICATIONS  (PRN):  acetaminophen     Tablet .. 650 milliGRAM(s) Oral every 6 hours PRN Mild Pain (1 - 3)  aluminum hydroxide/magnesium hydroxide/simethicone Suspension 30 milliLiter(s) Oral every 4 hours PRN Dyspepsia  melatonin 3 milliGRAM(s) Oral at bedtime PRN Insomnia  ondansetron Injectable 4 milliGRAM(s) IV Push every 8 hours PRN Nausea and/or Vomiting        ASSESSMENT/PLAN    RADHA on CKD, metabolic acidosis   -Uncleared etiology Appears to be intrinsic. ATN/Emboli? Unable to clinically determine. Recent CABG.   -Personal history of transient urinary retention 7/2022. Bladders scans negative for significant PVR.   -Serologies and other Analysis ordered by nephrology for further evaluation all unremarkable.   -ANCA and CHINMAY negative. C3 and C4 complements WNL  -right HD catheter (02/17), per IR   -on HD: M/W/F  -vascular consult for AVF: vein mapping done   -Sodium bicarbonate   -I/Os  -Continue to monitor Cr/Electrolytes  -Avoid nephrotoxic agents and/or adjust dose accordingly  -Nephrology consult and recommendations noted       #Acute Gout Flare Right Great Toe.  -Prednisone (finished course)  -resolved     #CAD s/p CABG (10/2022) without angina; Paroxysmal Atrial Fibrillation (on Amiodarone; not AC); HTN; HLD  -on ASA   -plan to resume Plavix once Cr stabilizes and after the IR procedure   -on Amiodarone for afib, [id number]=6. Not on AC since 7/2022. not on AC   -c/w BB and statin   -TTE findings noted.   -Continue Amiodarone  -Norvasc started 2 weeks prior to admission with reported subsequent increased LE edema. Discontinue Norvasc  -Hydralazine 25mg TID   -PRN Rx for elevated BP.     Normocytic Anemia. Stable.   -Likely component of CKD  -Stable and at baseline  -Ferritin/Iron/b12/folate levels all ok  -No overt signs of bleeding.   -Epo low normal in the setting of CKD. Epo as per nephro    DVT Prophylaxis:   heparin subq

## 2023-02-20 NOTE — PROGRESS NOTE ADULT - SUBJECTIVE AND OBJECTIVE BOX
NEPHROLOGY INTERVAL HPI/OVERNIGHT EVENTS:    Date of Service: 02-20-23 @ 16:04    Covering for Michael Moe/ Aster  2/20--No distress.  feeling fair.  Able to ambulate with a walker a little.  Reports minimal UO.  2/18--No distress.  Feeling well.  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 Pt was sent to ED by me  for further workup and mgt.    Today pt is feeling well, noticed her BP has been running higher and placed on Norvasc per her cardiologist but legs also getting swollen, no sob   pt statues her toes were hurting and seeing podiatrist for this    Pt states she is drinking and urinating alot   Denies NSAID use      MEDICATIONS  (STANDING):  aMIOdarone    Tablet 200 milliGRAM(s) Oral daily  aspirin enteric coated 81 milliGRAM(s) Oral daily  atorvastatin 20 milliGRAM(s) Oral at bedtime  clopidogrel Tablet 75 milliGRAM(s) Oral daily  cyanocobalamin 2000 MICROGram(s) Oral daily  epoetin asiya-epbx (RETACRIT) Injectable 8000 Unit(s) IV Push <User Schedule>  famotidine    Tablet 10 milliGRAM(s) Oral daily  hydrALAZINE 25 milliGRAM(s) Oral three times a day  levothyroxine 88 MICROGram(s) Oral daily  metoprolol succinate ER 25 milliGRAM(s) Oral daily  Nephro-shawna 1 Tablet(s) Oral daily  nystatin Powder 1 Application(s) Topical two times a day    MEDICATIONS  (PRN):  acetaminophen     Tablet .. 650 milliGRAM(s) Oral every 6 hours PRN Mild Pain (1 - 3)  aluminum hydroxide/magnesium hydroxide/simethicone Suspension 30 milliLiter(s) Oral every 4 hours PRN Dyspepsia  melatonin 3 milliGRAM(s) Oral at bedtime PRN Insomnia  ondansetron Injectable 4 milliGRAM(s) IV Push every 8 hours PRN Nausea and/or Vomiting      Vital Signs Last 24 Hrs  T(C): 36.6 (20 Feb 2023 12:50), Max: 36.7 (19 Feb 2023 21:55)  T(F): 97.8 (20 Feb 2023 12:50), Max: 98.1 (19 Feb 2023 21:55)  HR: 80 (20 Feb 2023 16:00) (68 - 81)  BP: 166/90 (20 Feb 2023 16:00) (142/44 - 188/85)  BP(mean): 92 (20 Feb 2023 06:19) (72 - 92)  RR: 18 (20 Feb 2023 16:00) (18 - 18)  SpO2: 95% (20 Feb 2023 07:41) (95% - 100%)    Parameters below as of 20 Feb 2023 12:50  Patient On (Oxygen Delivery Method): room air      PHYSICAL EXAM:  GENERAL: No distress  CHEST/LUNG: Clear to aus.  HEART: S1S2 RRR  ABDOMEN: soft   EXTREMITIES: no edema  SKIN:     LABS:                        7.6    9.23  )-----------( 76       ( 20 Feb 2023 13:01 )             22.8     02-20    143  |  110<H>  |  39<H>  ----------------------------<  100<H>  3.5   |  30  |  2.96<H>    Ca    7.7<L>      20 Feb 2023 13:01  Phos  2.0     02-20    TPro  x   /  Alb  2.5<L>  /  TBili  x   /  DBili  x   /  AST  x   /  ALT  x   /  AlkPhos  x   02-20        Phosphorus Level, Serum: 2.0 mg/dL (02-20 @ 13:01)          RADIOLOGY & ADDITIONAL TESTS:

## 2023-02-20 NOTE — DISCHARGE NOTE PROVIDER - PROVIDER TOKENS
PROVIDER:[TOKEN:[[id number]],ESTABLISHEDPATIENT:[T]] PROVIDER:[TOKEN:[[id number]],FOLLOWUP:[1 week],ESTABLISHEDPATIENT:[T]],PROVIDER:[TOKEN:[[id number]],FOLLOWUP:[1 week],ESTABLISHEDPATIENT:[T]]

## 2023-02-21 VITALS
DIASTOLIC BLOOD PRESSURE: 57 MMHG | TEMPERATURE: 98 F | SYSTOLIC BLOOD PRESSURE: 129 MMHG | OXYGEN SATURATION: 97 % | RESPIRATION RATE: 18 BRPM | HEART RATE: 70 BPM

## 2023-02-21 LAB
ALBUMIN SERPL ELPH-MCNC: 2.7 G/DL — LOW (ref 3.3–5)
ANION GAP SERPL CALC-SCNC: 5 MMOL/L — SIGNIFICANT CHANGE UP (ref 5–17)
BUN SERPL-MCNC: 20 MG/DL — SIGNIFICANT CHANGE UP (ref 7–23)
CALCIUM SERPL-MCNC: 8.1 MG/DL — LOW (ref 8.5–10.1)
CHLORIDE SERPL-SCNC: 105 MMOL/L — SIGNIFICANT CHANGE UP (ref 96–108)
CO2 SERPL-SCNC: 29 MMOL/L — SIGNIFICANT CHANGE UP (ref 22–31)
CREAT SERPL-MCNC: 2.48 MG/DL — HIGH (ref 0.5–1.3)
EGFR: 19 ML/MIN/1.73M2 — LOW
GLUCOSE SERPL-MCNC: 101 MG/DL — HIGH (ref 70–99)
HCT VFR BLD CALC: 25.3 % — LOW (ref 34.5–45)
HEPARIN-PF4 AB RESULT: <0.6 U/ML — SIGNIFICANT CHANGE UP (ref 0–0.9)
HGB BLD-MCNC: 8.4 G/DL — LOW (ref 11.5–15.5)
MCHC RBC-ENTMCNC: 32.7 PG — SIGNIFICANT CHANGE UP (ref 27–34)
MCHC RBC-ENTMCNC: 33.2 GM/DL — SIGNIFICANT CHANGE UP (ref 32–36)
MCV RBC AUTO: 98.4 FL — SIGNIFICANT CHANGE UP (ref 80–100)
PF4 HEPARIN CMPLX AB SER-ACNC: NEGATIVE — SIGNIFICANT CHANGE UP
PHOSPHATE SERPL-MCNC: 2.1 MG/DL — LOW (ref 2.5–4.5)
PLATELET # BLD AUTO: 77 K/UL — LOW (ref 150–400)
POTASSIUM SERPL-MCNC: 3.6 MMOL/L — SIGNIFICANT CHANGE UP (ref 3.5–5.3)
POTASSIUM SERPL-SCNC: 3.6 MMOL/L — SIGNIFICANT CHANGE UP (ref 3.5–5.3)
RBC # BLD: 2.57 M/UL — LOW (ref 3.8–5.2)
RBC # FLD: 14 % — SIGNIFICANT CHANGE UP (ref 10.3–14.5)
SODIUM SERPL-SCNC: 139 MMOL/L — SIGNIFICANT CHANGE UP (ref 135–145)
WBC # BLD: 9.04 K/UL — SIGNIFICANT CHANGE UP (ref 3.8–10.5)
WBC # FLD AUTO: 9.04 K/UL — SIGNIFICANT CHANGE UP (ref 3.8–10.5)

## 2023-02-21 PROCEDURE — 99239 HOSP IP/OBS DSCHRG MGMT >30: CPT

## 2023-02-21 RX ORDER — SODIUM,POTASSIUM PHOSPHATES 278-250MG
1 POWDER IN PACKET (EA) ORAL
Refills: 0 | Status: DISCONTINUED | OUTPATIENT
Start: 2023-02-21 | End: 2023-02-21

## 2023-02-21 RX ORDER — HYDRALAZINE HCL 50 MG
1 TABLET ORAL
Qty: 60 | Refills: 0
Start: 2023-02-21 | End: 2023-03-22

## 2023-02-21 RX ADMIN — Medication 88 MICROGRAM(S): at 06:19

## 2023-02-21 RX ADMIN — Medication 25 MILLIGRAM(S): at 06:19

## 2023-02-21 RX ADMIN — Medication 1 PACKET(S): at 12:38

## 2023-02-21 RX ADMIN — Medication 1 TABLET(S): at 09:39

## 2023-02-21 RX ADMIN — AMIODARONE HYDROCHLORIDE 200 MILLIGRAM(S): 400 TABLET ORAL at 09:38

## 2023-02-21 RX ADMIN — NYSTATIN CREAM 1 APPLICATION(S): 100000 CREAM TOPICAL at 09:37

## 2023-02-21 RX ADMIN — Medication 25 MILLIGRAM(S): at 09:39

## 2023-02-21 RX ADMIN — PREGABALIN 2000 MICROGRAM(S): 225 CAPSULE ORAL at 09:41

## 2023-02-21 RX ADMIN — Medication 81 MILLIGRAM(S): at 09:38

## 2023-02-21 RX ADMIN — Medication 25 MILLIGRAM(S): at 14:46

## 2023-02-21 RX ADMIN — CLOPIDOGREL BISULFATE 75 MILLIGRAM(S): 75 TABLET, FILM COATED ORAL at 09:39

## 2023-02-21 RX ADMIN — FAMOTIDINE 10 MILLIGRAM(S): 10 INJECTION INTRAVENOUS at 09:38

## 2023-02-21 RX ADMIN — Medication 1 PACKET(S): at 16:31

## 2023-02-21 NOTE — PROGRESS NOTE ADULT - PROVIDER SPECIALTY LIST ADULT
Hospitalist
Nephrology
Nephrology
Hospitalist
Nephrology
Nephrology
Hospitalist
Nephrology
Hospitalist
Hospitalist
Nephrology

## 2023-02-21 NOTE — PROGRESS NOTE ADULT - ASSESSMENT
86 yo female with hx of atrophic left kidney , CKD 4 w Cr ~ 2 post CABG in October and cr slowly rising since October    3--> 4 --> now Cr mid 5     RADHA on CKD 4  TDC in, on HD   HD TIW   Placement and fistula per Dr Rodarte    Anemia    KENNETH w HD        d/c with Rn staff, team

## 2023-02-21 NOTE — PROGRESS NOTE ADULT - SUBJECTIVE AND OBJECTIVE BOX
Patient is a 87y Female who reports no complaints as new.     MEDICATIONS  (STANDING):  aMIOdarone    Tablet 200 milliGRAM(s) Oral daily  aspirin enteric coated 81 milliGRAM(s) Oral daily  atorvastatin 20 milliGRAM(s) Oral at bedtime  clopidogrel Tablet 75 milliGRAM(s) Oral daily  cyanocobalamin 2000 MICROGram(s) Oral daily  epoetin asiya-epbx (RETACRIT) Injectable 8000 Unit(s) IV Push <User Schedule>  famotidine    Tablet 10 milliGRAM(s) Oral daily  hydrALAZINE 25 milliGRAM(s) Oral three times a day  levothyroxine 88 MICROGram(s) Oral daily  metoprolol succinate ER 25 milliGRAM(s) Oral daily  Nephro-shawna 1 Tablet(s) Oral daily  nystatin Powder 1 Application(s) Topical two times a day    MEDICATIONS  (PRN):  acetaminophen     Tablet .. 650 milliGRAM(s) Oral every 6 hours PRN Mild Pain (1 - 3)  aluminum hydroxide/magnesium hydroxide/simethicone Suspension 30 milliLiter(s) Oral every 4 hours PRN Dyspepsia  melatonin 3 milliGRAM(s) Oral at bedtime PRN Insomnia  ondansetron Injectable 4 milliGRAM(s) IV Push every 8 hours PRN Nausea and/or Vomiting        T(C): , Max: 36.8 (02-21-23 @ 08:04)  T(F): , Max: 98.2 (02-21-23 @ 08:04)  HR: 78 (02-21-23 @ 09:39)  BP: 110/54 (02-21-23 @ 09:39)  BP(mean): 85 (02-21-23 @ 06:17)  RR: 18 (02-21-23 @ 09:39)  SpO2: 95% (02-21-23 @ 09:39)  Wt(kg): --    02-20 @ 07:01  -  02-21 @ 07:00  --------------------------------------------------------  IN: 0 mL / OUT: 1000 mL / NET: -1000 mL          PHYSICAL EXAM:    Constitutional: NAD, frail  HEENT:  MM  dist  Cardiovascular: S1 and S2   Neurological: Alert pleasent  tdc R  tr edema        LABS:                        8.4    9.04  )-----------( 77       ( 21 Feb 2023 07:29 )             25.3     21 Feb 2023 07:29    139    |  105    |  20     ----------------------------<  101    3.6     |  29     |  2.48   20 Feb 2023 13:01    143    |  110    |  39     ----------------------------<  100    3.5     |  30     |  2.96   19 Feb 2023 07:48    142    |  110    |  38     ----------------------------<  98     3.7     |  29     |  2.56   18 Feb 2023 11:36    143    |  113    |  59     ----------------------------<  134    3.7     |  24     |  3.25     Ca    8.1        21 Feb 2023 07:29  Ca    7.7        20 Feb 2023 13:01  Ca    7.9        19 Feb 2023 07:48  Ca    7.8        18 Feb 2023 11:36  Phos  2.1       21 Feb 2023 07:29  Phos  2.0       20 Feb 2023 13:01  Phos  2.4       19 Feb 2023 07:48  Phos  2.8       18 Feb 2023 11:36    TPro  x      /  Alb  2.7    /  TBili  x      /  DBili  x      /  AST  x      /  ALT  x      /  AlkPhos  x      21 Feb 2023 07:29  TPro  x      /  Alb  2.5    /  TBili  x      /  DBili  x      /  AST  x      /  ALT  x      /  AlkPhos  x      20 Feb 2023 13:01  TPro  x      /  Alb  2.6    /  TBili  x      /  DBili  x      /  AST  x      /  ALT  x      /  AlkPhos  x      19 Feb 2023 07:48  TPro  x      /  Alb  2.8    /  TBili  x      /  DBili  x      /  AST  x      /  ALT  x      /  AlkPhos  x      18 Feb 2023 11:36          Urine Studies:          RADIOLOGY & ADDITIONAL STUDIES:

## 2023-02-21 NOTE — PROGRESS NOTE ADULT - REASON FOR ADMISSION
Elevated Cr/LE edema
Elevated Cr/LE edema
Pt ambulated steadily to the bathroom. Tolerated well.       Louise Quintero RN  05/20/20 2310
Elevated Cr/LE edema

## 2023-02-23 ENCOUNTER — INPATIENT (INPATIENT)
Facility: HOSPITAL | Age: 88
LOS: 3 days | Discharge: HOME HEALTH | DRG: 689 | End: 2023-02-27
Attending: INTERNAL MEDICINE | Admitting: INTERNAL MEDICINE
Payer: MEDICARE

## 2023-02-23 VITALS
OXYGEN SATURATION: 98 % | RESPIRATION RATE: 19 BRPM | WEIGHT: 240.08 LBS | TEMPERATURE: 99 F | DIASTOLIC BLOOD PRESSURE: 70 MMHG | SYSTOLIC BLOOD PRESSURE: 137 MMHG | HEART RATE: 84 BPM

## 2023-02-23 DIAGNOSIS — Z90.49 ACQUIRED ABSENCE OF OTHER SPECIFIED PARTS OF DIGESTIVE TRACT: Chronic | ICD-10-CM

## 2023-02-23 DIAGNOSIS — Z90.710 ACQUIRED ABSENCE OF BOTH CERVIX AND UTERUS: Chronic | ICD-10-CM

## 2023-02-23 DIAGNOSIS — Z95.1 PRESENCE OF AORTOCORONARY BYPASS GRAFT: Chronic | ICD-10-CM

## 2023-02-23 LAB
ALBUMIN SERPL ELPH-MCNC: 3 G/DL — LOW (ref 3.3–5)
ALP SERPL-CCNC: 69 U/L — SIGNIFICANT CHANGE UP (ref 40–120)
ALT FLD-CCNC: 44 U/L — SIGNIFICANT CHANGE UP (ref 12–78)
ANION GAP SERPL CALC-SCNC: 7 MMOL/L — SIGNIFICANT CHANGE UP (ref 5–17)
ANISOCYTOSIS BLD QL: SLIGHT — SIGNIFICANT CHANGE UP
APPEARANCE UR: ABNORMAL
APTT BLD: 29.9 SEC — SIGNIFICANT CHANGE UP (ref 27.5–35.5)
AST SERPL-CCNC: 28 U/L — SIGNIFICANT CHANGE UP (ref 15–37)
BACTERIA # UR AUTO: ABNORMAL
BASOPHILS # BLD AUTO: 0.02 K/UL — SIGNIFICANT CHANGE UP (ref 0–0.2)
BASOPHILS NFR BLD AUTO: 0.2 % — SIGNIFICANT CHANGE UP (ref 0–2)
BILIRUB SERPL-MCNC: 1.5 MG/DL — HIGH (ref 0.2–1.2)
BILIRUB UR-MCNC: NEGATIVE — SIGNIFICANT CHANGE UP
BUN SERPL-MCNC: 15 MG/DL — SIGNIFICANT CHANGE UP (ref 7–23)
CALCIUM SERPL-MCNC: 8.3 MG/DL — LOW (ref 8.5–10.1)
CHLORIDE SERPL-SCNC: 100 MMOL/L — SIGNIFICANT CHANGE UP (ref 96–108)
CO2 SERPL-SCNC: 30 MMOL/L — SIGNIFICANT CHANGE UP (ref 22–31)
COLOR SPEC: YELLOW — SIGNIFICANT CHANGE UP
CREAT SERPL-MCNC: 2.45 MG/DL — HIGH (ref 0.5–1.3)
DIFF PNL FLD: ABNORMAL
EGFR: 19 ML/MIN/1.73M2 — LOW
ELLIPTOCYTES BLD QL SMEAR: SLIGHT — SIGNIFICANT CHANGE UP
EOSINOPHIL # BLD AUTO: 0.12 K/UL — SIGNIFICANT CHANGE UP (ref 0–0.5)
EOSINOPHIL NFR BLD AUTO: 1.2 % — SIGNIFICANT CHANGE UP (ref 0–6)
EPI CELLS # UR: NEGATIVE — SIGNIFICANT CHANGE UP
FLUAV AG NPH QL: SIGNIFICANT CHANGE UP
FLUBV AG NPH QL: SIGNIFICANT CHANGE UP
GLUCOSE SERPL-MCNC: 135 MG/DL — HIGH (ref 70–99)
GLUCOSE UR QL: NEGATIVE — SIGNIFICANT CHANGE UP
HAV IGM SER-ACNC: SIGNIFICANT CHANGE UP
HBV CORE IGM SER-ACNC: SIGNIFICANT CHANGE UP
HBV SURFACE AG SER-ACNC: SIGNIFICANT CHANGE UP
HCT VFR BLD CALC: 26.7 % — LOW (ref 34.5–45)
HCV AB S/CO SERPL IA: 0.04 S/CO — SIGNIFICANT CHANGE UP (ref 0–0.99)
HCV AB SERPL-IMP: SIGNIFICANT CHANGE UP
HGB BLD-MCNC: 9 G/DL — LOW (ref 11.5–15.5)
IMM GRANULOCYTES NFR BLD AUTO: 0.5 % — SIGNIFICANT CHANGE UP (ref 0–0.9)
INR BLD: 1.09 RATIO — SIGNIFICANT CHANGE UP (ref 0.88–1.16)
KETONES UR-MCNC: ABNORMAL
LACTATE SERPL-SCNC: 1.5 MMOL/L — SIGNIFICANT CHANGE UP (ref 0.7–2)
LEUKOCYTE ESTERASE UR-ACNC: ABNORMAL
LIDOCAIN IGE QN: 50 U/L — LOW (ref 73–393)
LYMPHOCYTES # BLD AUTO: 0.29 K/UL — LOW (ref 1–3.3)
LYMPHOCYTES # BLD AUTO: 2.9 % — LOW (ref 13–44)
MACROCYTES BLD QL: SLIGHT — SIGNIFICANT CHANGE UP
MANUAL SMEAR VERIFICATION: SIGNIFICANT CHANGE UP
MCHC RBC-ENTMCNC: 32.8 PG — SIGNIFICANT CHANGE UP (ref 27–34)
MCHC RBC-ENTMCNC: 33.7 GM/DL — SIGNIFICANT CHANGE UP (ref 32–36)
MCV RBC AUTO: 97.4 FL — SIGNIFICANT CHANGE UP (ref 80–100)
MONOCYTES # BLD AUTO: 0.6 K/UL — SIGNIFICANT CHANGE UP (ref 0–0.9)
MONOCYTES NFR BLD AUTO: 6 % — SIGNIFICANT CHANGE UP (ref 2–14)
NEUTROPHILS # BLD AUTO: 8.86 K/UL — HIGH (ref 1.8–7.4)
NEUTROPHILS NFR BLD AUTO: 89.2 % — HIGH (ref 43–77)
NITRITE UR-MCNC: POSITIVE
OVALOCYTES BLD QL SMEAR: SLIGHT — SIGNIFICANT CHANGE UP
PH UR: 7 — SIGNIFICANT CHANGE UP (ref 5–8)
PLAT MORPH BLD: NORMAL — SIGNIFICANT CHANGE UP
PLATELET # BLD AUTO: 94 K/UL — LOW (ref 150–400)
POIKILOCYTOSIS BLD QL AUTO: SLIGHT — SIGNIFICANT CHANGE UP
POTASSIUM SERPL-MCNC: 3.6 MMOL/L — SIGNIFICANT CHANGE UP (ref 3.5–5.3)
POTASSIUM SERPL-SCNC: 3.6 MMOL/L — SIGNIFICANT CHANGE UP (ref 3.5–5.3)
PROT SERPL-MCNC: 5.9 GM/DL — LOW (ref 6–8.3)
PROT UR-MCNC: 30 MG/DL
PROTHROM AB SERPL-ACNC: 12.7 SEC — SIGNIFICANT CHANGE UP (ref 10.5–13.4)
RBC # BLD: 2.74 M/UL — LOW (ref 3.8–5.2)
RBC # FLD: 14 % — SIGNIFICANT CHANGE UP (ref 10.3–14.5)
RBC BLD AUTO: ABNORMAL
RBC CASTS # UR COMP ASSIST: NEGATIVE /HPF — SIGNIFICANT CHANGE UP (ref 0–4)
RSV RNA NPH QL NAA+NON-PROBE: SIGNIFICANT CHANGE UP
SARS-COV-2 RNA SPEC QL NAA+PROBE: SIGNIFICANT CHANGE UP
SODIUM SERPL-SCNC: 137 MMOL/L — SIGNIFICANT CHANGE UP (ref 135–145)
SP GR SPEC: 1 — LOW (ref 1.01–1.02)
SRA INTERP SER-IMP: SIGNIFICANT CHANGE UP
STOMATOCYTES BLD QL SMEAR: SLIGHT — SIGNIFICANT CHANGE UP
TROPONIN I, HIGH SENSITIVITY RESULT: 46.66 NG/L — SIGNIFICANT CHANGE UP
TROPONIN I, HIGH SENSITIVITY RESULT: 47.22 NG/L — SIGNIFICANT CHANGE UP
UROBILINOGEN FLD QL: NEGATIVE — SIGNIFICANT CHANGE UP
WBC # BLD: 9.94 K/UL — SIGNIFICANT CHANGE UP (ref 3.8–10.5)
WBC # FLD AUTO: 9.94 K/UL — SIGNIFICANT CHANGE UP (ref 3.8–10.5)
WBC UR QL: >50 /HPF (ref 0–5)

## 2023-02-23 PROCEDURE — 99285 EMERGENCY DEPT VISIT HI MDM: CPT | Mod: CS

## 2023-02-23 PROCEDURE — 71045 X-RAY EXAM CHEST 1 VIEW: CPT | Mod: 26

## 2023-02-23 PROCEDURE — 74176 CT ABD & PELVIS W/O CONTRAST: CPT | Mod: 26,MA

## 2023-02-23 PROCEDURE — 93010 ELECTROCARDIOGRAM REPORT: CPT

## 2023-02-23 PROCEDURE — 70450 CT HEAD/BRAIN W/O DYE: CPT | Mod: 26,MA

## 2023-02-23 RX ORDER — ACETAMINOPHEN 500 MG
650 TABLET ORAL EVERY 6 HOURS
Refills: 0 | Status: DISCONTINUED | OUTPATIENT
Start: 2023-02-23 | End: 2023-02-27

## 2023-02-23 RX ORDER — ASPIRIN/CALCIUM CARB/MAGNESIUM 324 MG
81 TABLET ORAL DAILY
Refills: 0 | Status: DISCONTINUED | OUTPATIENT
Start: 2023-02-23 | End: 2023-02-27

## 2023-02-23 RX ORDER — CEFEPIME 1 G/1
1000 INJECTION, POWDER, FOR SOLUTION INTRAMUSCULAR; INTRAVENOUS ONCE
Refills: 0 | Status: COMPLETED | OUTPATIENT
Start: 2023-02-23 | End: 2023-02-23

## 2023-02-23 RX ORDER — LANOLIN ALCOHOL/MO/W.PET/CERES
3 CREAM (GRAM) TOPICAL AT BEDTIME
Refills: 0 | Status: DISCONTINUED | OUTPATIENT
Start: 2023-02-23 | End: 2023-02-27

## 2023-02-23 RX ORDER — ACETAMINOPHEN 500 MG
1000 TABLET ORAL ONCE
Refills: 0 | Status: COMPLETED | OUTPATIENT
Start: 2023-02-23 | End: 2023-02-23

## 2023-02-23 RX ORDER — AMIODARONE HYDROCHLORIDE 400 MG/1
200 TABLET ORAL DAILY
Refills: 0 | Status: DISCONTINUED | OUTPATIENT
Start: 2023-02-23 | End: 2023-02-27

## 2023-02-23 RX ORDER — ONDANSETRON 8 MG/1
4 TABLET, FILM COATED ORAL EVERY 8 HOURS
Refills: 0 | Status: DISCONTINUED | OUTPATIENT
Start: 2023-02-23 | End: 2023-02-27

## 2023-02-23 RX ORDER — METOPROLOL TARTRATE 50 MG
25 TABLET ORAL DAILY
Refills: 0 | Status: DISCONTINUED | OUTPATIENT
Start: 2023-02-23 | End: 2023-02-27

## 2023-02-23 RX ORDER — VANCOMYCIN HCL 1 G
1000 VIAL (EA) INTRAVENOUS ONCE
Refills: 0 | Status: COMPLETED | OUTPATIENT
Start: 2023-02-23 | End: 2023-02-23

## 2023-02-23 RX ORDER — ATORVASTATIN CALCIUM 80 MG/1
20 TABLET, FILM COATED ORAL AT BEDTIME
Refills: 0 | Status: DISCONTINUED | OUTPATIENT
Start: 2023-02-23 | End: 2023-02-27

## 2023-02-23 RX ORDER — FAMOTIDINE 10 MG/ML
10 INJECTION INTRAVENOUS DAILY
Refills: 0 | Status: DISCONTINUED | OUTPATIENT
Start: 2023-02-23 | End: 2023-02-27

## 2023-02-23 RX ORDER — CLOPIDOGREL BISULFATE 75 MG/1
75 TABLET, FILM COATED ORAL DAILY
Refills: 0 | Status: DISCONTINUED | OUTPATIENT
Start: 2023-02-23 | End: 2023-02-27

## 2023-02-23 RX ORDER — HYDRALAZINE HCL 50 MG
25 TABLET ORAL
Refills: 0 | Status: DISCONTINUED | OUTPATIENT
Start: 2023-02-23 | End: 2023-02-27

## 2023-02-23 RX ORDER — SODIUM CHLORIDE 9 MG/ML
1000 INJECTION INTRAMUSCULAR; INTRAVENOUS; SUBCUTANEOUS
Refills: 0 | Status: DISCONTINUED | OUTPATIENT
Start: 2023-02-23 | End: 2023-02-24

## 2023-02-23 RX ORDER — SODIUM CHLORIDE 9 MG/ML
250 INJECTION INTRAMUSCULAR; INTRAVENOUS; SUBCUTANEOUS ONCE
Refills: 0 | Status: COMPLETED | OUTPATIENT
Start: 2023-02-23 | End: 2023-02-23

## 2023-02-23 RX ORDER — LEVOTHYROXINE SODIUM 125 MCG
88 TABLET ORAL DAILY
Refills: 0 | Status: DISCONTINUED | OUTPATIENT
Start: 2023-02-23 | End: 2023-02-27

## 2023-02-23 RX ORDER — PREGABALIN 225 MG/1
2000 CAPSULE ORAL DAILY
Refills: 0 | Status: DISCONTINUED | OUTPATIENT
Start: 2023-02-23 | End: 2023-02-27

## 2023-02-23 RX ORDER — ONDANSETRON 8 MG/1
4 TABLET, FILM COATED ORAL ONCE
Refills: 0 | Status: COMPLETED | OUTPATIENT
Start: 2023-02-23 | End: 2023-02-23

## 2023-02-23 RX ORDER — CEFEPIME 1 G/1
1000 INJECTION, POWDER, FOR SOLUTION INTRAMUSCULAR; INTRAVENOUS ONCE
Refills: 0 | Status: DISCONTINUED | OUTPATIENT
Start: 2023-02-23 | End: 2023-02-23

## 2023-02-23 RX ADMIN — Medication 25 MILLIGRAM(S): at 21:19

## 2023-02-23 RX ADMIN — ATORVASTATIN CALCIUM 20 MILLIGRAM(S): 80 TABLET, FILM COATED ORAL at 21:19

## 2023-02-23 RX ADMIN — CEFEPIME 1000 MILLIGRAM(S): 1 INJECTION, POWDER, FOR SOLUTION INTRAMUSCULAR; INTRAVENOUS at 08:09

## 2023-02-23 RX ADMIN — SODIUM CHLORIDE 50 MILLILITER(S): 9 INJECTION INTRAMUSCULAR; INTRAVENOUS; SUBCUTANEOUS at 21:19

## 2023-02-23 RX ADMIN — ONDANSETRON 4 MILLIGRAM(S): 8 TABLET, FILM COATED ORAL at 07:10

## 2023-02-23 RX ADMIN — Medication 400 MILLIGRAM(S): at 07:11

## 2023-02-23 RX ADMIN — Medication 250 MILLIGRAM(S): at 08:10

## 2023-02-23 RX ADMIN — SODIUM CHLORIDE 250 MILLILITER(S): 9 INJECTION INTRAMUSCULAR; INTRAVENOUS; SUBCUTANEOUS at 08:09

## 2023-02-23 NOTE — PATIENT PROFILE ADULT - FALL HARM RISK - HARM RISK INTERVENTIONS

## 2023-02-23 NOTE — ED ADULT TRIAGE NOTE - CHIEF COMPLAINT QUOTE
Pt. BIBEMS from home c/o weakness. Pt. went to dialysis last night and when she returned home she felt weak. Pt. endorses falling, states "my legs went under me" when walking to her chair and needed help getting up. Pt. denies headstrike/LOC. Pt. endorses vomiting this morning and feeling like she has a cold. Pt. believes she got sick at dialysis because it was so cold. Pt. has right chest wall dialysis catheter

## 2023-02-23 NOTE — H&P ADULT - NSHPREVIEWOFSYSTEMS_GEN_ALL_CORE
REVIEW OF SYSTEMS:    CONSTITUTIONAL: + weakness, fevers and chills  EYES/ENT: No visual changes; vertigo or throat pain   NECK: No pain or stiffness  RESPIRATORY: No cough, wheezing, hemoptysis or shortness of breath  CARDIOVASCULAR: No chest pain or palpitations  GASTROINTESTINAL: No abdominal or epigastric pain. No nausea, vomiting, or hematemesis; No diarrhea or constipation. No melena or hematochezia.  GENITOURINARY: No dysuria, urinary frequency or hematuria  NEUROLOGICAL: No numbness or weakness  EXTREMITIES: No swelling or tenderness  SKIN: No itching, burning, rashes, or lesions   All other review of systems is negative unless indicated above.

## 2023-02-23 NOTE — ED PROVIDER NOTE - CLINICAL SUMMARY MEDICAL DECISION MAKING FREE TEXT BOX
86 yo female with recent admission for RADHA on CKD; right IJ catheter placed during admission with generalized weakness; chills during dialysis yesterday; no chest pain; no sob; nausea and vomiting; rectal temp: 100.9 upon arrival; sepsis labs ordered; stat ekg; screening cxr; basic labs; urine and re-eval closely.

## 2023-02-23 NOTE — ED PROVIDER NOTE - OBJECTIVE STATEMENT
Patient is a 86 yo female s/p hospitalization at  for RADHA on CKD; Patient is a 88 yo female s/p hospitalization at  for RADHA on CKD; Patient had right IJ catheter placed on 2/17 for HD; patient was discharged 2 days ago; yesterday when for dialysis; during dialysis felt "cold", could not get warm and then was not able to complete dialysis 2/2 to "shaking chills." Patient also started vomiting at the end of dialysis. Patient went home walked into house and unable to make it to the chair as legs got weak; patient lowered herself to the ground and fire dept needed to come to get patient up into chair; patient slept in chair all night but still "cold" this AM with continued vomiting and came to ED for evaluation; no chest pain; no sob; no abdominal pain.

## 2023-02-23 NOTE — H&P ADULT - HISTORY OF PRESENT ILLNESS
7F with PMH of CKDIIIB, Atrophic Left Kidney, HTN, HLD, Valvular HD, CAD s/p CABG (10/2022), Pulm HTN, Hypothyroidism, CKD on HD for chills. Patient was recently discharged from .  Patient had right IJ catheter placed on 2/17 for HD; patient was discharged 2 days ago; yesterday fduring dialysis felt "cold", could not get warm and then was not able to complete dialysis 2/2 to "shaking chills." Patient also started vomiting at the end of dialysis. Patient went home walked into house and unable to make it to the chair as legs got weak; patient lowered herself to the ground and fire dept needed to come to get patient up into chair; patient slept in chair all night but still "cold" this AM with continued vomiting and came to ED for evaluation; no chest pain, sob, cough or  abdominal pain.    In the ER, patient received IVF  7F with PMH of CKDIIIB, Atrophic Left Kidney, HTN, HLD, Valvular HD, CAD s/p CABG (10/2022), Pulm HTN, Hypothyroidism, CKD on HD for chills. Patient was recently discharged from .  Patient had right IJ catheter placed on 2/17 for HD; patient was discharged 2 days ago; yesterday fduring dialysis felt "cold", could not get warm and then was not able to complete dialysis 2/2 to "shaking chills." Patient also started vomiting at the end of dialysis. Patient went home walked into house and unable to make it to the chair as legs got weak; patient lowered herself to the ground and fire dept needed to come to get patient up into chair; patient slept in chair all night but still "cold" this AM with continued vomiting and came to ED for evaluation; no chest pain, sob, cough or  abdominal pain.    In the ER, patient received IVF     Social History: Lives at home. No tobacco alcohol or illicit drug use   Family History: unknown cause and age of death of both parents

## 2023-02-23 NOTE — ED ADULT NURSE REASSESSMENT NOTE - NS ED NURSE REASSESS COMMENT FT1
Pt received from [facility]. Pt denies nausea and abdominal pain at this time. Right IJ port covered with dressing with bruising around area. Pt resting comfortably at this time.

## 2023-02-23 NOTE — ED PROVIDER NOTE - CARDIAC, MLM
Normal rate, regular rhythm.  Heart sounds S1, S2.  No murmurs, rubs or gallops. Normal rate, regular rhythm.  Heart sounds S1, S2.  (+) harsh systolic murmur;

## 2023-02-23 NOTE — H&P ADULT - NSHPPHYSICALEXAM_GEN_ALL_CORE
PHYSICAL EXAM:  Constitutional: NAD, awake and alert  Neurological: AAO x 3, no focal deficits  HEENT: PERRLA, EOMI, MMM  Neck: Soft and supple, No LAD, No JVD  Respiratory: Breath sounds are clear bilaterally, No wheezing, rales or rhonchi  Cardiovascular: S1 and S2, regular rate and rhythm; no Murmurs, gallops or rubs  Gastrointestinal: Bowel Sounds present, soft, nontender, nondistended, no guarding, no rebound tenderness  Back: No CVA tenderness   Extremities: No peripheral edema  Vascular: 2+ peripheral pulses  Musculoskeletal: 5/5 strength b/l upper and lower extremities  Skin: No rashes  Breast: Deferred  Rectal: Deferred PHYSICAL EXAM:  Constitutional: NAD, awake and alert  Neurological: AAO x 3, no focal deficits  HEENT: PERRLA, EOMI, MMM  Neck: Soft and supple, No LAD, No JVD +hematoma aournf the right IJ  Respiratory: Breath sounds are clear bilaterally, No wheezing, rales or rhonchi  Cardiovascular: S1 and S2, regular rate and rhythm; no Murmurs, gallops or rubs  Gastrointestinal: Bowel Sounds present, soft, nontender, nondistended, no guarding, no rebound tenderness  Back: No CVA tenderness   Extremities: No peripheral edema  Vascular: 2+ peripheral pulses  Musculoskeletal: 5/5 strength b/l upper and lower extremities  Skin: No rashes  Breast: Deferred  Rectal: Deferred

## 2023-02-23 NOTE — ED PROVIDER NOTE - CONSTITUTIONAL, MLM
normal... ill appearing; awake, alert, oriented to person, place, time/situation and in no apparent distress.

## 2023-02-23 NOTE — H&P ADULT - NSHPLABSRESULTS_GEN_ALL_CORE
< from: CT Head No Cont (02.23.23 @ 08:36) >      IMPRESSION: No evidence of acute hemorrhage mass or mass effect.      < end of copied text >    < from: CT Abdomen and Pelvis No Cont (02.23.23 @ 08:35) >      IMPRESSION:  Air/gas in the nondependent urinary bladder that may be related to   instrumentation or cystitis. Correlate clinically. No other imaging   evidence of infection or inflammation.  Atrophic left kidney.  Additional findingsas above.    < end of copied text >

## 2023-02-23 NOTE — PHARMACOTHERAPY INTERVENTION NOTE - COMMENTS
Med history complete, reviewed medications and allergies with patients daughter and past medication discharge document and confirmed medication list with doctor first med profile, all medication related questions answered

## 2023-02-23 NOTE — ED PROVIDER NOTE - PROGRESS NOTE DETAILS
Alejo DO: rectal exam: lot: 231 exp 6/30/23, qs positive; brown stool; guaiac neg Alejo DO: unable to place wet read: CXR unchanged from 2/6/23 Alejo REYNOLDS: Seen by Dr. Moe in dept. Alejo REYNOLDS: case discussed with Dr. Guzman- CT head; CT a/p ordered in setting of fever, nausea, vomiting; endorsed to Dr. Guzman for admission. Alejo REYNOLDS: Seen by Dr. Moe in dept. okay for 250cc bolus Alejo REYNOLDS: Dr. Guzman aware of CT results- no recent instrumentation- confirmed with daughter; bladder scan- 54cc urine; nursing staff aware to straight cath r/o uti in setting of fever, weakness.

## 2023-02-23 NOTE — PATIENT PROFILE ADULT - FUNCTIONAL ASSESSMENT - BASIC MOBILITY 4.
Implemented All Universal Safety Interventions:  [place] to call system. Call bell, personal items and telephone within reach. Instruct patient to call for assistance. Room bathroom lighting operational. Non-slip footwear when patient is off stretcher. Physically safe environment: no spills, clutter or unnecessary equipment. Stretcher in lowest position, wheels locked, appropriate side rails in place.
2 = A lot of assistance

## 2023-02-23 NOTE — ED ADULT NURSE NOTE - OBJECTIVE STATEMENT
Patient had right IJ catheter placed on 2/17 for HD; patient was discharged 2 days ago; yesterday when for dialysis; during dialysis felt "cold", could not get warm and then was not able to complete dialysis 2/2 to "shaking chills." Patient also started vomiting at the end of dialysis. Patient went home walked into house and unable to make it to the chair as legs got weak; patient lowered herself to the ground and fire dept needed to come to get patient up into chair; patient slept in chair all night but still "cold" this AM with continued vomiting and came to ED for evaluation; no chest pain; no sob; no abdominal pain.

## 2023-02-24 DIAGNOSIS — R50.9 FEVER, UNSPECIFIED: ICD-10-CM

## 2023-02-24 LAB
ANION GAP SERPL CALC-SCNC: 6 MMOL/L — SIGNIFICANT CHANGE UP (ref 5–17)
BUN SERPL-MCNC: 21 MG/DL — SIGNIFICANT CHANGE UP (ref 7–23)
CALCIUM SERPL-MCNC: 7.4 MG/DL — LOW (ref 8.5–10.1)
CHLORIDE SERPL-SCNC: 106 MMOL/L — SIGNIFICANT CHANGE UP (ref 96–108)
CO2 SERPL-SCNC: 28 MMOL/L — SIGNIFICANT CHANGE UP (ref 22–31)
CREAT SERPL-MCNC: 3.5 MG/DL — HIGH (ref 0.5–1.3)
EGFR: 12 ML/MIN/1.73M2 — LOW
GLUCOSE SERPL-MCNC: 90 MG/DL — SIGNIFICANT CHANGE UP (ref 70–99)
HCT VFR BLD CALC: 21.9 % — LOW (ref 34.5–45)
HCT VFR BLD CALC: 22.9 % — LOW (ref 34.5–45)
HGB BLD-MCNC: 7.2 G/DL — LOW (ref 11.5–15.5)
HGB BLD-MCNC: 7.7 G/DL — LOW (ref 11.5–15.5)
MAGNESIUM SERPL-MCNC: 1.7 MG/DL — SIGNIFICANT CHANGE UP (ref 1.6–2.6)
MCHC RBC-ENTMCNC: 32.7 PG — SIGNIFICANT CHANGE UP (ref 27–34)
MCHC RBC-ENTMCNC: 32.9 GM/DL — SIGNIFICANT CHANGE UP (ref 32–36)
MCV RBC AUTO: 99.5 FL — SIGNIFICANT CHANGE UP (ref 80–100)
PHOSPHATE SERPL-MCNC: 2.7 MG/DL — SIGNIFICANT CHANGE UP (ref 2.5–4.5)
PLATELET # BLD AUTO: 85 K/UL — LOW (ref 150–400)
POTASSIUM SERPL-MCNC: 3.2 MMOL/L — LOW (ref 3.5–5.3)
POTASSIUM SERPL-SCNC: 3.2 MMOL/L — LOW (ref 3.5–5.3)
RBC # BLD: 2.2 M/UL — LOW (ref 3.8–5.2)
RBC # FLD: 14 % — SIGNIFICANT CHANGE UP (ref 10.3–14.5)
SODIUM SERPL-SCNC: 140 MMOL/L — SIGNIFICANT CHANGE UP (ref 135–145)
WBC # BLD: 7.54 K/UL — SIGNIFICANT CHANGE UP (ref 3.8–10.5)
WBC # FLD AUTO: 7.54 K/UL — SIGNIFICANT CHANGE UP (ref 3.8–10.5)

## 2023-02-24 PROCEDURE — 90935 HEMODIALYSIS ONE EVALUATION: CPT

## 2023-02-24 PROCEDURE — 85014 HEMATOCRIT: CPT

## 2023-02-24 PROCEDURE — 99233 SBSQ HOSP IP/OBS HIGH 50: CPT

## 2023-02-24 PROCEDURE — 85018 HEMOGLOBIN: CPT

## 2023-02-24 PROCEDURE — 80069 RENAL FUNCTION PANEL: CPT

## 2023-02-24 PROCEDURE — 80048 BASIC METABOLIC PNL TOTAL CA: CPT

## 2023-02-24 PROCEDURE — 85027 COMPLETE CBC AUTOMATED: CPT

## 2023-02-24 PROCEDURE — 36415 COLL VENOUS BLD VENIPUNCTURE: CPT

## 2023-02-24 RX ORDER — CEFTRIAXONE 500 MG/1
1000 INJECTION, POWDER, FOR SOLUTION INTRAMUSCULAR; INTRAVENOUS EVERY 24 HOURS
Refills: 0 | Status: COMPLETED | OUTPATIENT
Start: 2023-02-24 | End: 2023-02-26

## 2023-02-24 RX ADMIN — Medication 88 MICROGRAM(S): at 05:05

## 2023-02-24 RX ADMIN — FAMOTIDINE 10 MILLIGRAM(S): 10 INJECTION INTRAVENOUS at 09:50

## 2023-02-24 RX ADMIN — PREGABALIN 2000 MICROGRAM(S): 225 CAPSULE ORAL at 09:50

## 2023-02-24 RX ADMIN — Medication 25 MILLIGRAM(S): at 21:21

## 2023-02-24 RX ADMIN — CLOPIDOGREL BISULFATE 75 MILLIGRAM(S): 75 TABLET, FILM COATED ORAL at 09:52

## 2023-02-24 RX ADMIN — CEFTRIAXONE 1000 MILLIGRAM(S): 500 INJECTION, POWDER, FOR SOLUTION INTRAMUSCULAR; INTRAVENOUS at 09:49

## 2023-02-24 RX ADMIN — Medication 81 MILLIGRAM(S): at 09:54

## 2023-02-24 RX ADMIN — ONDANSETRON 4 MILLIGRAM(S): 8 TABLET, FILM COATED ORAL at 09:51

## 2023-02-24 RX ADMIN — ATORVASTATIN CALCIUM 20 MILLIGRAM(S): 80 TABLET, FILM COATED ORAL at 21:21

## 2023-02-24 RX ADMIN — AMIODARONE HYDROCHLORIDE 200 MILLIGRAM(S): 400 TABLET ORAL at 10:00

## 2023-02-24 RX ADMIN — Medication 1 TABLET(S): at 09:49

## 2023-02-24 NOTE — PROGRESS NOTE ADULT - SUBJECTIVE AND OBJECTIVE BOX
HPI: 87/F with PMHx of ESRD on HD on MWF, Atrophic Left Kidney, HTN, HLD, Valvular Heart Disease, CAD s/p CABG (10/2022), Pulm HTN, Hypothyroidism, was brought to the ED for chills. Patient was recently discharged from .  Patient had right IJ catheter placed on 23 for HD; patient was discharged 2 days ago; during dialysis felt "cold", could not get warm and then was not able to complete dialysis  to "shaking chills." Patient also started vomiting at the end of dialysis. Patient went home walked into house and unable to make it to the chair as legs got weak; patient lowered herself to the ground and fire dept needed to come to get patient up into chair; patient slept in chair all night but still "cold" this AM with continued vomiting and came to ED for evaluation; no chest pain, sob, cough or  abdominal pain.    In the ER, patient received IVF     : UA + for UTI  c/o feeling weak and awful  Afebrile today  Hb dropped to 7.2; ? dilutional  K 3.2      PHYSICAL EXAM:    Daily     Daily     Vital Signs Last 24 Hrs  T(C): 36.2 (2023 08:10), Max: 36.8 (2023 15:01)  T(F): 97.2 (2023 08:10), Max: 98.2 (2023 15:01)  HR: 75 (2023 08:10) (70 - 75)  BP: 119/66 (2023 08:10) (119/66 - 123/50)  BP(mean): --  RR: 18 (2023 08:10) (18 - 18)  SpO2: 96% (2023 08:10) (96% - 97%)    Constitutional: Weak and ill appearing  HEENT: Atraumatic, TATYANA,   Respiratory: Breath Sounds normal, no rhonchi/wheeze  Cardiovascular: N S1S2; right chest IJ catheter for HD   Gastrointestinal: Abdomen soft, non tender, Bowel Sounds present  Extremities: No edema, peripheral pulses present  Neurological: AAO x 3, no gross focal motor deficits  Skin: Non cellulitic, no rash, ulcers  Lymph Nodes: No lymphadenopathy noted  Back: No CVA tenderness   Musculoskeletal: non tender  Breasts: Deferred  Genitourinary: deferred  Rectal: Deferred    All Labs/EKG/Radiology/Meds reviewed by me                          7.2    7.54  )-----------( 87       ( 2023 08:00 )             21.9       CBC Full  -  ( 2023 08:00 )  WBC Count : 7.54 K/uL  RBC Count : 2.20 M/uL  Hemoglobin : 7.2 g/dL  Hematocrit : 21.9 %  Platelet Count - Automated : 85 K/uL  Mean Cell Volume : 99.5 fl  Mean Cell Hemoglobin : 32.7 pg  Mean Cell Hemoglobin Concentration : 32.9 gm/dL  Auto Neutrophil # : x  Auto Lymphocyte # : x  Auto Monocyte # : x  Auto Eosinophil # : x  Auto Basophil # : x  Auto Neutrophil % : x  Auto Lymphocyte % : x  Auto Monocyte % : x  Auto Eosinophil % : x  Auto Basophil % : x      02-24    140  |  106  |  21  ----------------------------<  90  3.2<L>   |  28  |  3.50<H>    Ca    7.4<L>      2023 08:00  Phos  2.7     02-24  Mg     1.7     02-24    TPro  5.9<L>  /  Alb  3.0<L>  /  TBili  1.5<H>  /  DBili  x   /  AST  28  /  ALT  44  /  AlkPhos  69  02-23      LIVER FUNCTIONS - ( 2023 07:02 )  Alb: 3.0 g/dL / Pro: 5.9 gm/dL / ALK PHOS: 69 U/L / ALT: 44 U/L / AST: 28 U/L / GGT: x             PT/INR - ( 2023 08:43 )   PT: 12.7 sec;   INR: 1.09 ratio         PTT - ( 2023 08:43 )  PTT:29.9 sec          Urinalysis Basic - ( 2023 15:15 )    Color: Yellow / Appearance: Slightly Turbid / S.005 / pH: x  Gluc: x / Ketone: Trace  / Bili: Negative / Urobili: Negative   Blood: x / Protein: 30 mg/dL / Nitrite: Positive   Leuk Esterase: Moderate / RBC: Negative /HPF / WBC >50 /HPF   Sq Epi: x / Non Sq Epi: Negative / Bacteria: TNTC      < from: CT Head No Cont (23 @ 08:36) >  IMPRESSION: No evidence of acute hemorrhage mass or mass effect.    < end of copied text >  < from: CT Abdomen and Pelvis No Cont (23 @ 08:35) >  Air/gas in the nondependent urinary bladder that may be related to   instrumentation or cystitis. Correlate clinically. No other imaging   evidence of infection or inflammation.  Atrophic left kidney.    < end of copied text >  < from: Xray Chest 1 View-PORTABLE IMMEDIATE (23 @ 07:55) >  IMPRESSION: Slight lingular haziness again seen. Large right jugular line   is presently in place.    < end of copied text >        MEDICATIONS  (STANDING):  aMIOdarone    Tablet 200 milliGRAM(s) Oral daily  aspirin  chewable 81 milliGRAM(s) Oral daily  atorvastatin 20 milliGRAM(s) Oral at bedtime  cefTRIAXone Injectable. 1000 milliGRAM(s) IV Push every 24 hours  clopidogrel Tablet 75 milliGRAM(s) Oral daily  cyanocobalamin 2000 MICROGram(s) Oral daily  famotidine    Tablet 10 milliGRAM(s) Oral daily  hydrALAZINE 25 milliGRAM(s) Oral two times a day  levothyroxine 88 MICROGram(s) Oral daily  metoprolol succinate ER 25 milliGRAM(s) Oral daily  Nephro-shawna 1 Tablet(s) Oral daily    MEDICATIONS  (PRN):  acetaminophen     Tablet .. 650 milliGRAM(s) Oral every 6 hours PRN Temp greater or equal to 38C (100.4F), Mild Pain (1 - 3)  aluminum hydroxide/magnesium hydroxide/simethicone Suspension 30 milliLiter(s) Oral every 4 hours PRN Dyspepsia  melatonin 3 milliGRAM(s) Oral at bedtime PRN Insomnia  ondansetron Injectable 4 milliGRAM(s) IV Push every 8 hours PRN Nausea and/or Vomiting         HPI: 87/F with PMHx of ESRD on HD on MWF, Atrophic Left Kidney, HTN, HLD, Valvular Heart Disease, Anemia, Thrombocytopenia, CAD s/p CABG (10/2022), Pulm HTN, Hypothyroidism, was brought to the ED for chills. Patient was recently discharged from .  Patient had right IJ catheter placed on 23 for HD; patient was discharged 2 days ago; during dialysis felt "cold", could not get warm and then was not able to complete dialysis  to "shaking chills." Patient also started vomiting at the end of dialysis. Patient went home walked into house and unable to make it to the chair as legs got weak; patient lowered herself to the ground and fire dept needed to come to get patient up into chair; patient slept in chair all night but still "cold" this AM with continued vomiting and came to ED for evaluation; no chest pain, sob, cough or  abdominal pain.    In the ER, patient received IVF     : UA + for UTI  c/o feeling weak and awful  Afebrile today  Hb dropped to 7.2; ? dilutional  K 3.2      PHYSICAL EXAM:    Daily     Daily     Vital Signs Last 24 Hrs  T(C): 36.2 (2023 08:10), Max: 36.8 (2023 15:01)  T(F): 97.2 (2023 08:10), Max: 98.2 (2023 15:01)  HR: 75 (2023 08:10) (70 - 75)  BP: 119/66 (2023 08:10) (119/66 - 123/50)  BP(mean): --  RR: 18 (2023 08:10) (18 - 18)  SpO2: 96% (2023 08:10) (96% - 97%)    Constitutional: Weak and ill appearing  HEENT: Atraumatic, TATYANA,   Respiratory: Breath Sounds normal, no rhonchi/wheeze  Cardiovascular: N S1S2; right chest IJ catheter for HD   Gastrointestinal: Abdomen soft, non tender, Bowel Sounds present  Extremities: No edema, peripheral pulses present  Neurological: AAO x 3, no gross focal motor deficits  Skin: Non cellulitic, no rash, ulcers  Lymph Nodes: No lymphadenopathy noted  Back: No CVA tenderness   Musculoskeletal: non tender  Breasts: Deferred  Genitourinary: deferred  Rectal: Deferred    All Labs/EKG/Radiology/Meds reviewed by me                          7.2    7.54  )-----------( 87       ( 2023 08:00 )             21.9       CBC Full  -  ( 2023 08:00 )  WBC Count : 7.54 K/uL  RBC Count : 2.20 M/uL  Hemoglobin : 7.2 g/dL  Hematocrit : 21.9 %  Platelet Count - Automated : 85 K/uL  Mean Cell Volume : 99.5 fl  Mean Cell Hemoglobin : 32.7 pg  Mean Cell Hemoglobin Concentration : 32.9 gm/dL  Auto Neutrophil # : x  Auto Lymphocyte # : x  Auto Monocyte # : x  Auto Eosinophil # : x  Auto Basophil # : x  Auto Neutrophil % : x  Auto Lymphocyte % : x  Auto Monocyte % : x  Auto Eosinophil % : x  Auto Basophil % : x      02-24    140  |  106  |  21  ----------------------------<  90  3.2<L>   |  28  |  3.50<H>    Ca    7.4<L>      2023 08:00  Phos  2.7     02-24  Mg     1.7     02-24    TPro  5.9<L>  /  Alb  3.0<L>  /  TBili  1.5<H>  /  DBili  x   /  AST  28  /  ALT  44  /  AlkPhos  69  02-23      LIVER FUNCTIONS - ( 2023 07:02 )  Alb: 3.0 g/dL / Pro: 5.9 gm/dL / ALK PHOS: 69 U/L / ALT: 44 U/L / AST: 28 U/L / GGT: x             PT/INR - ( 2023 08:43 )   PT: 12.7 sec;   INR: 1.09 ratio         PTT - ( 2023 08:43 )  PTT:29.9 sec          Urinalysis Basic - ( 2023 15:15 )    Color: Yellow / Appearance: Slightly Turbid / S.005 / pH: x  Gluc: x / Ketone: Trace  / Bili: Negative / Urobili: Negative   Blood: x / Protein: 30 mg/dL / Nitrite: Positive   Leuk Esterase: Moderate / RBC: Negative /HPF / WBC >50 /HPF   Sq Epi: x / Non Sq Epi: Negative / Bacteria: TNTC      < from: CT Head No Cont (23 @ 08:36) >  IMPRESSION: No evidence of acute hemorrhage mass or mass effect.    < end of copied text >  < from: CT Abdomen and Pelvis No Cont (23 @ 08:35) >  Air/gas in the nondependent urinary bladder that may be related to   instrumentation or cystitis. Correlate clinically. No other imaging   evidence of infection or inflammation.  Atrophic left kidney.    < end of copied text >  < from: Xray Chest 1 View-PORTABLE IMMEDIATE (23 @ 07:55) >  IMPRESSION: Slight lingular haziness again seen. Large right jugular line   is presently in place.    < end of copied text >        MEDICATIONS  (STANDING):  aMIOdarone    Tablet 200 milliGRAM(s) Oral daily  aspirin  chewable 81 milliGRAM(s) Oral daily  atorvastatin 20 milliGRAM(s) Oral at bedtime  cefTRIAXone Injectable. 1000 milliGRAM(s) IV Push every 24 hours  clopidogrel Tablet 75 milliGRAM(s) Oral daily  cyanocobalamin 2000 MICROGram(s) Oral daily  famotidine    Tablet 10 milliGRAM(s) Oral daily  hydrALAZINE 25 milliGRAM(s) Oral two times a day  levothyroxine 88 MICROGram(s) Oral daily  metoprolol succinate ER 25 milliGRAM(s) Oral daily  Nephro-shawna 1 Tablet(s) Oral daily    MEDICATIONS  (PRN):  acetaminophen     Tablet .. 650 milliGRAM(s) Oral every 6 hours PRN Temp greater or equal to 38C (100.4F), Mild Pain (1 - 3)  aluminum hydroxide/magnesium hydroxide/simethicone Suspension 30 milliLiter(s) Oral every 4 hours PRN Dyspepsia  melatonin 3 milliGRAM(s) Oral at bedtime PRN Insomnia  ondansetron Injectable 4 milliGRAM(s) IV Push every 8 hours PRN Nausea and/or Vomiting

## 2023-02-24 NOTE — PROGRESS NOTE ADULT - ASSESSMENT
87/F with PMHx of ESRD on HD on MWF, Atrophic Left Kidney, HTN, HLD, Valvular Heart Disease, CAD s/p CABG (10/2022), Pulm HTN, Hypothyroidism, was brought to the ED for chills and admitted with :      N/V and Fever sec to UTI;  -Admit to medical floors  -CT Head NAD   -CT abd and pelvis : cystits   d/c iv fluids  -ID consult     Metabolic Encephalopathy:  resolved    Anemia, acute on chronic; anemia of chronic disease sec to ESRD: baseline Hb recently 7.5-9.3  Hb 7.2 on 2/24  Hb 9.0 on 2/23  likely dilutional , was on iv fluids  check H/H after HD today and in am    Hypokalemia : k 3.2  likely dilutional too  mx per renal team during HD  BMP in am    Weakness/Falls:  PTx consult  fall precautions    ESRD on HD on MWF:  cont HD as per renal team    CAD s/p CABG (10/2022)   Paroxysmal Atrial Fibrillation (on Amiodarone; not AC)  HTN  HLD  -[id number]=0. Not on AC since 7/2022.   -ASA/Plavix   -Continue BB/Statin  -Continue Amiodarone    DVT Prophylaxis: venodynes    FULL Code    Primary is daughter, Zaida Sandhu ([phone]) and secondary is son, Isidro Nova ([phone]) 87/F with PMHx of ESRD on HD on MWF, Atrophic Left Kidney, HTN, HLD, Valvular Heart Disease, CAD s/p CABG (10/2022), Pulm HTN, Hypothyroidism, was brought to the ED for chills and admitted with :      N/V and Fever sec to UTI/Cystitis;  -Admit to medical floors  -CT Head NAD   -CT abd and pelvis : cystits   d/c iv fluids  -ID consult   iv ceftriaxone  f/u urine/blood cx    Metabolic Encephalopathy:  resolved    Anemia, acute on chronic; anemia of chronic disease sec to ESRD: baseline Hb recently 7.5-9.3  Hb 7.2 on 2/24  Hb 9.0 on 2/23  likely dilutional , was on iv fluids  check H/H after HD today and in am    Hypokalemia : k 3.2  likely dilutional too  mx per renal team during HD  BMP in am    Weakness/Falls:  PTx consult  fall precautions    ESRD on HD on MWF:  cont HD as per renal team    CAD s/p CABG (10/2022)   Paroxysmal Atrial Fibrillation (on Amiodarone; not AC)  HTN  HLD  -[id number]=0. Not on AC since 7/2022.   -ASA/Plavix   -Continue BB/Statin  -Continue Amiodarone    DVT Prophylaxis: venodynes    FULL Code    Primary is daughter, Zaida Sandhu ([phone]) and secondary is son, Isidro Nova ([phone]) 87/F with PMHx of ESRD on HD on MWF, Atrophic Left Kidney, HTN, HLD, Valvular Heart Disease, CAD s/p CABG (10/2022), Pulm HTN, Hypothyroidism, Anemia, Thrombocytopenia, was brought to the ED for chills and admitted with :      N/V and Fever sec to UTI/Cystitis;  -Admit to medical floors  -CT Head NAD   -CT abd and pelvis : cystits   d/c iv fluids  -ID consult   iv ceftriaxone  f/u urine/blood cx    Metabolic Encephalopathy:  resolved    Anemia, acute on chronic; anemia of chronic disease sec to ESRD: baseline Hb recently 7.5-9.3  Hb 7.2 on 2/24  Hb 9.0 on 2/23  likely dilutional , was on iv fluids  check H/H after HD today and in am    Hypokalemia : k 3.2  likely dilutional too  mx per renal team during HD  BMP in am    Weakness/Falls:  PTx consult  fall precautions    ESRD on HD on MWF:  cont HD as per renal team    Chronic Thrombocytopenia:   monitor    CAD s/p CABG (10/2022)   Paroxysmal Atrial Fibrillation (on Amiodarone; not AC)  HTN  HLD  -[id number]=3. Not on AC since 7/2022.   -ASA/Plavix   -Continue BB/Statin  -Continue Amiodarone    DVT Prophylaxis: marlee    FULL Code    Primary is daughter, Zaida Sandhu ([phone]) and secondary is son, Isidro Nova ([phone])

## 2023-02-24 NOTE — PROGRESS NOTE ADULT - ASSESSMENT
Patient is a 87y Female whom presented to the hospital with atrophic left kidney ,CABG in October recent RADHA progression to ESRD here with N/V, renal eval of ESRD. Patient dc on tuesday, hd at [facility] [place] on 2/22 and was not feeling well. Home last night weak with N/V and noted in ed w fever.      ESRD  -HD tiw, MWF while inpatient  -Treatment complete as ordered  -Workup of fever per medicine, on ceftriaxone  -No ivf further w esrd    Anemia    KENNETH w HD    Monitor need prbc, defer to medicine       d/c with Rn staff, team

## 2023-02-24 NOTE — PROGRESS NOTE ADULT - SUBJECTIVE AND OBJECTIVE BOX
Patient is a 87y Female who reports no complaints as new, no cp or sob      MEDICATIONS  (STANDING):  aMIOdarone    Tablet 200 milliGRAM(s) Oral daily  aspirin  chewable 81 milliGRAM(s) Oral daily  atorvastatin 20 milliGRAM(s) Oral at bedtime  cefTRIAXone Injectable. 1000 milliGRAM(s) IV Push every 24 hours  clopidogrel Tablet 75 milliGRAM(s) Oral daily  cyanocobalamin 2000 MICROGram(s) Oral daily  famotidine    Tablet 10 milliGRAM(s) Oral daily  hydrALAZINE 25 milliGRAM(s) Oral two times a day  levothyroxine 88 MICROGram(s) Oral daily  metoprolol succinate ER 25 milliGRAM(s) Oral daily  Nephro-shawna 1 Tablet(s) Oral daily    MEDICATIONS  (PRN):  acetaminophen     Tablet .. 650 milliGRAM(s) Oral every 6 hours PRN Temp greater or equal to 38C (100.4F), Mild Pain (1 - 3)  aluminum hydroxide/magnesium hydroxide/simethicone Suspension 30 milliLiter(s) Oral every 4 hours PRN Dyspepsia  melatonin 3 milliGRAM(s) Oral at bedtime PRN Insomnia  ondansetron Injectable 4 milliGRAM(s) IV Push every 8 hours PRN Nausea and/or Vomiting        T(C): , Max: 37.1 (23 @ 12:57)  T(F): , Max: 98.8 (23 @ 12:57)  HR: 74 (23 @ 16:55)  BP: 131/66 (23 @ 16:55)  BP(mean): --  RR: 16 (23 @ 16:55)  SpO2: 96% (23 @ 08:10)  Wt(kg): --        PHYSICAL EXAM:    Constitutional: NAD,   HEENT:  MM  Neck: No LAD, No JVD  Respiratory: dist  Cardiovascular: S1 and S2  Extremities: No peripheral edema  Neurological: A/O x 3  : No Bose  Skin: No rashes  Access: tdc        LABS:                        7.7    x     )-----------( x        ( 2023 16:51 )             22.9     2023 08:00    140    |  106    |  21     ----------------------------<  90     3.2     |  28     |  3.50   2023 07:02    137    |  100    |  15     ----------------------------<  135    3.6     |  30     |  2.45   2023 07:29    139    |  105    |  20     ----------------------------<  101    3.6     |  29     |  2.48     Ca    7.4        2023 08:00  Ca    8.3        2023 07:02  Ca    8.1        2023 07:29  Phos  2.7       2023 08:00  Phos  2.1       2023 07:29  Mg     1.7       2023 08:00    TPro  5.9    /  Alb  3.0    /  TBili  1.5    /  DBili  x      /  AST  28     /  ALT  44     /  AlkPhos  69     2023 07:02  TPro  x      /  Alb  2.7    /  TBili  x      /  DBili  x      /  AST  x      /  ALT  x      /  AlkPhos  x      2023 07:29          Urine Studies:  Urinalysis Basic - ( 2023 15:15 )    Color: Yellow / Appearance: Slightly Turbid / S.005 / pH: x  Gluc: x / Ketone: Trace  / Bili: Negative / Urobili: Negative   Blood: x / Protein: 30 mg/dL / Nitrite: Positive   Leuk Esterase: Moderate / RBC: Negative /HPF / WBC >50 /HPF   Sq Epi: x / Non Sq Epi: Negative / Bacteria: TNTC            RADIOLOGY & ADDITIONAL STUDIES:

## 2023-02-25 LAB
ANION GAP SERPL CALC-SCNC: 3 MMOL/L — LOW (ref 5–17)
BUN SERPL-MCNC: 12 MG/DL — SIGNIFICANT CHANGE UP (ref 7–23)
CALCIUM SERPL-MCNC: 7.8 MG/DL — LOW (ref 8.5–10.1)
CHLORIDE SERPL-SCNC: 103 MMOL/L — SIGNIFICANT CHANGE UP (ref 96–108)
CO2 SERPL-SCNC: 32 MMOL/L — HIGH (ref 22–31)
CREAT SERPL-MCNC: 2.3 MG/DL — HIGH (ref 0.5–1.3)
EGFR: 20 ML/MIN/1.73M2 — LOW
GLUCOSE SERPL-MCNC: 84 MG/DL — SIGNIFICANT CHANGE UP (ref 70–99)
HCT VFR BLD CALC: 24.6 % — LOW (ref 34.5–45)
HGB BLD-MCNC: 7.9 G/DL — LOW (ref 11.5–15.5)
MCHC RBC-ENTMCNC: 32 PG — SIGNIFICANT CHANGE UP (ref 27–34)
MCHC RBC-ENTMCNC: 32.1 GM/DL — SIGNIFICANT CHANGE UP (ref 32–36)
MCV RBC AUTO: 99.6 FL — SIGNIFICANT CHANGE UP (ref 80–100)
PLATELET # BLD AUTO: 96 K/UL — LOW (ref 150–400)
POTASSIUM SERPL-MCNC: 3.6 MMOL/L — SIGNIFICANT CHANGE UP (ref 3.5–5.3)
POTASSIUM SERPL-SCNC: 3.6 MMOL/L — SIGNIFICANT CHANGE UP (ref 3.5–5.3)
RBC # BLD: 2.47 M/UL — LOW (ref 3.8–5.2)
RBC # FLD: 13.7 % — SIGNIFICANT CHANGE UP (ref 10.3–14.5)
SODIUM SERPL-SCNC: 138 MMOL/L — SIGNIFICANT CHANGE UP (ref 135–145)
WBC # BLD: 7.06 K/UL — SIGNIFICANT CHANGE UP (ref 3.8–10.5)
WBC # FLD AUTO: 7.06 K/UL — SIGNIFICANT CHANGE UP (ref 3.8–10.5)

## 2023-02-25 PROCEDURE — 99232 SBSQ HOSP IP/OBS MODERATE 35: CPT

## 2023-02-25 RX ADMIN — Medication 88 MICROGRAM(S): at 05:30

## 2023-02-25 RX ADMIN — Medication 1 TABLET(S): at 09:56

## 2023-02-25 RX ADMIN — CEFTRIAXONE 1000 MILLIGRAM(S): 500 INJECTION, POWDER, FOR SOLUTION INTRAMUSCULAR; INTRAVENOUS at 08:47

## 2023-02-25 RX ADMIN — FAMOTIDINE 10 MILLIGRAM(S): 10 INJECTION INTRAVENOUS at 09:56

## 2023-02-25 RX ADMIN — Medication 81 MILLIGRAM(S): at 09:55

## 2023-02-25 RX ADMIN — AMIODARONE HYDROCHLORIDE 200 MILLIGRAM(S): 400 TABLET ORAL at 09:59

## 2023-02-25 RX ADMIN — Medication 25 MILLIGRAM(S): at 09:56

## 2023-02-25 RX ADMIN — PREGABALIN 2000 MICROGRAM(S): 225 CAPSULE ORAL at 09:58

## 2023-02-25 RX ADMIN — ATORVASTATIN CALCIUM 20 MILLIGRAM(S): 80 TABLET, FILM COATED ORAL at 21:35

## 2023-02-25 RX ADMIN — Medication 25 MILLIGRAM(S): at 09:55

## 2023-02-25 RX ADMIN — Medication 650 MILLIGRAM(S): at 22:30

## 2023-02-25 RX ADMIN — Medication 25 MILLIGRAM(S): at 21:35

## 2023-02-25 RX ADMIN — Medication 650 MILLIGRAM(S): at 21:38

## 2023-02-25 RX ADMIN — CLOPIDOGREL BISULFATE 75 MILLIGRAM(S): 75 TABLET, FILM COATED ORAL at 09:56

## 2023-02-25 NOTE — CONSULT NOTE ADULT - ASSESSMENT
Patient is a 87y Female whom presented to the hospital with atrophic left kidney ,CABG in October recent RADHA progression to ESRD here with N/V, renal eval of ESRD. Patient dc on tuesday, hd at [facility] on 2/22 and was not feeling well. Home last night weak with N/V and noted in ed w fever.      ESRD  -HD tiw, MWF while inpatient  -Treatment tomorrow  -Workup of fever   -Decrease FW in    Anemia    KENNETH w HD        d/c with Rn staff, team and daughter  dc w ed staff  
87F with PMH of CKDIIIB, Atrophic Left Kidney, HTN, HLD, Valvular HD, CAD s/p CABG (10/2022), Pulm HTN, Hypothyroidism, CKD on HD s/p right IJ tession on 2/17 admitted on 2/23 for evaluation of chills and vomiting at the end of dialysis, the patient was sent home but could not get off the floor as legs gave out, she came back to hospital for further evaluation. She notes that she has a urinary tract infection and is much improved since starting antibiotics. No pain at the tessio site.     1. Patient admitted with fever and chills, most likely due to urinary tract infection  - follow up cultures   - serial cbc and monitor temperature   - reviewed prior medical records to evaluate for resistant or atypical pathogens   - iv hydration and supportive care   - dialysis per nephrology  - will continue ceftriaxone as ordered, depending on clinical status may increase duration  2. other issues; per medicine

## 2023-02-25 NOTE — PROGRESS NOTE ADULT - ASSESSMENT
87/F with PMHx of ESRD on HD on MWF, Atrophic Left Kidney, HTN, HLD, Valvular Heart Disease, CAD s/p CABG (10/2022), Pulm HTN, Hypothyroidism, Anemia, Thrombocytopenia, was brought to the ED for chills and admitted with :      N/V and Fever sec to UTI/Cystitis; U cx Klebsiella oxytoca/Raoultella ornithinolytica  -Admit to medical floors  -CT Head NAD   -CT abd and pelvis : cystits   d/c iv fluids  -ID consult   iv ceftriaxone   urine cx; Klebsiella oxytoca/Raoultella ornithinolytica; Sx awaited  blood cx neg    Metabolic Encephalopathy:  resolved    Anemia, acute on chronic; anemia of chronic disease sec to ESRD: baseline Hb recently 7.5-9.3  Hb 7.2 on 2/24  Hb 9.0 on 2/23  likely dilutional , was on iv fluids  check H/H after HD today and in am; improved; 7.7 and 7.9 respectively    Hypokalemia : k 3.2  likely dilutional too  mx per renal team during HD  BMP in am, K 3.6, normalized    Weakness/Falls:  PTx consult  fall precautions    ESRD on HD on MWF:  cont HD as per renal team    Chronic Thrombocytopenia:   monitor    CAD s/p CABG (10/2022)   Paroxysmal Atrial Fibrillation (on Amiodarone; not AC)  HTN  HLD  -[id number]=2. Not on AC since 7/2022.   -ASA/Plavix   -Continue BB/Statin  -Continue Amiodarone    DVT Prophylaxis: venodynes    FULL Code    Primary is daughter, Zaida Sandhu ([phone]) and secondary is son, Isidro Nova ([phone])    DISPO: cont iv ceftriaxone, ID eval; f/u urine final cx and sx; PTX consult

## 2023-02-25 NOTE — PROGRESS NOTE ADULT - SUBJECTIVE AND OBJECTIVE BOX
HPI: 87/F with PMHx of ESRD on HD on MWF, Atrophic Left Kidney, HTN, HLD, Valvular Heart Disease, Anemia, Thrombocytopenia, CAD s/p CABG (10/2022), Pulm HTN, Hypothyroidism, was brought to the ED for chills. Patient was recently discharged from .  Patient had right IJ catheter placed on 23 for HD; patient was discharged 2 days ago; during dialysis felt "cold", could not get warm and then was not able to complete dialysis  to "shaking chills." Patient also started vomiting at the end of dialysis. Patient went home walked into house and unable to make it to the chair as legs got weak; patient lowered herself to the ground and fire dept needed to come to get patient up into chair; patient slept in chair all night but still "cold" this AM with continued vomiting and came to ED for evaluation; no chest pain, sob, cough or  abdominal pain.    In the ER, patient received IVF     : UA + for UTI  c/o feeling weak and awful  Afebrile today  Hb dropped to 7.2; ? dilutional  K 3.2    : feeling less weak today  U cx Klebsiella oxytoca/Raoultella ornithinolytica  Hb better, 7.9  K 3.6    PHYSICAL EXAM:    Vital Signs Last 24 Hrs  T(C): 36.3 (2023 07:39), Max: 36.4 (2023 21:18)  T(F): 97.3 (2023 07:39), Max: 97.6 (2023 21:18)  HR: 72 (2023 07:39) (70 - 78)  BP: 145/54 (2023 07:39) (119/64 - 174/78)  BP(mean): 79 (2023 21:18) (79 - 79)  RR: 16 (2023 07:39) (16 - 16)  SpO2: 93% (2023 07:39) (93% - 97%)    Parameters below as of 2023 07:39  Patient On (Oxygen Delivery Method): room air        Constitutional: Weak  appearing  HEENT: Atraumatic, TATYANA,   Respiratory: Breath Sounds normal, no rhonchi/wheeze  Cardiovascular: N S1S2; right chest IJ catheter for HD   Gastrointestinal: Abdomen soft, non tender, Bowel Sounds present  Extremities: No edema, peripheral pulses present  Neurological: AAO x 3, no gross focal motor deficits  Skin: Non cellulitic, no rash, ulcers  Lymph Nodes: No lymphadenopathy noted  Back: No CVA tenderness   Musculoskeletal: non tender  Breasts: Deferred  Genitourinary: deferred  Rectal: Deferred    All Labs/EKG/Radiology/Meds reviewed    Lab Results:  CBC  CBC Full  -  ( 2023 07:58 )  WBC Count : 7.06 K/uL  RBC Count : 2.47 M/uL  Hemoglobin : 7.9 g/dL  Hematocrit : 24.6 %  Platelet Count - Automated : 96 K/uL  Mean Cell Volume : 99.6 fl  Mean Cell Hemoglobin : 32.0 pg  Mean Cell Hemoglobin Concentration : 32.1 gm/dL  Auto Neutrophil # : x  Auto Lymphocyte # : x  Auto Monocyte # : x  Auto Eosinophil # : x  Auto Basophil # : x  Auto Neutrophil % : x  Auto Lymphocyte % : x  Auto Monocyte % : x  Auto Eosinophil % : x  Auto Basophil % : x    .		Differential:	[] Automated		[] Manual  Chemistry      138  |  103  |  12  ----------------------------<  84  3.6   |  32<H>  |  2.30<H>    Ca    7.8<L>      2023 07:58  Phos  2.7       Mg     1.7     24          Urinalysis Basic - ( 2023 15:15 )    Color: Yellow / Appearance: Slightly Turbid / S.005 / pH: x  Gluc: x / Ketone: Trace  / Bili: Negative / Urobili: Negative   Blood: x / Protein: 30 mg/dL / Nitrite: Positive   Leuk Esterase: Moderate / RBC: Negative /HPF / WBC >50 /HPF   Sq Epi: x / Non Sq Epi: Negative / Bacteria: TNTC        MICROBIOLOGY/CULTURES:  Culture Results:   50,000 - 99,000 CFU/mL Klebsiella oxytoca/Raoultella ornithinolytica ( @ 15:15)  Culture Results:   No growth to date. ( @ 07:43)  Culture Results:   No growth to date. ( @ 07:02)              < from: CT Head No Cont (23 @ 08:36) >  IMPRESSION: No evidence of acute hemorrhage mass or mass effect.    < end of copied text >  < from: CT Abdomen and Pelvis No Cont (23 @ 08:35) >  Air/gas in the nondependent urinary bladder that may be related to   instrumentation or cystitis. Correlate clinically. No other imaging   evidence of infection or inflammation.  Atrophic left kidney.    < end of copied text >  < from: Xray Chest 1 View-PORTABLE IMMEDIATE (23 @ 07:55) >  IMPRESSION: Slight lingular haziness again seen. Large right jugular line   is presently in place.    < end of copied text >        MEDICATIONS  (STANDING):  aMIOdarone    Tablet 200 milliGRAM(s) Oral daily  aspirin  chewable 81 milliGRAM(s) Oral daily  atorvastatin 20 milliGRAM(s) Oral at bedtime  cefTRIAXone Injectable. 1000 milliGRAM(s) IV Push every 24 hours  clopidogrel Tablet 75 milliGRAM(s) Oral daily  cyanocobalamin 2000 MICROGram(s) Oral daily  famotidine    Tablet 10 milliGRAM(s) Oral daily  hydrALAZINE 25 milliGRAM(s) Oral two times a day  levothyroxine 88 MICROGram(s) Oral daily  metoprolol succinate ER 25 milliGRAM(s) Oral daily  Nephro-shawna 1 Tablet(s) Oral daily    MEDICATIONS  (PRN):  acetaminophen     Tablet .. 650 milliGRAM(s) Oral every 6 hours PRN Temp greater or equal to 38C (100.4F), Mild Pain (1 - 3)  aluminum hydroxide/magnesium hydroxide/simethicone Suspension 30 milliLiter(s) Oral every 4 hours PRN Dyspepsia  melatonin 3 milliGRAM(s) Oral at bedtime PRN Insomnia  ondansetron Injectable 4 milliGRAM(s) IV Push every 8 hours PRN Nausea and/or Vomiting

## 2023-02-26 LAB
-  AMIKACIN: SIGNIFICANT CHANGE UP
-  AMOXICILLIN/CLAVULANIC ACID: SIGNIFICANT CHANGE UP
-  AMPICILLIN/SULBACTAM: SIGNIFICANT CHANGE UP
-  AMPICILLIN: SIGNIFICANT CHANGE UP
-  AZTREONAM: SIGNIFICANT CHANGE UP
-  CEFAZOLIN: SIGNIFICANT CHANGE UP
-  CEFEPIME: SIGNIFICANT CHANGE UP
-  CEFOXITIN: SIGNIFICANT CHANGE UP
-  CEFTRIAXONE: SIGNIFICANT CHANGE UP
-  CIPROFLOXACIN: SIGNIFICANT CHANGE UP
-  ERTAPENEM: SIGNIFICANT CHANGE UP
-  GENTAMICIN: SIGNIFICANT CHANGE UP
-  IMIPENEM: SIGNIFICANT CHANGE UP
-  LEVOFLOXACIN: SIGNIFICANT CHANGE UP
-  MEROPENEM: SIGNIFICANT CHANGE UP
-  NITROFURANTOIN: SIGNIFICANT CHANGE UP
-  PIPERACILLIN/TAZOBACTAM: SIGNIFICANT CHANGE UP
-  TOBRAMYCIN: SIGNIFICANT CHANGE UP
-  TRIMETHOPRIM/SULFAMETHOXAZOLE: SIGNIFICANT CHANGE UP
CULTURE RESULTS: SIGNIFICANT CHANGE UP
HCT VFR BLD CALC: 23.3 % — LOW (ref 34.5–45)
HGB BLD-MCNC: 7.5 G/DL — LOW (ref 11.5–15.5)
MCHC RBC-ENTMCNC: 32.2 GM/DL — SIGNIFICANT CHANGE UP (ref 32–36)
MCHC RBC-ENTMCNC: 32.2 PG — SIGNIFICANT CHANGE UP (ref 27–34)
MCV RBC AUTO: 100 FL — SIGNIFICANT CHANGE UP (ref 80–100)
METHOD TYPE: SIGNIFICANT CHANGE UP
ORGANISM # SPEC MICROSCOPIC CNT: SIGNIFICANT CHANGE UP
ORGANISM # SPEC MICROSCOPIC CNT: SIGNIFICANT CHANGE UP
PLATELET # BLD AUTO: 93 K/UL — LOW (ref 150–400)
RBC # BLD: 2.33 M/UL — LOW (ref 3.8–5.2)
RBC # FLD: 13.5 % — SIGNIFICANT CHANGE UP (ref 10.3–14.5)
SPECIMEN SOURCE: SIGNIFICANT CHANGE UP
WBC # BLD: 5.96 K/UL — SIGNIFICANT CHANGE UP (ref 3.8–10.5)
WBC # FLD AUTO: 5.96 K/UL — SIGNIFICANT CHANGE UP (ref 3.8–10.5)

## 2023-02-26 PROCEDURE — 99232 SBSQ HOSP IP/OBS MODERATE 35: CPT

## 2023-02-26 RX ORDER — CEFTRIAXONE 500 MG/1
1000 INJECTION, POWDER, FOR SOLUTION INTRAMUSCULAR; INTRAVENOUS EVERY 24 HOURS
Refills: 0 | Status: DISCONTINUED | OUTPATIENT
Start: 2023-02-27 | End: 2023-02-27

## 2023-02-26 RX ADMIN — Medication 88 MICROGRAM(S): at 05:17

## 2023-02-26 RX ADMIN — AMIODARONE HYDROCHLORIDE 200 MILLIGRAM(S): 400 TABLET ORAL at 09:07

## 2023-02-26 RX ADMIN — Medication 25 MILLIGRAM(S): at 09:07

## 2023-02-26 RX ADMIN — FAMOTIDINE 10 MILLIGRAM(S): 10 INJECTION INTRAVENOUS at 09:08

## 2023-02-26 RX ADMIN — ATORVASTATIN CALCIUM 20 MILLIGRAM(S): 80 TABLET, FILM COATED ORAL at 21:16

## 2023-02-26 RX ADMIN — CEFTRIAXONE 1000 MILLIGRAM(S): 500 INJECTION, POWDER, FOR SOLUTION INTRAMUSCULAR; INTRAVENOUS at 09:06

## 2023-02-26 RX ADMIN — Medication 1 TABLET(S): at 09:07

## 2023-02-26 RX ADMIN — Medication 25 MILLIGRAM(S): at 09:08

## 2023-02-26 RX ADMIN — Medication 81 MILLIGRAM(S): at 09:07

## 2023-02-26 RX ADMIN — PREGABALIN 2000 MICROGRAM(S): 225 CAPSULE ORAL at 09:07

## 2023-02-26 RX ADMIN — Medication 25 MILLIGRAM(S): at 21:16

## 2023-02-26 RX ADMIN — CLOPIDOGREL BISULFATE 75 MILLIGRAM(S): 75 TABLET, FILM COATED ORAL at 09:08

## 2023-02-26 NOTE — PHYSICAL THERAPY INITIAL EVALUATION ADULT - PERTINENT HX OF CURRENT PROBLEM, REHAB EVAL
Pt admitted to HH secondary to chills and vomiting, weakness, s/p fall after HD. Hgb 7.5 today. Pt agreeable to PT with encouragement. Pt states her legs feel very shakey. Orthostatics done by RN, (-) orthostatics.

## 2023-02-26 NOTE — PROGRESS NOTE ADULT - ASSESSMENT
87/F with PMHx of ESRD on HD on MWF, Atrophic Left Kidney, HTN, HLD, Valvular Heart Disease, CAD s/p CABG (10/2022), Pulm HTN, Hypothyroidism, Anemia, Thrombocytopenia, was brought to the ED for chills and admitted with :      N/V and Fever sec to UTI/Cystitis; U cx Klebsiella oxytoca/Raoultella ornithinolytica  -Admit to medical floors  -CT Head NAD   -CT abd and pelvis : cystits   d/c iv fluids  -ID consult appreciated  iv ceftriaxone   urine cx; Klebsiella oxytoca/Raoultella ornithinolytica; Sx awaited  blood cx neg    Metabolic Encephalopathy:  resolved    Anemia, acute on chronic; anemia of chronic disease sec to ESRD: baseline Hb recently 7.5-9.3  Hb 7.2 on 2/24  Hb 9.0 on 2/23  likely dilutional , was on iv fluids  check H/H after HD today and in am; improved; 7.7 and 7.9 respectively    Hypokalemia : k 3.2  likely dilutional too  mx per renal team during HD  BMP in am, K 3.6, normalized    Weakness/Falls:  PTx consult  fall precautions  check Orthostatics    ESRD on HD on MWF:  cont HD as per renal team    Chronic Thrombocytopenia:   monitor    CAD s/p CABG (10/2022)   Paroxysmal Atrial Fibrillation (on Amiodarone; not AC)  HTN  HLD  -[id number]=1. Not on AC since 7/2022.   -ASA/Plavix   -Continue BB/Statin  -Continue Amiodarone    DVT Prophylaxis: venodynes    FULL Code    Primary is daughter, Zaida Sandhu ([phone]) and secondary is son, Isidro Nova ([phone])    DISPO: cont iv ceftriaxone, ID f/u; f/u urine final cx and sx; PTx consult; Orthostatics check

## 2023-02-26 NOTE — PHYSICAL THERAPY INITIAL EVALUATION ADULT - ADDITIONAL COMMENTS
Pt states she lives in a two level home with her son. She states she has a chair lift for stairs and 2 AUGUSTIN. Pt uses a RW at home.

## 2023-02-26 NOTE — PROGRESS NOTE ADULT - SUBJECTIVE AND OBJECTIVE BOX
HPI: 87/F with PMHx of ESRD on HD on MWF, Atrophic Left Kidney, HTN, HLD, Valvular Heart Disease, Anemia, Thrombocytopenia, CAD s/p CABG (10/2022), Pulm HTN, Hypothyroidism, was brought to the ED for chills. Patient was recently discharged from .  Patient had right IJ catheter placed on 23 for HD; patient was discharged 2 days ago; during dialysis felt "cold", could not get warm and then was not able to complete dialysis  to "shaking chills." Patient also started vomiting at the end of dialysis. Patient went home walked into house and unable to make it to the chair as legs got weak; patient lowered herself to the ground and fire dept needed to come to get patient up into chair; patient slept in chair all night but still "cold" this AM with continued vomiting and came to ED for evaluation; no chest pain, sob, cough or  abdominal pain.    In the ER, patient received IVF     : UA + for UTI  c/o feeling weak and awful  Afebrile today  Hb dropped to 7.2; ? dilutional  K 3.2    : feeling less weak today  U cx Klebsiella oxytoca/Raoultella ornithinolytica  Hb better, 7.9  K 3.6    : c/o little light headed feeling today after getting up which resolved spontaneously   Hb 7.5    PHYSICAL EXAM:    Vital Signs Last 24 Hrs  T(C): 36.2 (2023 08:25), Max: 36.7 (2023 15:49)  T(F): 97.2 (2023 08:25), Max: 98 (2023 15:49)  HR: 62 (2023 08:25) (62 - 77)  BP: 150/51 (2023 08:25) (135/58 - 150/51)  BP(mean): --  RR: 16 (2023 08:25) (16 - 16)  SpO2: 96% (2023 08:25) (95% - 96%)    Parameters below as of 2023 08:25  Patient On (Oxygen Delivery Method): room air        Constitutional: Weak  appearing  HEENT: Atraumatic, TATYANA,   Respiratory: Breath Sounds normal, no rhonchi/wheeze  Cardiovascular: N S1S2; right chest IJ catheter for HD   Gastrointestinal: Abdomen soft, non tender, Bowel Sounds present  Extremities: No edema, peripheral pulses present  Neurological: AAO x 3, no gross focal motor deficits  Skin: Non cellulitic, no rash, ulcers  Lymph Nodes: No lymphadenopathy noted  Back: No CVA tenderness   Musculoskeletal: non tender  Breasts: Deferred  Genitourinary: deferred  Rectal: Deferred    All Labs/EKG/Radiology/Meds reviewed                        7.5    5.96  )-----------( 93       ( 2023 07:57 )             23.3     Lab Results:  CBC  CBC Full  -  ( 2023 07:58 )  WBC Count : 7.06 K/uL  RBC Count : 2.47 M/uL  Hemoglobin : 7.9 g/dL  Hematocrit : 24.6 %  Platelet Count - Automated : 96 K/uL  Mean Cell Volume : 99.6 fl  Mean Cell Hemoglobin : 32.0 pg  Mean Cell Hemoglobin Concentration : 32.1 gm/dL  Auto Neutrophil # : x  Auto Lymphocyte # : x  Auto Monocyte # : x  Auto Eosinophil # : x  Auto Basophil # : x  Auto Neutrophil % : x  Auto Lymphocyte % : x  Auto Monocyte % : x  Auto Eosinophil % : x  Auto Basophil % : x    .		Differential:	[] Automated		[] Manual  Chemistry      138  |  103  |  12  ----------------------------<  84  3.6   |  32<H>  |  2.30<H>    Ca    7.8<L>      2023 07:58  Phos  2.7       Mg     1.7               Urinalysis Basic - ( 2023 15:15 )    Color: Yellow / Appearance: Slightly Turbid / S.005 / pH: x  Gluc: x / Ketone: Trace  / Bili: Negative / Urobili: Negative   Blood: x / Protein: 30 mg/dL / Nitrite: Positive   Leuk Esterase: Moderate / RBC: Negative /HPF / WBC >50 /HPF   Sq Epi: x / Non Sq Epi: Negative / Bacteria: TNTC        MICROBIOLOGY/CULTURES:  Culture Results:   50,000 - 99,000 CFU/mL Klebsiella oxytoca/Raoultella ornithinolytica ( @ 15:15)  Culture Results:   No growth to date. ( @ 07:43)  Culture Results:   No growth to date. ( @ 07:02)              < from: CT Head No Cont (23 @ 08:36) >  IMPRESSION: No evidence of acute hemorrhage mass or mass effect.    < end of copied text >  < from: CT Abdomen and Pelvis No Cont (23 @ 08:35) >  Air/gas in the nondependent urinary bladder that may be related to   instrumentation or cystitis. Correlate clinically. No other imaging   evidence of infection or inflammation.  Atrophic left kidney.    < end of copied text >  < from: Xray Chest 1 View-PORTABLE IMMEDIATE (23 @ 07:55) >  IMPRESSION: Slight lingular haziness again seen. Large right jugular line   is presently in place.    < end of copied text >        MEDICATIONS  (STANDING):  aMIOdarone    Tablet 200 milliGRAM(s) Oral daily  aspirin  chewable 81 milliGRAM(s) Oral daily  atorvastatin 20 milliGRAM(s) Oral at bedtime  cefTRIAXone Injectable. 1000 milliGRAM(s) IV Push every 24 hours  clopidogrel Tablet 75 milliGRAM(s) Oral daily  cyanocobalamin 2000 MICROGram(s) Oral daily  famotidine    Tablet 10 milliGRAM(s) Oral daily  hydrALAZINE 25 milliGRAM(s) Oral two times a day  levothyroxine 88 MICROGram(s) Oral daily  metoprolol succinate ER 25 milliGRAM(s) Oral daily  Nephro-shawna 1 Tablet(s) Oral daily    MEDICATIONS  (PRN):  acetaminophen     Tablet .. 650 milliGRAM(s) Oral every 6 hours PRN Temp greater or equal to 38C (100.4F), Mild Pain (1 - 3)  aluminum hydroxide/magnesium hydroxide/simethicone Suspension 30 milliLiter(s) Oral every 4 hours PRN Dyspepsia  melatonin 3 milliGRAM(s) Oral at bedtime PRN Insomnia  ondansetron Injectable 4 milliGRAM(s) IV Push every 8 hours PRN Nausea and/or Vomiting

## 2023-02-27 ENCOUNTER — TRANSCRIPTION ENCOUNTER (OUTPATIENT)
Age: 88
End: 2023-02-27

## 2023-02-27 VITALS
SYSTOLIC BLOOD PRESSURE: 133 MMHG | HEART RATE: 85 BPM | TEMPERATURE: 98 F | RESPIRATION RATE: 18 BRPM | DIASTOLIC BLOOD PRESSURE: 70 MMHG | OXYGEN SATURATION: 99 %

## 2023-02-27 DIAGNOSIS — I13.10 HYPERTENSIVE HEART AND CHRONIC KIDNEY DISEASE WITHOUT HEART FAILURE, WITH STAGE 1 THROUGH STAGE 4 CHRONIC KIDNEY DISEASE, OR UNSPECIFIED CHRONIC KIDNEY DISEASE: ICD-10-CM

## 2023-02-27 DIAGNOSIS — D69.6 THROMBOCYTOPENIA, UNSPECIFIED: ICD-10-CM

## 2023-02-27 DIAGNOSIS — E78.5 HYPERLIPIDEMIA, UNSPECIFIED: ICD-10-CM

## 2023-02-27 DIAGNOSIS — N18.4 CHRONIC KIDNEY DISEASE, STAGE 4 (SEVERE): ICD-10-CM

## 2023-02-27 DIAGNOSIS — E03.9 HYPOTHYROIDISM, UNSPECIFIED: ICD-10-CM

## 2023-02-27 DIAGNOSIS — I25.10 ATHEROSCLEROTIC HEART DISEASE OF NATIVE CORONARY ARTERY WITHOUT ANGINA PECTORIS: ICD-10-CM

## 2023-02-27 DIAGNOSIS — D63.1 ANEMIA IN CHRONIC KIDNEY DISEASE: ICD-10-CM

## 2023-02-27 DIAGNOSIS — Z88.0 ALLERGY STATUS TO PENICILLIN: ICD-10-CM

## 2023-02-27 DIAGNOSIS — M10.9 GOUT, UNSPECIFIED: ICD-10-CM

## 2023-02-27 DIAGNOSIS — E87.20 ACIDOSIS, UNSPECIFIED: ICD-10-CM

## 2023-02-27 DIAGNOSIS — Z95.1 PRESENCE OF AORTOCORONARY BYPASS GRAFT: ICD-10-CM

## 2023-02-27 DIAGNOSIS — N17.9 ACUTE KIDNEY FAILURE, UNSPECIFIED: ICD-10-CM

## 2023-02-27 DIAGNOSIS — I48.0 PAROXYSMAL ATRIAL FIBRILLATION: ICD-10-CM

## 2023-02-27 LAB
ALBUMIN SERPL ELPH-MCNC: 2.6 G/DL — LOW (ref 3.3–5)
ANION GAP SERPL CALC-SCNC: 3 MMOL/L — LOW (ref 5–17)
BUN SERPL-MCNC: 24 MG/DL — HIGH (ref 7–23)
CALCIUM SERPL-MCNC: 7.8 MG/DL — LOW (ref 8.5–10.1)
CHLORIDE SERPL-SCNC: 106 MMOL/L — SIGNIFICANT CHANGE UP (ref 96–108)
CO2 SERPL-SCNC: 31 MMOL/L — SIGNIFICANT CHANGE UP (ref 22–31)
CREAT SERPL-MCNC: 3.79 MG/DL — HIGH (ref 0.5–1.3)
EGFR: 11 ML/MIN/1.73M2 — LOW
GLUCOSE SERPL-MCNC: 103 MG/DL — HIGH (ref 70–99)
HCT VFR BLD CALC: 24.2 % — LOW (ref 34.5–45)
HGB BLD-MCNC: 7.8 G/DL — LOW (ref 11.5–15.5)
MCHC RBC-ENTMCNC: 32.2 GM/DL — SIGNIFICANT CHANGE UP (ref 32–36)
MCHC RBC-ENTMCNC: 32.4 PG — SIGNIFICANT CHANGE UP (ref 27–34)
MCV RBC AUTO: 100.4 FL — HIGH (ref 80–100)
PHOSPHATE SERPL-MCNC: 2.6 MG/DL — SIGNIFICANT CHANGE UP (ref 2.5–4.5)
PLATELET # BLD AUTO: 102 K/UL — LOW (ref 150–400)
POTASSIUM SERPL-MCNC: 3.4 MMOL/L — LOW (ref 3.5–5.3)
POTASSIUM SERPL-SCNC: 3.4 MMOL/L — LOW (ref 3.5–5.3)
RBC # BLD: 2.41 M/UL — LOW (ref 3.8–5.2)
RBC # FLD: 13.5 % — SIGNIFICANT CHANGE UP (ref 10.3–14.5)
SODIUM SERPL-SCNC: 140 MMOL/L — SIGNIFICANT CHANGE UP (ref 135–145)
WBC # BLD: 5.52 K/UL — SIGNIFICANT CHANGE UP (ref 3.8–10.5)
WBC # FLD AUTO: 5.52 K/UL — SIGNIFICANT CHANGE UP (ref 3.8–10.5)

## 2023-02-27 PROCEDURE — 99239 HOSP IP/OBS DSCHRG MGMT >30: CPT

## 2023-02-27 RX ORDER — CEFUROXIME AXETIL 250 MG
1 TABLET ORAL
Qty: 3 | Refills: 0
Start: 2023-02-27 | End: 2023-03-01

## 2023-02-27 RX ORDER — ERYTHROPOIETIN 10000 [IU]/ML
5000 INJECTION, SOLUTION INTRAVENOUS; SUBCUTANEOUS
Refills: 0 | Status: DISCONTINUED | OUTPATIENT
Start: 2023-02-27 | End: 2023-02-27

## 2023-02-27 RX ADMIN — AMIODARONE HYDROCHLORIDE 200 MILLIGRAM(S): 400 TABLET ORAL at 12:15

## 2023-02-27 RX ADMIN — Medication 1 TABLET(S): at 12:17

## 2023-02-27 RX ADMIN — PREGABALIN 2000 MICROGRAM(S): 225 CAPSULE ORAL at 12:14

## 2023-02-27 RX ADMIN — FAMOTIDINE 10 MILLIGRAM(S): 10 INJECTION INTRAVENOUS at 12:12

## 2023-02-27 RX ADMIN — Medication 25 MILLIGRAM(S): at 12:14

## 2023-02-27 RX ADMIN — CEFTRIAXONE 1000 MILLIGRAM(S): 500 INJECTION, POWDER, FOR SOLUTION INTRAMUSCULAR; INTRAVENOUS at 12:17

## 2023-02-27 RX ADMIN — CLOPIDOGREL BISULFATE 75 MILLIGRAM(S): 75 TABLET, FILM COATED ORAL at 12:14

## 2023-02-27 RX ADMIN — Medication 25 MILLIGRAM(S): at 12:16

## 2023-02-27 RX ADMIN — ERYTHROPOIETIN 5000 UNIT(S): 10000 INJECTION, SOLUTION INTRAVENOUS; SUBCUTANEOUS at 10:15

## 2023-02-27 RX ADMIN — Medication 81 MILLIGRAM(S): at 12:17

## 2023-02-27 RX ADMIN — Medication 88 MICROGRAM(S): at 05:05

## 2023-02-27 NOTE — DISCHARGE NOTE PROVIDER - HOSPITAL COURSE
PHYSICAL EXAM:    Daily     Daily     Vital Signs Last 24 Hrs  T(C): 36.1 (27 Feb 2023 07:50), Max: 37.3 (26 Feb 2023 21:14)  T(F): 97 (27 Feb 2023 07:50), Max: 99.1 (26 Feb 2023 21:14)  HR: 71 (27 Feb 2023 11:07) (64 - 72)  BP: 130/72 (27 Feb 2023 11:07) (128/50 - 150/69)  BP(mean): --  RR: 19 (27 Feb 2023 11:07) (16 - 19)  SpO2: 95% (26 Feb 2023 21:14) (95% - 96%)    Constitutional: Weak  appearing  HEENT: Atraumatic, TATYANA, Normal, No congestion  Respiratory: Breath Sounds normal, no rhonchi/wheeze  Cardiovascular: N S1S2;   Gastrointestinal: Abdomen soft, non tender, Bowel Sounds present  Extremities: No edema, peripheral pulses present  Neurological: AAO x 3, no gross focal motor deficits  Skin: Non cellulitic, no rash, ulcers  Lymph Nodes: No lymphadenopathy noted  Back: No CVA tenderness   Musculoskeletal: non tender  Breasts: Deferred  Genitourinary: deferred  Rectal: Deferred    87/F with PMHx of ESRD on HD on MWF, Atrophic Left Kidney, HTN, HLD, Valvular Heart Disease, CAD s/p CABG (10/2022), Pulm HTN, Hypothyroidism, Anemia, Thrombocytopenia, was brought to the ED for chills and admitted with :      N/V and Fever sec to UTI/Cystitis; U cx Klebsiella oxytoca/Raoultella ornithinolytica  -Admit to medical floors  -CT Head NAD   -CT abd and pelvis : cystits   d/c iv fluids  -ID consult appreciated  iv ceftriaxone given here; d/c home on ceftin 500 mg po daily x 3 more days; on dialysis days take after dialysis   urine cx; Klebsiella oxytoca/Raoultella ornithinolytica; pan sx  blood cx neg      Metabolic Encephalopathy:  resolved    Anemia, acute on chronic; anemia of chronic disease sec to ESRD: baseline Hb recently 7.5-9.3  Hb 7.2 on 2/24  Hb 9.0 on 2/23  likely dilutional , was on iv fluids  check H/H after HD today and in am; improved; 7.7 and 7.9, 7.8 respectively    Hypokalemia : k 3.2  likely dilutional too  mx per renal team during HD   K 3.6, 3.4; pt on HD    Weakness/Falls:  PTx consult: home PTx  fall precautions  checked Orthostatics; Negative    ESRD on HD on MWF:  cont HD as per renal team    Chronic Thrombocytopenia:   monitor    CAD s/p CABG (10/2022)   Paroxysmal Atrial Fibrillation (on Amiodarone; not AC)  HTN  HLD  -[id number]=9. Not on AC since 7/2022.   -ASA/Plavix   -Continue BB/Statin  -Continue Amiodarone      FULL Code    poc discussed with pt, team , son, Isidro Nova ([phone]), Dr. Anni krishnan home     time spent 45 min

## 2023-02-27 NOTE — DISCHARGE NOTE NURSING/CASE MANAGEMENT/SOCIAL WORK - NSDCPEFALRISK_GEN_ALL_CORE
For information on Fall & Injury Prevention, visit: [email or web address] OR  [email or web address] OR  https://www.cdc.gov/steadi/patient.html
Caregiver

## 2023-02-27 NOTE — DISCHARGE NOTE PROVIDER - NSDCMRMEDTOKEN_GEN_ALL_CORE_FT
acetaminophen 500 mg oral tablet: 1 tab(s) orally once a day, As Needed  amiodarone 200 mg oral tablet: 1 tab(s) orally once a day  aspirin 81 mg oral tablet: 1 tab(s) orally once a day  atorvastatin 20 mg oral tablet: 1 tab(s) orally once a day (in the evening)  cefuroxime 500 mg oral tablet: 1 tab(s) orally once a day .   on dialysis days take after dialysis.   clopidogrel 75 mg oral tablet: 1 tab(s) orally once a day  cyanocobalamin 1000 mcg oral tablet: 2 tab(s) orally once a day  famotidine 20 mg oral tablet: 0.5 tab(s) orally once a day  hydrALAZINE 25 mg oral tablet: 1 tab(s) orally every 12 hours   levothyroxine 88 mcg (0.088 mg) oral tablet: 1 tab(s) orally once a day  metoprolol succinate 25 mg oral tablet, extended release: 1 tab(s) orally once a day  Nephro-Zoë oral tablet: 1 tab(s) orally once a day

## 2023-02-27 NOTE — DISCHARGE NOTE PROVIDER - CARE PROVIDERS DIRECT ADDRESSES
,DirectAddress_Unknown,[email or web address]

## 2023-02-27 NOTE — DISCHARGE NOTE PROVIDER - CARE PROVIDER_API CALL
Judah Collins)  Cardiovascular Disease; Internal Medicine  [address], [address]  [place] [address]  Phone: [phone]  Fax: [phone]  Follow Up Time:     Ashish Moe  INTERNAL MEDICINE  [address] [place] [address]  Phone: [phone]  Fax: [phone]  Follow Up Time:

## 2023-02-27 NOTE — DISCHARGE NOTE NURSING/CASE MANAGEMENT/SOCIAL WORK - PATIENT PORTAL LINK FT
You can access the FollowMyHealth Patient Portal offered by [facility] by registering at the following website: [email or web address]. By joining [employer]’s FollowMyHealth portal, you will also be able to view your health information using other applications (apps) compatible with our system.

## 2023-02-27 NOTE — DISCHARGE NOTE PROVIDER - NSDCCPCAREPLAN_GEN_ALL_CORE_FT
PRINCIPAL DISCHARGE DIAGNOSIS  Diagnosis: Acute UTI  Assessment and Plan of Treatment: due to Klebsiella oxytoca  given ceftriaxone in hospital ,   take ceftin 500 mg once daily  on dialysis days, take after dialysis      SECONDARY DISCHARGE DIAGNOSES  Diagnosis: Weakness  Assessment and Plan of Treatment: due to uti  needs home physical therapy    Diagnosis: ESRD on dialysis  Assessment and Plan of Treatment: continue dialysis as per enal team

## 2023-02-28 LAB
CULTURE RESULTS: SIGNIFICANT CHANGE UP
CULTURE RESULTS: SIGNIFICANT CHANGE UP
SPECIMEN SOURCE: SIGNIFICANT CHANGE UP
SPECIMEN SOURCE: SIGNIFICANT CHANGE UP

## 2023-03-03 DIAGNOSIS — N17.9 ACUTE KIDNEY FAILURE, UNSPECIFIED: ICD-10-CM

## 2023-03-03 DIAGNOSIS — E87.6 HYPOKALEMIA: ICD-10-CM

## 2023-03-03 DIAGNOSIS — E78.5 HYPERLIPIDEMIA, UNSPECIFIED: ICD-10-CM

## 2023-03-03 DIAGNOSIS — G93.41 METABOLIC ENCEPHALOPATHY: ICD-10-CM

## 2023-03-03 DIAGNOSIS — D69.6 THROMBOCYTOPENIA, UNSPECIFIED: ICD-10-CM

## 2023-03-03 DIAGNOSIS — N18.6 END STAGE RENAL DISEASE: ICD-10-CM

## 2023-03-03 DIAGNOSIS — Z99.2 DEPENDENCE ON RENAL DIALYSIS: ICD-10-CM

## 2023-03-03 DIAGNOSIS — E03.9 HYPOTHYROIDISM, UNSPECIFIED: ICD-10-CM

## 2023-03-03 DIAGNOSIS — D63.1 ANEMIA IN CHRONIC KIDNEY DISEASE: ICD-10-CM

## 2023-03-03 DIAGNOSIS — Z79.02 LONG TERM (CURRENT) USE OF ANTITHROMBOTICS/ANTIPLATELETS: ICD-10-CM

## 2023-03-03 DIAGNOSIS — N30.90 CYSTITIS, UNSPECIFIED WITHOUT HEMATURIA: ICD-10-CM

## 2023-03-03 DIAGNOSIS — Z79.82 LONG TERM (CURRENT) USE OF ASPIRIN: ICD-10-CM

## 2023-03-03 DIAGNOSIS — I48.0 PAROXYSMAL ATRIAL FIBRILLATION: ICD-10-CM

## 2023-03-03 DIAGNOSIS — Z88.0 ALLERGY STATUS TO PENICILLIN: ICD-10-CM

## 2023-03-03 DIAGNOSIS — Z95.1 PRESENCE OF AORTOCORONARY BYPASS GRAFT: ICD-10-CM

## 2023-03-03 DIAGNOSIS — B96.1 KLEBSIELLA PNEUMONIAE [K. PNEUMONIAE] AS THE CAUSE OF DISEASES CLASSIFIED ELSEWHERE: ICD-10-CM

## 2023-03-03 DIAGNOSIS — Z79.890 HORMONE REPLACEMENT THERAPY: ICD-10-CM

## 2023-03-03 DIAGNOSIS — I12.0 HYPERTENSIVE CHRONIC KIDNEY DISEASE WITH STAGE 5 CHRONIC KIDNEY DISEASE OR END STAGE RENAL DISEASE: ICD-10-CM

## 2023-03-03 DIAGNOSIS — I25.10 ATHEROSCLEROTIC HEART DISEASE OF NATIVE CORONARY ARTERY WITHOUT ANGINA PECTORIS: ICD-10-CM

## 2023-03-03 DIAGNOSIS — I27.20 PULMONARY HYPERTENSION, UNSPECIFIED: ICD-10-CM

## 2023-03-29 NOTE — PATIENT PROFILE ADULT - NSPROPOAPRESSUREINJURYCT_GEN_A_NUR
1 Bcc Infundibulocystic Histology Text: Multiple pink strands of squamous epithelium and blue basaloid buds at the tips of the strands with some horn cysts.

## 2023-06-13 ENCOUNTER — APPOINTMENT (OUTPATIENT)
Dept: VASCULAR SURGERY | Facility: CLINIC | Age: 88
End: 2023-06-13

## 2023-06-20 ENCOUNTER — APPOINTMENT (OUTPATIENT)
Dept: SURGERY | Facility: CLINIC | Age: 88
End: 2023-06-20

## 2023-06-28 ENCOUNTER — APPOINTMENT (OUTPATIENT)
Dept: VASCULAR SURGERY | Facility: CLINIC | Age: 88
End: 2023-06-28
Payer: MEDICARE

## 2023-06-28 VITALS
OXYGEN SATURATION: 93 % | SYSTOLIC BLOOD PRESSURE: 142 MMHG | DIASTOLIC BLOOD PRESSURE: 72 MMHG | HEIGHT: 64 IN | HEART RATE: 79 BPM | BODY MASS INDEX: 31.58 KG/M2 | WEIGHT: 185 LBS

## 2023-06-28 DIAGNOSIS — N18.30 CHRONIC KIDNEY DISEASE, STAGE 3 UNSPECIFIED: ICD-10-CM

## 2023-06-28 PROCEDURE — 99214 OFFICE O/P EST MOD 30 MIN: CPT

## 2023-06-28 PROCEDURE — 93985 DUP-SCAN HEMO COMPL BI STD: CPT

## 2023-06-28 PROCEDURE — 93923 UPR/LXTR ART STDY 3+ LVLS: CPT

## 2023-06-28 RX ORDER — HYDROCHLOROTHIAZIDE 12.5 MG/1
12.5 TABLET ORAL
Qty: 90 | Refills: 0 | Status: COMPLETED | COMMUNITY
Start: 2020-11-30 | End: 2023-06-28

## 2023-06-28 RX ORDER — NEBIVOLOL HYDROCHLORIDE 20 MG/1
20 TABLET ORAL
Refills: 0 | Status: COMPLETED | COMMUNITY
End: 2023-06-28

## 2023-06-28 RX ORDER — ALLOPURINOL 100 MG/1
100 TABLET ORAL DAILY
Qty: 90 | Refills: 1 | Status: COMPLETED | COMMUNITY
Start: 2019-01-08 | End: 2023-06-28

## 2023-06-28 RX ORDER — RABEPRAZOLE SODIUM 20 MG/1
20 TABLET, DELAYED RELEASE ORAL DAILY
Refills: 0 | Status: COMPLETED | COMMUNITY
End: 2023-06-28

## 2023-06-29 PROBLEM — N18.30 CKD (CHRONIC KIDNEY DISEASE), STAGE III: Status: ACTIVE | Noted: 2017-10-10

## 2023-06-29 NOTE — PHYSICAL EXAM
[Normal Breath Sounds] : Normal breath sounds [Normal Rate and Rhythm] : normal rate and rhythm [2+] : left 2+ [0] : left 0 [de-identified] : well appearing elderly female no distress [de-identified] : wnl [de-identified] : right IJ tunneled HD catheter in place without erythema or tenderness; sternotomy incision well healed [FreeTextEntry1] : B/L lower extremity: notable ischemic changes in b/l feet with some dependant rubor and delayed cap refill; purple discoloration; but NO WOUNDS\par \par LUE: no scars 5/5 motor and sensory exam

## 2023-06-29 NOTE — ASSESSMENT
[FreeTextEntry1] : 87yo female with ESRD on HD via tunneled HD catheter and newly diagnosed PAD (likely small vessel and some tibial disease based on LUCILA/PVR)\par \par #ESRD requires permanent HD access\par -The tunneled catheter appears functional; continue catheter care and monitor for signs of infection\par -Left arm precausions; explained to patient and her daughter to not allow for blood draws and BP cuff measurements on the left arm\par -Will plan for left AVG given poor quality veins on mapping\par \par #PAD LUCILA/PVR consistent with small vessel disease (LUCILA normal 1.13 right and 1.10 left; PVR waveforms dampened in foot); I suspect part of the pain and sensation she was feeling was from hypotensive states and improved with better BP; on HD she occasionally gets worsened pain when her BP is low and improved with "pills" she receives at HD; possibly needs BP medications adjusted\par -Regarding PAD she may benefit from Pletal given mostly small vessel disease; however, has an extensive cardiac history so I will discuss this with Dr. Collins prior to prescribing; I explained this to Ms. Akhtarcanvincent\par \par \par Will schedule for AVG procedure and follow up closely thereafter

## 2023-06-29 NOTE — HISTORY OF PRESENT ILLNESS
[FreeTextEntry1] : 89yo very pleasant female here for 2 reasons 1. foot discoloration and abnormal sensation and 2. need for HD access; she is here with her daughter Zaida. Ms. Nova has a PMH of CAD s/p CABG 10/2022, ESRD, HLD, hypothyroid, HTN though recently hypotension with HD. She states her feet first started bothering her after the CABG almot 1 year ago; she was very hypersensitive over the first toe and even unable to wear shoes. She states since then the feeling has changed to a sensation of charlie in her feet that is constantly there and does not really change with ambulation.

## 2023-06-29 NOTE — REASON FOR VISIT
[Consultation] : a consultation visit [FreeTextEntry1] : Feet are cold, discolored and consult for dialysis port removal

## 2023-07-19 NOTE — PATIENT PROFILE ADULT - HOW PATIENT ADDRESSED, PROFILE
[Chaperone Present] : A chaperone was present in the examining room during all aspects of the physical examination [Labia Majora] : normal [Labia Minora] : normal [Normal] : normal [Enlarged ___ wks] : enlarged [unfilled] ~Uweeks [Uterine Adnexae] : normal Luba

## 2023-07-21 NOTE — PROGRESS NOTE ADULT - ASSESSMENT
Pt withdrawn to room scant during conversation. Pt observed to be napping throughout the day. Pt gets OOB for meals and medication. Reports continuing AH and denies current SI/HI. 88 yo female with hx of HTN, CAD, CKD 3b w left atrophic kidney, baseline Cr ~ 1.8 in 2021    not seen since then  now presenting with fall, weakness and UTI w decreased po intake and RADHA     RADHA on CKD Cr 1.8 - 2 w left atrophic kidney hx     sec to ATN in setting of hypotension w soft BP while on HCTZ and losartan + Urine retention     Cr improving   SC x 3 -->  Bose insertion today and trend Cr response    Urology eval    continue to hold further ARB and diuretics   maintain SBP > 110    bladder scan for retention and SC - may need Bose if persists    trend labs    abx asper Medicine    gentle IVF    Met acidosis -  sec to saline + RADHA    trend and change to Bicarb gtt if  HC < 14    check labs in AM     ** pt seen earlier today    d/w Dr Ayers     Thank you for the courtesy of this consult. We will follow this patient with you.   Management is subject to change if new information becomes available or patient condition changes.

## 2023-07-24 ENCOUNTER — APPOINTMENT (OUTPATIENT)
Dept: VASCULAR SURGERY | Facility: CLINIC | Age: 88
End: 2023-07-24
Payer: MEDICARE

## 2023-07-24 VITALS
SYSTOLIC BLOOD PRESSURE: 165 MMHG | DIASTOLIC BLOOD PRESSURE: 77 MMHG | OXYGEN SATURATION: 97 % | HEIGHT: 64 IN | WEIGHT: 185 LBS | BODY MASS INDEX: 31.58 KG/M2 | HEART RATE: 79 BPM

## 2023-07-24 PROCEDURE — 99212 OFFICE O/P EST SF 10 MIN: CPT

## 2023-07-25 NOTE — ASSESSMENT
[FreeTextEntry1] : 87yo female with ESRD on HD via tunneled HD catheter and newly diagnosed PAD (likely small vessel and some tibial disease based on LUCILA/PVR)\par \par #ESRD requires permanent HD access\par -Planned for AVG procedure Friday 7/28/23\par -Will obtain clearances\par -Dr. Rodarte aware and will adjust HD as needed\par \par #PAD LUCILA/PVR consistent with small vessel disease (LUCILA normal 1.13 right and 1.10 left; PVR waveforms dampened in foot)\par - I don't believe this is the source of her pain from friday\par -continue current medical management\par \par \par \par

## 2023-07-25 NOTE — HISTORY OF PRESENT ILLNESS
[FreeTextEntry1] : 89yo very pleasant female here for 2 reasons 1. foot discoloration and abnormal sensation and 2. need for HD access; she is here with her daughter Zaida. Ms. Nova has a PMH of CAD s/p CABG 10/2022, ESRD, HLD, hypothyroid, HTN though recently hypotension with HD. She states her feet first started bothering her after the CABG almot 1 year ago; she was very hypersensitive over the first toe and even unable to wear shoes. She states since then the feeling has changed to a sensation of charlie in her feet that is constantly there and does not really change with ambulation. \par  \par 89yo female with ESRD on HD via tunneled HD catheter and newly diagnosed PAD (likely small vessel and some tibial disease based on LUCILA/PVR)\par \par #ESRD requires permanent HD access\par -The tunneled catheter appears functional; continue catheter care and monitor for signs of infection\par -Left arm precausions; explained to patient and her daughter to not allow for blood draws and BP cuff measurements on the left arm\par -Will plan for left AVG given poor quality veins on mapping\par \par #PAD LUCILA/PVR consistent with small vessel disease (LUCILA normal 1.13 right and 1.10 left; PVR waveforms dampened in foot); I suspect part of the pain and sensation she was feeling was from hypotensive states and improved with better BP; on HD she occasionally gets worsened pain when her BP is low and improved with "pills" she receives at HD; possibly needs BP medications adjusted\par -Regarding PAD she may benefit from Pletal given mostly small vessel disease; however, has an extensive cardiac history so I will discuss this with Dr. Collins prior to prescribing; I explained this to Ms. Nova\par \par \par Will schedule for AVG procedure and follow up closely thereafter. \par \par   [de-identified] : Ms. Nova is here for follow up because on Friday follow HD she had sever pain in the b/l (Left>>Right) legs. She states it was radiating from the buttock to the lower eg. She took tylenol and it is improved and nearly resolved. States ambulation made the pain better. Denies rest pain and wounds; denies new swelling and heaviness.

## 2023-07-25 NOTE — PHYSICAL EXAM
[JVD] : no jugular venous distention  [Normal Breath Sounds] : Normal breath sounds [Normal Rate and Rhythm] : normal rate and rhythm [2+] : left 2+ [0] : left 0 [Varicose Veins Of Lower Extremities] : not present [Ankle Swelling (On Exam)] : not present [de-identified] : well appearing in wheelchair [de-identified] : wnl; catheter is place  [FreeTextEntry1] : warm and well perfused; <2s cap refill

## 2023-07-25 NOTE — ASSESSMENT
[FreeTextEntry1] : 89yo female with ESRD on HD via tunneled HD catheter and newly diagnosed PAD (likely small vessel and some tibial disease based on LUCILA/PVR)\par \par #ESRD requires permanent HD access\par -Planned for AVG procedure Friday 7/28/23\par -Will obtain clearances\par -Dr. Rodarte aware and will adjust HD as needed\par \par #PAD LUCILA/PVR consistent with small vessel disease (LUCILA normal 1.13 right and 1.10 left; PVR waveforms dampened in foot)\par - I don't believe this is the source of her pain from friday\par -continue current medical management\par \par \par \par

## 2023-07-25 NOTE — HISTORY OF PRESENT ILLNESS
[FreeTextEntry1] : 89yo very pleasant female here for 2 reasons 1. foot discoloration and abnormal sensation and 2. need for HD access; she is here with her daughter Zaida. Ms. Nova has a PMH of CAD s/p CABG 10/2022, ESRD, HLD, hypothyroid, HTN though recently hypotension with HD. She states her feet first started bothering her after the CABG almot 1 year ago; she was very hypersensitive over the first toe and even unable to wear shoes. She states since then the feeling has changed to a sensation of charlie in her feet that is constantly there and does not really change with ambulation. \par  \par 89yo female with ESRD on HD via tunneled HD catheter and newly diagnosed PAD (likely small vessel and some tibial disease based on LUCILA/PVR)\par \par #ESRD requires permanent HD access\par -The tunneled catheter appears functional; continue catheter care and monitor for signs of infection\par -Left arm precausions; explained to patient and her daughter to not allow for blood draws and BP cuff measurements on the left arm\par -Will plan for left AVG given poor quality veins on mapping\par \par #PAD LUCILA/PVR consistent with small vessel disease (LUCILA normal 1.13 right and 1.10 left; PVR waveforms dampened in foot); I suspect part of the pain and sensation she was feeling was from hypotensive states and improved with better BP; on HD she occasionally gets worsened pain when her BP is low and improved with "pills" she receives at HD; possibly needs BP medications adjusted\par -Regarding PAD she may benefit from Pletal given mostly small vessel disease; however, has an extensive cardiac history so I will discuss this with Dr. Collins prior to prescribing; I explained this to Ms. Nova\par \par \par Will schedule for AVG procedure and follow up closely thereafter. \par \par   [de-identified] : Ms. Nova is here for follow up because on Friday follow HD she had sever pain in the b/l (Left>>Right) legs. She states it was radiating from the buttock to the lower eg. She took tylenol and it is improved and nearly resolved. States ambulation made the pain better. Denies rest pain and wounds; denies new swelling and heaviness.

## 2023-07-27 NOTE — ASU PATIENT PROFILE, ADULT - FALL HARM RISK - HARM RISK INTERVENTIONS

## 2023-07-28 ENCOUNTER — TRANSCRIPTION ENCOUNTER (OUTPATIENT)
Age: 88
End: 2023-07-28

## 2023-07-28 ENCOUNTER — APPOINTMENT (OUTPATIENT)
Dept: VASCULAR SURGERY | Facility: HOSPITAL | Age: 88
End: 2023-07-28

## 2023-07-28 ENCOUNTER — OUTPATIENT (OUTPATIENT)
Dept: INPATIENT UNIT | Facility: HOSPITAL | Age: 88
LOS: 1 days | Discharge: HOME | End: 2023-07-28
Payer: MEDICARE

## 2023-07-28 VITALS
HEART RATE: 75 BPM | WEIGHT: 184.97 LBS | SYSTOLIC BLOOD PRESSURE: 168 MMHG | DIASTOLIC BLOOD PRESSURE: 86 MMHG | OXYGEN SATURATION: 99 % | HEIGHT: 64 IN | RESPIRATION RATE: 16 BRPM | TEMPERATURE: 98 F

## 2023-07-28 DIAGNOSIS — Z90.710 ACQUIRED ABSENCE OF BOTH CERVIX AND UTERUS: Chronic | ICD-10-CM

## 2023-07-28 DIAGNOSIS — I73.9 PERIPHERAL VASCULAR DISEASE, UNSPECIFIED: ICD-10-CM

## 2023-07-28 DIAGNOSIS — T82.838A HEMORRHAGE DUE TO VASCULAR PROSTHETIC DEVICES, IMPLANTS AND GRAFTS, INITIAL ENCOUNTER: ICD-10-CM

## 2023-07-28 DIAGNOSIS — I25.10 ATHEROSCLEROTIC HEART DISEASE OF NATIVE CORONARY ARTERY WITHOUT ANGINA PECTORIS: ICD-10-CM

## 2023-07-28 DIAGNOSIS — Z95.1 PRESENCE OF AORTOCORONARY BYPASS GRAFT: Chronic | ICD-10-CM

## 2023-07-28 DIAGNOSIS — E03.9 HYPOTHYROIDISM, UNSPECIFIED: ICD-10-CM

## 2023-07-28 DIAGNOSIS — Z95.1 PRESENCE OF AORTOCORONARY BYPASS GRAFT: ICD-10-CM

## 2023-07-28 DIAGNOSIS — N18.6 END STAGE RENAL DISEASE: ICD-10-CM

## 2023-07-28 DIAGNOSIS — Z88.0 ALLERGY STATUS TO PENICILLIN: ICD-10-CM

## 2023-07-28 DIAGNOSIS — I12.0 HYPERTENSIVE CHRONIC KIDNEY DISEASE WITH STAGE 5 CHRONIC KIDNEY DISEASE OR END STAGE RENAL DISEASE: ICD-10-CM

## 2023-07-28 DIAGNOSIS — Z99.2 DEPENDENCE ON RENAL DIALYSIS: ICD-10-CM

## 2023-07-28 DIAGNOSIS — Z90.49 ACQUIRED ABSENCE OF OTHER SPECIFIED PARTS OF DIGESTIVE TRACT: Chronic | ICD-10-CM

## 2023-07-28 DIAGNOSIS — N18.30 CHRONIC KIDNEY DISEASE, STAGE 3 UNSPECIFIED: ICD-10-CM

## 2023-07-28 DIAGNOSIS — Z79.82 LONG TERM (CURRENT) USE OF ASPIRIN: ICD-10-CM

## 2023-07-28 LAB
ANION GAP SERPL CALC-SCNC: 4 MMOL/L — LOW (ref 5–17)
BASOPHILS # BLD AUTO: 0.04 K/UL — SIGNIFICANT CHANGE UP (ref 0–0.2)
BASOPHILS NFR BLD AUTO: 0.6 % — SIGNIFICANT CHANGE UP (ref 0–2)
BUN SERPL-MCNC: 27 MG/DL — HIGH (ref 7–23)
CALCIUM SERPL-MCNC: 9 MG/DL — SIGNIFICANT CHANGE UP (ref 8.5–10.1)
CHLORIDE SERPL-SCNC: 100 MMOL/L — SIGNIFICANT CHANGE UP (ref 96–108)
CO2 SERPL-SCNC: 31 MMOL/L — SIGNIFICANT CHANGE UP (ref 22–31)
CREAT SERPL-MCNC: 4.95 MG/DL — HIGH (ref 0.5–1.3)
EGFR: 8 ML/MIN/1.73M2 — LOW
EOSINOPHIL # BLD AUTO: 0.15 K/UL — SIGNIFICANT CHANGE UP (ref 0–0.5)
EOSINOPHIL NFR BLD AUTO: 2.3 % — SIGNIFICANT CHANGE UP (ref 0–6)
GLUCOSE BLDC GLUCOMTR-MCNC: 102 MG/DL — HIGH (ref 70–99)
GLUCOSE SERPL-MCNC: 97 MG/DL — SIGNIFICANT CHANGE UP (ref 70–99)
HAV IGM SER-ACNC: SIGNIFICANT CHANGE UP
HBV CORE IGM SER-ACNC: SIGNIFICANT CHANGE UP
HBV SURFACE AG SER-ACNC: SIGNIFICANT CHANGE UP
HCT VFR BLD CALC: 29.9 % — LOW (ref 34.5–45)
HCV AB S/CO SERPL IA: 0.05 S/CO — SIGNIFICANT CHANGE UP (ref 0–0.99)
HCV AB SERPL-IMP: SIGNIFICANT CHANGE UP
HGB BLD-MCNC: 10 G/DL — LOW (ref 11.5–15.5)
IMM GRANULOCYTES NFR BLD AUTO: 0.5 % — SIGNIFICANT CHANGE UP (ref 0–0.9)
LYMPHOCYTES # BLD AUTO: 0.45 K/UL — LOW (ref 1–3.3)
LYMPHOCYTES # BLD AUTO: 7 % — LOW (ref 13–44)
MCHC RBC-ENTMCNC: 33.3 PG — SIGNIFICANT CHANGE UP (ref 27–34)
MCHC RBC-ENTMCNC: 33.4 GM/DL — SIGNIFICANT CHANGE UP (ref 32–36)
MCV RBC AUTO: 99.7 FL — SIGNIFICANT CHANGE UP (ref 80–100)
MONOCYTES # BLD AUTO: 0.08 K/UL — SIGNIFICANT CHANGE UP (ref 0–0.9)
MONOCYTES NFR BLD AUTO: 1.2 % — LOW (ref 2–14)
NEUTROPHILS # BLD AUTO: 5.67 K/UL — SIGNIFICANT CHANGE UP (ref 1.8–7.4)
NEUTROPHILS NFR BLD AUTO: 88.4 % — HIGH (ref 43–77)
PLATELET # BLD AUTO: 131 K/UL — LOW (ref 150–400)
POTASSIUM SERPL-MCNC: 4.3 MMOL/L — SIGNIFICANT CHANGE UP (ref 3.5–5.3)
POTASSIUM SERPL-SCNC: 4.3 MMOL/L — SIGNIFICANT CHANGE UP (ref 3.5–5.3)
RBC # BLD: 3 M/UL — LOW (ref 3.8–5.2)
RBC # FLD: 14.6 % — HIGH (ref 10.3–14.5)
SODIUM SERPL-SCNC: 135 MMOL/L — SIGNIFICANT CHANGE UP (ref 135–145)
WBC # BLD: 6.42 K/UL — SIGNIFICANT CHANGE UP (ref 3.8–10.5)
WBC # FLD AUTO: 6.42 K/UL — SIGNIFICANT CHANGE UP (ref 3.8–10.5)

## 2023-07-28 PROCEDURE — C1768: CPT

## 2023-07-28 PROCEDURE — 85025 COMPLETE CBC W/AUTO DIFF WBC: CPT

## 2023-07-28 PROCEDURE — 80048 BASIC METABOLIC PNL TOTAL CA: CPT

## 2023-07-28 PROCEDURE — 84100 ASSAY OF PHOSPHORUS: CPT

## 2023-07-28 PROCEDURE — 90999 UNLISTED DIALYSIS PROCEDURE: CPT

## 2023-07-28 PROCEDURE — C1889: CPT

## 2023-07-28 PROCEDURE — 80074 ACUTE HEPATITIS PANEL: CPT

## 2023-07-28 PROCEDURE — 36415 COLL VENOUS BLD VENIPUNCTURE: CPT

## 2023-07-28 PROCEDURE — 85027 COMPLETE CBC AUTOMATED: CPT

## 2023-07-28 PROCEDURE — 82962 GLUCOSE BLOOD TEST: CPT

## 2023-07-28 PROCEDURE — 36830 ARTERY-VEIN NONAUTOGRAFT: CPT | Mod: LT

## 2023-07-28 PROCEDURE — C9399: CPT

## 2023-07-28 PROCEDURE — 25028 I&D F/ARM&/WRST DP ABSC/HMTM: CPT | Mod: LT,78

## 2023-07-28 PROCEDURE — 83735 ASSAY OF MAGNESIUM: CPT

## 2023-07-28 PROCEDURE — 90935 HEMODIALYSIS ONE EVALUATION: CPT

## 2023-07-28 RX ORDER — HYDRALAZINE HCL 50 MG
25 TABLET ORAL EVERY 12 HOURS
Refills: 0 | Status: DISCONTINUED | OUTPATIENT
Start: 2023-07-28 | End: 2023-07-30

## 2023-07-28 RX ORDER — ATORVASTATIN CALCIUM 80 MG/1
20 TABLET, FILM COATED ORAL AT BEDTIME
Refills: 0 | Status: DISCONTINUED | OUTPATIENT
Start: 2023-07-28 | End: 2023-07-30

## 2023-07-28 RX ORDER — ACETAMINOPHEN 500 MG
650 TABLET ORAL EVERY 6 HOURS
Refills: 0 | Status: DISCONTINUED | OUTPATIENT
Start: 2023-07-28 | End: 2023-07-30

## 2023-07-28 RX ORDER — SODIUM CHLORIDE 9 MG/ML
1000 INJECTION INTRAMUSCULAR; INTRAVENOUS; SUBCUTANEOUS
Refills: 0 | Status: DISCONTINUED | OUTPATIENT
Start: 2023-07-28 | End: 2023-07-29

## 2023-07-28 RX ORDER — LEVOTHYROXINE SODIUM 125 MCG
88 TABLET ORAL DAILY
Refills: 0 | Status: DISCONTINUED | OUTPATIENT
Start: 2023-07-28 | End: 2023-07-30

## 2023-07-28 RX ORDER — ONDANSETRON 8 MG/1
4 TABLET, FILM COATED ORAL ONCE
Refills: 0 | Status: DISCONTINUED | OUTPATIENT
Start: 2023-07-28 | End: 2023-07-29

## 2023-07-28 RX ORDER — FENTANYL CITRATE 50 UG/ML
25 INJECTION INTRAVENOUS
Refills: 0 | Status: DISCONTINUED | OUTPATIENT
Start: 2023-07-28 | End: 2023-07-29

## 2023-07-28 RX ORDER — CLOPIDOGREL BISULFATE 75 MG/1
75 TABLET, FILM COATED ORAL DAILY
Refills: 0 | Status: DISCONTINUED | OUTPATIENT
Start: 2023-07-28 | End: 2023-07-30

## 2023-07-28 RX ORDER — ASPIRIN/CALCIUM CARB/MAGNESIUM 324 MG
81 TABLET ORAL DAILY
Refills: 0 | Status: DISCONTINUED | OUTPATIENT
Start: 2023-07-28 | End: 2023-07-30

## 2023-07-28 RX ORDER — OXYCODONE HYDROCHLORIDE 5 MG/1
5 TABLET ORAL ONCE
Refills: 0 | Status: DISCONTINUED | OUTPATIENT
Start: 2023-07-28 | End: 2023-07-29

## 2023-07-28 RX ORDER — HYDROMORPHONE HYDROCHLORIDE 2 MG/ML
1 INJECTION INTRAMUSCULAR; INTRAVENOUS; SUBCUTANEOUS EVERY 6 HOURS
Refills: 0 | Status: DISCONTINUED | OUTPATIENT
Start: 2023-07-28 | End: 2023-07-30

## 2023-07-28 RX ORDER — AMIODARONE HYDROCHLORIDE 400 MG/1
200 TABLET ORAL DAILY
Refills: 0 | Status: DISCONTINUED | OUTPATIENT
Start: 2023-07-28 | End: 2023-07-30

## 2023-07-28 RX ORDER — ACETAMINOPHEN 500 MG
2 TABLET ORAL
Qty: 80 | Refills: 0
Start: 2023-07-28 | End: 2023-08-06

## 2023-07-28 RX ORDER — FENTANYL CITRATE 50 UG/ML
50 INJECTION INTRAVENOUS
Refills: 0 | Status: DISCONTINUED | OUTPATIENT
Start: 2023-07-28 | End: 2023-07-29

## 2023-07-28 NOTE — BRIEF OPERATIVE NOTE - OPERATION/FINDINGS
Taken back to OR for suspected hematoma of AV graft site  Wound explored and oozing found from raw surfaces, no identifiable vessel  Wound closed

## 2023-07-28 NOTE — ASU DISCHARGE PLAN (ADULT/PEDIATRIC) - NS MD DC FALL RISK RISK
For information on Fall & Injury Prevention, visit: [email or web address] OR  [email or web address] OR  https://www.cdc.gov/steadi/patient.html

## 2023-07-28 NOTE — PATIENT PROFILE ADULT - FALL HARM RISK - FACTORS NURSING JUDGEMENT
Pt arrived to floor by squad SB tel , vss, up to BR dizzy and standing up to scale , placed on fall precautions reviewed with pt, see assessment sheet, , messaged Dr. Sonny Baires pt is here for orders, cont.  To monitor denies cp sob LOC x 4 aware of visitor restrictions;reviewd his med list staes he takes all his daily meds at hs, didn't get them in Ed last night , NOTE pt on 100mg XL lopressor, last dose 12/24/21 michell of all meds Yes

## 2023-07-28 NOTE — BRIEF OPERATIVE NOTE - NSICDXBRIEFPROCEDURE_GEN_ALL_CORE_FT
PROCEDURES:  Exploration and evacuation of hematoma of left upper extremity 28-Jul-2023 22:31:51  Dani Milligan  
PROCEDURES:  Creation of arteriovenous fistula using synthetic graft 28-Jul-2023 10:53:30  Aurelio Dubon

## 2023-07-28 NOTE — ASU DISCHARGE PLAN (ADULT/PEDIATRIC) - CARE PROVIDER_API CALL
Adan Boyle  Vascular Surgery  [address] [place] [address], [address]  [place] [address]  Phone: [phone]  Fax: [phone]  Follow Up Time: 2 weeks

## 2023-07-28 NOTE — BRIEF OPERATIVE NOTE - OPERATION/FINDINGS
creation fistula between brachial and cephalic vein using graft creation fistula between brachial and cephalic vein using graft, radial pulse palpable and pulse palpable in graft

## 2023-07-28 NOTE — H&P PST ADULT - HISTORY OF PRESENT ILLNESS
89yo very pleasant female here for 2 reasons 1. foot discoloration and abnormal sensation and 2. need for HD access; she is here with her daughter Zaida. Ms. Nova has a PMH of CAD s/p CABG 10/2022, ESRD, HLD, hypothyroid, HTN though recently hypotension with HD. She states her feet first started bothering her after the CABG almot 1 year ago; she was very hypersensitive over the first toe and even unable to wear shoes. She states since then the feeling has changed to a sensation of charlie in her feet that is constantly there and does not really change with ambulation.

## 2023-07-28 NOTE — PROVIDER CONTACT NOTE (OTHER) - ACTION/TREATMENT ORDERED:
MD assessed site and as per Dr Bush, Dr Boyle will take pt back to OR [facility].  MD aware pt had some ginger ale and cookies post dialysis, will be NPO from this point on

## 2023-07-28 NOTE — BRIEF OPERATIVE NOTE - NSICDXBRIEFPOSTOP_GEN_ALL_CORE_FT
POST-OP DIAGNOSIS:  S/P arteriovenous (AV) graft placement 28-Jul-2023 22:32:48  Dani Milligan  
POST-OP DIAGNOSIS:  End stage renal disease on dialysis 28-Jul-2023 10:54:00  Aurelio Dubon

## 2023-07-28 NOTE — BRIEF OPERATIVE NOTE - NSICDXBRIEFPREOP_GEN_ALL_CORE_FT
PRE-OP DIAGNOSIS:  S/P arteriovenous (AV) graft placement 28-Jul-2023 22:32:44  Dani Milligan  
PRE-OP DIAGNOSIS:  End stage renal disease 28-Jul-2023 10:53:41  Aurelio Dubon  End stage renal disease on dialysis 28-Jul-2023 10:53:46  Aurelio Dubon

## 2023-07-28 NOTE — H&P PST ADULT - ASSESSMENT
87yo female with ESRD on HD via tunneled HD catheter and newly diagnosed PAD (likely small vessel and some tibial disease based on LUCILA/PVR)    #ESRD requires permanent HD access  -The tunneled catheter appears functional; continue catheter care and monitor for signs of infection  -Left arm precausions; explained to patient and her daughter to not allow for blood draws and BP cuff measurements on the left arm  -Will plan for left AVG given poor quality veins on mapping

## 2023-07-28 NOTE — PATIENT PROFILE ADULT - FALL HARM RISK - HARM RISK INTERVENTIONS
Assistance with ambulation/Assistance OOB with selected safe patient handling equipment/Communicate Risk of Fall with Harm to all staff/Discuss with provider need for PT consult/Monitor gait and stability/Provide patient with walking aids - walker, cane, crutches/Reinforce activity limits and safety measures with patient and family/Tailored Fall Risk Interventions/Use of alarms - bed, chair and/or voice tab/Visual Cue: Yellow wristband and red socks/Bed in lowest position, wheels locked, appropriate side rails in place/Call bell, personal items and telephone in reach/Instruct patient to call for assistance before getting out of bed or chair/Non-slip footwear when patient is out of bed/[place] to call system/Physically safe environment - no spills, clutter or unnecessary equipment/Purposeful Proactive Rounding/Room/bathroom lighting operational, light cord in reach

## 2023-07-29 LAB
ANION GAP SERPL CALC-SCNC: 5 MMOL/L — SIGNIFICANT CHANGE UP (ref 5–17)
BASOPHILS # BLD AUTO: 0.02 K/UL — SIGNIFICANT CHANGE UP (ref 0–0.2)
BASOPHILS NFR BLD AUTO: 0.2 % — SIGNIFICANT CHANGE UP (ref 0–2)
BUN SERPL-MCNC: 16 MG/DL — SIGNIFICANT CHANGE UP (ref 7–23)
CALCIUM SERPL-MCNC: 8.4 MG/DL — LOW (ref 8.5–10.1)
CHLORIDE SERPL-SCNC: 99 MMOL/L — SIGNIFICANT CHANGE UP (ref 96–108)
CO2 SERPL-SCNC: 29 MMOL/L — SIGNIFICANT CHANGE UP (ref 22–31)
CREAT SERPL-MCNC: 3.5 MG/DL — HIGH (ref 0.5–1.3)
EGFR: 12 ML/MIN/1.73M2 — LOW
EOSINOPHIL # BLD AUTO: 0 K/UL — SIGNIFICANT CHANGE UP (ref 0–0.5)
EOSINOPHIL NFR BLD AUTO: 0 % — SIGNIFICANT CHANGE UP (ref 0–6)
GLUCOSE SERPL-MCNC: 133 MG/DL — HIGH (ref 70–99)
HCT VFR BLD CALC: 26.6 % — LOW (ref 34.5–45)
HGB BLD-MCNC: 8.9 G/DL — LOW (ref 11.5–15.5)
IMM GRANULOCYTES NFR BLD AUTO: 0.6 % — SIGNIFICANT CHANGE UP (ref 0–0.9)
LYMPHOCYTES # BLD AUTO: 0.87 K/UL — LOW (ref 1–3.3)
LYMPHOCYTES # BLD AUTO: 10.6 % — LOW (ref 13–44)
MAGNESIUM SERPL-MCNC: 2.1 MG/DL — SIGNIFICANT CHANGE UP (ref 1.6–2.6)
MCHC RBC-ENTMCNC: 33.5 GM/DL — SIGNIFICANT CHANGE UP (ref 32–36)
MCHC RBC-ENTMCNC: 33.5 PG — SIGNIFICANT CHANGE UP (ref 27–34)
MCV RBC AUTO: 100 FL — SIGNIFICANT CHANGE UP (ref 80–100)
MONOCYTES # BLD AUTO: 0.47 K/UL — SIGNIFICANT CHANGE UP (ref 0–0.9)
MONOCYTES NFR BLD AUTO: 5.7 % — SIGNIFICANT CHANGE UP (ref 2–14)
NEUTROPHILS # BLD AUTO: 6.83 K/UL — SIGNIFICANT CHANGE UP (ref 1.8–7.4)
NEUTROPHILS NFR BLD AUTO: 82.9 % — HIGH (ref 43–77)
PHOSPHATE SERPL-MCNC: 4.3 MG/DL — SIGNIFICANT CHANGE UP (ref 2.5–4.5)
PLATELET # BLD AUTO: 140 K/UL — LOW (ref 150–400)
POTASSIUM SERPL-MCNC: 4.1 MMOL/L — SIGNIFICANT CHANGE UP (ref 3.5–5.3)
POTASSIUM SERPL-SCNC: 4.1 MMOL/L — SIGNIFICANT CHANGE UP (ref 3.5–5.3)
RBC # BLD: 2.66 M/UL — LOW (ref 3.8–5.2)
RBC # FLD: 14.5 % — SIGNIFICANT CHANGE UP (ref 10.3–14.5)
SODIUM SERPL-SCNC: 133 MMOL/L — LOW (ref 135–145)
WBC # BLD: 8.24 K/UL — SIGNIFICANT CHANGE UP (ref 3.8–10.5)
WBC # FLD AUTO: 8.24 K/UL — SIGNIFICANT CHANGE UP (ref 3.8–10.5)

## 2023-07-29 RX ADMIN — Medication 88 MICROGRAM(S): at 05:36

## 2023-07-29 RX ADMIN — Medication 650 MILLIGRAM(S): at 00:22

## 2023-07-29 RX ADMIN — Medication 650 MILLIGRAM(S): at 13:46

## 2023-07-29 RX ADMIN — Medication 650 MILLIGRAM(S): at 01:22

## 2023-07-29 RX ADMIN — CLOPIDOGREL BISULFATE 75 MILLIGRAM(S): 75 TABLET, FILM COATED ORAL at 13:46

## 2023-07-29 RX ADMIN — AMIODARONE HYDROCHLORIDE 200 MILLIGRAM(S): 400 TABLET ORAL at 10:37

## 2023-07-29 RX ADMIN — Medication 81 MILLIGRAM(S): at 13:46

## 2023-07-29 RX ADMIN — ATORVASTATIN CALCIUM 20 MILLIGRAM(S): 80 TABLET, FILM COATED ORAL at 21:32

## 2023-07-29 RX ADMIN — Medication 650 MILLIGRAM(S): at 18:14

## 2023-07-29 NOTE — PROGRESS NOTE ADULT - ASSESSMENT
87 yo F with hematoma s/p creation of arteriovenous fistula using synthetic graft s/p wound wash out and exploration POD#1.    P:  - Pt stable for discharge but she would feel more comfortable to stay for one more day for dressing management  - Restarted on her home medications  - Pain and nausea control as needed  - Discharge tomorrow    Plan discussed with Dr Boyle

## 2023-07-29 NOTE — PROGRESS NOTE ADULT - SUBJECTIVE AND OBJECTIVE BOX
89yo with PMH of CAD s/p CABG 10/2022, ESRD, HLD, hypothyroid, HTN presented to  for creation of AVF with graft placement. After surgery she was found to have a very large hematoma that kept expanding and she was taken back to the OR the same night for hematoma evacuation. Now POD#1    Pt seen and examined at bedside. She is feeling well, denies any pain at the site. Denies any numbness or tingling of her left arm. Tolerating her diet and tolerated dialysis yesterday.    Vital Signs Last 24 Hrs  T(C): 36.6 (29 Jul 2023 21:29), Max: 36.7 (28 Jul 2023 23:20)  T(F): 97.8 (29 Jul 2023 21:29), Max: 98 (28 Jul 2023 23:20)  HR: 68 (29 Jul 2023 21:29) (60 - 68)  BP: 102/48 (29 Jul 2023 21:29) (99/75 - 120/62)  BP(mean): --  RR: 16 (29 Jul 2023 21:29) (12 - 18)  SpO2: 95% (29 Jul 2023 21:29) (92% - 100%)    Parameters below as of 29 Jul 2023 21:29  Patient On (Oxygen Delivery Method): room air        Labs:                     8.9    8.24  )-----------( 140      ( 29 Jul 2023 08:09 )             26.6     CBC Full  -  ( 29 Jul 2023 08:09 )  WBC Count : 8.24 K/uL  RBC Count : 2.66 M/uL  Hemoglobin : 8.9 g/dL  Hematocrit : 26.6 %  Platelet Count - Automated : 140 K/uL  Mean Cell Volume : 100.0 fl  Mean Cell Hemoglobin : 33.5 pg  Mean Cell Hemoglobin Concentration : 33.5 gm/dL  Auto Neutrophil # : 6.83 K/uL  Auto Lymphocyte # : 0.87 K/uL  Auto Monocyte # : 0.47 K/uL  Auto Eosinophil # : 0.00 K/uL  Auto Basophil # : 0.02 K/uL  Auto Neutrophil % : 82.9 %  Auto Lymphocyte % : 10.6 %  Auto Monocyte % : 5.7 %  Auto Eosinophil % : 0.0 %  Auto Basophil % : 0.2 %    07-29    133<L>  |  99  |  16  ----------------------------<  133<H>  4.1   |  29  |  3.50<H>    Ca    8.4<L>      29 Jul 2023 08:09  Phos  4.3     07-29  Mg     2.1     07-29      Meds:  acetaminophen     Tablet .. 650 milliGRAM(s) Oral every 6 hours  aMIOdarone    Tablet 200 milliGRAM(s) Oral daily  aspirin enteric coated 81 milliGRAM(s) Oral daily  atorvastatin 20 milliGRAM(s) Oral at bedtime  clopidogrel Tablet 75 milliGRAM(s) Oral daily  hydrALAZINE 25 milliGRAM(s) Oral every 12 hours  HYDROmorphone  Injectable 1 milliGRAM(s) IV Push every 6 hours PRN  levothyroxine 88 MICROGram(s) Oral daily      Physical exam:  GCS of 15, AOx3  Airway is patent  Breathing is symmetric and unlabored  Psych: normal affect  HEENT: Normocephalic, atraumatic, EOMI  Chest: no gross rib pathology or tenderness to exam.  Respiratory: Respiratory Effort normal; no wheezes  ABD:  soft, nontender, non distended, no rebound, no guarding, no rigidity  Musculoskeletal: All digits are warm and well perfused. Pt demonstrates grossly intact sensoromotor function. Right upper arm hematoma, appears stable since day prior, soft, non tender to touch

## 2023-07-30 ENCOUNTER — TRANSCRIPTION ENCOUNTER (OUTPATIENT)
Age: 88
End: 2023-07-30

## 2023-07-30 VITALS — RESPIRATION RATE: 16 BRPM | OXYGEN SATURATION: 99 % | HEART RATE: 78 BPM | TEMPERATURE: 98 F

## 2023-07-30 LAB
ANION GAP SERPL CALC-SCNC: 8 MMOL/L — SIGNIFICANT CHANGE UP (ref 5–17)
BUN SERPL-MCNC: 39 MG/DL — HIGH (ref 7–23)
CALCIUM SERPL-MCNC: 8.2 MG/DL — LOW (ref 8.5–10.1)
CHLORIDE SERPL-SCNC: 99 MMOL/L — SIGNIFICANT CHANGE UP (ref 96–108)
CO2 SERPL-SCNC: 26 MMOL/L — SIGNIFICANT CHANGE UP (ref 22–31)
CREAT SERPL-MCNC: 5.35 MG/DL — HIGH (ref 0.5–1.3)
EGFR: 7 ML/MIN/1.73M2 — LOW
GLUCOSE SERPL-MCNC: 89 MG/DL — SIGNIFICANT CHANGE UP (ref 70–99)
HCT VFR BLD CALC: 26.8 % — LOW (ref 34.5–45)
HGB BLD-MCNC: 8.8 G/DL — LOW (ref 11.5–15.5)
MAGNESIUM SERPL-MCNC: 2.1 MG/DL — SIGNIFICANT CHANGE UP (ref 1.6–2.6)
MCHC RBC-ENTMCNC: 32.8 GM/DL — SIGNIFICANT CHANGE UP (ref 32–36)
MCHC RBC-ENTMCNC: 33.3 PG — SIGNIFICANT CHANGE UP (ref 27–34)
MCV RBC AUTO: 101.5 FL — HIGH (ref 80–100)
PHOSPHATE SERPL-MCNC: 4.3 MG/DL — SIGNIFICANT CHANGE UP (ref 2.5–4.5)
PLATELET # BLD AUTO: 146 K/UL — LOW (ref 150–400)
POTASSIUM SERPL-MCNC: 4.1 MMOL/L — SIGNIFICANT CHANGE UP (ref 3.5–5.3)
POTASSIUM SERPL-SCNC: 4.1 MMOL/L — SIGNIFICANT CHANGE UP (ref 3.5–5.3)
RBC # BLD: 2.64 M/UL — LOW (ref 3.8–5.2)
RBC # FLD: 14.7 % — HIGH (ref 10.3–14.5)
SODIUM SERPL-SCNC: 133 MMOL/L — LOW (ref 135–145)
WBC # BLD: 8.87 K/UL — SIGNIFICANT CHANGE UP (ref 3.8–10.5)
WBC # FLD AUTO: 8.87 K/UL — SIGNIFICANT CHANGE UP (ref 3.8–10.5)

## 2023-07-30 RX ADMIN — CLOPIDOGREL BISULFATE 75 MILLIGRAM(S): 75 TABLET, FILM COATED ORAL at 12:31

## 2023-07-30 RX ADMIN — Medication 650 MILLIGRAM(S): at 00:09

## 2023-07-30 RX ADMIN — Medication 81 MILLIGRAM(S): at 12:31

## 2023-07-30 RX ADMIN — Medication 650 MILLIGRAM(S): at 12:31

## 2023-07-30 RX ADMIN — Medication 650 MILLIGRAM(S): at 05:31

## 2023-07-30 RX ADMIN — AMIODARONE HYDROCHLORIDE 200 MILLIGRAM(S): 400 TABLET ORAL at 10:18

## 2023-07-30 RX ADMIN — Medication 88 MICROGRAM(S): at 05:32

## 2023-07-30 NOTE — DISCHARGE NOTE PROVIDER - CARE PROVIDER_API CALL
Adan Boyle  Vascular Surgery  [address] [place] [address], [address]  [place] [address]  Phone: [phone]  Fax: [phone]  Follow Up Time: 2 weeks

## 2023-07-30 NOTE — PROVIDER CONTACT NOTE (OTHER) - SITUATION
faxed d/c paperwork to Dr. Briana Collins
Pt s/p AVG insertion, as per dialysis RN site was bleeding and pt seen by MD in dialysis, pressure dressing applied and MD to see pt in ASU to re-eval.

## 2023-07-30 NOTE — DISCHARGE NOTE NURSING/CASE MANAGEMENT/SOCIAL WORK - PATIENT PORTAL LINK FT
You can access the FollowMyHealth Patient Portal offered by [facility] by registering at the following website: [email or web address]. By joining [employer]’s FollowMyHealth portal, you will also be able to view your health information using other applications (apps) compatible with our system.

## 2023-07-30 NOTE — DISCHARGE NOTE PROVIDER - HOSPITAL COURSE
89yo with PMH of CAD s/p CABG 10/2022, ESRD, HLD, hypothyroid, HTN presented to  for creation of AVF with graft placement. After surgery she was found to have a very large hematoma that kept expanding and she was taken back to the OR the same night for hematoma evacuation. Now POD#2. Pt seen and examined at bedside. She is feeling well, denies any pain at the site. Denies any numbness or tingling of her left arm. Tolerating her diet and tolerated dialysis. Pt was uncomfortable leaving yesterday, she wasn't comfortable with the dressing at home.    87yo with PMH of CAD s/p CABG 10/2022, ESRD, HLD, hypothyroid, HTN presented to  for creation of AVF with graft placement. After surgery she was found to have a very large hematoma that kept expanding and she was taken back to the OR the same night for hematoma evacuation. Now POD#2. Pt seen and examined at bedside. She is feeling well, denies any pain at the site. Denies any numbness or tingling of her left arm. Tolerating her diet and tolerated dialysis. Pt was uncomfortable leaving yesterday, she wasn't comfortable with the dressing at home. She feels better today, daughter at bedside okay with taking her home.    Pressure dressing placed on small oozing site on arm. No bleeding through dressing.

## 2023-07-30 NOTE — DISCHARGE NOTE PROVIDER - NSDCCPTREATMENT_GEN_ALL_CORE_FT
PRINCIPAL PROCEDURE  Procedure: Creation of arteriovenous fistula using synthetic graft  Findings and Treatment:

## 2023-07-30 NOTE — DISCHARGE NOTE NURSING/CASE MANAGEMENT/SOCIAL WORK - NSDCPEFALRISK_GEN_ALL_CORE
For information on Fall & Injury Prevention, visit: [email or web address] OR  [email or web address] OR  https://www.cdc.gov/steadi/patient.html

## 2023-07-30 NOTE — DISCHARGE NOTE PROVIDER - NSDCMRMEDTOKEN_GEN_ALL_CORE_FT
acetaminophen 500 mg oral tablet: 2 tab(s) orally every 6 hours  acetaminophen 500 mg oral tablet: 1 tab(s) orally once a day, As Needed  amiodarone 200 mg oral tablet: 1 tab(s) orally once a day  aspirin 81 mg oral tablet: 1 tab(s) orally once a day  atorvastatin 20 mg oral tablet: 1 tab(s) orally once a day (in the evening)  clopidogrel 75 mg oral tablet: 1 tab(s) orally once a day  cyanocobalamin 1000 mcg oral tablet: 2 tab(s) orally once a day  famotidine 10 mg oral tablet: 1 orally once a day  famotidine 20 mg oral tablet: 0.5 tab(s) orally once a day  hydrALAZINE 25 mg oral tablet: 1 tab(s) orally every 12 hours   levothyroxine 88 mcg (0.088 mg) oral tablet: 1 tab(s) orally once a day

## 2023-07-30 NOTE — DISCHARGE NOTE PROVIDER - NSDCFUADDINST_GEN_ALL_CORE_FT
Please continue to take your home medication as prescribed.  Take over the counter medication for pain control as needed, dressing to be changed daily or more frequently if oozing blood.   Continue dialysis through Catheter, do not use graft yet.

## 2023-08-09 ENCOUNTER — APPOINTMENT (OUTPATIENT)
Dept: VASCULAR SURGERY | Facility: CLINIC | Age: 88
End: 2023-08-09
Payer: MEDICARE

## 2023-08-09 VITALS
SYSTOLIC BLOOD PRESSURE: 163 MMHG | RESPIRATION RATE: 14 BRPM | OXYGEN SATURATION: 98 % | DIASTOLIC BLOOD PRESSURE: 78 MMHG | WEIGHT: 185 LBS | HEIGHT: 64 IN | BODY MASS INDEX: 31.58 KG/M2 | HEART RATE: 76 BPM

## 2023-08-09 DIAGNOSIS — I73.9 PERIPHERAL VASCULAR DISEASE, UNSPECIFIED: ICD-10-CM

## 2023-08-09 PROCEDURE — 99024 POSTOP FOLLOW-UP VISIT: CPT

## 2023-08-14 RX ORDER — FAMOTIDINE 10 MG/1
10 TABLET, FILM COATED ORAL
Refills: 0 | Status: ACTIVE | COMMUNITY

## 2023-08-14 RX ORDER — CILOSTAZOL 100 MG/1
100 TABLET ORAL TWICE DAILY
Qty: 60 | Refills: 3 | Status: DISCONTINUED | COMMUNITY
Start: 2023-07-03 | End: 2023-08-14

## 2023-08-14 RX ORDER — PNV NO.95/FERROUS FUM/FOLIC AC 28MG-0.8MG
TABLET ORAL
Refills: 0 | Status: DISCONTINUED | COMMUNITY
End: 2023-08-14

## 2023-08-14 RX ORDER — CHOLECALCIFEROL (VITAMIN D3) 1250 MCG
1.25 MG CAPSULE ORAL
Qty: 8 | Refills: 0 | Status: DISCONTINUED | COMMUNITY
Start: 2018-06-28 | End: 2023-08-14

## 2023-08-14 RX ORDER — CLOPIDOGREL BISULFATE 75 MG/1
75 TABLET, FILM COATED ORAL DAILY
Qty: 90 | Refills: 1 | Status: ACTIVE | COMMUNITY

## 2023-08-14 RX ORDER — ERGOCALCIFEROL (VITAMIN D2) 1250 MCG
50000 CAPSULE ORAL
Refills: 0 | Status: DISCONTINUED | COMMUNITY
End: 2023-08-14

## 2023-08-14 RX ORDER — ADHESIVE TAPE 3"X 2.3 YD
50 MCG TAPE, NON-MEDICATED TOPICAL DAILY
Refills: 0 | Status: DISCONTINUED | COMMUNITY
End: 2023-08-14

## 2023-08-14 RX ORDER — HYDRALAZINE HYDROCHLORIDE 25 MG/1
25 TABLET ORAL
Refills: 0 | Status: ACTIVE | COMMUNITY

## 2023-08-15 ENCOUNTER — APPOINTMENT (OUTPATIENT)
Dept: ELECTROPHYSIOLOGY | Facility: CLINIC | Age: 88
End: 2023-08-15
Payer: MEDICARE

## 2023-08-15 VITALS
HEIGHT: 64 IN | BODY MASS INDEX: 32.61 KG/M2 | HEART RATE: 78 BPM | WEIGHT: 191 LBS | OXYGEN SATURATION: 99 % | SYSTOLIC BLOOD PRESSURE: 149 MMHG | DIASTOLIC BLOOD PRESSURE: 77 MMHG

## 2023-08-15 DIAGNOSIS — I44.7 LEFT BUNDLE-BRANCH BLOCK, UNSPECIFIED: ICD-10-CM

## 2023-08-15 DIAGNOSIS — I45.4 NONSPECIFIC INTRAVENTRICULAR BLOCK: ICD-10-CM

## 2023-08-15 PROCEDURE — 93000 ELECTROCARDIOGRAM COMPLETE: CPT

## 2023-08-15 PROCEDURE — 99204 OFFICE O/P NEW MOD 45 MIN: CPT

## 2023-08-23 ENCOUNTER — APPOINTMENT (OUTPATIENT)
Dept: VASCULAR SURGERY | Facility: CLINIC | Age: 88
End: 2023-08-23
Payer: MEDICARE

## 2023-08-23 PROCEDURE — 93990 DOPPLER FLOW TESTING: CPT

## 2023-08-23 PROCEDURE — 99212 OFFICE O/P EST SF 10 MIN: CPT

## 2023-08-24 RX ORDER — LIDOCAINE 5 G/100G
5 OINTMENT TOPICAL
Qty: 1 | Refills: 5 | Status: ACTIVE | COMMUNITY
Start: 2023-08-24 | End: 1900-01-01

## 2023-09-03 NOTE — ASSESSMENT
[FreeTextEntry1] : 87yo female s/p left brachioaxillary arteriovenous graft creation complicated by hematoma now recovered well -Based on duplex graft is patent with borderline flow volumes. Will attempt access via graft in the next week. It was marked using ultrasound -Will follow up in 2 weeks

## 2023-09-03 NOTE — PHYSICAL EXAM
[JVD] : no jugular venous distention  [Normal Breath Sounds] : Normal breath sounds [Normal Rate and Rhythm] : normal rate and rhythm [2+] : left 2+ [de-identified] : [facility] [de-identified] : wnl [FreeTextEntry1] : LUE: graft with palpable pulsatile thrill; marks with U/S duplex

## 2023-09-03 NOTE — HISTORY OF PRESENT ILLNESS
[FreeTextEntry1] : 87yo very pleasant female here for 2 reasons 1. foot discoloration and abnormal sensation and 2. need for HD access; she is here with her daughter Zaida. Ms. Nova has a PMH of CAD s/p CABG 10/2022, ESRD, HLD, hypothyroid, HTN though recently hypotension with HD. She states her feet first started bothering her after the CABG almot 1 year ago; she was very hypersensitive over the first toe and even unable to wear shoes. She states since then the feeling has changed to a sensation of charlie in her feet that is constantly there and does not really change with ambulation.   07/28/23: Left brachioaxillary AVG creation; complicated with return to OR for small hematoma [de-identified] : Since surgery Ms. Nova overall doing well. She initially had large amount of ecchymosis over AVG site but that is slowly improving. She denies any pain, numbness or weakness in the left hand. She is getting HD via tunneled hemodialysis catheter without any issues.

## 2023-09-28 ENCOUNTER — APPOINTMENT (OUTPATIENT)
Dept: VASCULAR SURGERY | Facility: CLINIC | Age: 88
End: 2023-09-28
Payer: MEDICARE

## 2023-09-28 VITALS
DIASTOLIC BLOOD PRESSURE: 78 MMHG | OXYGEN SATURATION: 98 % | WEIGHT: 191 LBS | SYSTOLIC BLOOD PRESSURE: 134 MMHG | HEIGHT: 64 IN | BODY MASS INDEX: 32.61 KG/M2 | HEART RATE: 80 BPM

## 2023-09-28 PROCEDURE — 36589 REMOVAL TUNNELED CV CATH: CPT | Mod: 58

## 2023-09-28 NOTE — PHYSICAL THERAPY INITIAL EVALUATION ADULT - ASR WT BEARING STATUS EVAL
Instrucciones de bailey hospitalaria para inyecciones para el dolor    DIETA:   Reanude duke dieta habitual. Si siente náuseas, siga tomando líquidos transparentes hasta que las náuseas desaparezcan.    ACTIVIDAD:   -No conduzca si siente las piernas entumecidas o débiles.  -Si se le administró Valium antes de la inyección, no conduzca zara 24 horas.  -Le recomendamos que descanse y se relaje hoy.  -No realice actividades extenuantes, no levante objetos pesados ni ejercite con pesas zara 3 lula.  -Hable con el Dr. Kincaid sobre reanudar las citas de fisioterapia.    MEDICAMEN:   -Reanude todos vanesa medicamentos anteriores. Los anticoagulantes se pueden reanudar mañana.  -En amaris de que experimente un dolor de phil grave después del tratamiento, recuéstese, debbie reposo, yahaira mucho líquido (si es posible, cafeína) y tome un   analgésico leve.  -Llame al Dr. Kincaid si no se le calma el dolor de phil.    CUIDADO DE LA HERIDA:  -Es posible que experimente rubor facial, nerviosismo o insomnio. Estos son efectos secundarios comunes del medicamento esteroide y desaparecerán dentro de 4 días.  -Es habitual que aumente la inflamación en el lugar de la inyección.  -Aplique hielo en el lugar de la inyección zara 20 minutos cada 4 horas si lo necesita debido a las molestias.  -No utilice almohadillas térmicas zara las primeras 24 horas.  -Déjese el apósito zara el deedee del día.  -Puede darse juanita ducha más tarde esta noche. No tome camelia de inmersión ni nade zara 3 días.    Comuníquese con el Dr. Kincaid si tiene alguna pregunta, inquietud o cualquiera de los siguientes síntomas:    Número del consultorio: [phone] Fuera del horario de atención: [phone]  · Enrojecimiento, secreción o hinchazón en el lugar de la inyección, fiebre superior a los 38 °C (101 °F).  · Náuseas/vómitos persistentes o rigidez en el shona.  · LLAME  SI TIENE DOLOR EN EL PECHO O DIFICULTAD PARA RESPIRAR.    Para las  inyecciones en la cadera y en la rodilla:  Reanude duke dieta y actividad habituales.    Llame si tiene temperatura superior a los 38 °C (101 °F), enrojecimiento, secreción o hinchazón en el lugar de la inyección.    El Dr. Kincaid deberá emitir lissett nueva orden para duke próxima inyección. Lissett vez que esta orden haya sido procesada, la oficina de [facility] se comunicará con usted para programar duke próxima azalea. Si la oficina no se comunica con usted dentro de 3 días hábiles, llame al [phone].    Me tran explicado y comprendo la información anterior. Se devolvieron todas las pertenencias.     Discharge Instructions For Pain Injections    DIET                    Resume your usual diet. If you are nauseated, remain on clear liquids until nausea passes.    ACTIVITY      Do not drive with any numbness or weakness in your legs.  If Valium was given prior to your injection, do not drive for 24 hours.  We recommend that you rest and relax today.  No strenuous activity, heavy lifting or weight training for 3 days.  Please discuss resuming physical therapy appointments with Dr. Kincaid.         MEDICATION     Resume previous all medications. Blood thinners may be resumed tomorrow.  Should you develop a severe headache after treatment lie down, rest and drink extra fluids (caffeine if possible) and take a mild pain reliever.  Please call Dr. Kincaid if your headache does not resolve    WOUND CARE   You may experience facial flushing, restlessness and or sleeplessness.   These are common side effects of the steroid medication and will resolve within 4 days.   Vaginal bleeding has also been associated with steroid injections in pre and postmenopausal women.   If bleeding persists more than 5 days please contact your OB/GYNE or Primary Care Physician.  It is not unusual to have increased soreness at the site of your injection.  Apply ice to the injection site for 20 minutes every 4 hours as needed for discomfort.  Do not  use heating pads for the first 24 hours.  Leave the Band-Aid on for the rest of today.  You may shower later this evening. No bath tubs or swimming for 3 days.  Wash your hands with soap and water before and after touching your injection site.    Please contact Dr. Kincaid with any questions, concerns or any of the following:  -Redness, drainage, or swelling at the injection site.  -Fever above 101.0  -Persistent nausea/vomiting or stiff neck  -CALL 911 FOR ANY CHEST PAIN OR DIFFICULTLY BREATHING   Office Number [phone] and After Hours [phone]    For hip and knee injections:  Resume your usual diet and activity.  Call for temperature above 101.0, redness, drainage or swelling at the injection site.    Dr. Kincaid's office will contact you to schedule the next appointment as requested by Dr. Kincaid.   If you have not been contacted by the office within two weeks, please contact them at [phone].    Call the office to provide a 24 hour notice when cancelling any appointments at [phone].  Do not cancel [facility] appointments using My Chart.    Patient has been instructed on the above. Patient verbalizes understanding.    no weight-bearing restrictions

## 2023-09-29 ENCOUNTER — RESULT REVIEW (OUTPATIENT)
Age: 88
End: 2023-09-29

## 2023-09-29 ENCOUNTER — OUTPATIENT (OUTPATIENT)
Dept: OUTPATIENT SERVICES | Facility: HOSPITAL | Age: 88
LOS: 1 days | End: 2023-09-29
Payer: MEDICARE

## 2023-09-29 VITALS
TEMPERATURE: 98 F | HEIGHT: 65 IN | SYSTOLIC BLOOD PRESSURE: 154 MMHG | OXYGEN SATURATION: 99 % | HEART RATE: 73 BPM | DIASTOLIC BLOOD PRESSURE: 59 MMHG | WEIGHT: 186.95 LBS | RESPIRATION RATE: 16 BRPM

## 2023-09-29 DIAGNOSIS — I49.5 SICK SINUS SYNDROME: ICD-10-CM

## 2023-09-29 DIAGNOSIS — Z98.890 OTHER SPECIFIED POSTPROCEDURAL STATES: Chronic | ICD-10-CM

## 2023-09-29 DIAGNOSIS — Z01.818 ENCOUNTER FOR OTHER PREPROCEDURAL EXAMINATION: ICD-10-CM

## 2023-09-29 DIAGNOSIS — Z95.1 PRESENCE OF AORTOCORONARY BYPASS GRAFT: Chronic | ICD-10-CM

## 2023-09-29 DIAGNOSIS — Z90.710 ACQUIRED ABSENCE OF BOTH CERVIX AND UTERUS: Chronic | ICD-10-CM

## 2023-09-29 DIAGNOSIS — Z90.49 ACQUIRED ABSENCE OF OTHER SPECIFIED PARTS OF DIGESTIVE TRACT: Chronic | ICD-10-CM

## 2023-09-29 LAB
ANION GAP SERPL CALC-SCNC: 6 MMOL/L — SIGNIFICANT CHANGE UP (ref 5–17)
APTT BLD: 29.2 SEC — SIGNIFICANT CHANGE UP (ref 24.5–35.6)
BASOPHILS # BLD AUTO: 0.06 K/UL — SIGNIFICANT CHANGE UP (ref 0–0.2)
BASOPHILS NFR BLD AUTO: 0.8 % — SIGNIFICANT CHANGE UP (ref 0–2)
BLD GP AB SCN SERPL QL: SIGNIFICANT CHANGE UP
BUN SERPL-MCNC: 46 MG/DL — HIGH (ref 7–23)
CALCIUM SERPL-MCNC: 8.8 MG/DL — SIGNIFICANT CHANGE UP (ref 8.5–10.1)
CHLORIDE SERPL-SCNC: 100 MMOL/L — SIGNIFICANT CHANGE UP (ref 96–108)
CO2 SERPL-SCNC: 28 MMOL/L — SIGNIFICANT CHANGE UP (ref 22–31)
CREAT SERPL-MCNC: 5.36 MG/DL — HIGH (ref 0.5–1.3)
EGFR: 7 ML/MIN/1.73M2 — LOW
EOSINOPHIL # BLD AUTO: 0.85 K/UL — HIGH (ref 0–0.5)
EOSINOPHIL NFR BLD AUTO: 11.5 % — HIGH (ref 0–6)
GLUCOSE SERPL-MCNC: 102 MG/DL — HIGH (ref 70–99)
HCT VFR BLD CALC: 31.4 % — LOW (ref 34.5–45)
HGB BLD-MCNC: 10.7 G/DL — LOW (ref 11.5–15.5)
IMM GRANULOCYTES NFR BLD AUTO: 0.3 % — SIGNIFICANT CHANGE UP (ref 0–0.9)
INR BLD: 0.89 RATIO — SIGNIFICANT CHANGE UP (ref 0.85–1.18)
LYMPHOCYTES # BLD AUTO: 1.32 K/UL — SIGNIFICANT CHANGE UP (ref 1–3.3)
LYMPHOCYTES # BLD AUTO: 17.9 % — SIGNIFICANT CHANGE UP (ref 13–44)
MCHC RBC-ENTMCNC: 34.1 GM/DL — SIGNIFICANT CHANGE UP (ref 32–36)
MCHC RBC-ENTMCNC: 34.2 PG — HIGH (ref 27–34)
MCV RBC AUTO: 100.3 FL — HIGH (ref 80–100)
MONOCYTES # BLD AUTO: 0.73 K/UL — SIGNIFICANT CHANGE UP (ref 0–0.9)
MONOCYTES NFR BLD AUTO: 9.9 % — SIGNIFICANT CHANGE UP (ref 2–14)
MRSA PCR RESULT.: SIGNIFICANT CHANGE UP
NEUTROPHILS # BLD AUTO: 4.4 K/UL — SIGNIFICANT CHANGE UP (ref 1.8–7.4)
NEUTROPHILS NFR BLD AUTO: 59.6 % — SIGNIFICANT CHANGE UP (ref 43–77)
PLATELET # BLD AUTO: 130 K/UL — LOW (ref 150–400)
POTASSIUM SERPL-MCNC: 4.1 MMOL/L — SIGNIFICANT CHANGE UP (ref 3.5–5.3)
POTASSIUM SERPL-SCNC: 4.1 MMOL/L — SIGNIFICANT CHANGE UP (ref 3.5–5.3)
PROTHROM AB SERPL-ACNC: 10.1 SEC — SIGNIFICANT CHANGE UP (ref 9.5–13)
RBC # BLD: 3.13 M/UL — LOW (ref 3.8–5.2)
RBC # FLD: 12.5 % — SIGNIFICANT CHANGE UP (ref 10.3–14.5)
S AUREUS DNA NOSE QL NAA+PROBE: SIGNIFICANT CHANGE UP
SODIUM SERPL-SCNC: 134 MMOL/L — LOW (ref 135–145)
WBC # BLD: 7.38 K/UL — SIGNIFICANT CHANGE UP (ref 3.8–10.5)
WBC # FLD AUTO: 7.38 K/UL — SIGNIFICANT CHANGE UP (ref 3.8–10.5)

## 2023-09-29 PROCEDURE — 99214 OFFICE O/P EST MOD 30 MIN: CPT | Mod: 25

## 2023-09-29 PROCEDURE — 85730 THROMBOPLASTIN TIME PARTIAL: CPT

## 2023-09-29 PROCEDURE — 80048 BASIC METABOLIC PNL TOTAL CA: CPT

## 2023-09-29 PROCEDURE — 86850 RBC ANTIBODY SCREEN: CPT

## 2023-09-29 PROCEDURE — 87641 MR-STAPH DNA AMP PROBE: CPT

## 2023-09-29 PROCEDURE — 85610 PROTHROMBIN TIME: CPT

## 2023-09-29 PROCEDURE — 85025 COMPLETE CBC W/AUTO DIFF WBC: CPT

## 2023-09-29 PROCEDURE — 71046 X-RAY EXAM CHEST 2 VIEWS: CPT | Mod: 26

## 2023-09-29 PROCEDURE — 71046 X-RAY EXAM CHEST 2 VIEWS: CPT

## 2023-09-29 PROCEDURE — 86900 BLOOD TYPING SEROLOGIC ABO: CPT

## 2023-09-29 PROCEDURE — 93005 ELECTROCARDIOGRAM TRACING: CPT

## 2023-09-29 PROCEDURE — 36415 COLL VENOUS BLD VENIPUNCTURE: CPT

## 2023-09-29 PROCEDURE — 86901 BLOOD TYPING SEROLOGIC RH(D): CPT

## 2023-09-29 PROCEDURE — 93010 ELECTROCARDIOGRAM REPORT: CPT

## 2023-09-29 PROCEDURE — 87640 STAPH A DNA AMP PROBE: CPT

## 2023-09-29 RX ORDER — FAMOTIDINE 10 MG/ML
1 INJECTION INTRAVENOUS
Refills: 0 | DISCHARGE

## 2023-09-29 NOTE — H&P PST ADULT - CARDIOVASCULAR COMMENTS
HTN, HLD, CAD(CABG), Valvular heart disease, SSS. On Clopidogrel and aspirin. Followed by Dr. Collins. See HPI.

## 2023-09-29 NOTE — H&P PST ADULT - NSICDXPASTSURGICALHX_GEN_ALL_CORE_FT
PAST SURGICAL HISTORY:  H/O: hysterectomy     History of appendectomy     History of removal of Port-a-Cath     S/P arteriovenous (AV) fistula creation     S/P CABG (coronary artery bypass graft)

## 2023-09-29 NOTE — H&P PST ADULT - MUSCULOSKELETAL COMMENTS
Unsteady gait and balance. uses a wheelchair for distance. last fell "one and a half years ago". Patient was hospitalised at  after fall.

## 2023-09-29 NOTE — H&P PST ADULT - HISTORY OF PRESENT ILLNESS
88 years old female with a history of HTN, HLD, CAD( CABG), Valvular heart disease CKD stage 3 (dialysis - Mon, Wed, Fri), Left AV Fistula,[place] (b/l hearing aids) and Hypothyroidism. She was at dialysis and had bradycardia. She went to see Dr. Collins and was placed on a monitor. Referred to Dr. Palomares with sick sinus syndrome. Planned Micra implant. Denies syncopal episode. Admits to fatigue.

## 2023-09-29 NOTE — H&P PST ADULT - ASSESSMENT
88 years old female present to PST prior to Micra implant with Dr. Palomares.    Plan   - Instructions as per procedural doctor and cardiac nurse practitioner.  - Mupirocin as directed.  - Chlorhexidine as directed.  - CBC, BMP, PT/ INR, PTT, MRSA, Type and Screen , EKG and CXR done in PST

## 2023-09-29 NOTE — H&P PST ADULT - NSICDXPASTMEDICALHX_GEN_ALL_CORE_FT
PAST MEDICAL HISTORY:  Anemia     CAD (coronary artery disease)     DDD (degenerative disc disease), lumbar     Frequent UTI     Hearing loss     History of valvular heart disease     HLD (hyperlipidemia)     HTN (hypertension)     Hypothyroidism     Imbalance     SSS (sick sinus syndrome)     Stage 3 chronic kidney disease     Unsteady gait

## 2023-09-30 DIAGNOSIS — I49.5 SICK SINUS SYNDROME: ICD-10-CM

## 2023-09-30 DIAGNOSIS — Z01.818 ENCOUNTER FOR OTHER PREPROCEDURAL EXAMINATION: ICD-10-CM

## 2023-10-02 NOTE — ASU PATIENT PROFILE, ADULT - FALL HARM RISK - HARM RISK INTERVENTIONS

## 2023-10-03 ENCOUNTER — RESULT REVIEW (OUTPATIENT)
Age: 88
End: 2023-10-03

## 2023-10-03 ENCOUNTER — OUTPATIENT (OUTPATIENT)
Dept: OUTPATIENT SERVICES | Facility: HOSPITAL | Age: 88
LOS: 1 days | Discharge: HOME | End: 2023-10-03
Payer: MEDICARE

## 2023-10-03 VITALS
TEMPERATURE: 98 F | WEIGHT: 186.07 LBS | HEART RATE: 70 BPM | RESPIRATION RATE: 17 BRPM | SYSTOLIC BLOOD PRESSURE: 143 MMHG | OXYGEN SATURATION: 98 % | DIASTOLIC BLOOD PRESSURE: 69 MMHG | HEIGHT: 65 IN

## 2023-10-03 DIAGNOSIS — Z90.710 ACQUIRED ABSENCE OF BOTH CERVIX AND UTERUS: Chronic | ICD-10-CM

## 2023-10-03 DIAGNOSIS — Z90.49 ACQUIRED ABSENCE OF OTHER SPECIFIED PARTS OF DIGESTIVE TRACT: Chronic | ICD-10-CM

## 2023-10-03 DIAGNOSIS — I49.5 SICK SINUS SYNDROME: ICD-10-CM

## 2023-10-03 DIAGNOSIS — Z98.890 OTHER SPECIFIED POSTPROCEDURAL STATES: Chronic | ICD-10-CM

## 2023-10-03 DIAGNOSIS — Z95.1 PRESENCE OF AORTOCORONARY BYPASS GRAFT: Chronic | ICD-10-CM

## 2023-10-03 LAB
HAV IGM SER-ACNC: SIGNIFICANT CHANGE UP
HBV CORE IGM SER-ACNC: SIGNIFICANT CHANGE UP
HBV SURFACE AG SER-ACNC: SIGNIFICANT CHANGE UP
HCV AB S/CO SERPL IA: 0.04 S/CO — SIGNIFICANT CHANGE UP (ref 0–0.99)
HCV AB SERPL-IMP: SIGNIFICANT CHANGE UP

## 2023-10-03 PROCEDURE — 71045 X-RAY EXAM CHEST 1 VIEW: CPT

## 2023-10-03 PROCEDURE — C1887: CPT

## 2023-10-03 PROCEDURE — 80048 BASIC METABOLIC PNL TOTAL CA: CPT

## 2023-10-03 PROCEDURE — C1889: CPT | Mod: Q0

## 2023-10-03 PROCEDURE — C1894: CPT

## 2023-10-03 PROCEDURE — 93005 ELECTROCARDIOGRAM TRACING: CPT | Mod: XU

## 2023-10-03 PROCEDURE — 93971 EXTREMITY STUDY: CPT | Mod: LT

## 2023-10-03 PROCEDURE — 71045 X-RAY EXAM CHEST 1 VIEW: CPT | Mod: 26

## 2023-10-03 PROCEDURE — 36415 COLL VENOUS BLD VENIPUNCTURE: CPT

## 2023-10-03 PROCEDURE — C1786: CPT

## 2023-10-03 PROCEDURE — 90999 UNLISTED DIALYSIS PROCEDURE: CPT

## 2023-10-03 PROCEDURE — 93010 ELECTROCARDIOGRAM REPORT: CPT

## 2023-10-03 PROCEDURE — 80074 ACUTE HEPATITIS PANEL: CPT

## 2023-10-03 PROCEDURE — C1769: CPT

## 2023-10-03 PROCEDURE — 93971 EXTREMITY STUDY: CPT | Mod: 26,LT

## 2023-10-03 PROCEDURE — 85025 COMPLETE CBC W/AUTO DIFF WBC: CPT

## 2023-10-03 PROCEDURE — 33274 TCAT INSJ/RPL PERM LDLS PM: CPT | Mod: Q0

## 2023-10-03 RX ORDER — CLOPIDOGREL BISULFATE 75 MG/1
75 TABLET, FILM COATED ORAL DAILY
Refills: 0 | Status: DISCONTINUED | OUTPATIENT
Start: 2023-10-03 | End: 2023-10-04

## 2023-10-03 RX ORDER — LEVOTHYROXINE SODIUM 125 MCG
88 TABLET ORAL DAILY
Refills: 0 | Status: DISCONTINUED | OUTPATIENT
Start: 2023-10-03 | End: 2023-10-04

## 2023-10-03 RX ORDER — ASPIRIN/CALCIUM CARB/MAGNESIUM 324 MG
1 TABLET ORAL
Qty: 0 | Refills: 0 | DISCHARGE

## 2023-10-03 RX ORDER — ATORVASTATIN CALCIUM 80 MG/1
1 TABLET, FILM COATED ORAL
Qty: 0 | Refills: 0 | DISCHARGE

## 2023-10-03 RX ORDER — FAMOTIDINE 10 MG/ML
0.5 INJECTION INTRAVENOUS
Qty: 0 | Refills: 0 | DISCHARGE

## 2023-10-03 RX ORDER — INFLUENZA VIRUS VACCINE 15; 15; 15; 15 UG/.5ML; UG/.5ML; UG/.5ML; UG/.5ML
0.7 SUSPENSION INTRAMUSCULAR ONCE
Refills: 0 | Status: DISCONTINUED | OUTPATIENT
Start: 2023-10-03 | End: 2023-10-04

## 2023-10-03 RX ORDER — FAMOTIDINE 10 MG/ML
20 INJECTION INTRAVENOUS DAILY
Refills: 0 | Status: DISCONTINUED | OUTPATIENT
Start: 2023-10-03 | End: 2023-10-04

## 2023-10-03 RX ORDER — ACETAMINOPHEN 500 MG
650 TABLET ORAL EVERY 6 HOURS
Refills: 0 | Status: DISCONTINUED | OUTPATIENT
Start: 2023-10-03 | End: 2023-10-04

## 2023-10-03 RX ORDER — LEVOTHYROXINE SODIUM 125 MCG
1 TABLET ORAL
Qty: 0 | Refills: 0 | DISCHARGE

## 2023-10-03 RX ORDER — CLOPIDOGREL BISULFATE 75 MG/1
1 TABLET, FILM COATED ORAL
Qty: 0 | Refills: 0 | DISCHARGE

## 2023-10-03 RX ORDER — ACETAMINOPHEN 500 MG
1 TABLET ORAL
Qty: 0 | Refills: 0 | DISCHARGE

## 2023-10-03 RX ORDER — AMIODARONE HYDROCHLORIDE 400 MG/1
1 TABLET ORAL
Qty: 0 | Refills: 0 | DISCHARGE

## 2023-10-03 RX ORDER — AMIODARONE HYDROCHLORIDE 400 MG/1
200 TABLET ORAL DAILY
Refills: 0 | Status: DISCONTINUED | OUTPATIENT
Start: 2023-10-03 | End: 2023-10-04

## 2023-10-03 RX ORDER — ATORVASTATIN CALCIUM 80 MG/1
20 TABLET, FILM COATED ORAL AT BEDTIME
Refills: 0 | Status: DISCONTINUED | OUTPATIENT
Start: 2023-10-03 | End: 2023-10-04

## 2023-10-03 RX ORDER — VANCOMYCIN HCL 1 G
1000 VIAL (EA) INTRAVENOUS ONCE
Refills: 0 | Status: COMPLETED | OUTPATIENT
Start: 2023-10-03 | End: 2023-10-03

## 2023-10-03 RX ORDER — ASPIRIN/CALCIUM CARB/MAGNESIUM 324 MG
81 TABLET ORAL DAILY
Refills: 0 | Status: DISCONTINUED | OUTPATIENT
Start: 2023-10-03 | End: 2023-10-04

## 2023-10-03 RX ADMIN — Medication 650 MILLIGRAM(S): at 11:43

## 2023-10-03 RX ADMIN — Medication 250 MILLIGRAM(S): at 08:03

## 2023-10-03 NOTE — PATIENT PROFILE ADULT - CHOOSE INDICATION TO IMMUNIZE (AN ORDER WILL BE GENERATED WHEN THIS NOTE IS SAVED):
Dr. Anthony Craw aware of patient placement in Care Lounge to initiate IVF. Patient is not pregnant (male or female)

## 2023-10-03 NOTE — PATIENT PROFILE ADULT - NSPRESCRALCFREQ_GEN_A_NUR
Onset: 4/8/20 2 pm    Location / description: Shen is calling with severe abdominal pain that started at 2 pm.  Pain is constant.  He he vomited multiple times, denies blood.  Pain is left sided  Bulge present on left side runs down the abdomen  Very difficult to talk during triage due to pain  Denies fever    Precipitating Factors: NA  Pain Scale (1-10), 10 highest: 10/10  Associated Symptoms: Above  What  improves / worsens symptoms: NA  Symptom specific medications: See List  Recent Care: None Recent  Did the patient have a positive coronavirus screening?: No    PLAN:  Directed to Emergency Department    Patient/Caller agrees to follow recommendations.  Reason for Disposition  • [1] SEVERE pain (e.g., excruciating) AND [2] present > 1 hour    Protocols used: ABDOMINAL PAIN - MALE-A-AH    
Regardinyr/ left side pain, abd pain  ----- Message from Clarisa Pace sent at 2020 12:58 AM CDT -----  Patient Name: Shen Galvez    Specialist or PCP Full Name: Stephan Tom, DO    Pregnant (If Yes, how long?):na     Symptoms: left side pain, abd pain     Do you or any of your household members have the following:  Fever > 100.4#F: Yes    New or worsening cough: No    Shortness of breath: No    Sore throat: No    New onset of nausea, vomiting or diarrhea: No    New onset of loss of taste or smell: No    Have you or a household member tested positive for COVID-19 in the last 14 days?: No    Call Back #:[phone]    Call Center Account #:[id number]    Please update the Demographics section with the patients permanent resident address       
Never

## 2023-10-03 NOTE — CONSULT NOTE ADULT - SUBJECTIVE AND OBJECTIVE BOX
HPI:  87 yo F w/ CAD, SSS & ESRD (on HD MWF via L BC AVG), who is admitted after EP procedure and is awaiting HD tomorrow. Vascular Surgery was consulted when patient's left brachiocephalic AV graft was found to be without a palpable thrill. Patient is seen on the floor and reports no issues with completing [facility] sessions [facility] where there have been no alarms sounding during her session and no prolonged bleeding after removing cannulation needles. She reports only pain with insertion of access needles. She denies paresthesias or pain developing distally while being dialyzed.    14 systems reviewed with pertinent positives and negatives as above.    PAST MEDICAL & SURGICAL HISTORY:  HTN (hypertension)      HLD (hyperlipidemia)      History of valvular heart disease      CAD (coronary artery disease)      Stage 3 chronic kidney disease      SSS (sick sinus syndrome)      Hearing loss      Frequent UTI      DDD (degenerative disc disease), lumbar      Unsteady gait      Imbalance      Hypothyroidism      Anemia      S/P CABG (coronary artery bypass graft)      H/O: hysterectomy      History of appendectomy      S/P arteriovenous (AV) fistula creation      History of removal of Port-a-Cath          MEDICATIONS  (STANDING):  aMIOdarone    Tablet 200 milliGRAM(s) Oral daily  aspirin  chewable 81 milliGRAM(s) Oral daily  atorvastatin 20 milliGRAM(s) Oral at bedtime  clopidogrel Tablet 75 milliGRAM(s) Oral daily  famotidine    Tablet 20 milliGRAM(s) Oral daily  levothyroxine 88 MICROGram(s) Oral daily    MEDICATIONS  (PRN):  acetaminophen     Tablet .. 650 milliGRAM(s) Oral every 6 hours PRN Mild Pain (1 - 3)      Allergies    penicillin (Rash)    Intolerances        SOCIAL HISTORY: Nonsmoker, denies EtOH    FAMILY HISTORY:  FHx: heart disease (Father)            Physical Exam:  GENERAL: NAD, well developed, well nourished  HEAD: Atraumatic, normocephalic  EYES: EOMI, PERRLA, conjunctiva and sclera clear  ENT: moist mucous membrane  NECK: supple, No JVD, midline trachea  CHEST/LUNG: clear to auscultation b/l, no rales, rhonchi, wheezing or rubs. unlabored respirations  Heart: S1, S2 normal, RRR w/o murmur  ABDOMEN: soft, nondistended, nontender. no organomegaly  EXTREMITIES: +2 left radial and AV graft pulses, AV graft is without palpable thrill and is with bruit auscultated throughout, though softer proximally on the arm, brisk cap refill. no clubbing, cyanosis or edema  NERVOUS SYSTEM: AOx4, speech clear, no neuro-deficits  MSK: full ROM, no deformities  SKIN: warm to touch, no rash, mild ecchymosis near inferolateral AV graft access sites        Vital Signs Last 24 Hrs  T(C): 36.4 (03 Oct 2023 15:36), Max: 36.7 (03 Oct 2023 07:00)  T(F): 97.5 (03 Oct 2023 15:36), Max: 98.1 (03 Oct 2023 07:00)  HR: 66 (03 Oct 2023 15:36) (57 - 70)  BP: 100/31 (03 Oct 2023 15:36) (100/31 - 144/64)  BP(mean): --  RR: 20 (03 Oct 2023 15:36) (16 - 20)  SpO2: 96% (03 Oct 2023 15:36) (94% - 98%)    Parameters below as of 03 Oct 2023 15:36  Patient On (Oxygen Delivery Method): room air        I&O's Summary          LABS:              CAPILLARY BLOOD GLUCOSE            Cultures:      RADIOLOGY & ADDITIONAL STUDIES:    AV graft duplex pending

## 2023-10-03 NOTE — CHART NOTE - NSCHARTNOTEFT_GEN_A_CORE
Patient with ESRD on HD M,W,F  Confirmed with dialysis that patient is scheduled for HD at [facility] tomorrow before being discharged  Spoke with Mina in Dialysis

## 2023-10-03 NOTE — PATIENT PROFILE ADULT - FALL HARM RISK - HARM RISK INTERVENTIONS

## 2023-10-03 NOTE — CONSULT NOTE ADULT - ASSESSMENT
89 yo F w/ CAD, SSS & ESRD (on HD MWF via L BC AVG), who is admitted after EP procedure and is awaiting HD tomorrow. Vascular Surgery was consulted when patient's left brachiocephalic AV graft was found to be without a palpable thrill. Though she has reportedly been without issues at HD, her physical exam would suggest that she is with possible thrombosis, and so would benefit from duplex of her graft, with possible need for endovascular intervention pending results.    -Further recommendations pending duplex of left brachiocephalic graft    This was discussed with Dr. Boyle, who is in agreement.

## 2023-10-03 NOTE — PACU DISCHARGE NOTE - COMMENTS
pt is s/p Micra implant via RFA. pt is aaox4, denies pain, discomfort, palpitations, SOB, dizziness. RLE is warm and mobile w no s/sx bleeding or hematoma. discharge instructions reviewed w pt including medication reconciliation, follow up appts and post catheterization precautions. pt verbalizes understanding of information and provides teachback. all questions answered to pt satisfaction. report is given to Lisa RN 3N. pt is awaiting transport to [facility].

## 2023-10-04 ENCOUNTER — TRANSCRIPTION ENCOUNTER (OUTPATIENT)
Age: 88
End: 2023-10-04

## 2023-10-04 VITALS — HEART RATE: 63 BPM | SYSTOLIC BLOOD PRESSURE: 118 MMHG | DIASTOLIC BLOOD PRESSURE: 61 MMHG | RESPIRATION RATE: 18 BRPM

## 2023-10-04 LAB
ANION GAP SERPL CALC-SCNC: 7 MMOL/L — SIGNIFICANT CHANGE UP (ref 5–17)
BASOPHILS # BLD AUTO: 0.05 K/UL — SIGNIFICANT CHANGE UP (ref 0–0.2)
BASOPHILS NFR BLD AUTO: 0.7 % — SIGNIFICANT CHANGE UP (ref 0–2)
BUN SERPL-MCNC: 39 MG/DL — HIGH (ref 7–23)
CALCIUM SERPL-MCNC: 8.6 MG/DL — SIGNIFICANT CHANGE UP (ref 8.5–10.1)
CHLORIDE SERPL-SCNC: 97 MMOL/L — SIGNIFICANT CHANGE UP (ref 96–108)
CO2 SERPL-SCNC: 30 MMOL/L — SIGNIFICANT CHANGE UP (ref 22–31)
CREAT SERPL-MCNC: 5.59 MG/DL — HIGH (ref 0.5–1.3)
EGFR: 7 ML/MIN/1.73M2 — LOW
EOSINOPHIL # BLD AUTO: 0.65 K/UL — HIGH (ref 0–0.5)
EOSINOPHIL NFR BLD AUTO: 9.7 % — HIGH (ref 0–6)
GLUCOSE SERPL-MCNC: 89 MG/DL — SIGNIFICANT CHANGE UP (ref 70–99)
HCT VFR BLD CALC: 29.2 % — LOW (ref 34.5–45)
HGB BLD-MCNC: 9.9 G/DL — LOW (ref 11.5–15.5)
IMM GRANULOCYTES NFR BLD AUTO: 0.3 % — SIGNIFICANT CHANGE UP (ref 0–0.9)
LYMPHOCYTES # BLD AUTO: 1.28 K/UL — SIGNIFICANT CHANGE UP (ref 1–3.3)
LYMPHOCYTES # BLD AUTO: 19 % — SIGNIFICANT CHANGE UP (ref 13–44)
MCHC RBC-ENTMCNC: 33.9 GM/DL — SIGNIFICANT CHANGE UP (ref 32–36)
MCHC RBC-ENTMCNC: 34.1 PG — HIGH (ref 27–34)
MCV RBC AUTO: 100.7 FL — HIGH (ref 80–100)
MONOCYTES # BLD AUTO: 0.58 K/UL — SIGNIFICANT CHANGE UP (ref 0–0.9)
MONOCYTES NFR BLD AUTO: 8.6 % — SIGNIFICANT CHANGE UP (ref 2–14)
NEUTROPHILS # BLD AUTO: 4.14 K/UL — SIGNIFICANT CHANGE UP (ref 1.8–7.4)
NEUTROPHILS NFR BLD AUTO: 61.7 % — SIGNIFICANT CHANGE UP (ref 43–77)
PLATELET # BLD AUTO: 130 K/UL — LOW (ref 150–400)
POTASSIUM SERPL-MCNC: 4 MMOL/L — SIGNIFICANT CHANGE UP (ref 3.5–5.3)
POTASSIUM SERPL-SCNC: 4 MMOL/L — SIGNIFICANT CHANGE UP (ref 3.5–5.3)
RBC # BLD: 2.9 M/UL — LOW (ref 3.8–5.2)
RBC # FLD: 12.4 % — SIGNIFICANT CHANGE UP (ref 10.3–14.5)
SODIUM SERPL-SCNC: 134 MMOL/L — LOW (ref 135–145)
WBC # BLD: 6.72 K/UL — SIGNIFICANT CHANGE UP (ref 3.8–10.5)
WBC # FLD AUTO: 6.72 K/UL — SIGNIFICANT CHANGE UP (ref 3.8–10.5)

## 2023-10-04 PROCEDURE — 93010 ELECTROCARDIOGRAM REPORT: CPT

## 2023-10-04 RX ADMIN — Medication 1 TABLET(S): at 09:43

## 2023-10-04 RX ADMIN — Medication 88 MICROGRAM(S): at 05:58

## 2023-10-04 RX ADMIN — CLOPIDOGREL BISULFATE 75 MILLIGRAM(S): 75 TABLET, FILM COATED ORAL at 09:43

## 2023-10-04 RX ADMIN — FAMOTIDINE 20 MILLIGRAM(S): 10 INJECTION INTRAVENOUS at 09:43

## 2023-10-04 RX ADMIN — AMIODARONE HYDROCHLORIDE 200 MILLIGRAM(S): 400 TABLET ORAL at 09:43

## 2023-10-04 RX ADMIN — Medication 81 MILLIGRAM(S): at 09:43

## 2023-10-04 NOTE — DISCHARGE NOTE NURSING/CASE MANAGEMENT/SOCIAL WORK - PATIENT PORTAL LINK FT
You can access the FollowMyHealth Patient Portal offered by [facility] by registering at the following website: [email or web address]. By joining [facility]’s FollowMyHealth portal, you will also be able to view your health information using other applications (apps) compatible with our system.

## 2023-10-04 NOTE — PROGRESS NOTE ADULT - ASSESSMENT
Patient is a 87y Female whom presented to the hospital with atrophic left kidney ,CABG in October recent RADHA progression to ESRD here with N/V, renal eval of ESRD. Patient dc on tuesday, hd at [facility] on 2/22 and was not feeling well. Home last night weak with N/V and noted in ed w fever.      ESRD  -HD today, avg ok for use per vascular  -Outpatient ESRD follow up  -Renally dosed meds    Anemia    KENENTH w HD per primary units       d/c with Rn staff, team   
87 yo F w/ CAD, SSS & ESRD (on HD MWF via L BC AVG), who is admitted after EP procedure and is awaiting HD tomorrow. Vascular Surgery was consulted when patient's left brachiocephalic AV graft was found to be without a palpable thrill, however, had been having successful HD through the graft.    Graft duplex appears to have no thrombosis/stenosis  awaiting HD session through AVG today to determine functionality  if HD successful, no further surgical intervention indicated at this time    Plan discussed with Dr. Boyle

## 2023-10-04 NOTE — DISCHARGE NOTE NURSING/CASE MANAGEMENT/SOCIAL WORK - NSDCPEFALRISK_GEN_ALL_CORE
For information on Fall & Injury Prevention, visit: [email or web address] OR  [email or web address] OR  https://www.cdc.gov/steadi/patient.html

## 2023-10-04 NOTE — DISCHARGE NOTE PROVIDER - HOSPITAL COURSE
88 years old female with a history of HTN, HLD, CAD( CABG), Valvular heart disease, ESRD (dialysis - Mon, Wed, Fri), Left AV Fistula, [place] (b/l hearing aids) and Hypothyroidism. She was at dialysis and had bradycardia. She went to see Dr. Collins and was placed on a monitor. Seen by Dr. Palomares for sick sinus syndrome.  She was referred for MICRA pacemaker implant.    10/4/23: s/p MICRA PPM POD #1.  Pt seen lying in bed in NAD, she denies CP, SOB, palpitations, dizziness.    TELE: SR 60-80 bpm  EKG: SR 63 bpm, MO 208ms, QRS 148ms, QTc 505ms (corrected for wide QRS approx 460ms)      MEDICATIONS  (STANDING):  aMIOdarone    Tablet 200 milliGRAM(s) Oral daily  aspirin  chewable 81 milliGRAM(s) Oral daily  atorvastatin 20 milliGRAM(s) Oral at bedtime  clopidogrel Tablet 75 milliGRAM(s) Oral daily  famotidine    Tablet 20 milliGRAM(s) Oral daily  influenza  Vaccine (HIGH DOSE) 0.7 milliLiter(s) IntraMuscular once  levothyroxine 88 MICROGram(s) Oral daily  Nephro-shawna 1 Tablet(s) Oral daily    MEDICATIONS  (PRN):  acetaminophen     Tablet .. 650 milliGRAM(s) Oral every 6 hours PRN Mild Pain (1 - 3)    Allergies  penicillin (Rash)    REVIEW OF SYSTEMS:  CONSTITUTIONAL: No weakness, fevers or chills  EYES/ENT: No visual changes;  No vertigo or throat pain   NECK: No pain or stiffness  RESPIRATORY: No cough, wheezing, hemoptysis; No shortness of breath  CARDIOVASCULAR: No chest pain or palpitations  GASTROINTESTINAL: No abdominal or epigastric pain. No nausea, vomiting, or hematemesis; No diarrhea or constipation. No melena or hematochezia.  GENITOURINARY: No dysuria, frequency or hematuria  NEUROLOGICAL: No numbness or weakness  SKIN: No itching, burning, rashes, or lesions   All other review of systems is negative unless indicated above    Vital Signs Last 24 Hrs  T(C): 36.9 (04 Oct 2023 09:16), Max: 36.9 (04 Oct 2023 09:16)  T(F): 98.5 (04 Oct 2023 09:16), Max: 98.5 (04 Oct 2023 09:16)  HR: 64 (04 Oct 2023 09:16) (57 - 66)  BP: 127/68 (04 Oct 2023 09:16) (100/31 - 127/68)  BP(mean): --  RR: 18 (04 Oct 2023 09:16) (16 - 20)  SpO2: 97% (04 Oct 2023 09:16) (94% - 98%)    Parameters below as of 04 Oct 2023 09:16  Patient On (Oxygen Delivery Method): room air                          9.9    6.72  )-----------( 130      ( 04 Oct 2023 07:27 )             29.2   10-04    134<L>  |  97  |  39<H>  ----------------------------<  89  4.0   |  30  |  5.59<H>    Ca    8.6      04 Oct 2023 07:27    PHYSICAL EXAM:  General: WN/WD NAD  Neurology: A&Ox3, nonfocal, POLANCO x 4  HEENT: NC/AT, neck supple, no JVD, EOMI, PERRL  Respiratory: CTA B/L  CV: RRR, S1S2, no murmurs, rubs or gallops  Abdominal: Soft, NT, ND +BS  Extremities: No edema, + peripheral pulses.  Right groin site is soft, suture removed, no bleeding or hematoma.  Skin: No rashes    A/P:  88 yr old female with above PMHx referred for MICRA PPM implant, POD #1 for SSS.  Post procedure instructions reviewed with patient who verbalized her understanding.  Resume pre procedure medications.  Follow up as outpatient in EP clinic in 2 weeks.  Pt to have hemodialysis this afternoon at 2PM.  Awaiting device interrogation.  She may be discharged to home after HD.  Pt to call for any questions or concerns.               88 years old female with a history of HTN, HLD, CAD( CABG), Valvular heart disease, ESRD (dialysis - Mon, Wed, Fri), Left AV Fistula, [place] (b/l hearing aids) and Hypothyroidism. She was at dialysis and had bradycardia. She went to see Dr. Collins and was placed on a monitor. Seen by Dr. Palomares for sick sinus syndrome.  She was referred for MICRA pacemaker implant.    10/4/23: s/p MICRA PPM POD #1.  Pt seen lying in bed in NAD, she denies CP, SOB, palpitations, dizziness.    TELE: SR 60-80 bpm  EKG: SR 63 bpm, VA 208ms, QRS 148ms, QTc 505ms (corrected for wide QRS approx 460ms)      MEDICATIONS  (STANDING):  aMIOdarone    Tablet 200 milliGRAM(s) Oral daily  aspirin  chewable 81 milliGRAM(s) Oral daily  atorvastatin 20 milliGRAM(s) Oral at bedtime  clopidogrel Tablet 75 milliGRAM(s) Oral daily  famotidine    Tablet 20 milliGRAM(s) Oral daily  influenza  Vaccine (HIGH DOSE) 0.7 milliLiter(s) IntraMuscular once  levothyroxine 88 MICROGram(s) Oral daily  Nephro-shawna 1 Tablet(s) Oral daily    MEDICATIONS  (PRN):  acetaminophen     Tablet .. 650 milliGRAM(s) Oral every 6 hours PRN Mild Pain (1 - 3)    Allergies  penicillin (Rash)    REVIEW OF SYSTEMS:  CONSTITUTIONAL: No weakness, fevers or chills  EYES/ENT: No visual changes;  No vertigo or throat pain   NECK: No pain or stiffness  RESPIRATORY: No cough, wheezing, hemoptysis; No shortness of breath  CARDIOVASCULAR: No chest pain or palpitations  GASTROINTESTINAL: No abdominal or epigastric pain. No nausea, vomiting, or hematemesis; No diarrhea or constipation. No melena or hematochezia.  GENITOURINARY: No dysuria, frequency or hematuria  NEUROLOGICAL: No numbness or weakness  SKIN: No itching, burning, rashes, or lesions   All other review of systems is negative unless indicated above    Vital Signs Last 24 Hrs  T(C): 36.9 (04 Oct 2023 09:16), Max: 36.9 (04 Oct 2023 09:16)  T(F): 98.5 (04 Oct 2023 09:16), Max: 98.5 (04 Oct 2023 09:16)  HR: 64 (04 Oct 2023 09:16) (57 - 66)  BP: 127/68 (04 Oct 2023 09:16) (100/31 - 127/68)  BP(mean): --  RR: 18 (04 Oct 2023 09:16) (16 - 20)  SpO2: 97% (04 Oct 2023 09:16) (94% - 98%)    Parameters below as of 04 Oct 2023 09:16  Patient On (Oxygen Delivery Method): room air                          9.9    6.72  )-----------( 130      ( 04 Oct 2023 07:27 )             29.2   10-04    134<L>  |  97  |  39<H>  ----------------------------<  89  4.0   |  30  |  5.59<H>    Ca    8.6      04 Oct 2023 07:27    PHYSICAL EXAM:  General: WN/WD NAD  Neurology: A&Ox3, nonfocal, POLANCO x 4  HEENT: NC/AT, neck supple, no JVD, EOMI, PERRL  Respiratory: CTA B/L  CV: RRR, S1S2, no murmurs, rubs or gallops  Abdominal: Soft, NT, ND +BS  Extremities: No edema, + 1 peripheral pulses.  Right groin site is soft, suture removed, no bleeding or hematoma.  LUE AV fistula with +bruit.  Skin: No rashes    A/P:  88 yr old female with above PMHx referred for MICRA PPM implant, POD #1 for SSS.  Post procedure instructions reviewed with patient who verbalized her understanding.  Resume pre procedure medications.  Follow up as outpatient in EP clinic in 2 weeks.  Pt to have hemodialysis this afternoon at 2PM.  Awaiting device interrogation.  She may be discharged to home after HD.  Pt to call for any questions or concerns.

## 2023-10-04 NOTE — PROGRESS NOTE ADULT - SUBJECTIVE AND OBJECTIVE BOX
Patient is a 88y Female who is sp ppm, post procedure convern for avf. Kept ON, seen by vascuilar         MEDICATIONS  (STANDING):  aMIOdarone    Tablet 200 milliGRAM(s) Oral daily  aspirin  chewable 81 milliGRAM(s) Oral daily  atorvastatin 20 milliGRAM(s) Oral at bedtime  clopidogrel Tablet 75 milliGRAM(s) Oral daily  famotidine    Tablet 20 milliGRAM(s) Oral daily  influenza  Vaccine (HIGH DOSE) 0.7 milliLiter(s) IntraMuscular once  levothyroxine 88 MICROGram(s) Oral daily  Nephro-shawna 1 Tablet(s) Oral daily    MEDICATIONS  (PRN):  acetaminophen     Tablet .. 650 milliGRAM(s) Oral every 6 hours PRN Mild Pain (1 - 3)        T(C): , Max: 36.9 (10-04-23 @ 09:16)  T(F): , Max: 98.5 (10-04-23 @ 09:16)  HR: 64 (10-04-23 @ 09:16)  BP: 127/68 (10-04-23 @ 09:16)  BP(mean): --  RR: 18 (10-04-23 @ 09:16)  SpO2: 97% (10-04-23 @ 09:16)  Wt(kg): --        PHYSICAL EXAM:    Constitutional: NAD   HEENT:  MM  Neck: No LAD, No JVD  Respiratory: dist  Cardiovascular: S1 and S2 , R chest ecchy  Extremities:   peripheral edema  Neurological: A/O x 3,   L avg thrill      LABS:                        9.9    6.72  )-----------( 130      ( 04 Oct 2023 07:27 )             29.2     04 Oct 2023 07:27    134    |  97     |  39     ----------------------------<  89     4.0     |  30     |  5.59     Ca    8.6        04 Oct 2023 07:27            Urine Studies:  Urinalysis Basic - ( 04 Oct 2023 07:27 )    Color: x / Appearance: x / SG: x / pH: x  Gluc: 89 mg/dL / Ketone: x  / Bili: x / Urobili: x   Blood: x / Protein: x / Nitrite: x   Leuk Esterase: x / RBC: x / WBC x   Sq Epi: x / Non Sq Epi: x / Bacteria: x            RADIOLOGY & ADDITIONAL STUDIES:              
Pt seen and examined at bedside, no acute events. Pt had no complaints. Denied fever, chills, nausea, vomiting or SOB overnight.     Vital Signs Last 24 Hrs  T(C): 36.4 (03 Oct 2023 23:41), Max: 36.7 (03 Oct 2023 07:00)  T(F): 97.5 (03 Oct 2023 23:41), Max: 98.1 (03 Oct 2023 07:00)  HR: 66 (03 Oct 2023 23:41) (57 - 70)  BP: 117/65 (03 Oct 2023 23:41) (100/31 - 144/64)  BP(mean): --  RR: 18 (03 Oct 2023 23:41) (16 - 20)  SpO2: 97% (03 Oct 2023 23:41) (94% - 98%)    Parameters below as of 03 Oct 2023 23:41  Patient On (Oxygen Delivery Method): room air              I&O's Summary    Physical Exam:  GENERAL: NAD, well developed, well nourished  HEAD: Atraumatic, normocephalic  EYES: EOMI, PERRLA, conjunctiva and sclera clear  ENT: moist mucous membrane  NECK: supple, No JVD, midline trachea  CHEST/LUNG: clear to auscultation b/l, no rales, rhonchi, wheezing or rubs. unlabored respirations  Heart: S1, S2 normal, RRR w/o murmur  ABDOMEN: soft, nondistended, nontender. no organomegaly  EXTREMITIES: +2 left radial and AV graft pulses, AV graft is without palpable thrill and is with bruit auscultated throughout, though softer proximally on the arm, brisk cap refill. no clubbing, cyanosis or edema  NERVOUS SYSTEM: AOx4, speech clear, no neuro-deficits  MSK: full ROM, no deformities  SKIN: warm to touch, no rash, mild ecchymosis near inferolateral AV graft access sites

## 2023-10-04 NOTE — DISCHARGE NOTE PROVIDER - CARE PROVIDER_API CALL
Aren Palomares.  Cardiovascular Disease  [address] [place] [address]  Phone: [phone]  Fax: [phone]  Follow Up Time:

## 2023-10-04 NOTE — DISCHARGE NOTE PROVIDER - NSDCMRMEDTOKEN_GEN_ALL_CORE_FT
acetaminophen 500 mg oral tablet: 1 tab(s) orally once a day, As Needed  amiodarone 200 mg oral tablet: 1 tab(s) orally once a day  aspirin 81 mg oral tablet: 1 tab(s) orally once a day  atorvastatin 20 mg oral tablet: 1 tab(s) orally once a day (in the evening)  clopidogrel 75 mg oral tablet: 1 tab(s) orally once a day  famotidine 20 mg oral tablet: 0.5 tab(s) orally once a day  levothyroxine 88 mcg (0.088 mg) oral tablet: 1 tab(s) orally once a day  Nephro-Zoë oral tablet: 1 tab(s) orally once a day

## 2023-10-06 PROBLEM — R26.89 OTHER ABNORMALITIES OF GAIT AND MOBILITY: Chronic | Status: ACTIVE | Noted: 2023-09-29

## 2023-10-06 PROBLEM — D64.9 ANEMIA, UNSPECIFIED: Chronic | Status: ACTIVE | Noted: 2023-09-29

## 2023-10-06 PROBLEM — I49.5 SICK SINUS SYNDROME: Chronic | Status: ACTIVE | Noted: 2023-09-29

## 2023-10-06 PROBLEM — H91.90 UNSPECIFIED HEARING LOSS, UNSPECIFIED EAR: Chronic | Status: ACTIVE | Noted: 2023-09-29

## 2023-10-06 PROBLEM — R26.81 UNSTEADINESS ON FEET: Chronic | Status: ACTIVE | Noted: 2023-09-29

## 2023-10-06 PROBLEM — M51.36 OTHER INTERVERTEBRAL DISC DEGENERATION, LUMBAR REGION: Chronic | Status: ACTIVE | Noted: 2023-09-29

## 2023-10-06 PROBLEM — E03.9 HYPOTHYROIDISM, UNSPECIFIED: Chronic | Status: ACTIVE | Noted: 2023-09-29

## 2023-10-06 PROBLEM — N39.0 URINARY TRACT INFECTION, SITE NOT SPECIFIED: Chronic | Status: ACTIVE | Noted: 2023-09-29

## 2023-10-10 DIAGNOSIS — Z00.6 ENCOUNTER FOR EXAMINATION FOR NORMAL COMPARISON AND CONTROL IN CLINICAL RESEARCH PROGRAM: ICD-10-CM

## 2023-10-10 DIAGNOSIS — I49.5 SICK SINUS SYNDROME: ICD-10-CM

## 2023-10-17 ENCOUNTER — APPOINTMENT (OUTPATIENT)
Dept: ELECTROPHYSIOLOGY | Facility: CLINIC | Age: 88
End: 2023-10-17

## 2023-10-20 ENCOUNTER — HOSPITAL ENCOUNTER (INPATIENT)
Dept: HOSPITAL 74 - JER | Age: 88
LOS: 3 days | Discharge: NURSING, REHAB OR LONG-TERM CARE | DRG: 314 | End: 2023-10-23
Admitting: INTERNAL MEDICINE
Payer: COMMERCIAL

## 2023-10-20 VITALS — BODY MASS INDEX: 70.4 KG/M2

## 2023-10-20 DIAGNOSIS — I25.10: ICD-10-CM

## 2023-10-20 DIAGNOSIS — M10.9: ICD-10-CM

## 2023-10-20 DIAGNOSIS — I12.0: ICD-10-CM
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
## 2023-10-20 DIAGNOSIS — Z99.2: ICD-10-CM

## 2023-10-20 DIAGNOSIS — D64.9: ICD-10-CM

## 2023-10-20 DIAGNOSIS — Z95.1: ICD-10-CM

## 2023-10-20 DIAGNOSIS — N18.6: ICD-10-CM

## 2023-10-20 DIAGNOSIS — T82.590A: Primary | ICD-10-CM

## 2023-10-20 DIAGNOSIS — E03.9: ICD-10-CM

## 2023-10-20 DIAGNOSIS — I25.2: ICD-10-CM

## 2023-10-20 DIAGNOSIS — I10: ICD-10-CM

## 2023-10-20 DIAGNOSIS — K21.9: ICD-10-CM

## 2023-10-20 LAB
ALBUMIN SERPL-MCNC: 3.4 G/DL (ref 3.4–5)
ALP SERPL-CCNC: 99 U/L (ref 45–117)
ALT SERPL-CCNC: 13 U/L (ref 13–61)
ANION GAP SERPL CALC-SCNC: 10 MMOL/L (ref 4–13)
APTT BLD: 28.6 SECONDS (ref 25.2–36.5)
AST SERPL-CCNC: 17 U/L (ref 15–37)
BASOPHILS # BLD: 1.1 % (ref 0–2)
BILIRUB SERPL-MCNC: 0.9 MG/DL (ref 0.2–1)
BUN SERPL-MCNC: 54.6 MG/DL (ref 7–18)
CALCIUM SERPL-MCNC: 8.7 MG/DL (ref 8.5–10.1)
CHLORIDE SERPL-SCNC: 95 MMOL/L (ref 98–107)
CO2 SERPL-SCNC: 31 MMOL/L (ref 21–32)
CREAT SERPL-MCNC: 6.9 MG/DL (ref 0.55–1.3)
DEPRECATED RDW RBC AUTO: 13.4 % (ref 11.6–15.6)
EOSINOPHIL # BLD: 11 % (ref 0–4.5)
GLUCOSE SERPL-MCNC: 85 MG/DL (ref 74–106)
HCT VFR BLD CALC: 27.4 % (ref 32.4–45.2)
HGB BLD-MCNC: 9.4 GM/DL (ref 10.7–15.3)
INR BLD: 1.01 (ref 0.83–1.09)
LYMPHOCYTES # BLD: 21.3 % (ref 8–40)
MCH RBC QN AUTO: 33.1 PG (ref 25.7–33.7)
MCHC RBC AUTO-ENTMCNC: 34.4 G/DL (ref 32–36)
MCV RBC: 96.2 FL (ref 80–96)
MONOCYTES # BLD AUTO: 8.9 % (ref 3.8–10.2)
NEUTROPHILS # BLD: 57.7 % (ref 42.8–82.8)
PLATELET # BLD AUTO: 150 10^3/UL (ref 134–434)
PMV BLD: 8.6 FL (ref 7.5–11.1)
POTASSIUM SERPLBLD-SCNC: 4 MMOL/L (ref 3.5–5.1)
PROT SERPL-MCNC: 5.9 G/DL (ref 6.4–8.2)
PT PNL PPP: 11.7 SEC (ref 9.7–13)
RBC # BLD AUTO: 2.84 M/MM3 (ref 3.6–5.2)
SODIUM SERPL-SCNC: 136 MMOL/L (ref 136–145)
WBC # BLD AUTO: 6.2 K/MM3 (ref 4–10)

## 2023-10-20 PROCEDURE — 5A1D70Z PERFORMANCE OF URINARY FILTRATION, INTERMITTENT, LESS THAN 6 HOURS PER DAY: ICD-10-PCS | Performed by: INTERNAL MEDICINE

## 2023-10-20 RX ADMIN — HEPARIN SODIUM SCH UNIT: 5000 INJECTION, SOLUTION INTRAVENOUS; SUBCUTANEOUS at 23:10

## 2023-10-21 VITALS — RESPIRATION RATE: 18 BRPM

## 2023-10-21 LAB
ANION GAP SERPL CALC-SCNC: 7 MMOL/L (ref 4–13)
BUN SERPL-MCNC: 62.4 MG/DL (ref 7–18)
CALCIUM SERPL-MCNC: 7.9 MG/DL (ref 8.5–10.1)
CHLORIDE SERPL-SCNC: 99 MMOL/L (ref 98–107)
CO2 SERPL-SCNC: 31 MMOL/L (ref 21–32)
CREAT SERPL-MCNC: 7.6 MG/DL (ref 0.55–1.3)
DEPRECATED RDW RBC AUTO: 13.1 % (ref 11.6–15.6)
GLUCOSE SERPL-MCNC: 103 MG/DL (ref 74–106)
HCT VFR BLD CALC: 25.2 % (ref 32.4–45.2)
HGB BLD-MCNC: 8.5 GM/DL (ref 10.7–15.3)
MAGNESIUM SERPL-MCNC: 1.7 MG/DL (ref 1.8–2.4)
MCH RBC QN AUTO: 32.7 PG (ref 25.7–33.7)
MCHC RBC AUTO-ENTMCNC: 33.8 G/DL (ref 32–36)
MCV RBC: 96.8 FL (ref 80–96)
PHOSPHATE SERPL-MCNC: 4 MG/DL (ref 2.5–4.9)
PLATELET # BLD AUTO: 141 10^3/UL (ref 134–434)
PMV BLD: 8.5 FL (ref 7.5–11.1)
POTASSIUM SERPLBLD-SCNC: 3.9 MMOL/L (ref 3.5–5.1)
RBC # BLD AUTO: 2.6 M/MM3 (ref 3.6–5.2)
SODIUM SERPL-SCNC: 137 MMOL/L (ref 136–145)
WBC # BLD AUTO: 5.7 K/MM3 (ref 4–10)

## 2023-10-21 RX ADMIN — HEPARIN SODIUM SCH UNIT: 5000 INJECTION, SOLUTION INTRAVENOUS; SUBCUTANEOUS at 06:52

## 2023-10-21 RX ADMIN — HEPARIN SODIUM SCH UNIT: 5000 INJECTION, SOLUTION INTRAVENOUS; SUBCUTANEOUS at 14:46

## 2023-10-21 RX ADMIN — HEPARIN SODIUM SCH UNIT: 5000 INJECTION, SOLUTION INTRAVENOUS; SUBCUTANEOUS at 22:46

## 2023-10-22 RX ADMIN — HEPARIN SODIUM SCH UNIT: 5000 INJECTION, SOLUTION INTRAVENOUS; SUBCUTANEOUS at 22:30

## 2023-10-22 RX ADMIN — HEPARIN SODIUM SCH UNIT: 5000 INJECTION, SOLUTION INTRAVENOUS; SUBCUTANEOUS at 13:33

## 2023-10-22 RX ADMIN — HEPARIN SODIUM SCH UNIT: 5000 INJECTION, SOLUTION INTRAVENOUS; SUBCUTANEOUS at 22:32

## 2023-10-22 RX ADMIN — HEPARIN SODIUM SCH UNIT: 5000 INJECTION, SOLUTION INTRAVENOUS; SUBCUTANEOUS at 05:48

## 2023-10-23 VITALS — DIASTOLIC BLOOD PRESSURE: 63 MMHG | HEART RATE: 62 BPM | SYSTOLIC BLOOD PRESSURE: 115 MMHG

## 2023-10-23 VITALS — TEMPERATURE: 97.5 F

## 2023-10-23 RX ADMIN — HEPARIN SODIUM SCH UNIT: 5000 INJECTION, SOLUTION INTRAVENOUS; SUBCUTANEOUS at 05:45

## 2023-10-24 ENCOUNTER — APPOINTMENT (OUTPATIENT)
Dept: HEART AND VASCULAR | Facility: CLINIC | Age: 88
End: 2023-10-24
Payer: MEDICARE

## 2023-10-24 ENCOUNTER — NON-APPOINTMENT (OUTPATIENT)
Age: 88
End: 2023-10-24

## 2023-10-24 VITALS
DIASTOLIC BLOOD PRESSURE: 78 MMHG | SYSTOLIC BLOOD PRESSURE: 128 MMHG | WEIGHT: 191 LBS | BODY MASS INDEX: 32.79 KG/M2 | HEART RATE: 69 BPM | OXYGEN SATURATION: 96 %

## 2023-10-24 DIAGNOSIS — Z79.899 OTHER LONG TERM (CURRENT) DRUG THERAPY: ICD-10-CM

## 2023-10-24 DIAGNOSIS — R09.89 OTHER SPECIFIED SYMPTOMS AND SIGNS INVOLVING THE CIRCULATORY AND RESPIRATORY SYSTEMS: ICD-10-CM

## 2023-10-24 DIAGNOSIS — I49.5 SICK SINUS SYNDROME: ICD-10-CM

## 2023-10-24 DIAGNOSIS — Z95.0 PRESENCE OF CARDIAC PACEMAKER: ICD-10-CM

## 2023-10-24 DIAGNOSIS — R01.1 CARDIAC MURMUR, UNSPECIFIED: ICD-10-CM

## 2023-10-24 PROCEDURE — 99215 OFFICE O/P EST HI 40 MIN: CPT | Mod: 24

## 2023-10-24 PROCEDURE — 93000 ELECTROCARDIOGRAM COMPLETE: CPT

## 2023-10-24 PROCEDURE — 93005 ELECTROCARDIOGRAM TRACING: CPT

## 2023-10-24 PROCEDURE — 93010 ELECTROCARDIOGRAM REPORT: CPT

## 2023-10-27 ENCOUNTER — HOSPITAL ENCOUNTER (EMERGENCY)
Dept: HOSPITAL 74 - JER | Age: 88
LOS: 1 days | Discharge: NURSING, REHAB OR LONG-TERM CARE | End: 2023-10-28
Payer: COMMERCIAL

## 2023-10-27 VITALS — SYSTOLIC BLOOD PRESSURE: 136 MMHG | DIASTOLIC BLOOD PRESSURE: 68 MMHG

## 2023-10-27 VITALS — RESPIRATION RATE: 20 BRPM

## 2023-10-27 VITALS — HEART RATE: 64 BPM | TEMPERATURE: 97.8 F

## 2023-10-27 VITALS — BODY MASS INDEX: 30.7 KG/M2

## 2023-10-27 DIAGNOSIS — T82.590A: Primary | ICD-10-CM

## 2023-10-27 DIAGNOSIS — I12.0: ICD-10-CM

## 2023-10-27 DIAGNOSIS — Z99.2: ICD-10-CM

## 2023-10-27 DIAGNOSIS — N18.6: ICD-10-CM

## 2023-10-27 LAB
ALBUMIN SERPL-MCNC: 3.2 G/DL (ref 3.4–5)
ALP SERPL-CCNC: 86 U/L (ref 45–117)
ALT SERPL-CCNC: 18 U/L (ref 13–61)
ANION GAP SERPL CALC-SCNC: 9 MMOL/L (ref 4–13)
APTT BLD: 27.4 SECONDS (ref 25.2–36.5)
AST SERPL-CCNC: 16 U/L (ref 15–37)
BASOPHILS # BLD: 1 % (ref 0–2)
BILIRUB SERPL-MCNC: 0.7 MG/DL (ref 0.2–1)
BUN SERPL-MCNC: 60.5 MG/DL (ref 7–18)
CALCIUM SERPL-MCNC: 8.1 MG/DL (ref 8.5–10.1)
CHLORIDE SERPL-SCNC: 97 MMOL/L (ref 98–107)
CO2 SERPL-SCNC: 31 MMOL/L (ref 21–32)
CREAT SERPL-MCNC: 7.9 MG/DL (ref 0.55–1.3)
DEPRECATED RDW RBC AUTO: 13.5 % (ref 11.6–15.6)
EOSINOPHIL # BLD: 10 % (ref 0–4.5)
GLUCOSE SERPL-MCNC: 93 MG/DL (ref 74–106)
HCT VFR BLD CALC: 26.4 % (ref 32.4–45.2)
HGB BLD-MCNC: 8.9 GM/DL (ref 10.7–15.3)
INR BLD: 1.03 (ref 0.83–1.09)
LYMPHOCYTES # BLD: 19.1 % (ref 8–40)
MAGNESIUM SERPL-MCNC: 1.9 MG/DL (ref 1.8–2.4)
MCH RBC QN AUTO: 32.6 PG (ref 25.7–33.7)
MCHC RBC AUTO-ENTMCNC: 33.7 G/DL (ref 32–36)
MCV RBC: 96.8 FL (ref 80–96)
MONOCYTES # BLD AUTO: 9.2 % (ref 3.8–10.2)
NEUTROPHILS # BLD: 60.7 % (ref 42.8–82.8)
PHOSPHATE SERPL-MCNC: 3.7 MG/DL (ref 2.5–4.9)
PLATELET # BLD AUTO: 158 10^3/UL (ref 134–434)
PMV BLD: 8.7 FL (ref 7.5–11.1)
POTASSIUM SERPLBLD-SCNC: 4.2 MMOL/L (ref 3.5–5.1)
PROT SERPL-MCNC: 5.7 G/DL (ref 6.4–8.2)
PT PNL PPP: 11.9 SEC (ref 9.7–13)
RBC # BLD AUTO: 2.73 M/MM3 (ref 3.6–5.2)
SODIUM SERPL-SCNC: 137 MMOL/L (ref 136–145)
WBC # BLD AUTO: 6.8 K/MM3 (ref 4–10)

## 2023-11-22 ENCOUNTER — APPOINTMENT (OUTPATIENT)
Dept: CARDIOLOGY | Facility: CLINIC | Age: 88
End: 2023-11-22
Payer: MEDICARE

## 2023-11-22 PROCEDURE — 93306 TTE W/DOPPLER COMPLETE: CPT

## 2023-11-28 ENCOUNTER — NON-APPOINTMENT (OUTPATIENT)
Age: 88
End: 2023-11-28

## 2023-11-28 ENCOUNTER — APPOINTMENT (OUTPATIENT)
Dept: CARDIOLOGY | Facility: CLINIC | Age: 88
End: 2023-11-28
Payer: MEDICARE

## 2023-11-28 PROCEDURE — 93880 EXTRACRANIAL BILAT STUDY: CPT

## 2023-12-05 PROBLEM — I45.4 IVCD (INTRAVENTRICULAR CONDUCTION DEFECT): Status: ACTIVE | Noted: 2017-10-10

## 2023-12-05 PROBLEM — I44.7 INCOMPLETE LEFT BUNDLE BRANCH BLOCK (LBBB): Status: ACTIVE | Noted: 2017-10-10

## 2024-01-07 PROBLEM — I73.9 PAD (PERIPHERAL ARTERY DISEASE): Status: ACTIVE | Noted: 2023-06-29

## 2024-01-15 ENCOUNTER — APPOINTMENT (OUTPATIENT)
Dept: ELECTROPHYSIOLOGY | Facility: CLINIC | Age: 89
End: 2024-01-15

## 2024-01-19 ENCOUNTER — HOSPITAL ENCOUNTER (OUTPATIENT)
Dept: HOSPITAL 74 - JER | Age: 89
Setting detail: OBSERVATION
LOS: 3 days | Discharge: NURSING, REHAB OR LONG-TERM CARE | End: 2024-01-22
Admitting: HOSPITALIST
Payer: COMMERCIAL

## 2024-01-19 VITALS — BODY MASS INDEX: 30.6 KG/M2

## 2024-01-19 DIAGNOSIS — R01.1: ICD-10-CM

## 2024-01-19 DIAGNOSIS — I21.4: ICD-10-CM

## 2024-01-19 DIAGNOSIS — D64.9: Primary | ICD-10-CM

## 2024-01-19 DIAGNOSIS — Z99.2: ICD-10-CM

## 2024-01-19 DIAGNOSIS — E03.9: ICD-10-CM

## 2024-01-19 DIAGNOSIS — Z88.0: ICD-10-CM

## 2024-01-19 DIAGNOSIS — M10.9: ICD-10-CM

## 2024-01-19 DIAGNOSIS — K21.9: ICD-10-CM

## 2024-01-19 DIAGNOSIS — N18.6: ICD-10-CM

## 2024-01-19 LAB
ALBUMIN SERPL-MCNC: 2.9 G/DL (ref 3.4–5)
ALP SERPL-CCNC: 104 U/L (ref 45–117)
ALT SERPL-CCNC: 13 U/L (ref 13–61)
ANION GAP SERPL CALC-SCNC: 10 MMOL/L (ref 4–13)
APTT BLD: 20.9 SECONDS (ref 25.2–36.5)
AST SERPL-CCNC: 16 U/L (ref 15–37)
BASOPHILS # BLD: 1.4 % (ref 0–2)
BILIRUB SERPL-MCNC: 0.6 MG/DL (ref 0.2–1)
BUN SERPL-MCNC: 36.6 MG/DL (ref 7–18)
CALCIUM SERPL-MCNC: 8.5 MG/DL (ref 8.5–10.1)
CHLORIDE SERPL-SCNC: 101 MMOL/L (ref 98–107)
CO2 SERPL-SCNC: 24 MMOL/L (ref 21–32)
CREAT SERPL-MCNC: 6.6 MG/DL (ref 0.55–1.3)
DEPRECATED RDW RBC AUTO: 14.2 % (ref 11.6–15.6)
EOSINOPHIL # BLD: 8.8 % (ref 0–4.5)
GLUCOSE SERPL-MCNC: 104 MG/DL (ref 74–106)
HCT VFR BLD CALC: 22.2 % (ref 32.4–45.2)
HGB BLD-MCNC: 7.4 GM/DL (ref 10.7–15.3)
INR BLD: 0.98 (ref 0.83–1.09)
LYMPHOCYTES # BLD: 20.1 % (ref 8–40)
MAGNESIUM SERPL-MCNC: 2.2 MG/DL (ref 1.8–2.4)
MCH RBC QN AUTO: 32.6 PG (ref 25.7–33.7)
MCHC RBC AUTO-ENTMCNC: 33.5 G/DL (ref 32–36)
MCV RBC: 97.5 FL (ref 80–96)
MONOCYTES # BLD AUTO: 9 % (ref 3.8–10.2)
NEUTROPHILS # BLD: 60.7 % (ref 42.8–82.8)
PHOSPHATE SERPL-MCNC: 3.1 MG/DL (ref 2.5–4.9)
POTASSIUM SERPLBLD-SCNC: 4 MMOL/L (ref 3.5–5.1)
PROT SERPL-MCNC: 5.7 G/DL (ref 6.4–8.2)
PT PNL PPP: 11.4 SEC (ref 9.7–13)
RBC # BLD AUTO: 2.28 M/MM3 (ref 3.6–5.2)
SODIUM SERPL-SCNC: 136 MMOL/L (ref 136–145)
WBC # BLD AUTO: 10.8 K/MM3 (ref 4–10)

## 2024-01-19 PROCEDURE — P9058 RBC, L/R, CMV-NEG, IRRAD: HCPCS

## 2024-01-19 PROCEDURE — 3E023GC INTRODUCTION OF OTHER THERAPEUTIC SUBSTANCE INTO MUSCLE, PERCUTANEOUS APPROACH: ICD-10-PCS

## 2024-01-19 PROCEDURE — G0378 HOSPITAL OBSERVATION PER HR: HCPCS

## 2024-01-19 PROCEDURE — P9038 RBC IRRADIATED: HCPCS

## 2024-01-19 PROCEDURE — 30233N1 TRANSFUSION OF NONAUTOLOGOUS RED BLOOD CELLS INTO PERIPHERAL VEIN, PERCUTANEOUS APPROACH: ICD-10-PCS

## 2024-01-19 RX ADMIN — HEPARIN SODIUM SCH UNIT: 5000 INJECTION, SOLUTION INTRAVENOUS; SUBCUTANEOUS at 23:05

## 2024-01-20 LAB
ANION GAP SERPL CALC-SCNC: 8 MMOL/L (ref 4–13)
BUN SERPL-MCNC: 36.5 MG/DL (ref 7–18)
CALCIUM SERPL-MCNC: 8.2 MG/DL (ref 8.5–10.1)
CHLORIDE SERPL-SCNC: 103 MMOL/L (ref 98–107)
CO2 SERPL-SCNC: 26 MMOL/L (ref 21–32)
CREAT SERPL-MCNC: 6.9 MG/DL (ref 0.55–1.3)
DEPRECATED RDW RBC AUTO: 14.2 % (ref 11.6–15.6)
GLUCOSE SERPL-MCNC: 84 MG/DL (ref 74–106)
HCT VFR BLD CALC: 23.8 % (ref 32.4–45.2)
HGB BLD-MCNC: 8.1 GM/DL (ref 10.7–15.3)
MAGNESIUM SERPL-MCNC: 2.2 MG/DL (ref 1.8–2.4)
MCH RBC QN AUTO: 32.9 PG (ref 25.7–33.7)
MCHC RBC AUTO-ENTMCNC: 33.9 G/DL (ref 32–36)
MCV RBC: 97 FL (ref 80–96)
POTASSIUM SERPLBLD-SCNC: 3.8 MMOL/L (ref 3.5–5.1)
RBC # BLD AUTO: 2.45 M/MM3 (ref 3.6–5.2)
SODIUM SERPL-SCNC: 137 MMOL/L (ref 136–145)
WBC # BLD AUTO: 9.4 K/MM3 (ref 4–10)

## 2024-01-20 RX ADMIN — HEPARIN SODIUM SCH UNIT: 5000 INJECTION, SOLUTION INTRAVENOUS; SUBCUTANEOUS at 15:03

## 2024-01-20 RX ADMIN — HEPARIN SODIUM SCH UNIT: 5000 INJECTION, SOLUTION INTRAVENOUS; SUBCUTANEOUS at 21:47

## 2024-01-20 RX ADMIN — HEPARIN SODIUM SCH: 5000 INJECTION, SOLUTION INTRAVENOUS; SUBCUTANEOUS at 06:14

## 2024-01-21 LAB
ALBUMIN SERPL-MCNC: 2.7 G/DL (ref 3.4–5)
ALP SERPL-CCNC: 99 U/L (ref 45–117)
ALT SERPL-CCNC: 13 U/L (ref 13–61)
ANION GAP SERPL CALC-SCNC: 8 MMOL/L (ref 4–13)
AST SERPL-CCNC: 14 U/L (ref 15–37)
BASOPHILS # BLD: 1.2 % (ref 0–2)
BILIRUB SERPL-MCNC: 0.9 MG/DL (ref 0.2–1)
BUN SERPL-MCNC: 25.9 MG/DL (ref 7–18)
CALCIUM SERPL-MCNC: 8.1 MG/DL (ref 8.5–10.1)
CHLORIDE SERPL-SCNC: 103 MMOL/L (ref 98–107)
CO2 SERPL-SCNC: 28 MMOL/L (ref 21–32)
CREAT SERPL-MCNC: 5.1 MG/DL (ref 0.55–1.3)
DEPRECATED RDW RBC AUTO: 15.8 % (ref 11.6–15.6)
EOSINOPHIL # BLD: 8.8 % (ref 0–4.5)
GLUCOSE SERPL-MCNC: 153 MG/DL (ref 74–106)
HCT VFR BLD CALC: 26.1 % (ref 32.4–45.2)
HGB BLD-MCNC: 8.7 GM/DL (ref 10.7–15.3)
LYMPHOCYTES # BLD: 24.1 % (ref 8–40)
MCH RBC QN AUTO: 32.1 PG (ref 25.7–33.7)
MCHC RBC AUTO-ENTMCNC: 33.5 G/DL (ref 32–36)
MCV RBC: 95.8 FL (ref 80–96)
MONOCYTES # BLD AUTO: 8.2 % (ref 3.8–10.2)
NEUTROPHILS # BLD: 57.7 % (ref 42.8–82.8)
PLATELET # BLD AUTO: 167 10^3/UL (ref 134–434)
PMV BLD: 8.4 FL (ref 7.5–11.1)
POTASSIUM SERPLBLD-SCNC: 3.6 MMOL/L (ref 3.5–5.1)
PROT SERPL-MCNC: 5.3 G/DL (ref 6.4–8.2)
RBC # BLD AUTO: 2.72 M/MM3 (ref 3.6–5.2)
SODIUM SERPL-SCNC: 138 MMOL/L (ref 136–145)
WBC # BLD AUTO: 6.8 K/MM3 (ref 4–10)

## 2024-01-21 RX ADMIN — CLOPIDOGREL BISULFATE SCH MG: 75 TABLET, FILM COATED ORAL at 09:45

## 2024-01-21 RX ADMIN — HEPARIN SODIUM SCH UNIT: 5000 INJECTION, SOLUTION INTRAVENOUS; SUBCUTANEOUS at 05:21

## 2024-01-21 RX ADMIN — HEPARIN SODIUM SCH: 5000 INJECTION, SOLUTION INTRAVENOUS; SUBCUTANEOUS at 14:20

## 2024-01-21 RX ADMIN — HEPARIN SODIUM SCH UNIT: 5000 INJECTION, SOLUTION INTRAVENOUS; SUBCUTANEOUS at 21:47

## 2024-01-22 VITALS — RESPIRATION RATE: 18 BRPM | DIASTOLIC BLOOD PRESSURE: 60 MMHG | TEMPERATURE: 98.6 F | SYSTOLIC BLOOD PRESSURE: 149 MMHG

## 2024-01-22 VITALS — HEART RATE: 67 BPM

## 2024-01-22 RX ADMIN — HEPARIN SODIUM SCH UNIT: 5000 INJECTION, SOLUTION INTRAVENOUS; SUBCUTANEOUS at 06:02

## 2024-01-22 RX ADMIN — HEPARIN SODIUM SCH UNIT: 5000 INJECTION, SOLUTION INTRAVENOUS; SUBCUTANEOUS at 14:16

## 2024-01-22 RX ADMIN — CLOPIDOGREL BISULFATE SCH MG: 75 TABLET, FILM COATED ORAL at 09:54

## 2024-02-08 NOTE — HISTORY OF PRESENT ILLNESS
[FreeTextEntry1] : 88F CAD s/p CABG X 3 (R SVG harvested) October 2022 at [facility] Dr. Olson on asa/plavix,  ESRD on HD started March 2023, S/P leadless pacemaker insertion 10/3/23 by EP Dr. Aren Palomares. Medtronic [id number] sys pacemaker transcath MICRA AV SNGL. Referred to Dr. Palomares by her [place] cardiologist Dr. Collins due to bradycardia during dialysis treatments. External monitor placed on patient 6/08/23-7/08/23 reported episodes of sinus bradycardia, sinus arrhythmia, and sinus tachycardia. Intermittent junctional rhythm was also noted. First degree AV block observed. Carotid Duplex US June 2023 with non obstructive bilateral ICA plaque.   reviewed by Dr. Collins 6/29/23.  Patient is endorsing no chest pain or sob.  no leg swelling.  walks with walker.    no palpitations.  no dizziness/LOC.    No hx CVA/VTE.

## 2024-02-08 NOTE — ADDENDUM
[FreeTextEntry1] : Discontinue amiodarone.  After review of [facility] discharge summary, she was started on medication post CABG Oct 2022 for brief post op afib.  This does not warrant indefinite amiodarone therapy as it puts her at undue risk of drug toxicity.

## 2024-02-08 NOTE — ASSESSMENT
[FreeTextEntry1] : 88 F   CAD s/p 3V CABG (R GSV harvested) Oct 2022 Bradycardia s/p leadless Medtronic Micra AV PPM Oct 2023 Carotid Plaque on US June 2023.  No hx of CVA Systolic Murmur -- likely aortic stenosis.  Likely radiating to carotids, less suspicion for primary carotid bruit.   ESRD on HD  Prior records/consultations reviewed.   EKG today NSR IVCD  - volume status is acceptable.  no signs/sx of ischemia.  - update ECHO, eval systolic murmur and if significant aortic stenosis present - update carotid US given bruits, known plaque (likely radiating AS murmur) - continue current regimen of asa/plavix for now pending review of full records.  She is now 1 year post cabg.  May decrease to monotherapy going forward.   - on amiodarone therapy - reason is not documented in records I have reviewed.  Possibly started after CABG for post op afib.  No afib on recent holter monitor.  Not on anticoagulation.  Need to clarify this history.   Obtain op report. - continue lipitor 20mg daily - BP controlled not on meds.   - recommend medtronic ppm check in 6 months

## 2024-03-05 NOTE — PHYSICAL THERAPY INITIAL EVALUATION ADULT - PRECAUTIONS/LIMITATIONS, REHAB EVAL
This patient does have evidence of infective focus-diabetic ulcer left heel   Gm neg bacteremia --ESBL Proteus s/merrem -continue per ID;   My overall impression is severe sepsis .-improving   Antibiotics given- clindamycin(only in ED)  /vancomycn /cefepime now--  Antibiotics (From admission, onward)    Start     Stop Route Frequency Ordered    08/13/19 2300  meropenem-0.9% sodium chloride 500 mg/50 mL IVPB      -- IV Every 12 hours (non-standard times) 08/13/19 1231    08/12/19 2245  mupirocin 2 % ointment      08/17 2059 Nasl 2 times daily 08/12/19 2143          Latest lactate reviewed, they are-  No results for input(s): LACTATE in the last 72 hours.    Organ dysfunction indicated by Acute kidney injury and Acute respiratory failure  Source- heel  Ruled out  pneumonia -check procalcitonin--neg   Source control Achieved by- iv abx, diuresis-debridement /wound care  Of left heel /ID and podiatry following     Heel xray-neg  CT foot -possible septic arthritis 1st [facility] consulted -for CTAortogram w runoff am then determine bka vs aka . Ortho/podiatry following   WOund culture 8/13  Ua/urine culture-ngtd     cxr-trend -     4 telemetry/fall precautions

## 2024-04-10 DIAGNOSIS — I10 ESSENTIAL (PRIMARY) HYPERTENSION: ICD-10-CM

## 2024-04-10 DIAGNOSIS — J44.9 CHRONIC OBSTRUCTIVE PULMONARY DISEASE, UNSPECIFIED: ICD-10-CM

## 2024-04-10 DIAGNOSIS — E78.5 HYPERLIPIDEMIA, UNSPECIFIED: ICD-10-CM

## 2024-04-10 DIAGNOSIS — Z95.1 PRESENCE OF AORTOCORONARY BYPASS GRAFT: ICD-10-CM

## 2024-04-10 RX ORDER — AMIODARONE HYDROCHLORIDE 200 MG/1
200 TABLET ORAL DAILY
Qty: 90 | Refills: 1 | Status: DISCONTINUED | COMMUNITY
End: 2024-04-10

## 2024-04-10 NOTE — HISTORY OF PRESENT ILLNESS
[FreeTextEntry1] : 88F CAD s/p CABG X 3 (R SVG harvested) October 2022 at [facility] Dr. Olson on asa/plavix,  ESRD on HD started March 2023, S/P leadless pacemaker insertion 10/3/23 by EP Dr. Aren Palomares. Medtronic [id number] sys pacemaker transcath MICRA AV SNGL. Referred to Dr. Palomares by her [place] cardiologist Dr. Collins due to bradycardia during dialysis treatments. External monitor placed on patient 6/08/23-7/08/23 reported episodes of sinus bradycardia, sinus arrhythmia, and sinus tachycardia. Intermittent junctional rhythm was also noted. First degree AV block observed. Carotid Duplex US June 2023 with non obstructive bilateral ICA plaque.   reviewed by Dr. Collins 6/29/23.  Patient is endorsing no chest pain or sob.  no leg swelling.  walks with walker.    no palpitations.  no dizziness/LOC.    No hx CVA/VTE.

## 2024-04-10 NOTE — PHYSICAL EXAM
[Well Developed] : well developed [Well Nourished] : well nourished [No Acute Distress] : no acute distress [Normal Conjunctiva] : normal conjunctiva [Normal Venous Pressure] : normal venous pressure [No Carotid Bruit] : no carotid bruit [Normal S1, S2] : normal S1, S2 [No Rub] : no rub [No Gallop] : no gallop [Clear Lung Fields] : clear lung fields [Good Air Entry] : good air entry [No Respiratory Distress] : no respiratory distress  [Soft] : abdomen soft [Non Tender] : non-tender [No Masses/organomegaly] : no masses/organomegaly [Normal Bowel Sounds] : normal bowel sounds [Moves all extremities] : moves all extremities [No Focal Deficits] : no focal deficits [Normal Speech] : normal speech [No ulcers] : no ulcers [No edema] : no edema [No varicosities] : no varicosities [No chronic venous stasis changes] : no chronic venous stasis changes [No cyanosis] : no cyanosis [No rashes] : no rashes [Normal peripheral pulses] : : normal peripheral pulses [de-identified] : AS murmur radiating throughout best heard at aortic area

## 2024-04-10 NOTE — ADDENDUM
[FreeTextEntry1] : Discontinue amiodarone.  After review of [facility] discharge summary, she was started on medication post CABG Oct 2022 for brief post op afib.  This does not warrant indefinite amiodarone therapy as it puts her at undue risk of drug toxicity.

## 2024-04-11 ENCOUNTER — NON-APPOINTMENT (OUTPATIENT)
Age: 89
End: 2024-04-11

## 2024-04-11 ENCOUNTER — APPOINTMENT (OUTPATIENT)
Dept: CARDIOLOGY | Facility: CLINIC | Age: 89
End: 2024-04-11
Payer: MEDICARE

## 2024-04-11 ENCOUNTER — APPOINTMENT (OUTPATIENT)
Dept: HEART AND VASCULAR | Facility: CLINIC | Age: 89
End: 2024-04-11
Payer: MEDICARE

## 2024-04-11 DIAGNOSIS — I35.0 NONRHEUMATIC AORTIC (VALVE) STENOSIS: ICD-10-CM

## 2024-04-11 DIAGNOSIS — Z99.2 END STAGE RENAL DISEASE: ICD-10-CM

## 2024-04-11 DIAGNOSIS — N18.6 END STAGE RENAL DISEASE: ICD-10-CM

## 2024-04-11 DIAGNOSIS — I25.10 ATHEROSCLEROTIC HEART DISEASE OF NATIVE CORONARY ARTERY W/OUT ANGINA PECTORIS: ICD-10-CM

## 2024-04-11 PROBLEM — E78.5 HYPERLIPIDEMIA: Status: ACTIVE | Noted: 2017-03-17

## 2024-04-11 PROBLEM — I10 HTN (HYPERTENSION): Status: ACTIVE | Noted: 2017-03-17

## 2024-04-11 PROBLEM — Z95.1 S/P CABG (CORONARY ARTERY BYPASS GRAFT): Status: ACTIVE | Noted: 2023-10-24

## 2024-04-11 PROBLEM — J44.9 COPD, MILD: Status: ACTIVE | Noted: 2017-10-10

## 2024-04-11 PROCEDURE — 99214 OFFICE O/P EST MOD 30 MIN: CPT

## 2024-04-11 PROCEDURE — 93000 ELECTROCARDIOGRAM COMPLETE: CPT

## 2024-04-11 NOTE — ASSESSMENT
[FreeTextEntry1] : 88 F   CAD s/p 3V CABG (R GSV harvested) Oct 2022 Bradycardia s/p leadless Medtronic Micra AV PPM Oct 2023 Carotid Plaque Mild AS- Prominent Murmur   ESRD on HD EKG today NSR RBBB LAFB  - volume status is good.  no signs/sx of ischemia.  - TTE Nov 2023 - notable for mild AS, she has a prominent AS murmur on exam - Carotid US Nov 2023 - BETTE 20-49% possible > 50% distal R CCA ; LICA 20-49% - continue plavix 75mg. - since last visit we discontinued amiodarone to prevent drug toxicity, it was started due to post op afib.  In sinus rhythm today.  No palpitations.   - continue lipitor 20mg daily - BP controlled not on meds.   Would avoid PRN hydralazine as this could lead to unsafe drops in BP.  She has showed no signs of essential chronic hypertension.  - recommend medtronic ppm check today normal functioning Micra AV - serial monitoring of AS and carotid disease, repeat studies in Dec 2024

## 2024-04-11 NOTE — ADDENDUM
[FreeTextEntry1] : Discontinue amiodarone.  After review of [facility] discharge summary, she was started on medication post CABG Oct 2022 for brief post op afib.  This does not warrant indefinite amiodarone therapy as it puts her at undue risk of drug toxicity.

## 2024-04-11 NOTE — HISTORY OF PRESENT ILLNESS
[FreeTextEntry1] : 88F CAD s/p CABG X 3 (R SVG harvested) October 2022 at [facility] Dr. Olson on asa/plavix,  ESRD on HD started March 2023, S/P leadless pacemaker insertion 10/3/23 by EP Dr. Aren Palomares. Medtronic [id number] sys pacemaker transcath MICRA AV SNGL. Referred to Dr. Palomares by her [place] cardiologist Dr. Collins due to bradycardia during dialysis treatments. External monitor placed on patient 6/08/23-7/08/23 reported episodes of sinus bradycardia, sinus arrhythmia, and sinus tachycardia. Intermittent junctional rhythm was also noted. First degree AV block observed. Carotid Duplex US June 2023 with non obstructive bilateral ICA plaque.   reviewed by Dr. Collins 6/29/23. Patient is endorsing no chest pain or sob.  no leg swelling.  walks with walker.    no palpitations.  no dizziness/LOC.    4/11/24: doing well, no complaints, tolerating HD.  Is being given PO hydral 25 PRN for elevated BP, notes however her BPs are on lower side most of the time, especially on dialysis days. no leg swelling.    No hx CVA/VTE.

## 2024-04-11 NOTE — PHYSICAL EXAM
[Well Developed] : well developed [Well Nourished] : well nourished [No Acute Distress] : no acute distress [Normal Conjunctiva] : normal conjunctiva [Normal Venous Pressure] : normal venous pressure [No Carotid Bruit] : no carotid bruit [Normal S1, S2] : normal S1, S2 [No Rub] : no rub [No Gallop] : no gallop [Clear Lung Fields] : clear lung fields [Good Air Entry] : good air entry [No Respiratory Distress] : no respiratory distress  [Soft] : abdomen soft [Non Tender] : non-tender [No Masses/organomegaly] : no masses/organomegaly [Normal Bowel Sounds] : normal bowel sounds [Moves all extremities] : moves all extremities [No Focal Deficits] : no focal deficits [Normal Speech] : normal speech [No ulcers] : no ulcers [No edema] : no edema [No varicosities] : no varicosities [No chronic venous stasis changes] : no chronic venous stasis changes [No cyanosis] : no cyanosis [No rashes] : no rashes [Normal peripheral pulses] : : normal peripheral pulses [de-identified] : AS murmur radiating throughout best heard at aortic area

## 2024-06-03 NOTE — PATIENT PROFILE ADULT - FUNCTIONAL ASSESSMENT - BASIC MOBILITY 1.
Please see the attached refill request.  
Received refill request for adderall.  PDMP reviewed and demonstrated no abnormal filling patterns.  Refill submitted as requested.  Will follow up with patient as currently scheduled.     
2 = A lot of assistance

## 2024-10-22 ENCOUNTER — RESULT REVIEW (OUTPATIENT)
Age: 89
End: 2024-10-22

## 2024-11-14 ENCOUNTER — APPOINTMENT (OUTPATIENT)
Dept: HEART AND VASCULAR | Facility: CLINIC | Age: 89
End: 2024-11-14
Payer: MEDICARE

## 2024-11-14 ENCOUNTER — NON-APPOINTMENT (OUTPATIENT)
Age: 89
End: 2024-11-14

## 2024-11-14 ENCOUNTER — APPOINTMENT (OUTPATIENT)
Dept: CARDIOLOGY | Facility: CLINIC | Age: 89
End: 2024-11-14
Payer: MEDICARE

## 2024-11-14 VITALS
DIASTOLIC BLOOD PRESSURE: 70 MMHG | WEIGHT: 189 LBS | HEART RATE: 78 BPM | OXYGEN SATURATION: 96 % | SYSTOLIC BLOOD PRESSURE: 148 MMHG | BODY MASS INDEX: 32.44 KG/M2

## 2024-11-14 DIAGNOSIS — I10 ESSENTIAL (PRIMARY) HYPERTENSION: ICD-10-CM

## 2024-11-14 DIAGNOSIS — I65.23 OCCLUSION AND STENOSIS OF BILATERAL CAROTID ARTERIES: ICD-10-CM

## 2024-11-14 DIAGNOSIS — Z95.0 PRESENCE OF CARDIAC PACEMAKER: ICD-10-CM

## 2024-11-14 DIAGNOSIS — I35.0 NONRHEUMATIC AORTIC (VALVE) STENOSIS: ICD-10-CM

## 2024-11-14 DIAGNOSIS — J44.9 CHRONIC OBSTRUCTIVE PULMONARY DISEASE, UNSPECIFIED: ICD-10-CM

## 2024-11-14 DIAGNOSIS — I25.10 ATHEROSCLEROTIC HEART DISEASE OF NATIVE CORONARY ARTERY W/OUT ANGINA PECTORIS: ICD-10-CM

## 2024-11-14 DIAGNOSIS — Z99.2 END STAGE RENAL DISEASE: ICD-10-CM

## 2024-11-14 DIAGNOSIS — I73.9 PERIPHERAL VASCULAR DISEASE, UNSPECIFIED: ICD-10-CM

## 2024-11-14 DIAGNOSIS — Z95.1 PRESENCE OF AORTOCORONARY BYPASS GRAFT: ICD-10-CM

## 2024-11-14 DIAGNOSIS — N18.6 END STAGE RENAL DISEASE: ICD-10-CM

## 2024-11-14 PROCEDURE — 99214 OFFICE O/P EST MOD 30 MIN: CPT

## 2024-11-14 PROCEDURE — G2211 COMPLEX E/M VISIT ADD ON: CPT

## 2024-11-14 PROCEDURE — 93000 ELECTROCARDIOGRAM COMPLETE: CPT

## 2024-12-05 ENCOUNTER — RESULT REVIEW (OUTPATIENT)
Age: 89
End: 2024-12-05

## 2024-12-11 DIAGNOSIS — Z13.1 ENCOUNTER FOR SCREENING FOR DIABETES MELLITUS: ICD-10-CM

## 2024-12-19 NOTE — PATIENT PROFILE ADULT - FUNCTIONAL ASSESSMENT - BASIC MOBILITY SECTION LABEL
Patient has Systolic (HFrEF) heart failure that is Chronic. On presentation their CHF was well compensated. Most recent BNP and echo results are listed below.  Recent Labs     12/18/24 2129   *     Latest ECHO  Results for orders placed during the hospital encounter of 12/10/24    Echo    Interpretation Summary    Left Ventricle: The left ventricle is normal in size. Normal wall thickness. Normal wall motion. Septal motion is consistent with pacing. There is severely reduced systolic function with a visually estimated ejection fraction of 25 - 30%. There is normal diastolic function. E/A ratio is 1.61.    Right Ventricle: Right ventricle was not assessed. Right ventricular enlargement. Systolic function is reduced.TAPSE is 1.80 cm. Pacemaker lead present in the ventricle.    Left Atrium: Left atrium is severely dilated.    Right Atrium: Right atrium is severely dilated.    Aortic Valve: There is mild aortic regurgitation.    Mitral Valve: There is moderate to severe regurgitation.    Tricuspid Valve: There is mild to moderate regurgitation. There is moderate pulmonary hypertension. The estimated PA systolic pressure is at least 59 mmHg.    Pulmonic Valve: There is mild regurgitation.    Current Heart Failure Medications       Plan  - Monitor strict I&Os and daily weights.    - Place on telemetry  - Low sodium diet  - Cardiology has been consulted  - The patient's volume status is at their baseline  - Holding off on resuming home lasix, entresto, jardiance for now 2/2 [place]       .

## 2024-12-26 NOTE — PHYSICAL THERAPY INITIAL EVALUATION ADULT - LIGHT TOUCH SENSATION, LLE, REHAB EVAL
Dr. Michi Bob,    Your patient is scheduled for a lung screening CT on 12/30/24. CMS age criteria for screening is 50-77. Based on her age, she is unfortunately ineligible for lung cancer screening.     Please inform your patient that they do not meet the CMS age criteria for LDCT lung screening and call centralized scheduling to cancel the order.    If you feel she requires imaging for a reason other than tobacco dependence, please consider ordering a \"CT Chest w/o contrast\". Please note however, since she is ineligible for screening, per policy, she would not be followed by the lung screening program        Thank you for your assistance.      Marly Kiran  [facility]     
Pt states that she wanted the screening because she has been a smoker for over 20 years. Pt denies having symptoms, but states that she has concerns due to being a long time smoker.  
within normal limits

## 2025-03-06 ENCOUNTER — APPOINTMENT (OUTPATIENT)
Dept: HEART AND VASCULAR | Facility: CLINIC | Age: 89
End: 2025-03-06
Payer: MEDICARE

## 2025-03-06 ENCOUNTER — APPOINTMENT (OUTPATIENT)
Dept: HEART AND VASCULAR | Facility: CLINIC | Age: 89
End: 2025-03-06

## 2025-03-06 VITALS
SYSTOLIC BLOOD PRESSURE: 132 MMHG | DIASTOLIC BLOOD PRESSURE: 76 MMHG | HEART RATE: 83 BPM | BODY MASS INDEX: 32.27 KG/M2 | OXYGEN SATURATION: 94 % | HEIGHT: 64 IN | WEIGHT: 189 LBS

## 2025-03-06 DIAGNOSIS — I25.10 ATHEROSCLEROTIC HEART DISEASE OF NATIVE CORONARY ARTERY W/OUT ANGINA PECTORIS: ICD-10-CM

## 2025-03-06 DIAGNOSIS — E78.5 HYPERLIPIDEMIA, UNSPECIFIED: ICD-10-CM

## 2025-03-06 DIAGNOSIS — Z99.2 END STAGE RENAL DISEASE: ICD-10-CM

## 2025-03-06 DIAGNOSIS — Z95.1 PRESENCE OF AORTOCORONARY BYPASS GRAFT: ICD-10-CM

## 2025-03-06 DIAGNOSIS — N18.6 END STAGE RENAL DISEASE: ICD-10-CM

## 2025-03-06 DIAGNOSIS — I65.22 OCCLUSION AND STENOSIS OF LEFT CAROTID ARTERY: ICD-10-CM

## 2025-03-06 DIAGNOSIS — I10 ESSENTIAL (PRIMARY) HYPERTENSION: ICD-10-CM

## 2025-03-06 PROCEDURE — 99214 OFFICE O/P EST MOD 30 MIN: CPT

## 2025-03-06 PROCEDURE — 93000 ELECTROCARDIOGRAM COMPLETE: CPT

## 2025-03-06 PROCEDURE — G2211 COMPLEX E/M VISIT ADD ON: CPT

## 2025-03-06 RX ORDER — SEVELAMER CARBONATE 800 MG/1
800 TABLET, FILM COATED ORAL
Refills: 0 | Status: ACTIVE | COMMUNITY

## 2025-03-06 RX ORDER — ALLOPURINOL 100 MG/1
100 TABLET ORAL DAILY
Refills: 0 | Status: ACTIVE | COMMUNITY

## 2025-04-17 ENCOUNTER — RESULT REVIEW (OUTPATIENT)
Age: 89
End: 2025-04-17

## 2025-05-20 ENCOUNTER — APPOINTMENT (OUTPATIENT)
Dept: HEART AND VASCULAR | Facility: CLINIC | Age: 89
End: 2025-05-20
Payer: MEDICARE

## 2025-05-20 VITALS
OXYGEN SATURATION: 95 % | HEIGHT: 64 IN | HEART RATE: 55 BPM | SYSTOLIC BLOOD PRESSURE: 130 MMHG | RESPIRATION RATE: 16 BRPM | WEIGHT: 198 LBS | BODY MASS INDEX: 33.8 KG/M2 | DIASTOLIC BLOOD PRESSURE: 76 MMHG

## 2025-05-20 DIAGNOSIS — I65.23 OCCLUSION AND STENOSIS OF BILATERAL CAROTID ARTERIES: ICD-10-CM

## 2025-05-20 DIAGNOSIS — E78.5 HYPERLIPIDEMIA, UNSPECIFIED: ICD-10-CM

## 2025-05-20 DIAGNOSIS — I25.10 ATHEROSCLEROTIC HEART DISEASE OF NATIVE CORONARY ARTERY W/OUT ANGINA PECTORIS: ICD-10-CM

## 2025-05-20 DIAGNOSIS — Z95.1 PRESENCE OF AORTOCORONARY BYPASS GRAFT: ICD-10-CM

## 2025-05-20 DIAGNOSIS — Z95.0 PRESENCE OF CARDIAC PACEMAKER: ICD-10-CM

## 2025-05-20 DIAGNOSIS — J44.9 CHRONIC OBSTRUCTIVE PULMONARY DISEASE, UNSPECIFIED: ICD-10-CM

## 2025-05-20 DIAGNOSIS — Z79.899 OTHER LONG TERM (CURRENT) DRUG THERAPY: ICD-10-CM

## 2025-05-20 DIAGNOSIS — I10 ESSENTIAL (PRIMARY) HYPERTENSION: ICD-10-CM

## 2025-05-20 DIAGNOSIS — N18.30 CHRONIC KIDNEY DISEASE, STAGE 3 UNSPECIFIED: ICD-10-CM

## 2025-05-20 PROCEDURE — 93000 ELECTROCARDIOGRAM COMPLETE: CPT | Mod: 59

## 2025-05-20 PROCEDURE — G2211 COMPLEX E/M VISIT ADD ON: CPT

## 2025-05-20 PROCEDURE — 93280 PM DEVICE PROGR EVAL DUAL: CPT

## 2025-05-20 PROCEDURE — 99214 OFFICE O/P EST MOD 30 MIN: CPT

## 2025-05-21 RX ORDER — SEVELAMER HYDROCHLORIDE 800 MG/1
800 TABLET, FILM COATED ORAL 3 TIMES DAILY
Refills: 0 | Status: ACTIVE | COMMUNITY

## 2025-06-11 NOTE — CONSULT NOTE ADULT - SUBJECTIVE AND OBJECTIVE BOX
- Hgb 11.6 > 9.6 postpartum  - will initiate ferrous sulfate daily along with vitamin C  
Patient is a 87y Female whom presented to the hospital with atrophic left kidney ,CABG in October recent RADHA progression to ESRD here with N/V, renal eval of ESRD. Patient dc on tuesday, hd at [facility] on 2/22 and was not feeling well. Home last night weak with N/V and noted in ed w fever.     PAST MEDICAL & SURGICAL HISTORY:  HTN (hypertension)      HLD (hyperlipidemia)      History of valvular heart disease      CAD (coronary artery disease)      Stage 3 chronic kidney disease      S/P CABG (coronary artery bypass graft)      H/O: hysterectomy      History of appendectomy          MEDICATIONS  (STANDING):    MEDICATIONS  (PRN):      Allergies    penicillin (Rash)    Intolerances        SOCIAL HISTORY:    FAMILY HISTORY:  FHx: heart disease (Father)        REVIEW OF SYSTEMS:    CONSTITUTIONAL: + weakness, fevers or chills  EYES/ENT: No visual changes;  No vertigo or throat pain   NECK: No pain or stiffness  RESPIRATORY: No cough, wheezing, hemoptysis; No shortness of breath  CARDIOVASCULAR: No chest pain or palpitations  GASTROINTESTINAL: No abdominal or epigastric pain. +nausea, vomiting, or hematemesis; No diarrhea or constipation. No melena or hematochezia.  GENITOURINARY: No dysuria, frequency or hematuria  NEUROLOGICAL: No numbness or weakness  SKIN: No itching, burning, rashes, or lesions   All other review of systems is negative unless indicated above.      T(C): , Max: 38.3 (02-23-23 @ 06:48)  T(F): , Max: 100.9 (02-23-23 @ 06:48)  HR: 70 (02-23-23 @ 10:59)  BP: 127/52 (02-23-23 @ 10:59)  BP(mean): 61 (02-23-23 @ 09:30)  RR: 18 (02-23-23 @ 10:59)  SpO2: 95% (02-23-23 @ 10:59)  Wt(kg): --      Weight (kg): 108.9 (02-23 @ 06:28)    PHYSICAL EXAM:    Constitutional: NAD,   HEENT: MM  Neck: No LAD, No JVD  Respiratory: dist bs  Cardiovascular: S1 and S2   Extremities: No peripheral edema  Neurological: A/O x 3  : No Bose  Skin: No rashes  Access: tdc        LABS:                        9.0    9.94  )-----------( 94       ( 23 Feb 2023 07:02 )             26.7     23 Feb 2023 07:02    137    |  100    |  15     ----------------------------<  135    3.6     |  30     |  2.45   21 Feb 2023 07:29    139    |  105    |  20     ----------------------------<  101    3.6     |  29     |  2.48   20 Feb 2023 13:01    143    |  110    |  39     ----------------------------<  100    3.5     |  30     |  2.96     Ca    8.3        23 Feb 2023 07:02  Ca    8.1        21 Feb 2023 07:29  Ca    7.7        20 Feb 2023 13:01  Phos  2.1       21 Feb 2023 07:29  Phos  2.0       20 Feb 2023 13:01    TPro  5.9    /  Alb  3.0    /  TBili  1.5    /  DBili  x      /  AST  28     /  ALT  44     /  AlkPhos  69     23 Feb 2023 07:02  TPro  x      /  Alb  2.7    /  TBili  x      /  DBili  x      /  AST  x      /  ALT  x      /  AlkPhos  x      21 Feb 2023 07:29  TPro  x      /  Alb  2.5    /  TBili  x      /  DBili  x      /  AST  x      /  ALT  x      /  AlkPhos  x      20 Feb 2023 13:01          Urine Studies:          RADIOLOGY & ADDITIONAL STUDIES:                
Patient is a 87y old  Female who presents with a chief complaint of Chills (25 Feb 2023 14:30)    HPI:  87F with PMH of CKDIIIB, Atrophic Left Kidney, HTN, HLD, Valvular HD, CAD s/p CABG (10/2022), Pulm HTN, Hypothyroidism, CKD on HD s/p right IJ tession on 2/17 admitted on 2/23 for evaluation of chills and vomiting at the end of dialysis, the patient was sent home but could not get off the floor as legs gave out, she came back to hospital for further evaluation. She notes that she has a urinary tract infection and is much improved since starting antibiotics. No pain at the tessio site.           PMH: as above  PSH: as above  Meds: per reconciliation sheet, noted below  MEDICATIONS  (STANDING):  aMIOdarone    Tablet 200 milliGRAM(s) Oral daily  aspirin  chewable 81 milliGRAM(s) Oral daily  atorvastatin 20 milliGRAM(s) Oral at bedtime  cefTRIAXone Injectable. 1000 milliGRAM(s) IV Push every 24 hours  clopidogrel Tablet 75 milliGRAM(s) Oral daily  cyanocobalamin 2000 MICROGram(s) Oral daily  famotidine    Tablet 10 milliGRAM(s) Oral daily  hydrALAZINE 25 milliGRAM(s) Oral two times a day  levothyroxine 88 MICROGram(s) Oral daily  metoprolol succinate ER 25 milliGRAM(s) Oral daily  Nephro-shawna 1 Tablet(s) Oral daily    MEDICATIONS  (PRN):  acetaminophen     Tablet .. 650 milliGRAM(s) Oral every 6 hours PRN Temp greater or equal to 38C (100.4F), Mild Pain (1 - 3)  aluminum hydroxide/magnesium hydroxide/simethicone Suspension 30 milliLiter(s) Oral every 4 hours PRN Dyspepsia  melatonin 3 milliGRAM(s) Oral at bedtime PRN Insomnia  ondansetron Injectable 4 milliGRAM(s) IV Push every 8 hours PRN Nausea and/or Vomiting    Allergies    penicillin (Rash)    Intolerances      Social: no smoking, no alcohol, no illegal drugs; no recent travel, no exposure to TB  FAMILY HISTORY:  FHx: heart disease (Father)       no history of premature cardiovascular disease in first degree relatives  ROS: the patient denies fever,  no HA, no dizziness, no sore throat, no blurry vision, no CP, no palpitations, no SOB, no cough, no abdominal pain, no diarrhea,  no dysuria, no leg pain, no claudication, no rash, no joint aches, no rectal pain or bleeding, no night sweats  All other systems reviewed and are negative    Vital Signs Last 24 Hrs  T(C): 36.3 (25 Feb 2023 07:39), Max: 36.4 (24 Feb 2023 21:18)  T(F): 97.3 (25 Feb 2023 07:39), Max: 97.6 (24 Feb 2023 21:18)  HR: 72 (25 Feb 2023 07:39) (71 - 78)  BP: 145/54 (25 Feb 2023 07:39) (119/64 - 174/78)  BP(mean): 79 (24 Feb 2023 21:18) (79 - 79)  RR: 16 (25 Feb 2023 07:39) (16 - 16)  SpO2: 93% (25 Feb 2023 07:39) (93% - 97%)    Parameters below as of 25 Feb 2023 07:39  Patient On (Oxygen Delivery Method): room air      Daily     Daily     PE:    Constitutional: frail looking  HEENT: NC/AT, EOMI, PERRLA, conjunctivae clear; ears and nose atraumatic; pharynx clear  Neck: supple; thyroid not palpable  Back: no tenderness  Respiratory: respiratory effort normal; clear to auscultation  Cardiovascular: S1S2 regular, no murmurs  Abdomen: soft, not tender, not distended, positive BS; no liver or spleen organomegaly  Genitourinary: no suprapubic tenderness  Musculoskeletal: no muscle tenderness, no joint swelling or tenderness  Neurological/ Psychiatric: AxOx3, judgement and insight normal;  moving all extremities  Skin: no rashes; no palpable lesions; tessio site clean  in right upper chest    Labs: all available labs reviewed                        7.9    7.06  )-----------( 96       ( 25 Feb 2023 07:58 )             24.6     02-25    138  |  103  |  12  ----------------------------<  84  3.6   |  32<H>  |  2.30<H>    Ca    7.8<L>      25 Feb 2023 07:58  Phos  2.7     02-24  Mg     1.7     02-24     Culture - Urine (02.23.23 @ 15:15)    Specimen Source: Clean Catch Clean Catch (Midstream)    Culture Results:   50,000 - 99,000 CFU/mL Klebsiella oxytoca/Raoultella ornithinolytica            < from: CT Abdomen and Pelvis No Cont (02.23.23 @ 08:35) >    ACC: [id number] EXAM:  CT ABDOMEN AND PELVIS   ORDERED BY: MANOHAR OLIVARES     PROCEDURE DATE:  02/23/2023          INTERPRETATION:  CLINICAL INFORMATION: Nausea vomiting and fever.    COMPARISON: 07/05/2022.    CONTRAST/COMPLICATIONS:  IV Contrast: NONE  Oral Contrast: NONE  Complications: None reported at time of study completion    PROCEDURE:  CT of the Abdomen and Pelvis was performed.  Sagittal and coronal reformats were performed.    FINDINGS:  LOWER CHEST: Prior sternotomy and mediastinal surgery. Coronary artery   calcification. Mild cardiomegaly. Nonspecific subpleural fibrotic lung   disease. Diminished blood pool density compatible with anemia.    LIVER: Within normal limits.  BILE DUCTS: Normal caliber.  GALLBLADDER: Within normallimits.  SPLEEN: Within normal limits.  PANCREAS: Within normal limits.  ADRENALS: Within normal limits.  KIDNEYS/URETERS: Atrophic left kidney. 2.4 cm cyst upper pole of the   right kidney.    BLADDER: Minimally distended. Evidence of air/gas withinthe nondependent   urinary bladder that may be related to instrumentation or cystitis.   Correlate clinically.  REPRODUCTIVE ORGANS: Hysterectomy.    BOWEL: No bowel obstruction. Appendix is not visualized. No evidence of   inflammation in the pericecal region. Uncomplicated diverticulosis   predominantly involving the sigmoid colon.  PERITONEUM: No ascites.  VESSELS: Atherosclerotic changes.  RETROPERITONEUM/LYMPH NODES: No lymphadenopathy.  ABDOMINAL WALL: Within normal limits.  BONES: Degenerative changes.    IMPRESSION:  Air/gas in the nondependent urinary bladder that may be related to   instrumentation or cystitis. Correlate clinically. No other imaging   evidence of infection or inflammation.  Atrophic left kidney.  Additional findingsas above.    < end of copied text >    Radiology: all available radiological tests reviewed    Advanced directives addressed: full resuscitation

## 2025-08-19 ENCOUNTER — NON-APPOINTMENT (OUTPATIENT)
Age: 89
End: 2025-08-19

## 2025-08-19 ENCOUNTER — APPOINTMENT (OUTPATIENT)
Dept: HEART AND VASCULAR | Facility: CLINIC | Age: 89
End: 2025-08-19
Payer: MEDICARE

## 2025-08-19 PROCEDURE — 93296 REM INTERROG EVL PM/IDS: CPT

## 2025-08-19 PROCEDURE — 93294 REM INTERROG EVL PM/LDLS PM: CPT
